# Patient Record
Sex: FEMALE | Race: WHITE | Employment: OTHER | URBAN - METROPOLITAN AREA
[De-identification: names, ages, dates, MRNs, and addresses within clinical notes are randomized per-mention and may not be internally consistent; named-entity substitution may affect disease eponyms.]

---

## 2017-01-16 DIAGNOSIS — Z12.31 ENCOUNTER FOR SCREENING MAMMOGRAM FOR BREAST CANCER: Primary | ICD-10-CM

## 2017-01-30 ENCOUNTER — TELEPHONE (OUTPATIENT)
Dept: INTERNAL MEDICINE | Age: 72
End: 2017-01-30

## 2017-01-30 DIAGNOSIS — N18.30 CHRONIC KIDNEY DISEASE, STAGE III (MODERATE) (HCC): ICD-10-CM

## 2017-01-30 DIAGNOSIS — N17.9 ACUTE KIDNEY FAILURE, UNSPECIFIED (HCC): Primary | ICD-10-CM

## 2017-02-10 ENCOUNTER — HOSPITAL ENCOUNTER (OUTPATIENT)
Dept: LAB | Age: 72
Discharge: HOME | End: 2017-02-10
Payer: MEDICARE

## 2017-02-10 LAB
ALBUMIN SERPL-MCNC: 4.5 G/DL (ref 3.9–4.9)
ANION GAP SERPL CALCULATED.3IONS-SCNC: 13 MEQ/L (ref 7–13)
BACTERIA: NORMAL /HPF
BILIRUBIN URINE: NEGATIVE
BLOOD, URINE: NEGATIVE
BUN BLDV-MCNC: 34 MG/DL (ref 8–23)
CALCIUM SERPL-MCNC: 9.6 MG/DL (ref 8.6–10.2)
CHLORIDE BLD-SCNC: 101 MEQ/L (ref 98–107)
CLARITY: CLEAR
CO2: 25 MEQ/L (ref 22–29)
COLOR: YELLOW
CREAT SERPL-MCNC: 1.04 MG/DL (ref 0.5–0.9)
CREATININE URINE: 90.3 MG/DL
EPITHELIAL CELLS, UA: NORMAL /HPF
GFR AFRICAN AMERICAN: >60
GFR NON-AFRICAN AMERICAN: 52.3
GLUCOSE BLD-MCNC: 122 MG/DL (ref 74–109)
GLUCOSE URINE: NEGATIVE MG/DL
KETONES, URINE: NEGATIVE MG/DL
LEUKOCYTE ESTERASE, URINE: NEGATIVE
NITRITE, URINE: NEGATIVE
PH UA: 6 (ref 5–9)
PHOSPHORUS: 4 MG/DL (ref 2.5–4.5)
POTASSIUM SERPL-SCNC: 3.9 MEQ/L (ref 3.5–5.1)
PROTEIN PROTEIN: 65 MG/DL
PROTEIN UA: 30 MG/DL
PROTEIN/CREAT RATIO: 0.7 ML/ML (ref 0–0.2)
RBC UA: NORMAL /HPF (ref 0–2)
SODIUM BLD-SCNC: 139 MEQ/L (ref 132–144)
SPECIFIC GRAVITY UA: 1.02 (ref 1–1.03)
UROBILINOGEN, URINE: 0.2 E.U./DL
WBC UA: NORMAL /HPF (ref 0–5)

## 2017-02-10 PROCEDURE — 36415 COLL VENOUS BLD VENIPUNCTURE: CPT

## 2017-02-10 PROCEDURE — 80069 RENAL FUNCTION PANEL: CPT

## 2017-02-10 PROCEDURE — 81001 URINALYSIS AUTO W/SCOPE: CPT

## 2017-02-10 PROCEDURE — 84156 ASSAY OF PROTEIN URINE: CPT

## 2017-04-04 DIAGNOSIS — Z12.31 ENCOUNTER FOR SCREENING MAMMOGRAM FOR BREAST CANCER: Primary | ICD-10-CM

## 2017-04-19 ENCOUNTER — TELEPHONE (OUTPATIENT)
Dept: FAMILY MEDICINE CLINIC | Age: 72
End: 2017-04-19

## 2017-05-05 ENCOUNTER — TELEPHONE (OUTPATIENT)
Dept: FAMILY MEDICINE CLINIC | Age: 72
End: 2017-05-05

## 2017-05-05 DIAGNOSIS — M54.30 SCIATIC NERVE PAIN, UNSPECIFIED LATERALITY: Primary | ICD-10-CM

## 2017-05-13 ENCOUNTER — HOSPITAL ENCOUNTER (INPATIENT)
Age: 72
LOS: 2 days | Discharge: HOME | DRG: 247 | End: 2017-05-15
Attending: EMERGENCY MEDICINE | Admitting: INTERNAL MEDICINE
Payer: MEDICARE

## 2017-05-13 ENCOUNTER — HOSPITAL ENCOUNTER (EMERGENCY)
Age: 72
Discharge: ANOTHER HOSPITAL | End: 2017-05-13
Attending: EMERGENCY MEDICINE
Payer: MEDICARE

## 2017-05-13 VITALS
HEART RATE: 63 BPM | TEMPERATURE: 97.8 F | SYSTOLIC BLOOD PRESSURE: 176 MMHG | RESPIRATION RATE: 18 BRPM | OXYGEN SATURATION: 62 % | BODY MASS INDEX: 40.35 KG/M2 | WEIGHT: 250 LBS | DIASTOLIC BLOOD PRESSURE: 83 MMHG

## 2017-05-13 DIAGNOSIS — I21.3 ACUTE ST ELEVATION MYOCARDIAL INFARCTION (STEMI), UNSPECIFIED ARTERY (HCC): Primary | ICD-10-CM

## 2017-05-13 PROBLEM — E66.01 MORBID OBESITY DUE TO EXCESS CALORIES (HCC): Status: ACTIVE | Noted: 2017-05-13

## 2017-05-13 PROBLEM — I21.21 ST ELEVATION MYOCARDIAL INFARCTION INVOLVING LEFT CIRCUMFLEX CORONARY ARTERY (HCC): Status: ACTIVE | Noted: 2017-05-13

## 2017-05-13 LAB
ANION GAP SERPL CALCULATED.3IONS-SCNC: 20 MEQ/L (ref 7–13)
BASOPHILS ABSOLUTE: 0 K/UL (ref 0–0.2)
BASOPHILS RELATIVE PERCENT: 0.5 %
BUN BLDV-MCNC: 31 MG/DL (ref 8–23)
CALCIUM SERPL-MCNC: 9.6 MG/DL (ref 8.6–10.2)
CHLORIDE BLD-SCNC: 102 MEQ/L (ref 98–107)
CO2: 21 MEQ/L (ref 22–29)
CREAT SERPL-MCNC: 1.02 MG/DL (ref 0.5–0.9)
EOSINOPHILS ABSOLUTE: 0.3 K/UL (ref 0–0.7)
EOSINOPHILS RELATIVE PERCENT: 3.2 %
GFR AFRICAN AMERICAN: >60
GFR NON-AFRICAN AMERICAN: 53.4
GLUCOSE BLD-MCNC: 133 MG/DL (ref 74–109)
HCT VFR BLD CALC: 39.5 % (ref 37–47)
HEMOGLOBIN: 13.2 G/DL (ref 12–16)
LYMPHOCYTES ABSOLUTE: 2 K/UL (ref 1–4.8)
LYMPHOCYTES RELATIVE PERCENT: 25.6 %
MAGNESIUM: 2.2 MG/DL (ref 1.7–2.3)
MCH RBC QN AUTO: 29.5 PG (ref 27–31.3)
MCHC RBC AUTO-ENTMCNC: 33.3 % (ref 33–37)
MCV RBC AUTO: 88.5 FL (ref 82–100)
MONOCYTES ABSOLUTE: 0.5 K/UL (ref 0.2–0.8)
MONOCYTES RELATIVE PERCENT: 6.7 %
NEUTROPHILS ABSOLUTE: 5.1 K/UL (ref 1.4–6.5)
NEUTROPHILS RELATIVE PERCENT: 64 %
PDW BLD-RTO: 13.4 % (ref 11.5–14.5)
PLATELET # BLD: 229 K/UL (ref 130–400)
POTASSIUM SERPL-SCNC: 4.5 MEQ/L (ref 3.5–5.1)
RBC # BLD: 4.46 M/UL (ref 4.2–5.4)
SODIUM BLD-SCNC: 143 MEQ/L (ref 132–144)
TROPONIN: <0.01 NG/ML (ref 0–0.01)
WBC # BLD: 7.9 K/UL (ref 4.8–10.8)

## 2017-05-13 PROCEDURE — B2111ZZ FLUOROSCOPY OF MULTIPLE CORONARY ARTERIES USING LOW OSMOLAR CONTRAST: ICD-10-PCS | Performed by: INTERNAL MEDICINE

## 2017-05-13 PROCEDURE — 36415 COLL VENOUS BLD VENIPUNCTURE: CPT

## 2017-05-13 PROCEDURE — 99223 1ST HOSP IP/OBS HIGH 75: CPT | Performed by: INTERNAL MEDICINE

## 2017-05-13 PROCEDURE — 83735 ASSAY OF MAGNESIUM: CPT

## 2017-05-13 PROCEDURE — 2580000003 HC RX 258: Performed by: INTERNAL MEDICINE

## 2017-05-13 PROCEDURE — 027034Z DILATION OF CORONARY ARTERY, ONE ARTERY WITH DRUG-ELUTING INTRALUMINAL DEVICE, PERCUTANEOUS APPROACH: ICD-10-PCS | Performed by: INTERNAL MEDICINE

## 2017-05-13 PROCEDURE — 92929 HC PRQ CARD STENT W/ANGIO ADDL: CPT | Performed by: INTERNAL MEDICINE

## 2017-05-13 PROCEDURE — 4A023N7 MEASUREMENT OF CARDIAC SAMPLING AND PRESSURE, LEFT HEART, PERCUTANEOUS APPROACH: ICD-10-PCS | Performed by: INTERNAL MEDICINE

## 2017-05-13 PROCEDURE — 2580000003 HC RX 258: Performed by: EMERGENCY MEDICINE

## 2017-05-13 PROCEDURE — C1874 STENT, COATED/COV W/DEL SYS: HCPCS

## 2017-05-13 PROCEDURE — C1726 CATH, BAL DIL, NON-VASCULAR: HCPCS

## 2017-05-13 PROCEDURE — 6370000000 HC RX 637 (ALT 250 FOR IP): Performed by: EMERGENCY MEDICINE

## 2017-05-13 PROCEDURE — 6360000002 HC RX W HCPCS: Performed by: INTERNAL MEDICINE

## 2017-05-13 PROCEDURE — 6370000000 HC RX 637 (ALT 250 FOR IP)

## 2017-05-13 PROCEDURE — 6370000000 HC RX 637 (ALT 250 FOR IP): Performed by: INTERNAL MEDICINE

## 2017-05-13 PROCEDURE — 2500000003 HC RX 250 WO HCPCS: Performed by: EMERGENCY MEDICINE

## 2017-05-13 PROCEDURE — 96375 TX/PRO/DX INJ NEW DRUG ADDON: CPT

## 2017-05-13 PROCEDURE — 92941 PRQ TRLML REVSC TOT OCCL AMI: CPT | Performed by: INTERNAL MEDICINE

## 2017-05-13 PROCEDURE — 80048 BASIC METABOLIC PNL TOTAL CA: CPT

## 2017-05-13 PROCEDURE — B2151ZZ FLUOROSCOPY OF LEFT HEART USING LOW OSMOLAR CONTRAST: ICD-10-PCS | Performed by: INTERNAL MEDICINE

## 2017-05-13 PROCEDURE — 93458 L HRT ARTERY/VENTRICLE ANGIO: CPT | Performed by: INTERNAL MEDICINE

## 2017-05-13 PROCEDURE — C1769 GUIDE WIRE: HCPCS

## 2017-05-13 PROCEDURE — 2720000010 HC SURG SUPPLY STERILE

## 2017-05-13 PROCEDURE — C1894 INTRO/SHEATH, NON-LASER: HCPCS

## 2017-05-13 PROCEDURE — 84484 ASSAY OF TROPONIN QUANT: CPT

## 2017-05-13 PROCEDURE — 85025 COMPLETE CBC W/AUTO DIFF WBC: CPT

## 2017-05-13 PROCEDURE — 99285 EMERGENCY DEPT VISIT HI MDM: CPT

## 2017-05-13 PROCEDURE — 93005 ELECTROCARDIOGRAM TRACING: CPT

## 2017-05-13 PROCEDURE — 6360000002 HC RX W HCPCS

## 2017-05-13 PROCEDURE — C1887 CATHETER, GUIDING: HCPCS

## 2017-05-13 PROCEDURE — 6360000002 HC RX W HCPCS: Performed by: EMERGENCY MEDICINE

## 2017-05-13 PROCEDURE — 2000000000 HC ICU R&B

## 2017-05-13 PROCEDURE — 96365 THER/PROPH/DIAG IV INF INIT: CPT

## 2017-05-13 RX ORDER — MORPHINE SULFATE 4 MG/ML
INJECTION, SOLUTION INTRAMUSCULAR; INTRAVENOUS
Status: COMPLETED
Start: 2017-05-13 | End: 2017-05-13

## 2017-05-13 RX ORDER — METOPROLOL TARTRATE 5 MG/5ML
5 INJECTION INTRAVENOUS ONCE
Status: COMPLETED | OUTPATIENT
Start: 2017-05-13 | End: 2017-05-13

## 2017-05-13 RX ORDER — PANTOPRAZOLE SODIUM 40 MG/1
40 TABLET, DELAYED RELEASE ORAL DAILY
Status: DISCONTINUED | OUTPATIENT
Start: 2017-05-13 | End: 2017-05-15 | Stop reason: HOSPADM

## 2017-05-13 RX ORDER — ALBUTEROL SULFATE 90 UG/1
2 AEROSOL, METERED RESPIRATORY (INHALATION) EVERY 6 HOURS PRN
Status: DISCONTINUED | OUTPATIENT
Start: 2017-05-13 | End: 2017-05-14

## 2017-05-13 RX ORDER — MORPHINE SULFATE 2 MG/ML
2 INJECTION, SOLUTION INTRAMUSCULAR; INTRAVENOUS ONCE
Status: DISCONTINUED | OUTPATIENT
Start: 2017-05-13 | End: 2017-05-13

## 2017-05-13 RX ORDER — CLOPIDOGREL 300 MG/1
300 TABLET, FILM COATED ORAL ONCE
Status: COMPLETED | OUTPATIENT
Start: 2017-05-13 | End: 2017-05-13

## 2017-05-13 RX ORDER — SODIUM CHLORIDE 0.9 % (FLUSH) 0.9 %
10 SYRINGE (ML) INJECTION EVERY 12 HOURS SCHEDULED
Status: DISCONTINUED | OUTPATIENT
Start: 2017-05-13 | End: 2017-05-15 | Stop reason: HOSPADM

## 2017-05-13 RX ORDER — ACETAMINOPHEN 325 MG/1
650 TABLET ORAL EVERY 4 HOURS PRN
Status: DISCONTINUED | OUTPATIENT
Start: 2017-05-13 | End: 2017-05-15 | Stop reason: HOSPADM

## 2017-05-13 RX ORDER — ASPIRIN 81 MG/1
324 TABLET, CHEWABLE ORAL ONCE
Status: COMPLETED | OUTPATIENT
Start: 2017-05-13 | End: 2017-05-13

## 2017-05-13 RX ORDER — ATORVASTATIN CALCIUM 80 MG/1
80 TABLET, FILM COATED ORAL NIGHTLY
Status: DISCONTINUED | OUTPATIENT
Start: 2017-05-13 | End: 2017-05-15 | Stop reason: HOSPADM

## 2017-05-13 RX ORDER — CLOPIDOGREL BISULFATE 75 MG/1
75 TABLET ORAL DAILY
Status: DISCONTINUED | OUTPATIENT
Start: 2017-05-14 | End: 2017-05-15 | Stop reason: HOSPADM

## 2017-05-13 RX ORDER — HYDRALAZINE HYDROCHLORIDE 20 MG/ML
10 INJECTION INTRAMUSCULAR; INTRAVENOUS EVERY 10 MIN PRN
Status: COMPLETED | OUTPATIENT
Start: 2017-05-13 | End: 2017-05-13

## 2017-05-13 RX ORDER — NITROGLYCERIN 0.4 MG/1
TABLET SUBLINGUAL
Status: COMPLETED
Start: 2017-05-13 | End: 2017-05-13

## 2017-05-13 RX ORDER — ONDANSETRON 2 MG/ML
4 INJECTION INTRAMUSCULAR; INTRAVENOUS EVERY 6 HOURS PRN
Status: DISCONTINUED | OUTPATIENT
Start: 2017-05-13 | End: 2017-05-15 | Stop reason: HOSPADM

## 2017-05-13 RX ORDER — SODIUM CHLORIDE 9 MG/ML
INJECTION, SOLUTION INTRAVENOUS CONTINUOUS
Status: DISPENSED | OUTPATIENT
Start: 2017-05-13 | End: 2017-05-14

## 2017-05-13 RX ORDER — LOSARTAN POTASSIUM AND HYDROCHLOROTHIAZIDE 12.5; 5 MG/1; MG/1
2 TABLET ORAL DAILY
Status: DISCONTINUED | OUTPATIENT
Start: 2017-05-13 | End: 2017-05-15 | Stop reason: HOSPADM

## 2017-05-13 RX ORDER — ASPIRIN 81 MG/1
324 TABLET, CHEWABLE ORAL DAILY
Status: DISCONTINUED | OUTPATIENT
Start: 2017-05-14 | End: 2017-05-15 | Stop reason: HOSPADM

## 2017-05-13 RX ORDER — MORPHINE SULFATE 4 MG/ML
4 INJECTION, SOLUTION INTRAMUSCULAR; INTRAVENOUS ONCE
Status: DISCONTINUED | OUTPATIENT
Start: 2017-05-13 | End: 2017-05-13 | Stop reason: HOSPADM

## 2017-05-13 RX ORDER — SODIUM CHLORIDE 0.9 % (FLUSH) 0.9 %
10 SYRINGE (ML) INJECTION PRN
Status: DISCONTINUED | OUTPATIENT
Start: 2017-05-13 | End: 2017-05-15 | Stop reason: HOSPADM

## 2017-05-13 RX ORDER — 0.9 % SODIUM CHLORIDE 0.9 %
1000 INTRAVENOUS SOLUTION INTRAVENOUS ONCE
Status: COMPLETED | OUTPATIENT
Start: 2017-05-13 | End: 2017-05-13

## 2017-05-13 RX ORDER — HYDROMORPHONE HCL 110MG/55ML
2 PATIENT CONTROLLED ANALGESIA SYRINGE INTRAVENOUS ONCE
Status: DISCONTINUED | OUTPATIENT
Start: 2017-05-13 | End: 2017-05-13 | Stop reason: HOSPADM

## 2017-05-13 RX ORDER — NITROGLYCERIN 20 MG/100ML
5 INJECTION INTRAVENOUS CONTINUOUS
Status: DISCONTINUED | OUTPATIENT
Start: 2017-05-13 | End: 2017-05-13 | Stop reason: HOSPADM

## 2017-05-13 RX ORDER — TRAMADOL HYDROCHLORIDE 50 MG/1
25 TABLET ORAL EVERY 6 HOURS PRN
Status: DISCONTINUED | OUTPATIENT
Start: 2017-05-13 | End: 2017-05-15 | Stop reason: HOSPADM

## 2017-05-13 RX ORDER — HEPARIN SODIUM 5000 [USP'U]/ML
5000 INJECTION, SOLUTION INTRAVENOUS; SUBCUTANEOUS ONCE
Status: COMPLETED | OUTPATIENT
Start: 2017-05-13 | End: 2017-05-13

## 2017-05-13 RX ADMIN — NITROGLYCERIN 10 MCG/MIN: 20 INJECTION INTRAVENOUS at 14:34

## 2017-05-13 RX ADMIN — LOSARTAN POTASSIUM AND HYDROCHLOROTHIAZIDE 2 TABLET: 12.5; 5 TABLET ORAL at 20:47

## 2017-05-13 RX ADMIN — SODIUM CHLORIDE: 9 INJECTION, SOLUTION INTRAVENOUS at 20:48

## 2017-05-13 RX ADMIN — HYDRALAZINE HYDROCHLORIDE 10 MG: 20 INJECTION INTRAMUSCULAR; INTRAVENOUS at 21:36

## 2017-05-13 RX ADMIN — METOPROLOL TARTRATE 5 MG: 5 INJECTION, SOLUTION INTRAVENOUS at 14:35

## 2017-05-13 RX ADMIN — CLOPIDOGREL BISULFATE 300 MG: 300 TABLET, FILM COATED ORAL at 14:33

## 2017-05-13 RX ADMIN — HYDRALAZINE HYDROCHLORIDE 10 MG: 20 INJECTION INTRAMUSCULAR; INTRAVENOUS at 23:37

## 2017-05-13 RX ADMIN — Medication 10 ML: at 20:48

## 2017-05-13 RX ADMIN — SODIUM CHLORIDE 1000 ML: 9 INJECTION, SOLUTION INTRAVENOUS at 14:31

## 2017-05-13 RX ADMIN — MORPHINE SULFATE: 4 INJECTION, SOLUTION INTRAMUSCULAR; INTRAVENOUS at 14:27

## 2017-05-13 RX ADMIN — NITROGLYCERIN: 0.4 TABLET SUBLINGUAL at 14:20

## 2017-05-13 RX ADMIN — PANTOPRAZOLE SODIUM 40 MG: 40 TABLET, DELAYED RELEASE ORAL at 20:46

## 2017-05-13 RX ADMIN — METOPROLOL TARTRATE 25 MG: 25 TABLET, FILM COATED ORAL at 20:47

## 2017-05-13 RX ADMIN — ATORVASTATIN CALCIUM 80 MG: 80 TABLET, FILM COATED ORAL at 20:52

## 2017-05-13 RX ADMIN — ASPIRIN 81 MG 324 MG: 81 TABLET ORAL at 14:29

## 2017-05-13 RX ADMIN — HEPARIN SODIUM 5000 UNITS: 5000 INJECTION, SOLUTION INTRAVENOUS; SUBCUTANEOUS at 14:29

## 2017-05-13 ASSESSMENT — ENCOUNTER SYMPTOMS
COLOR CHANGE: 0
ABDOMINAL DISTENTION: 0
HEMOPTYSIS: 0
COUGH: 0
SHORTNESS OF BREATH: 1
ABDOMINAL PAIN: 0
ABDOMINAL PAIN: 0
SORE THROAT: 0
RHINORRHEA: 0
PHOTOPHOBIA: 0
BACK PAIN: 0
NAUSEA: 0
APNEA: 0
NAUSEA: 0
GASTROINTESTINAL NEGATIVE: 1
EYES NEGATIVE: 1
EYE PAIN: 0
SINUS PRESSURE: 0
DIARRHEA: 0
WHEEZING: 0
WHEEZING: 0
VOMITING: 0
VOMITING: 0
SHORTNESS OF BREATH: 1
CONSTIPATION: 0
ORTHOPNEA: 0

## 2017-05-13 ASSESSMENT — PAIN SCALES - GENERAL
PAINLEVEL_OUTOF10: 5
PAINLEVEL_OUTOF10: 8
PAINLEVEL_OUTOF10: 0
PAINLEVEL_OUTOF10: 8
PAINLEVEL_OUTOF10: 10

## 2017-05-14 LAB
ANION GAP SERPL CALCULATED.3IONS-SCNC: 13 MEQ/L (ref 7–13)
BUN BLDV-MCNC: 27 MG/DL (ref 8–23)
CALCIUM SERPL-MCNC: 9.1 MG/DL (ref 8.6–10.2)
CHLORIDE BLD-SCNC: 103 MEQ/L (ref 98–107)
CHOLESTEROL, TOTAL: 261 MG/DL (ref 0–199)
CO2: 22 MEQ/L (ref 22–29)
CREAT SERPL-MCNC: 0.8 MG/DL (ref 0.5–0.9)
GFR AFRICAN AMERICAN: >60
GFR NON-AFRICAN AMERICAN: >60
GLUCOSE BLD-MCNC: 124 MG/DL (ref 74–109)
HCT VFR BLD CALC: 37.2 % (ref 37–47)
HDLC SERPL-MCNC: 48 MG/DL (ref 40–59)
HEMOGLOBIN: 11.8 G/DL (ref 12–16)
LDL CHOLESTEROL CALCULATED: 167 MG/DL (ref 0–129)
MCH RBC QN AUTO: 28.9 PG (ref 27–31.3)
MCHC RBC AUTO-ENTMCNC: 31.8 % (ref 33–37)
MCV RBC AUTO: 90.8 FL (ref 82–100)
PDW BLD-RTO: 13.5 % (ref 11.5–14.5)
PLATELET # BLD: 160 K/UL (ref 130–400)
POTASSIUM SERPL-SCNC: 4.4 MEQ/L (ref 3.5–5.1)
RBC # BLD: 4.09 M/UL (ref 4.2–5.4)
SODIUM BLD-SCNC: 138 MEQ/L (ref 132–144)
TRIGL SERPL-MCNC: 231 MG/DL (ref 0–200)
WBC # BLD: 7.5 K/UL (ref 4.8–10.8)

## 2017-05-14 PROCEDURE — 2580000003 HC RX 258: Performed by: INTERNAL MEDICINE

## 2017-05-14 PROCEDURE — 36415 COLL VENOUS BLD VENIPUNCTURE: CPT

## 2017-05-14 PROCEDURE — 6370000000 HC RX 637 (ALT 250 FOR IP): Performed by: INTERNAL MEDICINE

## 2017-05-14 PROCEDURE — 99232 SBSQ HOSP IP/OBS MODERATE 35: CPT | Performed by: INTERNAL MEDICINE

## 2017-05-14 PROCEDURE — 2060000000 HC ICU INTERMEDIATE R&B

## 2017-05-14 PROCEDURE — 93005 ELECTROCARDIOGRAM TRACING: CPT

## 2017-05-14 PROCEDURE — 80048 BASIC METABOLIC PNL TOTAL CA: CPT

## 2017-05-14 PROCEDURE — 85027 COMPLETE CBC AUTOMATED: CPT

## 2017-05-14 PROCEDURE — 80061 LIPID PANEL: CPT

## 2017-05-14 RX ORDER — ALBUTEROL SULFATE 90 UG/1
2 AEROSOL, METERED RESPIRATORY (INHALATION) EVERY 4 HOURS PRN
Status: DISCONTINUED | OUTPATIENT
Start: 2017-05-14 | End: 2017-05-15 | Stop reason: HOSPADM

## 2017-05-14 RX ADMIN — Medication 10 ML: at 08:11

## 2017-05-14 RX ADMIN — METOPROLOL TARTRATE 25 MG: 25 TABLET, FILM COATED ORAL at 21:20

## 2017-05-14 RX ADMIN — ACETAMINOPHEN 650 MG: 325 TABLET ORAL at 18:54

## 2017-05-14 RX ADMIN — Medication 10 ML: at 21:20

## 2017-05-14 RX ADMIN — ATORVASTATIN CALCIUM 80 MG: 80 TABLET, FILM COATED ORAL at 21:20

## 2017-05-14 RX ADMIN — METOPROLOL TARTRATE 25 MG: 25 TABLET, FILM COATED ORAL at 08:10

## 2017-05-14 RX ADMIN — ASPIRIN 81 MG CHEWABLE TABLET 324 MG: 81 TABLET CHEWABLE at 08:09

## 2017-05-14 RX ADMIN — PANTOPRAZOLE SODIUM 40 MG: 40 TABLET, DELAYED RELEASE ORAL at 08:10

## 2017-05-14 RX ADMIN — TRAMADOL HYDROCHLORIDE 25 MG: 50 TABLET, FILM COATED ORAL at 00:07

## 2017-05-14 RX ADMIN — LOSARTAN POTASSIUM AND HYDROCHLOROTHIAZIDE 2 TABLET: 12.5; 5 TABLET ORAL at 08:10

## 2017-05-14 RX ADMIN — CLOPIDOGREL BISULFATE 75 MG: 75 TABLET, FILM COATED ORAL at 08:10

## 2017-05-14 RX ADMIN — TRAMADOL HYDROCHLORIDE 25 MG: 50 TABLET, FILM COATED ORAL at 18:54

## 2017-05-14 RX ADMIN — SODIUM CHLORIDE: 9 INJECTION, SOLUTION INTRAVENOUS at 06:40

## 2017-05-14 RX ADMIN — ACETAMINOPHEN 650 MG: 325 TABLET ORAL at 00:06

## 2017-05-14 ASSESSMENT — PAIN DESCRIPTION - LOCATION
LOCATION: BACK

## 2017-05-14 ASSESSMENT — PAIN DESCRIPTION - DIRECTION: RADIATING_TOWARDS: RIGHT LEG

## 2017-05-14 ASSESSMENT — PAIN SCALES - GENERAL
PAINLEVEL_OUTOF10: 0
PAINLEVEL_OUTOF10: 1
PAINLEVEL_OUTOF10: 0
PAINLEVEL_OUTOF10: 1
PAINLEVEL_OUTOF10: 8
PAINLEVEL_OUTOF10: 0
PAINLEVEL_OUTOF10: 8
PAINLEVEL_OUTOF10: 0

## 2017-05-15 VITALS
HEART RATE: 92 BPM | WEIGHT: 250 LBS | OXYGEN SATURATION: 100 % | BODY MASS INDEX: 40.18 KG/M2 | HEIGHT: 66 IN | TEMPERATURE: 99.3 F | RESPIRATION RATE: 18 BRPM | SYSTOLIC BLOOD PRESSURE: 177 MMHG | DIASTOLIC BLOOD PRESSURE: 71 MMHG

## 2017-05-15 LAB
LV EF: 65 %
LVEF MODALITY: NORMAL

## 2017-05-15 PROCEDURE — 99238 HOSP IP/OBS DSCHRG MGMT 30/<: CPT | Performed by: NURSE PRACTITIONER

## 2017-05-15 PROCEDURE — 93306 TTE W/DOPPLER COMPLETE: CPT

## 2017-05-15 PROCEDURE — 93005 ELECTROCARDIOGRAM TRACING: CPT

## 2017-05-15 PROCEDURE — 6370000000 HC RX 637 (ALT 250 FOR IP): Performed by: INTERNAL MEDICINE

## 2017-05-15 PROCEDURE — 2580000003 HC RX 258: Performed by: INTERNAL MEDICINE

## 2017-05-15 RX ORDER — CLOPIDOGREL BISULFATE 75 MG/1
75 TABLET ORAL DAILY
Qty: 30 TABLET | Refills: 11 | Status: SHIPPED | OUTPATIENT
Start: 2017-05-15 | End: 2018-05-01 | Stop reason: SDUPTHER

## 2017-05-15 RX ORDER — ASPIRIN 81 MG/1
81 TABLET ORAL DAILY
Qty: 30 TABLET | Refills: 3 | Status: ON HOLD | OUTPATIENT
Start: 2017-05-15 | End: 2020-12-24 | Stop reason: HOSPADM

## 2017-05-15 RX ORDER — NITROGLYCERIN 0.4 MG/1
0.4 TABLET SUBLINGUAL EVERY 5 MIN PRN
Qty: 25 TABLET | Refills: 3 | Status: SHIPPED | OUTPATIENT
Start: 2017-05-15

## 2017-05-15 RX ORDER — ATORVASTATIN CALCIUM 80 MG/1
40 TABLET, FILM COATED ORAL NIGHTLY
Qty: 30 TABLET | Refills: 3 | Status: SHIPPED | OUTPATIENT
Start: 2017-05-15 | End: 2018-01-09 | Stop reason: SDUPTHER

## 2017-05-15 RX ADMIN — TRAMADOL HYDROCHLORIDE 25 MG: 50 TABLET, FILM COATED ORAL at 05:38

## 2017-05-15 RX ADMIN — ASPIRIN 81 MG CHEWABLE TABLET 324 MG: 81 TABLET CHEWABLE at 08:14

## 2017-05-15 RX ADMIN — METOPROLOL TARTRATE 25 MG: 25 TABLET, FILM COATED ORAL at 08:14

## 2017-05-15 RX ADMIN — ACETAMINOPHEN 650 MG: 325 TABLET ORAL at 05:38

## 2017-05-15 RX ADMIN — Medication 10 ML: at 08:14

## 2017-05-15 RX ADMIN — CLOPIDOGREL BISULFATE 75 MG: 75 TABLET, FILM COATED ORAL at 08:14

## 2017-05-15 RX ADMIN — LOSARTAN POTASSIUM AND HYDROCHLOROTHIAZIDE 2 TABLET: 12.5; 5 TABLET ORAL at 08:14

## 2017-05-15 RX ADMIN — PANTOPRAZOLE SODIUM 40 MG: 40 TABLET, DELAYED RELEASE ORAL at 08:14

## 2017-05-15 ASSESSMENT — PAIN SCALES - GENERAL
PAINLEVEL_OUTOF10: 8
PAINLEVEL_OUTOF10: 0
PAINLEVEL_OUTOF10: 0

## 2017-05-16 LAB
EKG ATRIAL RATE: 64 BPM
EKG ATRIAL RATE: 89 BPM
EKG P AXIS: 60 DEGREES
EKG P AXIS: 66 DEGREES
EKG P-R INTERVAL: 162 MS
EKG P-R INTERVAL: 172 MS
EKG Q-T INTERVAL: 414 MS
EKG Q-T INTERVAL: 474 MS
EKG QRS DURATION: 160 MS
EKG QRS DURATION: 170 MS
EKG QTC CALCULATION (BAZETT): 489 MS
EKG QTC CALCULATION (BAZETT): 503 MS
EKG R AXIS: 31 DEGREES
EKG R AXIS: 76 DEGREES
EKG T AXIS: -1 DEGREES
EKG T AXIS: -30 DEGREES
EKG VENTRICULAR RATE: 64 BPM
EKG VENTRICULAR RATE: 89 BPM

## 2017-05-17 LAB — LV EF: 43 %

## 2017-06-06 ENCOUNTER — OFFICE VISIT (OUTPATIENT)
Dept: CARDIOLOGY | Age: 72
End: 2017-06-06

## 2017-06-06 VITALS
RESPIRATION RATE: 14 BRPM | BODY MASS INDEX: 43.42 KG/M2 | HEIGHT: 66 IN | HEART RATE: 58 BPM | DIASTOLIC BLOOD PRESSURE: 72 MMHG | OXYGEN SATURATION: 98 % | WEIGHT: 270.2 LBS | SYSTOLIC BLOOD PRESSURE: 158 MMHG

## 2017-06-06 DIAGNOSIS — I10 ESSENTIAL HYPERTENSION: ICD-10-CM

## 2017-06-06 DIAGNOSIS — I21.21 ST ELEVATION MYOCARDIAL INFARCTION INVOLVING LEFT CIRCUMFLEX CORONARY ARTERY (HCC): Primary | ICD-10-CM

## 2017-06-06 DIAGNOSIS — E78.2 MIXED HYPERLIPIDEMIA: ICD-10-CM

## 2017-06-06 DIAGNOSIS — E66.01 MORBID OBESITY DUE TO EXCESS CALORIES (HCC): ICD-10-CM

## 2017-06-06 PROCEDURE — 99213 OFFICE O/P EST LOW 20 MIN: CPT | Performed by: INTERNAL MEDICINE

## 2017-06-06 RX ORDER — DILTIAZEM HYDROCHLORIDE 240 MG/1
240 CAPSULE, COATED, EXTENDED RELEASE ORAL DAILY
Refills: 3 | COMMUNITY
Start: 2017-04-03 | End: 2018-01-02 | Stop reason: SDUPTHER

## 2017-06-08 LAB
EKG ATRIAL RATE: 50 BPM
EKG ATRIAL RATE: 55 BPM
EKG ATRIAL RATE: 59 BPM
EKG P AXIS: 16 DEGREES
EKG P AXIS: 19 DEGREES
EKG P AXIS: 63 DEGREES
EKG P-R INTERVAL: 150 MS
EKG P-R INTERVAL: 158 MS
EKG P-R INTERVAL: 174 MS
EKG Q-T INTERVAL: 482 MS
EKG Q-T INTERVAL: 482 MS
EKG Q-T INTERVAL: 488 MS
EKG QRS DURATION: 158 MS
EKG QRS DURATION: 158 MS
EKG QRS DURATION: 164 MS
EKG QTC CALCULATION (BAZETT): 439 MS
EKG QTC CALCULATION (BAZETT): 466 MS
EKG QTC CALCULATION (BAZETT): 477 MS
EKG R AXIS: 67 DEGREES
EKG R AXIS: 69 DEGREES
EKG R AXIS: 72 DEGREES
EKG T AXIS: 53 DEGREES
EKG T AXIS: 71 DEGREES
EKG T AXIS: 71 DEGREES
EKG VENTRICULAR RATE: 50 BPM
EKG VENTRICULAR RATE: 55 BPM
EKG VENTRICULAR RATE: 59 BPM

## 2017-06-12 ENCOUNTER — HOSPITAL ENCOUNTER (OUTPATIENT)
Dept: CARDIAC CATH/INVASIVE PROCEDURES | Age: 72
Discharge: HOME | End: 2017-06-12
Attending: INTERNAL MEDICINE | Admitting: INTERNAL MEDICINE
Payer: MEDICARE

## 2017-06-12 VITALS
HEART RATE: 53 BPM | WEIGHT: 270 LBS | BODY MASS INDEX: 43.39 KG/M2 | DIASTOLIC BLOOD PRESSURE: 76 MMHG | TEMPERATURE: 97 F | SYSTOLIC BLOOD PRESSURE: 185 MMHG | HEIGHT: 66 IN | RESPIRATION RATE: 20 BRPM

## 2017-06-12 LAB
ANION GAP SERPL CALCULATED.3IONS-SCNC: 15 MEQ/L (ref 7–13)
BUN BLDV-MCNC: 31 MG/DL (ref 8–23)
CALCIUM SERPL-MCNC: 9.3 MG/DL (ref 8.6–10.2)
CHLORIDE BLD-SCNC: 103 MEQ/L (ref 98–107)
CO2: 21 MEQ/L (ref 22–29)
CREAT SERPL-MCNC: 0.97 MG/DL (ref 0.5–0.9)
GFR AFRICAN AMERICAN: >60
GFR NON-AFRICAN AMERICAN: 56.6
GLUCOSE BLD-MCNC: 115 MG/DL (ref 74–109)
HCT VFR BLD CALC: 37.3 % (ref 37–47)
HEMOGLOBIN: 12 G/DL (ref 12–16)
MCH RBC QN AUTO: 28.6 PG (ref 27–31.3)
MCHC RBC AUTO-ENTMCNC: 32.1 % (ref 33–37)
MCV RBC AUTO: 89.1 FL (ref 82–100)
PDW BLD-RTO: 14.2 % (ref 11.5–14.5)
PLATELET # BLD: 168 K/UL (ref 130–400)
POTASSIUM SERPL-SCNC: 4.2 MEQ/L (ref 3.5–5.1)
RBC # BLD: 4.19 M/UL (ref 4.2–5.4)
SODIUM BLD-SCNC: 139 MEQ/L (ref 132–144)
WBC # BLD: 5.5 K/UL (ref 4.8–10.8)

## 2017-06-12 PROCEDURE — 2500000003 HC RX 250 WO HCPCS

## 2017-06-12 PROCEDURE — 6360000002 HC RX W HCPCS

## 2017-06-12 PROCEDURE — 80048 BASIC METABOLIC PNL TOTAL CA: CPT

## 2017-06-12 PROCEDURE — 85027 COMPLETE CBC AUTOMATED: CPT

## 2017-06-12 PROCEDURE — 2720000001 HC MISC SURG SUPPLY STERILE $51-500

## 2017-06-12 PROCEDURE — C1874 STENT, COATED/COV W/DEL SYS: HCPCS

## 2017-06-12 PROCEDURE — 2580000003 HC RX 258

## 2017-06-12 PROCEDURE — C1769 GUIDE WIRE: HCPCS

## 2017-06-12 PROCEDURE — C1894 INTRO/SHEATH, NON-LASER: HCPCS

## 2017-06-12 PROCEDURE — 92928 PRQ TCAT PLMT NTRAC ST 1 LES: CPT | Performed by: INTERNAL MEDICINE

## 2017-06-12 PROCEDURE — 2720000010 HC SURG SUPPLY STERILE

## 2017-06-12 PROCEDURE — C1726 CATH, BAL DIL, NON-VASCULAR: HCPCS

## 2017-06-12 PROCEDURE — C1725 CATH, TRANSLUMIN NON-LASER: HCPCS

## 2017-06-12 RX ORDER — ONDANSETRON 2 MG/ML
4 INJECTION INTRAMUSCULAR; INTRAVENOUS EVERY 6 HOURS PRN
Status: CANCELLED | OUTPATIENT
Start: 2017-06-12

## 2017-06-12 RX ORDER — SODIUM CHLORIDE 9 MG/ML
INJECTION, SOLUTION INTRAVENOUS CONTINUOUS
Status: DISCONTINUED | OUTPATIENT
Start: 2017-06-12 | End: 2017-06-12 | Stop reason: HOSPADM

## 2017-06-12 RX ORDER — SODIUM CHLORIDE 9 MG/ML
INJECTION, SOLUTION INTRAVENOUS CONTINUOUS
Status: CANCELLED | OUTPATIENT
Start: 2017-06-12 | End: 2017-06-12

## 2017-06-12 RX ORDER — SODIUM CHLORIDE 0.9 % (FLUSH) 0.9 %
10 SYRINGE (ML) INJECTION EVERY 12 HOURS SCHEDULED
Status: DISCONTINUED | OUTPATIENT
Start: 2017-06-12 | End: 2017-06-12 | Stop reason: HOSPADM

## 2017-06-14 RX ORDER — TRAMADOL HYDROCHLORIDE 50 MG/1
TABLET ORAL
Refills: 1 | OUTPATIENT
Start: 2017-06-14

## 2017-07-11 ENCOUNTER — OFFICE VISIT (OUTPATIENT)
Dept: CARDIOLOGY | Age: 72
End: 2017-07-11

## 2017-07-11 VITALS
BODY MASS INDEX: 42.75 KG/M2 | DIASTOLIC BLOOD PRESSURE: 80 MMHG | HEART RATE: 63 BPM | HEIGHT: 66 IN | WEIGHT: 266 LBS | SYSTOLIC BLOOD PRESSURE: 120 MMHG | OXYGEN SATURATION: 96 %

## 2017-07-11 DIAGNOSIS — E66.01 MORBID OBESITY DUE TO EXCESS CALORIES (HCC): Primary | ICD-10-CM

## 2017-07-11 DIAGNOSIS — I10 ESSENTIAL HYPERTENSION: ICD-10-CM

## 2017-07-11 DIAGNOSIS — E78.2 MIXED HYPERLIPIDEMIA: ICD-10-CM

## 2017-07-11 DIAGNOSIS — I25.2 HISTORY OF ST ELEVATION MYOCARDIAL INFARCTION (STEMI): ICD-10-CM

## 2017-07-11 PROBLEM — I21.21 ST ELEVATION MYOCARDIAL INFARCTION INVOLVING LEFT CIRCUMFLEX CORONARY ARTERY (HCC): Status: RESOLVED | Noted: 2017-05-13 | Resolved: 2017-07-11

## 2017-07-11 PROCEDURE — 99213 OFFICE O/P EST LOW 20 MIN: CPT | Performed by: INTERNAL MEDICINE

## 2017-08-18 ENCOUNTER — HOSPITAL ENCOUNTER (OUTPATIENT)
Dept: LAB | Age: 72
Discharge: HOME | End: 2017-08-18
Payer: MEDICARE

## 2017-08-18 LAB
ALBUMIN SERPL-MCNC: 4.7 G/DL (ref 3.9–4.9)
ANION GAP SERPL CALCULATED.3IONS-SCNC: 17 MEQ/L (ref 7–13)
BUN BLDV-MCNC: 36 MG/DL (ref 8–23)
CALCIUM SERPL-MCNC: 9.8 MG/DL (ref 8.6–10.2)
CHLORIDE BLD-SCNC: 106 MEQ/L (ref 98–107)
CO2: 23 MEQ/L (ref 22–29)
CREAT SERPL-MCNC: 0.99 MG/DL (ref 0.5–0.9)
GFR AFRICAN AMERICAN: >60
GFR NON-AFRICAN AMERICAN: 55.3
GLUCOSE BLD-MCNC: 112 MG/DL (ref 74–109)
PARATHYROID HORMONE INTACT: 58.5 PG/ML (ref 15–65)
PHOSPHORUS: 4.3 MG/DL (ref 2.5–4.5)
POTASSIUM SERPL-SCNC: 4.8 MEQ/L (ref 3.5–5.1)
SODIUM BLD-SCNC: 146 MEQ/L (ref 132–144)
VITAMIN D 25-HYDROXY: 36.8 NG/ML (ref 30–100)

## 2017-08-18 PROCEDURE — 36415 COLL VENOUS BLD VENIPUNCTURE: CPT

## 2017-08-18 PROCEDURE — 80069 RENAL FUNCTION PANEL: CPT

## 2017-08-18 PROCEDURE — 82306 VITAMIN D 25 HYDROXY: CPT

## 2017-08-18 PROCEDURE — 83970 ASSAY OF PARATHORMONE: CPT

## 2017-09-25 DIAGNOSIS — I10 ESSENTIAL HYPERTENSION: ICD-10-CM

## 2017-09-25 RX ORDER — LOSARTAN POTASSIUM AND HYDROCHLOROTHIAZIDE 25; 100 MG/1; MG/1
TABLET ORAL
Qty: 30 TABLET | Refills: 0 | Status: SHIPPED | OUTPATIENT
Start: 2017-09-25 | End: 2017-09-27 | Stop reason: SDUPTHER

## 2017-09-27 ENCOUNTER — OFFICE VISIT (OUTPATIENT)
Dept: FAMILY MEDICINE CLINIC | Age: 72
End: 2017-09-27

## 2017-09-27 VITALS
HEART RATE: 68 BPM | SYSTOLIC BLOOD PRESSURE: 128 MMHG | BODY MASS INDEX: 43.07 KG/M2 | DIASTOLIC BLOOD PRESSURE: 72 MMHG | HEIGHT: 66 IN | RESPIRATION RATE: 14 BRPM | WEIGHT: 268 LBS

## 2017-09-27 DIAGNOSIS — M25.562 CHRONIC PAIN OF BOTH KNEES: Primary | ICD-10-CM

## 2017-09-27 DIAGNOSIS — M25.561 CHRONIC PAIN OF BOTH KNEES: Primary | ICD-10-CM

## 2017-09-27 DIAGNOSIS — Z00.00 ROUTINE GENERAL MEDICAL EXAMINATION AT A HEALTH CARE FACILITY: Primary | ICD-10-CM

## 2017-09-27 DIAGNOSIS — I10 ESSENTIAL HYPERTENSION: ICD-10-CM

## 2017-09-27 DIAGNOSIS — G89.29 CHRONIC PAIN OF BOTH KNEES: Primary | ICD-10-CM

## 2017-09-27 PROCEDURE — G0438 PPPS, INITIAL VISIT: HCPCS | Performed by: PHYSICIAN ASSISTANT

## 2017-09-27 RX ORDER — LOSARTAN POTASSIUM AND HYDROCHLOROTHIAZIDE 25; 100 MG/1; MG/1
TABLET ORAL
Qty: 90 TABLET | Refills: 1 | Status: SHIPPED | OUTPATIENT
Start: 2017-09-27 | End: 2018-04-30 | Stop reason: SDUPTHER

## 2017-09-27 ASSESSMENT — ANXIETY QUESTIONNAIRES: GAD7 TOTAL SCORE: 0

## 2017-09-27 ASSESSMENT — PATIENT HEALTH QUESTIONNAIRE - PHQ9: SUM OF ALL RESPONSES TO PHQ QUESTIONS 1-9: 0

## 2017-09-27 ASSESSMENT — LIFESTYLE VARIABLES: HOW OFTEN DO YOU HAVE A DRINK CONTAINING ALCOHOL: 0

## 2017-10-20 ENCOUNTER — HOSPITAL ENCOUNTER (OUTPATIENT)
Dept: GENERAL RADIOLOGY | Age: 72
Discharge: HOME | End: 2017-10-20
Payer: MEDICARE

## 2017-10-20 DIAGNOSIS — M25.562 LEFT KNEE PAIN, UNSPECIFIED CHRONICITY: ICD-10-CM

## 2017-10-20 DIAGNOSIS — M25.561 RIGHT KNEE PAIN, UNSPECIFIED CHRONICITY: ICD-10-CM

## 2017-10-20 PROCEDURE — 73564 X-RAY EXAM KNEE 4 OR MORE: CPT

## 2018-01-02 DIAGNOSIS — I21.21 ST ELEVATION MYOCARDIAL INFARCTION INVOLVING LEFT CIRCUMFLEX CORONARY ARTERY (HCC): ICD-10-CM

## 2018-01-02 RX ORDER — DILTIAZEM HYDROCHLORIDE 240 MG/1
240 CAPSULE, COATED, EXTENDED RELEASE ORAL DAILY
Qty: 90 CAPSULE | Refills: 3 | Status: SHIPPED | OUTPATIENT
Start: 2018-01-02 | End: 2018-10-30 | Stop reason: SDUPTHER

## 2018-01-02 NOTE — TELEPHONE ENCOUNTER
Medication: cardizem    Last office visit: 9/27/2017    Last labs: 5/14/2017    Last filled: 12/2/2017    Pt would like 90 day supply

## 2018-01-09 ENCOUNTER — OFFICE VISIT (OUTPATIENT)
Dept: CARDIOLOGY | Age: 73
End: 2018-01-09

## 2018-01-09 VITALS
WEIGHT: 283 LBS | OXYGEN SATURATION: 97 % | SYSTOLIC BLOOD PRESSURE: 138 MMHG | HEART RATE: 71 BPM | HEIGHT: 66 IN | BODY MASS INDEX: 45.48 KG/M2 | DIASTOLIC BLOOD PRESSURE: 80 MMHG

## 2018-01-09 DIAGNOSIS — E66.01 MORBID OBESITY DUE TO EXCESS CALORIES (HCC): Primary | ICD-10-CM

## 2018-01-09 DIAGNOSIS — I10 ESSENTIAL HYPERTENSION: ICD-10-CM

## 2018-01-09 DIAGNOSIS — E78.2 MIXED HYPERLIPIDEMIA: ICD-10-CM

## 2018-01-09 DIAGNOSIS — E66.01 MORBID OBESITY WITH BMI OF 45.0-49.9, ADULT (HCC): ICD-10-CM

## 2018-01-09 DIAGNOSIS — I25.2 HISTORY OF ST ELEVATION MYOCARDIAL INFARCTION (STEMI): ICD-10-CM

## 2018-01-09 PROCEDURE — 99213 OFFICE O/P EST LOW 20 MIN: CPT | Performed by: INTERNAL MEDICINE

## 2018-01-09 RX ORDER — ATORVASTATIN CALCIUM 80 MG/1
40 TABLET, FILM COATED ORAL NIGHTLY
Qty: 30 TABLET | Refills: 5 | Status: SHIPPED | OUTPATIENT
Start: 2018-01-09 | End: 2019-01-15 | Stop reason: SDUPTHER

## 2018-01-09 NOTE — PROGRESS NOTES
effusion. 7-11-17: s/p PCI of RCA at [facility]. Pt denies chest pain, dyspnea, dyspnea on exertion, change in exercise capacity, fatigue,  nausea, vomiting, diarrhea, constipation, motor weakness, insomnia, weight loss, syncope, dizziness, lightheadedness, palpitations, PND, orthopnea, or claudication. No nitro use. BP and hr are good. CAD is stable. No LE discoloration or ulcers. No LE edema. No CHF type symptoms. Lipid profile is normal.     1-9-18: Pt denies chest pain, dyspnea, dyspnea on exertion, change in exercise capacity, fatigue,  nausea, vomiting, diarrhea, constipation, motor weakness, insomnia, weight loss, syncope, dizziness, lightheadedness, palpitations, PND, orthopnea, or claudication. No nitro use. BP and hr are good. CAD is stable. No LE discoloration or ulcers. No LE edema. No CHF type symptoms. Lipid profile is normal.  No bleeding issues on DAPT. Compliant with meds.      Patient Active Problem List   Diagnosis    Hypothyroid    Essential hypertension    Hyperlipidemia    Morbid obesity due to excess calories ([address]    Morbid obesity due to excess calories ([address]    History of ST elevation myocardial infarction (STEMI)       Past Surgical History:   Procedure Laterality Date    COLONOSCOPY  01/14/2015    DR Royal Miramontes - DIVERTICULOSIS    CORONARY ANGIOPLASTY WITH STENT PLACEMENT  05/17/2017    x2 stents    FINGER SURGERY  12/9/14    middle finger right hand due to infection   [facility]  [id number]   [place]  2008    shoulder dislocated - popped  back in Right shoulder    UPPER GASTROINTESTINAL ENDOSCOPY  01/14/2015    DR LANE - ULCER IN THE ANTRUM    UPPER GASTROINTESTINAL ENDOSCOPY  05/06/2015    DR Royal Miramontes - GASTRITIS, PREVIOUS ULCER HEALED       Social History     Social History    Marital status:      Spouse name: N/A    Number of children: N/A    Years of education: N/A     Social History Main Topics    Smoking status: Never Smoker    exertion  Gastrointestinal ROS: no abdominal pain, change in bowel habits, or black or bloody stools  Genito-Urinary ROS: no dysuria, trouble voiding, or hematuria  Musculoskeletal ROS: negative  Neurological ROS: no TIA or stroke symptoms  Dermatological ROS: negative    VITALS:  Blood pressure 138/80, pulse 71, height 5' 6\" (1.676 m), weight 283 lb (128.4 kg), last menstrual period 09/17/1996, SpO2 97 %, not currently breastfeeding. Body mass index is 45.68 kg/m². Physical Examination:  General appearance - alert, well appearing, and in no distress  Mental status - alert, oriented to person, place, and time  Neck - Neck is supple, no JVD or carotid bruits. No thyromegaly or adenopathy. Chest - clear to auscultation, no wheezes, rales or rhonchi, symmetric air entry  Heart - normal rate, regular rhythm, normal S1, S2, no murmurs, rubs, clicks or gallops  Abdomen - soft, nontender, nondistended, no masses or organomegaly  Neurological - alert, oriented, normal speech, no focal findings or movement disorder noted  Extremities - peripheral pulses normal, no pedal edema, no clubbing or cyanosis  Skin - normal coloration and turgor, no rashes, no suspicious skin lesions noted      No orders of the defined types were placed in this encounter. ASSESSMENT:    1. Morbid obesity due to excess calories ([address]     2. Essential hypertension     3. Mixed hyperlipidemia     4. History of ST elevation myocardial infarction (STEMI)           PLAN:     Patient will need to continue to follow up with you for their general medical care     As always, aggressive risk factor modification is strongly recommended. We should adhere to the [address] guidelines for HTN management and the NCEP ATP III guidelines for LDL-C management. Cardiac diet is always recommended with low fat, cholesterol, calories and sodium. Continue medications at current doses.      DAPT for 12 months    The proper use and anticipated side effects of proximal to mid RCA. S/p PCI to CX with 2 KEYSHA. Planned staging of RCA due to STEMI procedure. s/p ECHO. Conclusions   Summary   Normal mitral valve structure and function.   Normal aortic valve structure and function.   Normal tricuspid valve structure and function.   There is mild ( 1 +) tricuspid regurgitation with estimated RVSP of 38 mm   Hg.   Normal pulmonic valve structure and function.   Mildly dilated left atrium.   Left ventricular ejection fraction is visually estimated at 65%.   Impaired relaxation compatible with diastolic dysfunction. ( reversed E/A   ratio)   Mild concentric left ventricular hypertrophy.   Normal right atrium.   Normal right ventricle structure and function.   No evidence of pericardial effusion.   No evidence of pleural effusion. 7-11-17: s/p PCI of RCA at [facility]. Pt denies chest pain, dyspnea, dyspnea on exertion, change in exercise capacity, fatigue,  nausea, vomiting, diarrhea, constipation, motor weakness, insomnia, weight loss, syncope, dizziness, lightheadedness, palpitations, PND, orthopnea, or claudication. No nitro use. BP and hr are good. CAD is stable. No LE discoloration or ulcers. No LE edema. No CHF type symptoms. Lipid profile is abnormal. Diet and exercise could be better. No smoking.          Patient Active Problem List   Diagnosis    Hypothyroid    Essential hypertension    Hyperlipidemia    Morbid obesity due to excess calories ([address]    Morbid obesity due to excess calories ([address]    History of ST elevation myocardial infarction (STEMI)       Past Surgical History:   Procedure Laterality Date    COLONOSCOPY  01/14/2015    DR Jesenia Silvestre - DIVERTICULOSIS    CORONARY ANGIOPLASTY WITH STENT PLACEMENT  05/17/2017    x2 stents    FINGER SURGERY  12/9/14    middle finger right hand due to infection   [facility]  [id number]   [place]  2008    shoulder dislocated - popped  back in Right shoulder    UPPER GASTROINTESTINAL ENDOSCOPY modification is strongly recommended. We should adhere to the [address] guidelines for HTN management and the NCEP ATP III guidelines for LDL-C management. Cardiac diet is always recommended with low fat, cholesterol, calories and sodium. Continue medications at current doses. DAPT for 12 months    Check EKG next office visit. Increase lipitor to 80mg nightly. Consider zetia if not at goal still. The proper use and anticipated side effects of nitroglycerine has been carefully explained. If a single episode of chest pain is not relieved by one tablet, the patient will try another within 5 minutes; and if this doesn't relieve the pain, the patient is instructed to call 911 for transportation to an emergency department. Patient was advised and encouraged to check blood pressure at home or at a pharmacy, maintain a logbook, and also call us back if blood pressure are above the target ranges or if it is low. Patient clearly understands and agrees to the instructions. We will need to continue to monitor muscle and liver enzymes, BUN, CR, and electrolytes. Thank you for allowing me to participate in the care of your patient, please don't hesitate to contact me if you have any further questions.

## 2018-04-30 DIAGNOSIS — I10 ESSENTIAL HYPERTENSION: ICD-10-CM

## 2018-04-30 RX ORDER — LOSARTAN POTASSIUM AND HYDROCHLOROTHIAZIDE 25; 100 MG/1; MG/1
TABLET ORAL
Qty: 90 TABLET | Refills: 1 | Status: SHIPPED | OUTPATIENT
Start: 2018-04-30 | End: 2018-10-24 | Stop reason: SDUPTHER

## 2018-05-02 RX ORDER — CLOPIDOGREL BISULFATE 75 MG/1
75 TABLET ORAL DAILY
Qty: 30 TABLET | Refills: 5 | Status: SHIPPED | OUTPATIENT
Start: 2018-05-02 | End: 2018-07-10

## 2018-07-10 ENCOUNTER — OFFICE VISIT (OUTPATIENT)
Dept: CARDIOLOGY CLINIC | Age: 73
End: 2018-07-10
Payer: MEDICARE

## 2018-07-10 VITALS
HEART RATE: 63 BPM | DIASTOLIC BLOOD PRESSURE: 80 MMHG | HEIGHT: 66 IN | WEIGHT: 283.4 LBS | BODY MASS INDEX: 45.55 KG/M2 | SYSTOLIC BLOOD PRESSURE: 120 MMHG

## 2018-07-10 DIAGNOSIS — E66.01 MORBID OBESITY DUE TO EXCESS CALORIES (HCC): ICD-10-CM

## 2018-07-10 DIAGNOSIS — I10 ESSENTIAL HYPERTENSION: Primary | ICD-10-CM

## 2018-07-10 DIAGNOSIS — I25.2 HISTORY OF ST ELEVATION MYOCARDIAL INFARCTION (STEMI): ICD-10-CM

## 2018-07-10 DIAGNOSIS — E78.2 MIXED HYPERLIPIDEMIA: ICD-10-CM

## 2018-07-10 DIAGNOSIS — I25.10 CORONARY ARTERY DISEASE INVOLVING NATIVE CORONARY ARTERY OF NATIVE HEART WITHOUT ANGINA PECTORIS: ICD-10-CM

## 2018-07-10 PROCEDURE — 99214 OFFICE O/P EST MOD 30 MIN: CPT | Performed by: INTERNAL MEDICINE

## 2018-07-10 PROCEDURE — 93000 ELECTROCARDIOGRAM COMPLETE: CPT | Performed by: INTERNAL MEDICINE

## 2018-07-10 NOTE — PROGRESS NOTES
evidence of pleural effusion. 7-11-17: s/p PCI of RCA at [facility]. Pt denies chest pain, dyspnea, dyspnea on exertion, change in exercise capacity, fatigue,  nausea, vomiting, diarrhea, constipation, motor weakness, insomnia, weight loss, syncope, dizziness, lightheadedness, palpitations, PND, orthopnea, or claudication. No nitro use. BP and hr are good. CAD is stable. No LE discoloration or ulcers. No LE edema. No CHF type symptoms. Lipid profile is normal.     1-9-18: Pt denies chest pain, dyspnea, dyspnea on exertion, change in exercise capacity, fatigue,  nausea, vomiting, diarrhea, constipation, motor weakness, insomnia, weight loss, syncope, dizziness, lightheadedness, palpitations, PND, orthopnea, or claudication. No nitro use. BP and hr are good. CAD is stable. No LE discoloration or ulcers. No LE edema. No CHF type symptoms. Lipid profile is normal.  No bleeding issues on DAPT. Compliant with meds. 7-10-18: doing well overall. On DAPT and no bleeding issues. Pt denies chest pain, dyspnea, dyspnea on exertion, change in exercise capacity, fatigue,  nausea, vomiting, diarrhea, constipation, motor weakness, insomnia, weight loss, syncope, dizziness, lightheadedness, palpitations, PND, orthopnea, or claudication. No nitro use. BP and hr are good. CAD is stable. No LE discoloration or ulcers. No LE edema. No CHF type symptoms. Lipid profile is normal. No recent hospitalization. No change in meds. ekg WITH NSR, RBBB. SHE NEEDS TO HAVE BACK WORK DONE. Did not tolerate full dose lipitor 80mg daily, caused her joint pain.        Patient Active Problem List   Diagnosis    Hypothyroid    Essential hypertension    Hyperlipidemia    Morbid obesity due to excess calories ([address]    Morbid obesity due to excess calories ([address]    History of ST elevation myocardial infarction (STEMI)    Coronary artery disease involving native coronary artery of native heart without angina pectoris       Past Surgical History: 3. Mixed hyperlipidemia     4. History of ST elevation myocardial infarction (STEMI)     5. Coronary artery disease involving native coronary artery of native heart without angina pectoris           PLAN:     Patient will need to continue to follow up with you for their general medical care     As always, aggressive risk factor modification is strongly recommended. We should adhere to the [address] guidelines for HTN management and the NCEP ATP III guidelines for LDL-C management. Cardiac diet is always recommended with low fat, cholesterol, calories and sodium. Continue medications at current doses. DAPT     The proper use and anticipated side effects of nitroglycerine has been carefully explained. If a single episode of chest pain is not relieved by one tablet, the patient will try another within 5 minutes; and if this doesn't relieve the pain, the patient is instructed to call 911 for transportation to an emergency department. Patient was advised and encouraged to check blood pressure at home or at a pharmacy, maintain a logbook, and also call us back if blood pressure are above the target ranges or if it is low. Patient clearly understands and agrees to the instructions. We will need to continue to monitor muscle and liver enzymes, BUN, CR, and electrolytes. Thank you for allowing me to participate in the care of your patient, please don't hesitate to contact me if you have any further questions. Chief Complaint   Patient presents with    Hypertension    Hyperlipidemia    6 Month Follow-Up       5-13-17: Hospital Course:   Kathlee Runner is a 80 y.o. female admitted to [facility] on 5/13/2017 for chest pain. After an EKG was done at [facility] was found to be having an acute MI patient was flown here by helicopter taken directly to cath lab to drug eluted stents were put in place ALP INE 3.5 mm x 23 mm and a 2.5 mm x 8 mm .  Tolerated procedure well denied smoking.          Patient Active Problem List   Diagnosis    Hypothyroid    Essential hypertension    Hyperlipidemia    Morbid obesity due to excess calories ([address]    Morbid obesity due to excess calories ([address]    History of ST elevation myocardial infarction (STEMI)    Coronary artery disease involving native coronary artery of native heart without angina pectoris       Past Surgical History:   Procedure Laterality Date    COLONOSCOPY  01/14/2015    DR LANE - DIVERTICULOSIS    CORONARY ANGIOPLASTY WITH STENT PLACEMENT  05/17/2017    x2 stents    FINGER SURGERY  12/9/14    middle finger right hand due to infection   [address] [facility]  [id number]   [employer] SHOULDER SURGERY  2008    shoulder dislocated - popped  back in Right shoulder    UPPER GASTROINTESTINAL ENDOSCOPY  01/14/2015    DR LANE - ULCER IN THE ANTRUM    UPPER GASTROINTESTINAL ENDOSCOPY  05/06/2015    DR LANE - GASTRITIS, PREVIOUS ULCER HEALED       Social History     Social History    Marital status:      Spouse name: N/A    Number of children: N/A    Years of education: N/A     Social History Main Topics    Smoking status: Never Smoker    Smokeless tobacco: Never Used    Alcohol use Yes      Comment: glass of wine once a year    Drug use: No    Sexual activity: Yes     Partners: Male     Other Topics Concern    Not on file     Social History Narrative    No narrative on file       Family History   Problem Relation Age of Onset    Heart Disease Mother 61    Cancer Father 79        kidney       Current Outpatient Prescriptions   Medication Sig Dispense Refill    metoprolol tartrate (LOPRESSOR) 25 MG tablet Take 1 tablet by mouth 2 times daily 180 tablet 3    losartan-hydrochlorothiazide (HYZAAR) 100-25 MG per tablet Take 1 tablet by mouth daily 90 tablet 1    atorvastatin (LIPITOR) 80 MG tablet Take 0.5 tablets by mouth nightly (Patient taking differently: Take 80 mg by mouth nightly ) 30 tablet 5    diltiazem no focal findings or movement disorder noted  Extremities - peripheral pulses normal, no pedal edema, no clubbing or cyanosis  Skin - normal coloration and turgor, no rashes, no suspicious skin lesions noted      Orders Placed This Encounter   Procedures    EKG 12 Lead       ASSESSMENT:     Diagnosis Orders   1. Essential hypertension  EKG 12 Lead   2. Morbid obesity due to excess calories ([address]     3. Mixed hyperlipidemia     4. History of ST elevation myocardial infarction (STEMI)     5. Coronary artery disease involving native coronary artery of native heart without angina pectoris           PLAN:     Patient will need to continue to follow up with you for their general medical care     As always, aggressive risk factor modification is strongly recommended. We should adhere to the [address] guidelines for HTN management and the NCEP ATP III guidelines for LDL-C management. Cardiac diet is always recommended with low fat, cholesterol, calories and sodium. Continue medications at current doses. Ok to stop Plavix. Cont with Asa 81MG DAILY. Check EKG     Consider Zetia if not at LDL targer. Or repatha. The proper use and anticipated side effects of nitroglycerine has been carefully explained. If a single episode of chest pain is not relieved by one tablet, the patient will try another within 5 minutes; and if this doesn't relieve the pain, the patient is instructed to call 911 for transportation to an emergency department. Patient was advised and encouraged to check blood pressure at home or at a pharmacy, maintain a logbook, and also call us back if blood pressure are above the target ranges or if it is low. Patient clearly understands and agrees to the instructions. We will need to continue to monitor muscle and liver enzymes, BUN, CR, and electrolytes.     Thank you for allowing me to participate in the care of your patient, please don't hesitate to contact me if you have any further

## 2018-08-28 ENCOUNTER — HOSPITAL ENCOUNTER (OUTPATIENT)
Dept: LAB | Age: 73
Discharge: HOME | End: 2018-08-28
Payer: MEDICARE

## 2018-08-28 LAB
ALBUMIN SERPL-MCNC: 4.3 G/DL (ref 3.9–4.9)
ANION GAP SERPL CALCULATED.3IONS-SCNC: 15 MEQ/L (ref 7–13)
BUN BLDV-MCNC: 28 MG/DL (ref 8–23)
CALCIUM SERPL-MCNC: 9.4 MG/DL (ref 8.6–10.2)
CHLORIDE BLD-SCNC: 101 MEQ/L (ref 98–107)
CO2: 25 MEQ/L (ref 22–29)
CREAT SERPL-MCNC: 1.04 MG/DL (ref 0.5–0.9)
GFR AFRICAN AMERICAN: >60
GFR NON-AFRICAN AMERICAN: 52.1
GLUCOSE BLD-MCNC: 113 MG/DL (ref 74–109)
PARATHYROID HORMONE INTACT: 81.6 PG/ML (ref 15–65)
PHOSPHORUS: 3.6 MG/DL (ref 2.5–4.5)
POTASSIUM SERPL-SCNC: 4.8 MEQ/L (ref 3.5–5.1)
SODIUM BLD-SCNC: 141 MEQ/L (ref 132–144)
VITAMIN D 25-HYDROXY: 31.1 NG/ML (ref 30–100)

## 2018-08-28 PROCEDURE — 36415 COLL VENOUS BLD VENIPUNCTURE: CPT

## 2018-08-28 PROCEDURE — 83970 ASSAY OF PARATHORMONE: CPT

## 2018-08-28 PROCEDURE — 80069 RENAL FUNCTION PANEL: CPT

## 2018-08-28 PROCEDURE — 82306 VITAMIN D 25 HYDROXY: CPT

## 2018-10-24 DIAGNOSIS — I10 ESSENTIAL HYPERTENSION: ICD-10-CM

## 2018-10-24 RX ORDER — LOSARTAN POTASSIUM AND HYDROCHLOROTHIAZIDE 25; 100 MG/1; MG/1
TABLET ORAL
Qty: 90 TABLET | Refills: 1 | Status: SHIPPED | OUTPATIENT
Start: 2018-10-24 | End: 2018-10-30 | Stop reason: SDUPTHER

## 2018-10-24 NOTE — TELEPHONE ENCOUNTER
Rx requested:  Requested Prescriptions     Pending Prescriptions Disp Refills    losartan-hydrochlorothiazide (HYZAAR) 100-25 MG per tablet 90 tablet 1     Sig: Take 1 tablet by mouth daily       Last Office Visit:   9/27/17- made an appointment with you for next week.    Last Labs:  8/28/18    Last filled:  4/30/18    Next Visit Date:  Future Appointments  Date Time Provider Alvarez Solo   10/30/2018 9:00 AM Laurita Grant MD [address] [place] [address]   1/15/2019 10:15 AM Jordna Gomes DO Fredyroslyn Saeed [facility] [id number]

## 2018-10-30 ENCOUNTER — HOSPITAL ENCOUNTER (OUTPATIENT)
Age: 73
Setting detail: SPECIMEN
Discharge: HOME | End: 2018-10-30
Payer: MEDICARE

## 2018-10-30 ENCOUNTER — OFFICE VISIT (OUTPATIENT)
Dept: FAMILY MEDICINE CLINIC | Age: 73
End: 2018-10-30
Payer: MEDICARE

## 2018-10-30 VITALS
OXYGEN SATURATION: 97 % | BODY MASS INDEX: 53.26 KG/M2 | WEIGHT: 289.4 LBS | SYSTOLIC BLOOD PRESSURE: 126 MMHG | TEMPERATURE: 97.6 F | HEART RATE: 71 BPM | DIASTOLIC BLOOD PRESSURE: 82 MMHG | HEIGHT: 62 IN

## 2018-10-30 DIAGNOSIS — Z23 ENCOUNTER FOR VACCINATION: ICD-10-CM

## 2018-10-30 DIAGNOSIS — M54.41 CHRONIC BILATERAL LOW BACK PAIN WITH BILATERAL SCIATICA: Primary | ICD-10-CM

## 2018-10-30 DIAGNOSIS — E03.9 HYPOTHYROIDISM, UNSPECIFIED TYPE: ICD-10-CM

## 2018-10-30 DIAGNOSIS — Z00.00 ROUTINE GENERAL MEDICAL EXAMINATION AT A HEALTH CARE FACILITY: ICD-10-CM

## 2018-10-30 DIAGNOSIS — I21.21 ST ELEVATION MYOCARDIAL INFARCTION INVOLVING LEFT CIRCUMFLEX CORONARY ARTERY (HCC): ICD-10-CM

## 2018-10-30 DIAGNOSIS — I10 ESSENTIAL HYPERTENSION: ICD-10-CM

## 2018-10-30 DIAGNOSIS — G89.29 CHRONIC BILATERAL LOW BACK PAIN WITH BILATERAL SCIATICA: Primary | ICD-10-CM

## 2018-10-30 DIAGNOSIS — M54.42 CHRONIC BILATERAL LOW BACK PAIN WITH BILATERAL SCIATICA: Primary | ICD-10-CM

## 2018-10-30 DIAGNOSIS — M17.0 PRIMARY OSTEOARTHRITIS OF BOTH KNEES: ICD-10-CM

## 2018-10-30 PROBLEM — E66.01 MORBID OBESITY DUE TO EXCESS CALORIES (HCC): Status: RESOLVED | Noted: 2017-06-06 | Resolved: 2018-10-30

## 2018-10-30 LAB
ALBUMIN SERPL-MCNC: 4.2 G/DL (ref 3.9–4.9)
ALP BLD-CCNC: 108 U/L (ref 40–130)
ALT SERPL-CCNC: 15 U/L (ref 0–33)
ANION GAP SERPL CALCULATED.3IONS-SCNC: 14 MEQ/L (ref 7–13)
AST SERPL-CCNC: 16 U/L (ref 0–35)
BILIRUB SERPL-MCNC: 0.4 MG/DL (ref 0–1.2)
BUN BLDV-MCNC: 24 MG/DL (ref 8–23)
CALCIUM SERPL-MCNC: 9.7 MG/DL (ref 8.6–10.2)
CHLORIDE BLD-SCNC: 103 MEQ/L (ref 98–107)
CHOLESTEROL, FASTING: 165 MG/DL (ref 0–199)
CO2: 25 MEQ/L (ref 22–29)
CREAT SERPL-MCNC: 1.21 MG/DL (ref 0.5–0.9)
GFR AFRICAN AMERICAN: 52.9
GFR NON-AFRICAN AMERICAN: 43.7
GLOBULIN: 2.6 G/DL (ref 2.3–3.5)
GLUCOSE FASTING: 113 MG/DL (ref 74–109)
HCT VFR BLD CALC: 37.6 % (ref 37–47)
HDLC SERPL-MCNC: 54 MG/DL (ref 40–59)
HEMOGLOBIN: 12.2 G/DL (ref 12–16)
LDL CHOLESTEROL CALCULATED: 80 MG/DL (ref 0–129)
MCH RBC QN AUTO: 29.4 PG (ref 27–31.3)
MCHC RBC AUTO-ENTMCNC: 32.6 % (ref 33–37)
MCV RBC AUTO: 90.2 FL (ref 82–100)
PDW BLD-RTO: 14.5 % (ref 11.5–14.5)
PLATELET # BLD: 184 K/UL (ref 130–400)
POTASSIUM SERPL-SCNC: 4.7 MEQ/L (ref 3.5–5.1)
RBC # BLD: 4.17 M/UL (ref 4.2–5.4)
SODIUM BLD-SCNC: 142 MEQ/L (ref 132–144)
TOTAL PROTEIN: 6.8 G/DL (ref 6.4–8.1)
TRIGLYCERIDE, FASTING: 153 MG/DL (ref 0–200)
TSH SERPL DL<=0.05 MIU/L-ACNC: 6.12 UIU/ML (ref 0.27–4.2)
WBC # BLD: 4.2 K/UL (ref 4.8–10.8)

## 2018-10-30 PROCEDURE — 80053 COMPREHEN METABOLIC PANEL: CPT

## 2018-10-30 PROCEDURE — 90670 PCV13 VACCINE IM: CPT | Performed by: FAMILY MEDICINE

## 2018-10-30 PROCEDURE — G0439 PPPS, SUBSEQ VISIT: HCPCS | Performed by: FAMILY MEDICINE

## 2018-10-30 PROCEDURE — 80061 LIPID PANEL: CPT

## 2018-10-30 PROCEDURE — G0009 ADMIN PNEUMOCOCCAL VACCINE: HCPCS | Performed by: FAMILY MEDICINE

## 2018-10-30 PROCEDURE — 85027 COMPLETE CBC AUTOMATED: CPT

## 2018-10-30 PROCEDURE — 84443 ASSAY THYROID STIM HORMONE: CPT

## 2018-10-30 RX ORDER — DILTIAZEM HYDROCHLORIDE 240 MG/1
240 CAPSULE, COATED, EXTENDED RELEASE ORAL DAILY
Qty: 90 CAPSULE | Refills: 3 | Status: SHIPPED | OUTPATIENT
Start: 2018-10-30 | End: 2019-12-26

## 2018-10-30 RX ORDER — LOSARTAN POTASSIUM AND HYDROCHLOROTHIAZIDE 25; 100 MG/1; MG/1
TABLET ORAL
Qty: 90 TABLET | Refills: 3 | Status: SHIPPED | OUTPATIENT
Start: 2018-10-30 | End: 2020-01-20

## 2018-10-30 ASSESSMENT — PATIENT HEALTH QUESTIONNAIRE - PHQ9
SUM OF ALL RESPONSES TO PHQ QUESTIONS 1-9: 0
SUM OF ALL RESPONSES TO PHQ QUESTIONS 1-9: 0

## 2018-10-30 ASSESSMENT — LIFESTYLE VARIABLES: HOW OFTEN DO YOU HAVE A DRINK CONTAINING ALCOHOL: 0

## 2018-10-30 ASSESSMENT — ANXIETY QUESTIONNAIRES: GAD7 TOTAL SCORE: 0

## 2018-10-30 NOTE — PROGRESS NOTES
Medicare Annual Wellness Visit  Name: Allen De La Torre Date: 10/30/2018   MRN: [id number] Sex: Female   Age: 68 y.o. Ethnicity: Non-/Non    : 1945 Race: Mary Green is here for Medicare AWV    Screenings for behavioral, psychosocial and functional/safety risks, and cognitive dysfunction are all negative except as indicated below. These results, as well as other patient data from the [address] form, are documented in Flowsheets linked to this Encounter. Allergies   Allergen Reactions    Lisinopril Other (See Comments)     cough    Tramadol Nausea Only     Prior to Visit Medications    Medication Sig Taking? Authorizing Provider   losartan-hydrochlorothiazide (HYZAAR) 100-25 MG per tablet Take 1 tablet by mouth daily Yes Berkley Burrows MD   metoprolol tartrate (LOPRESSOR) 25 MG tablet Take 1 tablet by mouth 2 times daily Yes Jordan Boland DO   atorvastatin (LIPITOR) 80 MG tablet Take 0.5 tablets by mouth nightly  Patient taking differently: Take 80 mg by mouth nightly  Yes Jordan Boland, DO   diltiazem (CARDIZEM CD) 240 MG extended release capsule Take 1 capsule by mouth daily Yes STACIA Lovell   nitroGLYCERIN (NITROSTAT) 0.4 MG SL tablet Place 1 tablet under the tongue every 5 minutes as needed for Chest pain Yes BEN Blake CNP   aspirin EC 81 MG EC tablet Take 1 tablet by mouth daily Yes BEN Blake CNP   acetaminophen (TYLENOL) 325 MG tablet Take 650 mg by mouth as needed for Pain Yes Historical Provider, MD   traMADol (ULTRAM) 50 MG tablet take 1 (ONE) tablet 3 (THREE) times a day as needed  Historical Provider, MD   diclofenac sodium (VOLTAREN) 1 % GEL Apply 2 g topically 2 times daily  STACIA Vásquez   CINNAMON PO Take  by mouth.  2000 mg. daily  Historical Provider, MD     Past Medical History:   Diagnosis Date    Antral ulcer 2015    DR Polo Vega    Asthma     \"cured by beestings\"    Coronary artery disease involving native coronary artery of native heart without angina pectoris 7/10/2018    Diverticulosis of colon (without mention of hemorrhage) 01/14/2015    DR Ramiro Carlson    Essential hypertension 12/10/2013    History of normal resting EKG 1996    normal    History of ST elevation myocardial infarction (STEMI) 7/11/2017    History of transfusion of whole blood 1996    Excessive bleeding before hysterectomy    Hyperlipidemia     Hypothyroidism     Morbid obesity due to excess calories ([address] 5/13/2017    Morbid obesity due to excess calories ([address] 6/6/2017    Osteoarthritis     Pneumonia     Shoulder dislocation     ST elevation myocardial infarction involving left circumflex coronary artery ([address] 5/13/2017    Unspecified gastritis and gastroduodenitis without mention of hemorrhage 05/06/2015    DR Ramiro Carlson     Past Surgical History:   Procedure Laterality Date    COLONOSCOPY  01/14/2015    DR LANE - DIVERTICULOSIS    CORONARY ANGIOPLASTY WITH STENT PLACEMENT  05/17/2017    x2 stents    FINGER SURGERY  12/9/14    middle finger right hand due to infection    HYSTERECTOMY  1996    LASIK  [id number]   [place]  2008    shoulder dislocated - popped  back in Right shoulder    UPPER GASTROINTESTINAL ENDOSCOPY  01/14/2015    DR LANE - ULCER IN THE ANTRUM    UPPER GASTROINTESTINAL ENDOSCOPY  05/06/2015    DR Ramiro Carlson - GASTRITIS, PREVIOUS ULCER HEALED     Family History   Problem Relation Age of Onset    Heart Disease Mother 61    Cancer Father 79        kidney       CareTeam (Including outside providers/suppliers regularly involved in providing care):   Patient Care Team:  STACIA Esparza as PCP - General (Physician Assistant)  STACIA Esparza as PCP - S Attributed Provider  Marie Burns MD as Consulting Physician (Orthopedic Surgery)  Gamal Crain MD (Gastroenterology)    Wt Readings from Last 3 Encounters:   10/30/18 289 lb 6.4 oz (131.3 kg)   07/10/18 283 lb 6.4

## 2018-10-31 ENCOUNTER — TELEPHONE (OUTPATIENT)
Dept: FAMILY MEDICINE CLINIC | Age: 73
End: 2018-10-31

## 2018-10-31 DIAGNOSIS — E03.9 HYPOTHYROIDISM, UNSPECIFIED TYPE: Primary | ICD-10-CM

## 2018-10-31 RX ORDER — LEVOTHYROXINE SODIUM 0.07 MG/1
75 TABLET ORAL DAILY
Qty: 30 TABLET | Refills: 3 | Status: SHIPPED | OUTPATIENT
Start: 2018-10-31 | End: 2020-12-08

## 2018-11-19 ENCOUNTER — OFFICE VISIT (OUTPATIENT)
Dept: FAMILY MEDICINE CLINIC | Age: 73
End: 2018-11-19
Payer: MEDICARE

## 2018-11-19 VITALS
HEIGHT: 62 IN | WEIGHT: 280.6 LBS | BODY MASS INDEX: 51.64 KG/M2 | RESPIRATION RATE: 16 BRPM | DIASTOLIC BLOOD PRESSURE: 80 MMHG | HEART RATE: 78 BPM | TEMPERATURE: 98.1 F | SYSTOLIC BLOOD PRESSURE: 132 MMHG | OXYGEN SATURATION: 96 %

## 2018-11-19 DIAGNOSIS — J22 LOWER RESPIRATORY INFECTION (E.G., BRONCHITIS, PNEUMONIA, PNEUMONITIS, PULMONITIS): Primary | ICD-10-CM

## 2018-11-19 PROCEDURE — 99214 OFFICE O/P EST MOD 30 MIN: CPT | Performed by: FAMILY MEDICINE

## 2018-11-19 RX ORDER — AZITHROMYCIN 250 MG/1
250 TABLET, FILM COATED ORAL SEE ADMIN INSTRUCTIONS
Qty: 6 TABLET | Refills: 0 | Status: SHIPPED | OUTPATIENT
Start: 2018-11-19 | End: 2018-11-24

## 2018-11-19 ASSESSMENT — ENCOUNTER SYMPTOMS
DIARRHEA: 0
SHORTNESS OF BREATH: 0
COUGH: 1
RHINORRHEA: 0
SORE THROAT: 0
ABDOMINAL PAIN: 0
WHEEZING: 0
CONSTIPATION: 0

## 2018-11-19 NOTE — PROGRESS NOTES
LASIK  [id number]    SHOULDER SURGERY  2008    shoulder dislocated - popped  back in Right shoulder    UPPER GASTROINTESTINAL ENDOSCOPY  01/14/2015    DR LANE - ULCER IN THE ANTRUM    UPPER GASTROINTESTINAL ENDOSCOPY  05/06/2015    DR Jhonny Dimas - GASTRITIS, PREVIOUS ULCER HEALED     Social History     Social History    Marital status:      Spouse name: N/A    Number of children: N/A    Years of education: N/A     Occupational History    Not on file.      Social History Main Topics    Smoking status: Never Smoker    Smokeless tobacco: Never Used    Alcohol use Yes      Comment: glass of wine once a year    Drug use: No    Sexual activity: Yes     Partners: Male     Other Topics Concern    Not on file     Social History Narrative    No narrative on file     Allergies   Allergen Reactions    Lisinopril Other (See Comments)     cough    Tramadol Nausea Only     Current Outpatient Prescriptions   Medication Sig Dispense Refill    azithromycin (ZITHROMAX) 250 MG tablet Take 1 tablet by mouth See Admin Instructions for 5 days 500mg on day 1 followed by 250mg on days 2 - 5 6 tablet 0    levothyroxine (SYNTHROID) 75 MCG tablet Take 1 tablet by mouth Daily 30 tablet 3    diltiazem (CARDIZEM CD) 240 MG extended release capsule Take 1 capsule by mouth daily 90 capsule 3    losartan-hydrochlorothiazide (HYZAAR) 100-25 MG per tablet Take 1 tablet by mouth daily 90 tablet 3    Handicap Placard MISC by Does not apply route x5 years 1 each 0    metoprolol tartrate (LOPRESSOR) 25 MG tablet Take 1 tablet by mouth 2 times daily 180 tablet 3    atorvastatin (LIPITOR) 80 MG tablet Take 0.5 tablets by mouth nightly (Patient taking differently: Take 80 mg by mouth nightly ) 30 tablet 5    nitroGLYCERIN (NITROSTAT) 0.4 MG SL tablet Place 1 tablet under the tongue every 5 minutes as needed for Chest pain 25 tablet 3    aspirin EC 81 MG EC tablet Take 1 tablet by mouth daily 30 tablet 3    traMADol (ULTRAM) 50 MG

## 2019-01-04 ENCOUNTER — OFFICE VISIT (OUTPATIENT)
Dept: FAMILY MEDICINE CLINIC | Age: 74
End: 2019-01-04
Payer: MEDICARE

## 2019-01-04 VITALS
DIASTOLIC BLOOD PRESSURE: 72 MMHG | WEIGHT: 285 LBS | OXYGEN SATURATION: 98 % | SYSTOLIC BLOOD PRESSURE: 138 MMHG | HEART RATE: 62 BPM | BODY MASS INDEX: 52.13 KG/M2

## 2019-01-04 DIAGNOSIS — H93.90 EAR LESION: Primary | ICD-10-CM

## 2019-01-04 DIAGNOSIS — E66.01 MORBID OBESITY WITH BMI OF 50.0-59.9, ADULT (HCC): ICD-10-CM

## 2019-01-04 PROCEDURE — 99213 OFFICE O/P EST LOW 20 MIN: CPT | Performed by: FAMILY MEDICINE

## 2019-01-04 ASSESSMENT — ENCOUNTER SYMPTOMS
SHORTNESS OF BREATH: 0
DIARRHEA: 0
CONSTIPATION: 0
WHEEZING: 0
RHINORRHEA: 0
ABDOMINAL PAIN: 0
SORE THROAT: 0
COUGH: 0

## 2019-01-15 ENCOUNTER — OFFICE VISIT (OUTPATIENT)
Dept: CARDIOLOGY CLINIC | Age: 74
End: 2019-01-15
Payer: MEDICARE

## 2019-01-15 VITALS
BODY MASS INDEX: 53.47 KG/M2 | HEART RATE: 78 BPM | HEIGHT: 62 IN | OXYGEN SATURATION: 97 % | SYSTOLIC BLOOD PRESSURE: 136 MMHG | WEIGHT: 290.6 LBS | DIASTOLIC BLOOD PRESSURE: 84 MMHG

## 2019-01-15 DIAGNOSIS — I10 ESSENTIAL HYPERTENSION: ICD-10-CM

## 2019-01-15 DIAGNOSIS — E66.01 MORBID OBESITY DUE TO EXCESS CALORIES (HCC): Primary | ICD-10-CM

## 2019-01-15 DIAGNOSIS — I25.2 HISTORY OF ST ELEVATION MYOCARDIAL INFARCTION (STEMI): ICD-10-CM

## 2019-01-15 DIAGNOSIS — I25.10 CORONARY ARTERY DISEASE INVOLVING NATIVE CORONARY ARTERY OF NATIVE HEART WITHOUT ANGINA PECTORIS: ICD-10-CM

## 2019-01-15 PROCEDURE — 99214 OFFICE O/P EST MOD 30 MIN: CPT | Performed by: INTERNAL MEDICINE

## 2019-01-15 RX ORDER — ATORVASTATIN CALCIUM 80 MG/1
40 TABLET, FILM COATED ORAL NIGHTLY
Qty: 90 TABLET | Refills: 3 | Status: SHIPPED | OUTPATIENT
Start: 2019-01-15 | End: 2020-03-10 | Stop reason: SDUPTHER

## 2019-01-22 PROBLEM — M43.10 DEGENERATIVE SPONDYLOLISTHESIS: Status: ACTIVE | Noted: 2019-01-22

## 2019-01-22 PROBLEM — M48.062 SPINAL STENOSIS OF LUMBAR REGION WITH NEUROGENIC CLAUDICATION: Status: ACTIVE | Noted: 2019-01-22

## 2019-01-31 ENCOUNTER — TELEPHONE (OUTPATIENT)
Dept: CARDIOLOGY CLINIC | Age: 74
End: 2019-01-31

## 2019-02-01 ENCOUNTER — HOSPITAL ENCOUNTER (OUTPATIENT)
Dept: PREADMISSION TESTING | Age: 74
Discharge: HOME | End: 2019-02-05
Payer: MEDICARE

## 2019-02-01 VITALS
RESPIRATION RATE: 16 BRPM | OXYGEN SATURATION: 98 % | TEMPERATURE: 97.1 F | HEART RATE: 72 BPM | HEIGHT: 63 IN | DIASTOLIC BLOOD PRESSURE: 73 MMHG | WEIGHT: 281 LBS | SYSTOLIC BLOOD PRESSURE: 196 MMHG | BODY MASS INDEX: 49.79 KG/M2

## 2019-02-01 DIAGNOSIS — D23.22 BENIGN NEOPLASM OF LEFT EAR: ICD-10-CM

## 2019-02-01 LAB
ANION GAP SERPL CALCULATED.3IONS-SCNC: 12 MEQ/L (ref 7–13)
BUN BLDV-MCNC: 34 MG/DL (ref 8–23)
CALCIUM SERPL-MCNC: 9.7 MG/DL (ref 8.6–10.2)
CHLORIDE BLD-SCNC: 105 MEQ/L (ref 98–107)
CO2: 27 MEQ/L (ref 22–29)
CREAT SERPL-MCNC: 1.18 MG/DL (ref 0.5–0.9)
EKG ATRIAL RATE: 62 BPM
EKG P AXIS: 48 DEGREES
EKG P-R INTERVAL: 178 MS
EKG Q-T INTERVAL: 482 MS
EKG QRS DURATION: 162 MS
EKG QTC CALCULATION (BAZETT): 489 MS
EKG R AXIS: 40 DEGREES
EKG T AXIS: 15 DEGREES
EKG VENTRICULAR RATE: 62 BPM
GFR AFRICAN AMERICAN: 54.4
GFR NON-AFRICAN AMERICAN: 45
GLUCOSE BLD-MCNC: 101 MG/DL (ref 74–109)
HCT VFR BLD CALC: 36.1 % (ref 37–47)
HEMOGLOBIN: 12.1 G/DL (ref 12–16)
MCH RBC QN AUTO: 30.6 PG (ref 27–31.3)
MCHC RBC AUTO-ENTMCNC: 33.7 % (ref 33–37)
MCV RBC AUTO: 90.9 FL (ref 82–100)
PDW BLD-RTO: 15.3 % (ref 11.5–14.5)
PLATELET # BLD: 168 K/UL (ref 130–400)
POTASSIUM SERPL-SCNC: 4.4 MEQ/L (ref 3.5–5.1)
RBC # BLD: 3.97 M/UL (ref 4.2–5.4)
SODIUM BLD-SCNC: 144 MEQ/L (ref 132–144)
WBC # BLD: 7.3 K/UL (ref 4.8–10.8)

## 2019-02-01 PROCEDURE — 85027 COMPLETE CBC AUTOMATED: CPT

## 2019-02-01 PROCEDURE — 80048 BASIC METABOLIC PNL TOTAL CA: CPT

## 2019-02-01 PROCEDURE — 93005 ELECTROCARDIOGRAM TRACING: CPT

## 2019-02-01 RX ORDER — SODIUM CHLORIDE 0.9 % (FLUSH) 0.9 %
10 SYRINGE (ML) INJECTION EVERY 12 HOURS SCHEDULED
Status: CANCELLED | OUTPATIENT
Start: 2019-02-07

## 2019-02-01 RX ORDER — SODIUM CHLORIDE 0.9 % (FLUSH) 0.9 %
10 SYRINGE (ML) INJECTION PRN
Status: CANCELLED | OUTPATIENT
Start: 2019-02-07

## 2019-02-01 RX ORDER — LIDOCAINE HYDROCHLORIDE 10 MG/ML
1 INJECTION, SOLUTION EPIDURAL; INFILTRATION; INTRACAUDAL; PERINEURAL
Status: CANCELLED | OUTPATIENT
Start: 2019-02-07 | End: 2019-02-07

## 2019-02-01 RX ORDER — SODIUM CHLORIDE, SODIUM LACTATE, POTASSIUM CHLORIDE, CALCIUM CHLORIDE 600; 310; 30; 20 MG/100ML; MG/100ML; MG/100ML; MG/100ML
INJECTION, SOLUTION INTRAVENOUS CONTINUOUS
Status: CANCELLED | OUTPATIENT
Start: 2019-02-07

## 2019-02-01 ASSESSMENT — ENCOUNTER SYMPTOMS
STRIDOR: 0
VOMITING: 0
SHORTNESS OF BREATH: 0
CHEST TIGHTNESS: 0
BACK PAIN: 0
TROUBLE SWALLOWING: 0
EYES NEGATIVE: 1
SORE THROAT: 0
COUGH: 0
CONSTIPATION: 0
ALLERGIC/IMMUNOLOGIC NEGATIVE: 1
ABDOMINAL PAIN: 0
DIARRHEA: 0
NAUSEA: 0
WHEEZING: 0

## 2019-02-07 ENCOUNTER — ANESTHESIA EVENT (OUTPATIENT)
Dept: OPERATING ROOM | Age: 74
End: 2019-02-07
Payer: MEDICARE

## 2019-02-07 ENCOUNTER — HOSPITAL ENCOUNTER (OUTPATIENT)
Age: 74
Setting detail: OUTPATIENT SURGERY
Discharge: HOME | End: 2019-02-07
Attending: OTOLARYNGOLOGY | Admitting: OTOLARYNGOLOGY
Payer: MEDICARE

## 2019-02-07 ENCOUNTER — ANESTHESIA (OUTPATIENT)
Dept: OPERATING ROOM | Age: 74
End: 2019-02-07
Payer: MEDICARE

## 2019-02-07 VITALS — DIASTOLIC BLOOD PRESSURE: 72 MMHG | OXYGEN SATURATION: 87 % | SYSTOLIC BLOOD PRESSURE: 158 MMHG

## 2019-02-07 VITALS
RESPIRATION RATE: 16 BRPM | TEMPERATURE: 97.2 F | SYSTOLIC BLOOD PRESSURE: 164 MMHG | OXYGEN SATURATION: 95 % | HEART RATE: 71 BPM | DIASTOLIC BLOOD PRESSURE: 78 MMHG

## 2019-02-07 DIAGNOSIS — D23.22 BENIGN NEOPLASM OF LEFT EAR: Primary | ICD-10-CM

## 2019-02-07 PROCEDURE — 7100000011 HC PHASE II RECOVERY - ADDTL 15 MIN: Performed by: OTOLARYNGOLOGY

## 2019-02-07 PROCEDURE — 88329 PATH CONSLTJ DRG SURG: CPT

## 2019-02-07 PROCEDURE — 6360000002 HC RX W HCPCS: Performed by: NURSE PRACTITIONER

## 2019-02-07 PROCEDURE — 6360000002 HC RX W HCPCS: Performed by: NURSE ANESTHETIST, CERTIFIED REGISTERED

## 2019-02-07 PROCEDURE — 3700000001 HC ADD 15 MINUTES (ANESTHESIA): Performed by: OTOLARYNGOLOGY

## 2019-02-07 PROCEDURE — 88305 TISSUE EXAM BY PATHOLOGIST: CPT

## 2019-02-07 PROCEDURE — 2709999900 HC NON-CHARGEABLE SUPPLY: Performed by: OTOLARYNGOLOGY

## 2019-02-07 PROCEDURE — 2500000003 HC RX 250 WO HCPCS: Performed by: OTOLARYNGOLOGY

## 2019-02-07 PROCEDURE — 3600000012 HC SURGERY LEVEL 2 ADDTL 15MIN: Performed by: OTOLARYNGOLOGY

## 2019-02-07 PROCEDURE — 2500000003 HC RX 250 WO HCPCS: Performed by: NURSE PRACTITIONER

## 2019-02-07 PROCEDURE — 88331 PATH CONSLTJ SURG 1 BLK 1SPC: CPT

## 2019-02-07 PROCEDURE — 7100000000 HC PACU RECOVERY - FIRST 15 MIN: Performed by: OTOLARYNGOLOGY

## 2019-02-07 PROCEDURE — 93010 ELECTROCARDIOGRAM REPORT: CPT | Performed by: INTERNAL MEDICINE

## 2019-02-07 PROCEDURE — 2580000003 HC RX 258: Performed by: NURSE PRACTITIONER

## 2019-02-07 PROCEDURE — 3600000002 HC SURGERY LEVEL 2 BASE: Performed by: OTOLARYNGOLOGY

## 2019-02-07 PROCEDURE — 3700000000 HC ANESTHESIA ATTENDED CARE: Performed by: OTOLARYNGOLOGY

## 2019-02-07 PROCEDURE — 7100000001 HC PACU RECOVERY - ADDTL 15 MIN: Performed by: OTOLARYNGOLOGY

## 2019-02-07 PROCEDURE — 7100000010 HC PHASE II RECOVERY - FIRST 15 MIN: Performed by: OTOLARYNGOLOGY

## 2019-02-07 PROCEDURE — 2580000003 HC RX 258: Performed by: OTOLARYNGOLOGY

## 2019-02-07 PROCEDURE — 2500000003 HC RX 250 WO HCPCS: Performed by: NURSE ANESTHETIST, CERTIFIED REGISTERED

## 2019-02-07 RX ORDER — SODIUM CHLORIDE 0.9 % (FLUSH) 0.9 %
10 SYRINGE (ML) INJECTION EVERY 12 HOURS SCHEDULED
Status: DISCONTINUED | OUTPATIENT
Start: 2019-02-07 | End: 2019-02-07 | Stop reason: HOSPADM

## 2019-02-07 RX ORDER — SODIUM CHLORIDE 0.9 % (FLUSH) 0.9 %
10 SYRINGE (ML) INJECTION PRN
Status: DISCONTINUED | OUTPATIENT
Start: 2019-02-07 | End: 2019-02-07 | Stop reason: HOSPADM

## 2019-02-07 RX ORDER — LIDOCAINE HYDROCHLORIDE AND EPINEPHRINE BITARTRATE 20; .01 MG/ML; MG/ML
INJECTION, SOLUTION SUBCUTANEOUS PRN
Status: DISCONTINUED | OUTPATIENT
Start: 2019-02-07 | End: 2019-02-07 | Stop reason: ALTCHOICE

## 2019-02-07 RX ORDER — HYDROCODONE BITARTRATE AND ACETAMINOPHEN 5; 325 MG/1; MG/1
1 TABLET ORAL EVERY 4 HOURS PRN
Qty: 20 TABLET | Refills: 0 | Status: SHIPPED | OUTPATIENT
Start: 2019-02-07 | End: 2019-02-14

## 2019-02-07 RX ORDER — METOCLOPRAMIDE HYDROCHLORIDE 5 MG/ML
10 INJECTION INTRAMUSCULAR; INTRAVENOUS
Status: DISCONTINUED | OUTPATIENT
Start: 2019-02-07 | End: 2019-02-07 | Stop reason: HOSPADM

## 2019-02-07 RX ORDER — FENTANYL CITRATE 50 UG/ML
INJECTION, SOLUTION INTRAMUSCULAR; INTRAVENOUS PRN
Status: DISCONTINUED | OUTPATIENT
Start: 2019-02-07 | End: 2019-02-07 | Stop reason: SDUPTHER

## 2019-02-07 RX ORDER — MEPERIDINE HYDROCHLORIDE 25 MG/ML
12.5 INJECTION INTRAMUSCULAR; INTRAVENOUS; SUBCUTANEOUS EVERY 5 MIN PRN
Status: DISCONTINUED | OUTPATIENT
Start: 2019-02-07 | End: 2019-02-07 | Stop reason: HOSPADM

## 2019-02-07 RX ORDER — LIDOCAINE HYDROCHLORIDE 10 MG/ML
1 INJECTION, SOLUTION EPIDURAL; INFILTRATION; INTRACAUDAL; PERINEURAL
Status: COMPLETED | OUTPATIENT
Start: 2019-02-07 | End: 2019-02-07

## 2019-02-07 RX ORDER — ROCURONIUM BROMIDE 10 MG/ML
INJECTION, SOLUTION INTRAVENOUS PRN
Status: DISCONTINUED | OUTPATIENT
Start: 2019-02-07 | End: 2019-02-07 | Stop reason: SDUPTHER

## 2019-02-07 RX ORDER — PROPOFOL 10 MG/ML
INJECTION, EMULSION INTRAVENOUS PRN
Status: DISCONTINUED | OUTPATIENT
Start: 2019-02-07 | End: 2019-02-07 | Stop reason: SDUPTHER

## 2019-02-07 RX ORDER — LIDOCAINE HYDROCHLORIDE 20 MG/ML
INJECTION, SOLUTION INFILTRATION; PERINEURAL PRN
Status: DISCONTINUED | OUTPATIENT
Start: 2019-02-07 | End: 2019-02-07 | Stop reason: SDUPTHER

## 2019-02-07 RX ORDER — ONDANSETRON 2 MG/ML
4 INJECTION INTRAMUSCULAR; INTRAVENOUS EVERY 6 HOURS PRN
Status: DISCONTINUED | OUTPATIENT
Start: 2019-02-07 | End: 2019-02-07 | Stop reason: HOSPADM

## 2019-02-07 RX ORDER — MIDAZOLAM HYDROCHLORIDE 1 MG/ML
INJECTION INTRAMUSCULAR; INTRAVENOUS PRN
Status: DISCONTINUED | OUTPATIENT
Start: 2019-02-07 | End: 2019-02-07 | Stop reason: SDUPTHER

## 2019-02-07 RX ORDER — ONDANSETRON 2 MG/ML
INJECTION INTRAMUSCULAR; INTRAVENOUS PRN
Status: DISCONTINUED | OUTPATIENT
Start: 2019-02-07 | End: 2019-02-07 | Stop reason: SDUPTHER

## 2019-02-07 RX ORDER — ACETAMINOPHEN 325 MG/1
650 TABLET ORAL EVERY 4 HOURS PRN
Status: DISCONTINUED | OUTPATIENT
Start: 2019-02-07 | End: 2019-02-07 | Stop reason: HOSPADM

## 2019-02-07 RX ORDER — DIPHENHYDRAMINE HYDROCHLORIDE 50 MG/ML
12.5 INJECTION INTRAMUSCULAR; INTRAVENOUS
Status: DISCONTINUED | OUTPATIENT
Start: 2019-02-07 | End: 2019-02-07 | Stop reason: HOSPADM

## 2019-02-07 RX ORDER — MAGNESIUM HYDROXIDE 1200 MG/15ML
LIQUID ORAL CONTINUOUS PRN
Status: COMPLETED | OUTPATIENT
Start: 2019-02-07 | End: 2019-02-07

## 2019-02-07 RX ORDER — FENTANYL CITRATE 50 UG/ML
50 INJECTION, SOLUTION INTRAMUSCULAR; INTRAVENOUS EVERY 10 MIN PRN
Status: DISCONTINUED | OUTPATIENT
Start: 2019-02-07 | End: 2019-02-07 | Stop reason: HOSPADM

## 2019-02-07 RX ORDER — HYDROCODONE BITARTRATE AND ACETAMINOPHEN 5; 325 MG/1; MG/1
1 TABLET ORAL PRN
Status: DISCONTINUED | OUTPATIENT
Start: 2019-02-07 | End: 2019-02-07 | Stop reason: HOSPADM

## 2019-02-07 RX ORDER — HYDROCODONE BITARTRATE AND ACETAMINOPHEN 5; 325 MG/1; MG/1
2 TABLET ORAL PRN
Status: DISCONTINUED | OUTPATIENT
Start: 2019-02-07 | End: 2019-02-07 | Stop reason: HOSPADM

## 2019-02-07 RX ORDER — ONDANSETRON 2 MG/ML
4 INJECTION INTRAMUSCULAR; INTRAVENOUS
Status: DISCONTINUED | OUTPATIENT
Start: 2019-02-07 | End: 2019-02-07 | Stop reason: HOSPADM

## 2019-02-07 RX ORDER — SODIUM CHLORIDE, SODIUM LACTATE, POTASSIUM CHLORIDE, CALCIUM CHLORIDE 600; 310; 30; 20 MG/100ML; MG/100ML; MG/100ML; MG/100ML
INJECTION, SOLUTION INTRAVENOUS CONTINUOUS
Status: DISCONTINUED | OUTPATIENT
Start: 2019-02-07 | End: 2019-02-07 | Stop reason: HOSPADM

## 2019-02-07 RX ORDER — CEPHALEXIN 250 MG/1
250 CAPSULE ORAL 3 TIMES DAILY
Qty: 30 CAPSULE | Refills: 0 | Status: SHIPPED | OUTPATIENT
Start: 2019-02-07 | End: 2019-02-17

## 2019-02-07 RX ORDER — DEXTROSE, SODIUM CHLORIDE, AND POTASSIUM CHLORIDE 5; .45; .15 G/100ML; G/100ML; G/100ML
INJECTION INTRAVENOUS CONTINUOUS
Status: DISCONTINUED | OUTPATIENT
Start: 2019-02-07 | End: 2019-02-07

## 2019-02-07 RX ADMIN — FENTANYL CITRATE 50 MCG: 50 INJECTION, SOLUTION INTRAMUSCULAR; INTRAVENOUS at 12:25

## 2019-02-07 RX ADMIN — SODIUM CHLORIDE, POTASSIUM CHLORIDE, SODIUM LACTATE AND CALCIUM CHLORIDE: 600; 310; 30; 20 INJECTION, SOLUTION INTRAVENOUS at 08:55

## 2019-02-07 RX ADMIN — MIDAZOLAM HYDROCHLORIDE 2 MG: 1 INJECTION, SOLUTION INTRAMUSCULAR; INTRAVENOUS at 10:05

## 2019-02-07 RX ADMIN — FENTANYL CITRATE 50 MCG: 50 INJECTION, SOLUTION INTRAMUSCULAR; INTRAVENOUS at 10:14

## 2019-02-07 RX ADMIN — ROCURONIUM BROMIDE 40 MG: 50 INJECTION, SOLUTION INTRAVENOUS at 10:14

## 2019-02-07 RX ADMIN — ONDANSETRON 4 MG: 2 INJECTION INTRAMUSCULAR; INTRAVENOUS at 12:05

## 2019-02-07 RX ADMIN — SODIUM CHLORIDE, POTASSIUM CHLORIDE, SODIUM LACTATE AND CALCIUM CHLORIDE: 600; 310; 30; 20 INJECTION, SOLUTION INTRAVENOUS at 11:20

## 2019-02-07 RX ADMIN — LIDOCAINE HYDROCHLORIDE 0.1 ML: 10 INJECTION, SOLUTION EPIDURAL; INFILTRATION; INTRACAUDAL; PERINEURAL at 08:54

## 2019-02-07 RX ADMIN — SUGAMMADEX 200 MG: 100 INJECTION, SOLUTION INTRAVENOUS at 12:05

## 2019-02-07 RX ADMIN — PROPOFOL 150 MG: 10 INJECTION, EMULSION INTRAVENOUS at 10:14

## 2019-02-07 RX ADMIN — CEFAZOLIN 3 G: 10 INJECTION, POWDER, FOR SOLUTION INTRAVENOUS; PARENTERAL at 10:10

## 2019-02-07 RX ADMIN — LIDOCAINE HYDROCHLORIDE 60 MG: 20 INJECTION, SOLUTION INFILTRATION; PERINEURAL at 10:14

## 2019-02-07 ASSESSMENT — PULMONARY FUNCTION TESTS
PIF_VALUE: 24
PIF_VALUE: 22
PIF_VALUE: 24
PIF_VALUE: 23
PIF_VALUE: 23
PIF_VALUE: 24
PIF_VALUE: 22
PIF_VALUE: 2
PIF_VALUE: 24
PIF_VALUE: 22
PIF_VALUE: 24
PIF_VALUE: 2
PIF_VALUE: 24
PIF_VALUE: 22
PIF_VALUE: 22
PIF_VALUE: 23
PIF_VALUE: 2
PIF_VALUE: 22
PIF_VALUE: 24
PIF_VALUE: 22
PIF_VALUE: 23
PIF_VALUE: 2
PIF_VALUE: 22
PIF_VALUE: 2
PIF_VALUE: 1
PIF_VALUE: 24
PIF_VALUE: 22
PIF_VALUE: 24
PIF_VALUE: 22
PIF_VALUE: 24
PIF_VALUE: 22
PIF_VALUE: 22
PIF_VALUE: 24
PIF_VALUE: 23
PIF_VALUE: 24
PIF_VALUE: 22
PIF_VALUE: 2
PIF_VALUE: 22
PIF_VALUE: 2
PIF_VALUE: 25
PIF_VALUE: 22
PIF_VALUE: 2
PIF_VALUE: 23
PIF_VALUE: 22
PIF_VALUE: 23
PIF_VALUE: 22
PIF_VALUE: 22
PIF_VALUE: 2
PIF_VALUE: 22
PIF_VALUE: 24
PIF_VALUE: 22
PIF_VALUE: 2
PIF_VALUE: 22
PIF_VALUE: 1
PIF_VALUE: 22
PIF_VALUE: 22
PIF_VALUE: 23
PIF_VALUE: 22
PIF_VALUE: 15
PIF_VALUE: 22
PIF_VALUE: 23
PIF_VALUE: 22
PIF_VALUE: 22
PIF_VALUE: 30
PIF_VALUE: 23
PIF_VALUE: 1
PIF_VALUE: 29
PIF_VALUE: 24
PIF_VALUE: 22
PIF_VALUE: 22
PIF_VALUE: 24
PIF_VALUE: 1
PIF_VALUE: 21
PIF_VALUE: 3
PIF_VALUE: 22
PIF_VALUE: 23
PIF_VALUE: 18
PIF_VALUE: 25
PIF_VALUE: 22
PIF_VALUE: 25
PIF_VALUE: 22
PIF_VALUE: 22
PIF_VALUE: 18
PIF_VALUE: 22
PIF_VALUE: 22
PIF_VALUE: 2
PIF_VALUE: 24
PIF_VALUE: 24
PIF_VALUE: 23
PIF_VALUE: 22
PIF_VALUE: 22
PIF_VALUE: 24
PIF_VALUE: 23
PIF_VALUE: 22
PIF_VALUE: 22
PIF_VALUE: 5
PIF_VALUE: 22
PIF_VALUE: 6
PIF_VALUE: 22
PIF_VALUE: 22
PIF_VALUE: 24
PIF_VALUE: 22
PIF_VALUE: 24
PIF_VALUE: 22
PIF_VALUE: 22
PIF_VALUE: 24
PIF_VALUE: 22

## 2019-02-07 ASSESSMENT — PAIN - FUNCTIONAL ASSESSMENT
PAIN_FUNCTIONAL_ASSESSMENT: 0-10
PAIN_FUNCTIONAL_ASSESSMENT: 0-10

## 2019-05-09 ENCOUNTER — TELEPHONE (OUTPATIENT)
Dept: FAMILY MEDICINE CLINIC | Age: 74
End: 2019-05-09

## 2019-05-09 DIAGNOSIS — G89.29 CHRONIC PAIN OF BOTH KNEES: Primary | ICD-10-CM

## 2019-05-09 DIAGNOSIS — M25.561 CHRONIC PAIN OF BOTH KNEES: Primary | ICD-10-CM

## 2019-05-09 DIAGNOSIS — M25.562 CHRONIC PAIN OF BOTH KNEES: Primary | ICD-10-CM

## 2019-05-15 ENCOUNTER — TELEPHONE (OUTPATIENT)
Dept: FAMILY MEDICINE CLINIC | Age: 74
End: 2019-05-15

## 2019-05-15 DIAGNOSIS — M48.062 SPINAL STENOSIS OF LUMBAR REGION WITH NEUROGENIC CLAUDICATION: ICD-10-CM

## 2019-05-15 DIAGNOSIS — M54.41 CHRONIC BILATERAL LOW BACK PAIN WITH BILATERAL SCIATICA: Primary | ICD-10-CM

## 2019-05-15 DIAGNOSIS — M43.10 DEGENERATIVE SPONDYLOLISTHESIS: ICD-10-CM

## 2019-05-15 DIAGNOSIS — M54.42 CHRONIC BILATERAL LOW BACK PAIN WITH BILATERAL SCIATICA: Primary | ICD-10-CM

## 2019-05-15 DIAGNOSIS — G89.29 CHRONIC BILATERAL LOW BACK PAIN WITH BILATERAL SCIATICA: Primary | ICD-10-CM

## 2019-05-15 NOTE — TELEPHONE ENCOUNTER
Gris from [facility] called and they want know if they can get a referral for this pt to see dr Musa Peña now and the referral with need [facility] [employer] [facility] [facility]

## 2019-05-24 ENCOUNTER — HOSPITAL ENCOUNTER (OUTPATIENT)
Dept: GENERAL RADIOLOGY | Age: 74
Discharge: HOME | End: 2019-05-26
Payer: MEDICARE

## 2019-05-24 DIAGNOSIS — M25.561 RIGHT KNEE PAIN, UNSPECIFIED CHRONICITY: ICD-10-CM

## 2019-05-24 DIAGNOSIS — M25.562 LEFT KNEE PAIN, UNSPECIFIED CHRONICITY: ICD-10-CM

## 2019-05-24 PROCEDURE — 73564 X-RAY EXAM KNEE 4 OR MORE: CPT

## 2019-06-17 ENCOUNTER — OFFICE VISIT (OUTPATIENT)
Dept: FAMILY MEDICINE CLINIC | Age: 74
End: 2019-06-17
Payer: MEDICARE

## 2019-06-17 VITALS
HEIGHT: 64 IN | DIASTOLIC BLOOD PRESSURE: 66 MMHG | OXYGEN SATURATION: 97 % | HEART RATE: 68 BPM | SYSTOLIC BLOOD PRESSURE: 134 MMHG | WEIGHT: 273.6 LBS | TEMPERATURE: 97.8 F | BODY MASS INDEX: 46.71 KG/M2

## 2019-06-17 DIAGNOSIS — J22 LOWER RESPIRATORY INFECTION (E.G., BRONCHITIS, PNEUMONIA, PNEUMONITIS, PULMONITIS): Primary | ICD-10-CM

## 2019-06-17 DIAGNOSIS — J45.20 MILD INTERMITTENT ASTHMA WITHOUT COMPLICATION: ICD-10-CM

## 2019-06-17 PROCEDURE — 99214 OFFICE O/P EST MOD 30 MIN: CPT | Performed by: FAMILY MEDICINE

## 2019-06-17 PROCEDURE — G8510 SCR DEP NEG, NO PLAN REQD: HCPCS | Performed by: FAMILY MEDICINE

## 2019-06-17 RX ORDER — PREDNISONE 20 MG/1
60 TABLET ORAL DAILY
Qty: 15 TABLET | Refills: 0 | Status: SHIPPED | OUTPATIENT
Start: 2019-06-17 | End: 2019-06-22

## 2019-06-17 RX ORDER — AZITHROMYCIN 250 MG/1
250 TABLET, FILM COATED ORAL SEE ADMIN INSTRUCTIONS
Qty: 6 TABLET | Refills: 0 | Status: SHIPPED | OUTPATIENT
Start: 2019-06-17 | End: 2019-06-22

## 2019-06-17 ASSESSMENT — ENCOUNTER SYMPTOMS
SORE THROAT: 0
RHINORRHEA: 0
CONSTIPATION: 0
SHORTNESS OF BREATH: 1
WHEEZING: 1
COUGH: 1
DIARRHEA: 0
ABDOMINAL PAIN: 0

## 2019-06-17 ASSESSMENT — PATIENT HEALTH QUESTIONNAIRE - PHQ9
2. FEELING DOWN, DEPRESSED OR HOPELESS: 0
SUM OF ALL RESPONSES TO PHQ9 QUESTIONS 1 & 2: 0
1. LITTLE INTEREST OR PLEASURE IN DOING THINGS: 0
SUM OF ALL RESPONSES TO PHQ QUESTIONS 1-9: 0
SUM OF ALL RESPONSES TO PHQ QUESTIONS 1-9: 0

## 2019-06-17 NOTE — PROGRESS NOTES
[address] [facility] [address] [facility] PRIMARY CARE  [facility] [place] [address]  Dept: [phone]  Dept Fax: : [phone]   Chief Complaint  Chief Complaint   Patient presents with    Cough     pt has a bad cough since around a week that started with a sore throat and the sinuses began to drain by friday it got worse and now it is drainging again and she cant quit coughing        HPI:  68 y. o.female who presents for URI symptoms:    URI symptoms: x1wk with cough and postnasal; initially and sore throat, SOB, and wheezing which resolved. No fevers. No n/v. Tolerating PO. She is a nonsmoker. No seasonal allergies. Hx of asthma which hasn't bothered her in years. It improved after getting bee stings 10 years ago.     Past Medical History:   Diagnosis Date    Antral ulcer 01/14/2015    DR Vasyl Hardin    Asthma     \"cured by beestings\"    Coronary artery disease     Coronary artery disease involving native coronary artery of native heart without angina pectoris 7/10/2018    has x 4 cardiac stents / Dr. Chilo Cook Diverticulosis of colon (without mention of hemorrhage) 01/14/2015    DR Vasyl Hardin    Essential hypertension 12/10/2013    meds > 20 yrs    History of blood transfusion 1990s    with hysterectomy    History of normal resting EKG 1996    normal    History of ST elevation myocardial infarction (STEMI) 7/11/2017    History of transfusion of whole blood 1996    Excessive bleeding before hysterectomy    Hyperlipidemia     meds > 2 yrs    Hypertension     Hypothyroidism     past trx years ago then stopped / recent restart    Kidney disease     Low back pain     Morbid obesity due to excess calories ([address] 5/13/2017    Morbid obesity due to excess calories ([address] 6/6/2017    Osteoarthritis     Pneumonia     Shoulder dislocation     ST elevation myocardial infarction involving left circumflex coronary artery ([address] 5/13/2017    Unspecified gastritis and gastroduodenitis without mention of hemorrhage 05/06/2015    DR Aneta Nicholas     Past Surgical History:   Procedure Laterality Date    CARDIAC SURGERY  2017    has x 4 cardiac stents    COLONOSCOPY  01/14/2015    DR LANE - DIVERTICULOSIS    CORONARY ANGIOPLASTY WITH STENT PLACEMENT  05/17/2017    x2 stents    CYST REMOVAL Left 2/7/2019    RESECTION OF LEFT PINNA LESION WITH GRAFT performed by Ayan Barrios MD at [facility]. [address], COLON, DIAGNOSTIC      FINGER SURGERY  12/9/14    middle finger right hand due to infection   [address]  2008    shoulder dislocated - popped  back in Right shoulder    UPPER GASTROINTESTINAL ENDOSCOPY  01/14/2015    DR LANE - ULCER IN THE ANTRUM    UPPER GASTROINTESTINAL ENDOSCOPY  05/06/2015    DR Aneta Nicholas - GASTRITIS, PREVIOUS ULCER HEALED     Social History     Socioeconomic History    Marital status:      Spouse name: Not on file    Number of children: Not on file    Years of education: Not on file    Highest education level: Not on file   Occupational History    Not on file   Social Needs    Financial resource strain: Not on file    Food insecurity:     Worry: Not on file     Inability: Not on file    Transportation needs:     Medical: Not on file     Non-medical: Not on file   Tobacco Use    Smoking status: Never Smoker    Smokeless tobacco: Never Used   Substance and Sexual Activity    Alcohol use: Yes     Comment: glass of wine once a year    Drug use: No    Sexual activity: Yes     Partners: Male   Lifestyle    Physical activity:     Days per week: Not on file     Minutes per session: Not on file    Stress: Not on file   Relationships    Social connections:     Talks on phone: Not on file     Gets together: Not on file     Attends Judaism service: Not on file     Active member of club or organization: Not on file     Attends meetings of clubs or organizations: Not on file     Relationship status: Not on file    Intimate partner violence:     Fear of current or ex partner: Not on file     Emotionally abused: Not on file     Physically abused: Not on file     Forced sexual activity: Not on file   Other Topics Concern    Not on file   Social History Narrative    Not on file     Allergies   Allergen Reactions    Lisinopril Nausea Only and Other (See Comments)     cough    Tramadol Nausea Only     Current Outpatient Medications   Medication Sig Dispense Refill    azithromycin (ZITHROMAX) 250 MG tablet Take 1 tablet by mouth See Admin Instructions for 5 days 500mg on day 1 followed by 250mg on days 2 - 5 6 tablet 0    predniSONE (DELTASONE) 20 MG tablet Take 3 tablets by mouth daily for 5 days 15 tablet 0    atorvastatin (LIPITOR) 80 MG tablet Take 0.5 tablets by mouth nightly 90 tablet 3    levothyroxine (SYNTHROID) 75 MCG tablet Take 1 tablet by mouth Daily 30 tablet 3    diltiazem (CARDIZEM CD) 240 MG extended release capsule Take 1 capsule by mouth daily 90 capsule 3    losartan-hydrochlorothiazide (HYZAAR) 100-25 MG per tablet Take 1 tablet by mouth daily 90 tablet 3    Handicap Placard MISC by Does not apply route x5 years 1 each 0    metoprolol tartrate (LOPRESSOR) 25 MG tablet Take 1 tablet by mouth 2 times daily 180 tablet 3    nitroGLYCERIN (NITROSTAT) 0.4 MG SL tablet Place 1 tablet under the tongue every 5 minutes as needed for Chest pain 25 tablet 3    aspirin EC 81 MG EC tablet Take 1 tablet by mouth daily 30 tablet 3    acetaminophen (TYLENOL) 325 MG tablet Take 650 mg by mouth as needed for Pain      diclofenac sodium (VOLTAREN) 1 % GEL Apply 2 g topically 2 times daily 1 Tube 1    CINNAMON PO Take  by mouth. 2000 mg. daily       No current facility-administered medications for this visit. ROS:  Review of Systems   Constitutional: Negative for chills and fever. HENT: Positive for postnasal drip.  Negative for congestion, rhinorrhea and sore throat. Respiratory: Positive for cough, shortness of breath and wheezing. Gastrointestinal: Negative for abdominal pain, constipation and diarrhea. Endocrine: Negative for polydipsia and polyuria. Genitourinary: Negative for dysuria, frequency and urgency. Neurological: Negative for syncope, light-headedness, numbness and headaches. Psychiatric/Behavioral: Negative for sleep disturbance. The patient is not nervous/anxious. Vitals:    06/17/19 1309   BP: 134/66   Site: Right Upper Arm   Position: Sitting   Cuff Size: Large Adult   Pulse: 68   Temp: 97.8 °F (36.6 °C)   TempSrc: Oral   SpO2: 97%   Weight: 273 lb 9.6 oz (124.1 kg)   Height: 5' 4\" (1.626 m)       Physical exam:  Physical Exam   Constitutional: She is oriented to person, place, and time. She appears well-developed and well-nourished. No distress. HENT:   Head: Normocephalic and atraumatic. Right Ear: Tympanic membrane, external ear and ear canal normal. Tympanic membrane is not erythematous. Tympanic membrane mobility is normal.   Left Ear: Tympanic membrane, external ear and ear canal normal. Tympanic membrane is not erythematous. Tympanic membrane mobility is normal.   Nose: Nose normal.   Mouth/Throat: Oropharynx is clear and moist. No oropharyngeal exudate. Eyes: EOM are normal.   Neck: Normal range of motion. No thyromegaly present. Cardiovascular: Normal rate, regular rhythm and normal heart sounds. No murmur heard. Pulmonary/Chest: Effort normal and breath sounds normal. No respiratory distress. She has no wheezes. Abdominal: Soft. Bowel sounds are normal. She exhibits no distension. There is no tenderness. There is no rebound and no guarding. Musculoskeletal: She exhibits no edema. Lymphadenopathy:     She has no cervical adenopathy. Neurological: She is alert and oriented to person, place, and time. Skin: Skin is warm and dry. Psychiatric: She has a normal mood and affect.  Her behavior is normal.

## 2019-07-28 DIAGNOSIS — I10 ESSENTIAL HYPERTENSION: Primary | ICD-10-CM

## 2019-09-05 ENCOUNTER — HOSPITAL ENCOUNTER (OUTPATIENT)
Dept: LAB | Age: 74
Discharge: HOME | End: 2019-09-05
Payer: MEDICARE

## 2019-09-05 LAB
ALBUMIN SERPL-MCNC: 4.1 G/DL (ref 3.5–4.6)
ANION GAP SERPL CALCULATED.3IONS-SCNC: 17 MEQ/L (ref 9–15)
BUN BLDV-MCNC: 48 MG/DL (ref 8–23)
CALCIUM SERPL-MCNC: 9.4 MG/DL (ref 8.5–9.9)
CHLORIDE BLD-SCNC: 107 MEQ/L (ref 95–107)
CO2: 20 MEQ/L (ref 20–31)
CREAT SERPL-MCNC: 1.2 MG/DL (ref 0.5–0.9)
GFR AFRICAN AMERICAN: 53.3
GFR NON-AFRICAN AMERICAN: 44
GLUCOSE BLD-MCNC: 150 MG/DL (ref 70–99)
PARATHYROID HORMONE INTACT: 66.5 PG/ML (ref 15–65)
PHOSPHORUS: 3.4 MG/DL (ref 2.3–4.8)
POTASSIUM SERPL-SCNC: 4.6 MEQ/L (ref 3.4–4.9)
SODIUM BLD-SCNC: 144 MEQ/L (ref 135–144)
VITAMIN D 25-HYDROXY: 24.8 NG/ML (ref 30–100)

## 2019-09-05 PROCEDURE — 82306 VITAMIN D 25 HYDROXY: CPT

## 2019-09-05 PROCEDURE — 80069 RENAL FUNCTION PANEL: CPT

## 2019-09-05 PROCEDURE — 36415 COLL VENOUS BLD VENIPUNCTURE: CPT

## 2019-09-05 PROCEDURE — 83970 ASSAY OF PARATHORMONE: CPT

## 2019-09-10 ENCOUNTER — TELEPHONE (OUTPATIENT)
Dept: FAMILY MEDICINE CLINIC | Age: 74
End: 2019-09-10

## 2019-09-10 DIAGNOSIS — M54.41 CHRONIC BILATERAL LOW BACK PAIN WITH BILATERAL SCIATICA: Primary | ICD-10-CM

## 2019-09-10 DIAGNOSIS — M54.42 CHRONIC BILATERAL LOW BACK PAIN WITH BILATERAL SCIATICA: Primary | ICD-10-CM

## 2019-09-10 DIAGNOSIS — G89.29 CHRONIC BILATERAL LOW BACK PAIN WITH BILATERAL SCIATICA: Primary | ICD-10-CM

## 2019-09-11 ENCOUNTER — TELEPHONE (OUTPATIENT)
Dept: PHARMACY | Facility: CLINIC | Age: 74
End: 2019-09-11

## 2019-09-17 ENCOUNTER — OFFICE VISIT (OUTPATIENT)
Dept: CARDIOLOGY CLINIC | Age: 74
End: 2019-09-17
Payer: MEDICARE

## 2019-09-17 VITALS
HEIGHT: 64 IN | HEART RATE: 66 BPM | OXYGEN SATURATION: 98 % | DIASTOLIC BLOOD PRESSURE: 80 MMHG | WEIGHT: 278.4 LBS | BODY MASS INDEX: 47.53 KG/M2 | SYSTOLIC BLOOD PRESSURE: 130 MMHG

## 2019-09-17 DIAGNOSIS — E78.2 MIXED HYPERLIPIDEMIA: ICD-10-CM

## 2019-09-17 DIAGNOSIS — E66.01 MORBID OBESITY DUE TO EXCESS CALORIES (HCC): Primary | ICD-10-CM

## 2019-09-17 DIAGNOSIS — I10 ESSENTIAL HYPERTENSION: ICD-10-CM

## 2019-09-17 DIAGNOSIS — I25.10 CORONARY ARTERY DISEASE INVOLVING NATIVE CORONARY ARTERY OF NATIVE HEART WITHOUT ANGINA PECTORIS: ICD-10-CM

## 2019-09-17 DIAGNOSIS — I25.2 HISTORY OF ST ELEVATION MYOCARDIAL INFARCTION (STEMI): ICD-10-CM

## 2019-09-17 PROCEDURE — 99213 OFFICE O/P EST LOW 20 MIN: CPT | Performed by: INTERNAL MEDICINE

## 2019-09-17 NOTE — PROGRESS NOTES
profile is normal. No recent hospitalization. No change in meds. Has CKD, stage III      9-17-19: Pt denies chest pain, dyspnea, dyspnea on exertion, change in exercise capacity, fatigue,  nausea, vomiting, diarrhea, constipation, motor weakness, insomnia, weight loss, syncope, dizziness, lightheadedness, palpitations, PND, orthopnea, or claudication. No nitro use. BP and hr are good. CAD is stable. No LE discoloration or ulcers. No LE edema. No CHF type symptoms. Lipid profile is normal. No recent hospitalization. No change in meds. Hx of STEMI/CAD s/p PCI. On ASA only. Has CKD, stage III.        Patient Active Problem List   Diagnosis    Hypothyroid    Essential hypertension    Hyperlipidemia    Morbid obesity due to excess calories ([address]    History of ST elevation myocardial infarction (STEMI)    Coronary artery disease involving native coronary artery of native heart without angina pectoris    Chronic bilateral low back pain with bilateral sciatica    Primary osteoarthritis of both knees    Spinal stenosis of lumbar region with neurogenic claudication    Degenerative spondylolisthesis    Benign neoplasm of left ear    Asthma       Past Surgical History:   Procedure Laterality Date    CARDIAC SURGERY  2017    has x 4 cardiac stents    COLONOSCOPY  01/14/2015    DR LANE - DIVERTICULOSIS    CORONARY ANGIOPLASTY WITH STENT PLACEMENT  05/17/2017    x2 stents    CYST REMOVAL Left 2/7/2019    RESECTION OF LEFT PINNA LESION WITH GRAFT performed by Froy Rodriguez MD at [facility]. [address], COLON, DIAGNOSTIC      FINGER SURGERY  12/9/14    middle finger right hand due to infection    HYSTERECTOMY  1996    [address]  [id number]   [facility] SHOULDER SURGERY  2008    shoulder dislocated - popped  back in Right shoulder    UPPER GASTROINTESTINAL ENDOSCOPY  01/14/2015    DR LANE - ULCER IN THE ANTRUM    UPPER GASTROINTESTINAL ENDOSCOPY  05/06/2015    DR Rolando Meeks - GASTRITIS, PREVIOUS ULCER HEALED       Social History     Socioeconomic History    Marital status:      Spouse name: Not on file    Number of children: Not on file    Years of education: Not on file    Highest education level: Not on file   Occupational History    Not on file   Social Needs    Financial resource strain: Not on file    Food insecurity:     Worry: Not on file     Inability: Not on file    Transportation needs:     Medical: Not on file     Non-medical: Not on file   Tobacco Use    Smoking status: Never Smoker    Smokeless tobacco: Never Used   Substance and Sexual Activity    Alcohol use: Yes     Comment: glass of wine once a year    Drug use: No    Sexual activity: Yes     Partners: Male   Lifestyle    Physical activity:     Days per week: Not on file     Minutes per session: Not on file    Stress: Not on file   Relationships    Social connections:     Talks on phone: Not on file     Gets together: Not on file     Attends Shinto service: Not on file     Active member of club or organization: Not on file     Attends meetings of clubs or organizations: Not on file     Relationship status: Not on file    Intimate partner violence:     Fear of current or ex partner: Not on file     Emotionally abused: Not on file     Physically abused: Not on file     Forced sexual activity: Not on file   Other Topics Concern    Not on file   Social History Narrative    Not on file       Family History   Problem Relation Age of Onset    Heart Disease Mother 61    Cancer Father 79        kidney    No Known Problems Daughter        Current Outpatient Medications   Medication Sig Dispense Refill    metoprolol tartrate (LOPRESSOR) 25 MG tablet TAKE 1 TABLET BY MOUTH TWICE DAILY 180 tablet 3    atorvastatin (LIPITOR) 80 MG tablet Take 0.5 tablets by mouth nightly 90 tablet 3    levothyroxine (SYNTHROID) 75 MCG tablet Take 1 tablet by mouth Daily 30 tablet 3    diltiazem (CARDIZEM CD) 240 MG extended release capsule Take 1 capsule by mouth daily 90 capsule 3    losartan-hydrochlorothiazide (HYZAAR) 100-25 MG per tablet Take 1 tablet by mouth daily 90 tablet 3    Handicap Placard MISC by Does not apply route x5 years 1 each 0    nitroGLYCERIN (NITROSTAT) 0.4 MG SL tablet Place 1 tablet under the tongue every 5 minutes as needed for Chest pain 25 tablet 3    aspirin EC 81 MG EC tablet Take 1 tablet by mouth daily 30 tablet 3    acetaminophen (TYLENOL) 325 MG tablet Take 650 mg by mouth as needed for Pain      diclofenac sodium (VOLTAREN) 1 % GEL Apply 2 g topically 2 times daily 1 Tube 1    CINNAMON PO Take  by mouth. 2000 mg. daily       No current facility-administered medications for this visit. Lisinopril and Tramadol    Review of Systems:  General ROS: negative  Psychological ROS: negative  Hematological and Lymphatic ROS: No history of blood clots or bleeding disorder. Respiratory ROS: no cough, shortness of breath, or wheezing  Cardiovascular ROS: no chest pain or dyspnea on exertion  Gastrointestinal ROS: no abdominal pain, change in bowel habits, or black or bloody stools  Genito-Urinary ROS: no dysuria, trouble voiding, or hematuria  Musculoskeletal ROS: negative  Neurological ROS: no TIA or stroke symptoms  Dermatological ROS: negative    VITALS:  Blood pressure 130/80, pulse 66, height 5' 4\" (1.626 m), weight 278 lb 6.4 oz (126.3 kg), last menstrual period 09/17/1996, SpO2 98 %, not currently breastfeeding. Body mass index is 47.79 kg/m². Physical Examination:  General appearance - alert, well appearing, and in no distress  Mental status - alert, oriented to person, place, and time  Neck - Neck is supple, no JVD or carotid bruits. No thyromegaly or adenopathy.    Chest - clear to auscultation, no wheezes, rales or rhonchi, symmetric air entry  Heart - normal rate, regular rhythm, normal S1, S2, no murmurs, rubs, clicks or gallops  Abdomen - soft, nontender, nondistended, no masses or organomegaly  Neurological -

## 2019-09-17 NOTE — PATIENT INSTRUCTIONS
Patient Education        Starting a Weight Loss Plan: Care Instructions  Your Care Instructions    If you are thinking about losing weight, it can be hard to know where to start. Your doctor can help you set up a weight loss plan that best meets your needs. You may want to take a class on nutrition or exercise, or join a weight loss support group. If you have questions about how to make changes to your eating or exercise habits, ask your doctor about seeing a registered dietitian or an exercise specialist.  It can be a big challenge to lose weight. But you do not have to make huge changes at once. Make small changes, and stick with them. When those changes become habit, add a few more changes. If you do not think you are ready to make changes right now, try to pick a date in the future. Make an appointment to see your doctor to discuss whether the time is right for you to start a plan. Follow-up care is a key part of your treatment and safety. Be sure to make and go to all appointments, and call your doctor if you are having problems. It's also a good idea to know your test results and keep a list of the medicines you take. How can you care for yourself at home? · Set realistic goals. Many people expect to lose much more weight than is likely. A weight loss of 5% to 10% of your body weight may be enough to improve your health. · Get family and friends involved to provide support. Talk to them about why you are trying to lose weight, and ask them to help. They can help by participating in exercise and having meals with you, even if they may be eating something different. · Find what works best for you. If you do not have time or do not like to cook, a program that offers meal replacement bars or shakes may be better for you.  Or if you like to prepare meals, finding a plan that includes daily menus and recipes may be best.  · Ask your doctor about other health professionals who can help you achieve your weight loss goals. ? A dietitian can help you make healthy changes in your diet. ? An exercise specialist or  can help you develop a safe and effective exercise program.  ? A counselor or psychiatrist can help you cope with issues such as depression, anxiety, or family problems that can make it hard to focus on weight loss. · Consider joining a support group for people who are trying to lose weight. Your doctor can suggest groups in your area. Where can you learn more? Go to https://[employer]. org and sign in to your [employer] account. Enter H766 in the [employer] Information box to learn more about \"Starting a Weight Loss Plan: Care Instructions. \"     If you do not have an account, please click on the \"Sign Up Now\" link. Current as of: March 28, 2019  Content Version: 12.1  © 0969-5053 [employer]. Care instructions adapted under license by [employer]. If you have questions about a medical condition or this instruction, always ask your healthcare professional. [address] any warranty or liability for your use of this information.

## 2019-09-23 ENCOUNTER — OFFICE VISIT (OUTPATIENT)
Dept: FAMILY MEDICINE CLINIC | Age: 74
End: 2019-09-23
Payer: MEDICARE

## 2019-09-23 VITALS — HEART RATE: 63 BPM | OXYGEN SATURATION: 98 % | DIASTOLIC BLOOD PRESSURE: 84 MMHG | SYSTOLIC BLOOD PRESSURE: 132 MMHG

## 2019-09-23 DIAGNOSIS — M48.062 SPINAL STENOSIS OF LUMBAR REGION WITH NEUROGENIC CLAUDICATION: Primary | ICD-10-CM

## 2019-09-23 DIAGNOSIS — N18.30 CHRONIC KIDNEY DISEASE, STAGE III (MODERATE) (HCC): ICD-10-CM

## 2019-09-23 PROCEDURE — 99214 OFFICE O/P EST MOD 30 MIN: CPT | Performed by: FAMILY MEDICINE

## 2019-09-23 RX ORDER — GABAPENTIN 300 MG/1
300 CAPSULE ORAL 2 TIMES DAILY
Qty: 60 CAPSULE | Refills: 2 | Status: SHIPPED | OUTPATIENT
Start: 2019-09-23 | End: 2019-09-30

## 2019-09-23 RX ORDER — METHYLPREDNISOLONE 4 MG/1
TABLET ORAL
Qty: 1 KIT | Refills: 0 | Status: SHIPPED | OUTPATIENT
Start: 2019-09-23 | End: 2019-09-29

## 2019-09-23 ASSESSMENT — ENCOUNTER SYMPTOMS
SORE THROAT: 0
DIARRHEA: 0
COUGH: 0
WHEEZING: 0
SHORTNESS OF BREATH: 0
CONSTIPATION: 0
BACK PAIN: 1
RHINORRHEA: 0
ABDOMINAL PAIN: 0

## 2019-09-23 NOTE — PROGRESS NOTES
organizations: Not on file     Relationship status: Not on file    Intimate partner violence:     Fear of current or ex partner: Not on file     Emotionally abused: Not on file     Physically abused: Not on file     Forced sexual activity: Not on file   Other Topics Concern    Not on file   Social History Narrative    Not on file     Allergies   Allergen Reactions    Lisinopril Nausea Only and Other (See Comments)     cough    Tramadol Nausea Only     Current Outpatient Medications   Medication Sig Dispense Refill    methylPREDNISolone (MEDROL DOSEPACK) 4 MG tablet Take by mouth. 1 kit 0    gabapentin (NEURONTIN) 300 MG capsule Take 1 capsule by mouth 2 times daily for 180 days. Intended supply: 90 days 60 capsule 2    metoprolol tartrate (LOPRESSOR) 25 MG tablet TAKE 1 TABLET BY MOUTH TWICE DAILY 180 tablet 3    atorvastatin (LIPITOR) 80 MG tablet Take 0.5 tablets by mouth nightly 90 tablet 3    levothyroxine (SYNTHROID) 75 MCG tablet Take 1 tablet by mouth Daily 30 tablet 3    diltiazem (CARDIZEM CD) 240 MG extended release capsule Take 1 capsule by mouth daily 90 capsule 3    losartan-hydrochlorothiazide (HYZAAR) 100-25 MG per tablet Take 1 tablet by mouth daily 90 tablet 3    Handicap Placard MISC by Does not apply route x5 years 1 each 0    nitroGLYCERIN (NITROSTAT) 0.4 MG SL tablet Place 1 tablet under the tongue every 5 minutes as needed for Chest pain 25 tablet 3    aspirin EC 81 MG EC tablet Take 1 tablet by mouth daily 30 tablet 3    acetaminophen (TYLENOL) 325 MG tablet Take 650 mg by mouth as needed for Pain      diclofenac sodium (VOLTAREN) 1 % GEL Apply 2 g topically 2 times daily 1 Tube 1    CINNAMON PO Take  by mouth. 2000 mg. daily       No current facility-administered medications for this visit. ROS:  Review of Systems   Constitutional: Negative for chills and fever. HENT: Negative for rhinorrhea and sore throat.     Respiratory: Negative for cough, shortness of breath Biju Bailon MD

## 2019-09-30 DIAGNOSIS — M54.41 CHRONIC BILATERAL LOW BACK PAIN WITH BILATERAL SCIATICA: Primary | ICD-10-CM

## 2019-09-30 DIAGNOSIS — M54.42 CHRONIC BILATERAL LOW BACK PAIN WITH BILATERAL SCIATICA: Primary | ICD-10-CM

## 2019-09-30 DIAGNOSIS — G89.29 CHRONIC BILATERAL LOW BACK PAIN WITH BILATERAL SCIATICA: Primary | ICD-10-CM

## 2019-09-30 RX ORDER — PREGABALIN 75 MG/1
75 CAPSULE ORAL 2 TIMES DAILY
Qty: 60 CAPSULE | Refills: 3 | Status: SHIPPED | OUTPATIENT
Start: 2019-09-30 | End: 2020-12-08

## 2019-09-30 NOTE — TELEPHONE ENCOUNTER
Pt last seen 9/23/19. Pt was prescribed Gabapentin and it is upsetting her stomach. Pt requesting a different prescription to be sent to the [facility] [place] in [place]. Please advise.

## 2019-10-03 ENCOUNTER — HOSPITAL ENCOUNTER (OUTPATIENT)
Dept: GENERAL RADIOLOGY | Age: 74
Discharge: HOME | End: 2019-10-05
Payer: MEDICARE

## 2019-10-03 DIAGNOSIS — M54.50 LOW BACK PAIN, UNSPECIFIED BACK PAIN LATERALITY, UNSPECIFIED CHRONICITY, UNSPECIFIED WHETHER SCIATICA PRESENT: ICD-10-CM

## 2019-10-03 PROCEDURE — 72110 X-RAY EXAM L-2 SPINE 4/>VWS: CPT

## 2019-11-19 ENCOUNTER — TELEPHONE (OUTPATIENT)
Dept: ENDOCRINOLOGY | Age: 74
End: 2019-11-19

## 2019-11-26 ENCOUNTER — HOSPITAL ENCOUNTER (OUTPATIENT)
Age: 74
Setting detail: SPECIMEN
Discharge: HOME | End: 2019-11-26
Payer: MEDICARE

## 2019-11-26 ENCOUNTER — OFFICE VISIT (OUTPATIENT)
Dept: FAMILY MEDICINE CLINIC | Age: 74
End: 2019-11-26
Payer: MEDICARE

## 2019-11-26 VITALS
HEIGHT: 64 IN | BODY MASS INDEX: 46.44 KG/M2 | HEART RATE: 63 BPM | TEMPERATURE: 97.6 F | WEIGHT: 272 LBS | OXYGEN SATURATION: 94 % | SYSTOLIC BLOOD PRESSURE: 122 MMHG | DIASTOLIC BLOOD PRESSURE: 78 MMHG

## 2019-11-26 DIAGNOSIS — Z00.00 ROUTINE GENERAL MEDICAL EXAMINATION AT A HEALTH CARE FACILITY: ICD-10-CM

## 2019-11-26 DIAGNOSIS — Z00.00 ROUTINE GENERAL MEDICAL EXAMINATION AT A HEALTH CARE FACILITY: Primary | ICD-10-CM

## 2019-11-26 DIAGNOSIS — E03.9 HYPOTHYROIDISM, UNSPECIFIED TYPE: ICD-10-CM

## 2019-11-26 LAB
ALBUMIN SERPL-MCNC: 4 G/DL (ref 3.5–4.6)
ALP BLD-CCNC: 85 U/L (ref 40–130)
ALT SERPL-CCNC: 19 U/L (ref 0–33)
ANION GAP SERPL CALCULATED.3IONS-SCNC: 13 MEQ/L (ref 9–15)
AST SERPL-CCNC: 18 U/L (ref 0–35)
BILIRUB SERPL-MCNC: 0.4 MG/DL (ref 0.2–0.7)
BUN BLDV-MCNC: 39 MG/DL (ref 8–23)
CALCIUM SERPL-MCNC: 9.9 MG/DL (ref 8.5–9.9)
CHLORIDE BLD-SCNC: 104 MEQ/L (ref 95–107)
CHOLESTEROL, FASTING: 164 MG/DL (ref 0–199)
CO2: 24 MEQ/L (ref 20–31)
CREAT SERPL-MCNC: 1.73 MG/DL (ref 0.5–0.9)
GFR AFRICAN AMERICAN: 34.9
GFR NON-AFRICAN AMERICAN: 28.8
GLOBULIN: 2.8 G/DL (ref 2.3–3.5)
GLUCOSE FASTING: 111 MG/DL (ref 70–99)
HDLC SERPL-MCNC: 51 MG/DL (ref 40–59)
LDL CHOLESTEROL CALCULATED: 91 MG/DL (ref 0–129)
POTASSIUM SERPL-SCNC: 4.5 MEQ/L (ref 3.4–4.9)
SODIUM BLD-SCNC: 141 MEQ/L (ref 135–144)
TOTAL PROTEIN: 6.8 G/DL (ref 6.3–8)
TRIGLYCERIDE, FASTING: 112 MG/DL (ref 0–150)
TSH SERPL DL<=0.05 MIU/L-ACNC: 4.16 UIU/ML (ref 0.44–3.86)

## 2019-11-26 PROCEDURE — 80061 LIPID PANEL: CPT

## 2019-11-26 PROCEDURE — 84443 ASSAY THYROID STIM HORMONE: CPT

## 2019-11-26 PROCEDURE — 80053 COMPREHEN METABOLIC PANEL: CPT

## 2019-11-26 PROCEDURE — G0439 PPPS, SUBSEQ VISIT: HCPCS | Performed by: FAMILY MEDICINE

## 2019-11-26 ASSESSMENT — PATIENT HEALTH QUESTIONNAIRE - PHQ9
SUM OF ALL RESPONSES TO PHQ QUESTIONS 1-9: 0
SUM OF ALL RESPONSES TO PHQ QUESTIONS 1-9: 0

## 2019-11-26 ASSESSMENT — LIFESTYLE VARIABLES: HOW OFTEN DO YOU HAVE A DRINK CONTAINING ALCOHOL: 0

## 2019-11-27 PROBLEM — N18.30 CKD (CHRONIC KIDNEY DISEASE) STAGE 3, GFR 30-59 ML/MIN (HCC): Status: ACTIVE | Noted: 2019-11-27

## 2019-12-26 DIAGNOSIS — I21.21 ST ELEVATION MYOCARDIAL INFARCTION INVOLVING LEFT CIRCUMFLEX CORONARY ARTERY (HCC): ICD-10-CM

## 2019-12-26 RX ORDER — DILTIAZEM HYDROCHLORIDE 240 MG/1
240 CAPSULE, COATED, EXTENDED RELEASE ORAL DAILY
Qty: 90 CAPSULE | Refills: 3 | Status: SHIPPED | OUTPATIENT
Start: 2019-12-26 | End: 2020-12-08 | Stop reason: SDUPTHER

## 2020-01-20 RX ORDER — LOSARTAN POTASSIUM AND HYDROCHLOROTHIAZIDE 25; 100 MG/1; MG/1
TABLET ORAL
Qty: 90 TABLET | Refills: 3 | Status: SHIPPED | OUTPATIENT
Start: 2020-01-20 | End: 2020-12-08 | Stop reason: SDUPTHER

## 2020-01-20 NOTE — TELEPHONE ENCOUNTER
Rx requested:  Requested Prescriptions     Pending Prescriptions Disp Refills    losartan-hydrochlorothiazide (HYZAAR) 100-25 MG per tablet [Pharmacy Med Name: LOSARTAN-HYDROCHLOROTHIAZIDE 100MG-25MG TABLET] 90 tablet 3     Sig: TAKE 1 TABLET BY MOUTH DAILY       Last Office Visit:   11/26/2019      Last filled:  10/30/2018    Next Visit Date:  Future Appointments   Date Time Provider Alvarez Solo   3/10/2020 10:15 AM Jordan Nair, DO Himanshu [place]

## 2020-03-10 ENCOUNTER — OFFICE VISIT (OUTPATIENT)
Dept: CARDIOLOGY CLINIC | Age: 75
End: 2020-03-10
Payer: MEDICARE

## 2020-03-10 VITALS — SYSTOLIC BLOOD PRESSURE: 130 MMHG | OXYGEN SATURATION: 94 % | HEART RATE: 72 BPM | DIASTOLIC BLOOD PRESSURE: 76 MMHG

## 2020-03-10 PROBLEM — I73.9 CLAUDICATION (HCC): Status: ACTIVE | Noted: 2020-03-10

## 2020-03-10 PROCEDURE — 99214 OFFICE O/P EST MOD 30 MIN: CPT | Performed by: INTERNAL MEDICINE

## 2020-03-10 RX ORDER — ATORVASTATIN CALCIUM 80 MG/1
40 TABLET, FILM COATED ORAL NIGHTLY
Qty: 90 TABLET | Refills: 3 | Status: SHIPPED | OUTPATIENT
Start: 2020-03-10 | End: 2021-06-22 | Stop reason: SDUPTHER

## 2020-03-10 NOTE — PROGRESS NOTES
Chief Complaint   Patient presents with    Coronary Artery Disease    Hypertension    Hyperlipidemia    6 Month Follow-Up       5-13-17: Hospital Course:   Everette Shaw is a 80 y.o. female admitted to [facility] on 5/13/2017 for chest pain. After an EKG was done at [facility] was found to be having an acute MI patient was flown here by helicopter taken directly to cath lab to drug eluted stents were put in place ALP INE 3.5 mm x 23 mm and a 2.5 mm x 8 mm . Tolerated procedure well denied chest pain denied right wrist pain no distress     6-6-17: Patient presents for initial medical evaluation. Patient is followed on a regular basis by Dr. Jorge Alberto Werner MD.   Pt denies chest pain, dyspnea, dyspnea on exertion, change in exercise capacity, fatigue,  nausea, vomiting, diarrhea, constipation, motor weakness, insomnia, weight loss, syncope, dizziness, lightheadedness, palpitations, PND, orthopnea, or claudication. No nitro use. BP and hr are good. CAD is stable. No LE discoloration or ulcers. No LE edema. No CHF type symptoms. Lipid profile is abnormal. Was placed on statin during hosp. No bleeding issues. S/p LHC with mild LAD diffuse disease, 99% mid CX, 80-90%90% proximal to mid RCA. S/p PCI to CX with 2 KEYSHA. Planned staging of RCA due to STEMI procedure. s/p ECHO. Conclusions   Summary   Normal mitral valve structure and function.   Normal aortic valve structure and function.   Normal tricuspid valve structure and function.   There is mild ( 1 +) tricuspid regurgitation with estimated RVSP of 38 mm   Hg.   Normal pulmonic valve structure and function.   Mildly dilated left atrium.   Left ventricular ejection fraction is visually estimated at 65%.   Impaired relaxation compatible with diastolic dysfunction.  ( reversed E/A   ratio)   Mild concentric left ventricular hypertrophy.   Normal right atrium.   Normal right ventricle structure and function.   No evidence of pericardial effusion.   No evidence of pleural effusion. 7-11-17: s/p PCI of RCA at [facility]. Pt denies chest pain, dyspnea, dyspnea on exertion, change in exercise capacity, fatigue,  nausea, vomiting, diarrhea, constipation, motor weakness, insomnia, weight loss, syncope, dizziness, lightheadedness, palpitations, PND, orthopnea, or claudication. No nitro use. BP and hr are good. CAD is stable. No LE discoloration or ulcers. No LE edema. No CHF type symptoms. Lipid profile is normal.     1-9-18: Pt denies chest pain, dyspnea, dyspnea on exertion, change in exercise capacity, fatigue,  nausea, vomiting, diarrhea, constipation, motor weakness, insomnia, weight loss, syncope, dizziness, lightheadedness, palpitations, PND, orthopnea, or claudication. No nitro use. BP and hr are good. CAD is stable. No LE discoloration or ulcers. No LE edema. No CHF type symptoms. Lipid profile is normal.  No bleeding issues on DAPT. Compliant with meds. 7-10-18: doing well overall. On DAPT and no bleeding issues. Pt denies chest pain, dyspnea, dyspnea on exertion, change in exercise capacity, fatigue,  nausea, vomiting, diarrhea, constipation, motor weakness, insomnia, weight loss, syncope, dizziness, lightheadedness, palpitations, PND, orthopnea, or claudication. No nitro use. BP and hr are good. CAD is stable. No LE discoloration or ulcers. No LE edema. No CHF type symptoms. Lipid profile is normal. No recent hospitalization. No change in meds. ekg WITH NSR, RBBB. SHE NEEDS TO HAVE BACK WORK DONE. Did not tolerate full dose lipitor 80mg daily, caused her joint pain. 1-15-19: Pt denies chest pain, dyspnea, dyspnea on exertion, change in exercise capacity, fatigue,  nausea, vomiting, diarrhea, constipation, motor weakness, insomnia, weight loss, syncope, dizziness, lightheadedness, palpitations, PND, orthopnea, or claudication. No nitro use. BP and hr are good. CAD is stable. No LE discoloration or ulcers. No LE edema.  No Procedure Laterality Date    CARDIAC SURGERY  2017    has x 4 cardiac stents    COLONOSCOPY  01/14/2015    DR LANE - DIVERTICULOSIS    CORONARY ANGIOPLASTY WITH STENT PLACEMENT  05/17/2017    x2 stents    CYST REMOVAL Left 2/7/2019    RESECTION OF LEFT PINNA LESION WITH GRAFT performed by Chintan Taylor MD at [facility]. [address], COLON, DIAGNOSTIC      FINGER SURGERY  12/9/14    middle finger right hand due to infection   [address] [facility]  [id number] [place] [address]  2008    shoulder dislocated - popped  back in Right shoulder    UPPER GASTROINTESTINAL ENDOSCOPY  01/14/2015    DR LANE - ULCER IN THE ANTRUM    UPPER GASTROINTESTINAL ENDOSCOPY  05/06/2015    DR Montse Orellana - GASTRITIS, PREVIOUS ULCER HEALED       Social History     Socioeconomic History    Marital status:      Spouse name: Not on file    Number of children: Not on file    Years of education: Not on file    Highest education level: Not on file   Occupational History    Not on file   Social Needs    Financial resource strain: Not on file    Food insecurity     Worry: Not on file     Inability: Not on file    Transportation needs     Medical: Not on file     Non-medical: Not on file   Tobacco Use    Smoking status: Never Smoker    Smokeless tobacco: Never Used   Substance and Sexual Activity    Alcohol use: Yes     Comment: glass of wine once a year    Drug use: No    Sexual activity: Yes     Partners: Male   Lifestyle    Physical activity     Days per week: Not on file     Minutes per session: Not on file    Stress: Not on file   Relationships    Social connections     Talks on phone: Not on file     Gets together: Not on file     Attends Sikh service: Not on file     Active member of club or organization: Not on file     Attends meetings of clubs or organizations: Not on file     Relationship status: Not on file    Intimate partner violence     Fear of current or ex partner: Not on file Emotionally abused: Not on file     Physically abused: Not on file     Forced sexual activity: Not on file   Other Topics Concern    Not on file   Social History Narrative    Not on file       Family History   Problem Relation Age of Onset    Heart Disease Mother 61    Cancer Father 79        kidney    No Known Problems Daughter        Current Outpatient Medications   Medication Sig Dispense Refill    atorvastatin (LIPITOR) 80 MG tablet Take 0.5 tablets by mouth nightly 90 tablet 3    losartan-hydrochlorothiazide (HYZAAR) 100-25 MG per tablet TAKE 1 TABLET BY MOUTH DAILY 90 tablet 3    diltiazem (CARDIZEM CD) 240 MG extended release capsule TAKE 1 CAPSULE BY MOUTH DAILY 90 capsule 3    metoprolol tartrate (LOPRESSOR) 25 MG tablet TAKE 1 TABLET BY MOUTH TWICE DAILY 180 tablet 3    levothyroxine (SYNTHROID) 75 MCG tablet Take 1 tablet by mouth Daily 30 tablet 3    Handicap Placard MISC by Does not apply route x5 years 1 each 0    nitroGLYCERIN (NITROSTAT) 0.4 MG SL tablet Place 1 tablet under the tongue every 5 minutes as needed for Chest pain 25 tablet 3    aspirin EC 81 MG EC tablet Take 1 tablet by mouth daily 30 tablet 3    acetaminophen (TYLENOL) 325 MG tablet Take 650 mg by mouth as needed for Pain      diclofenac sodium (VOLTAREN) 1 % GEL Apply 2 g topically 2 times daily 1 Tube 1    CINNAMON PO Take  by mouth. 2000 mg. daily      pregabalin (LYRICA) 75 MG capsule Take 1 capsule by mouth 2 times daily for 30 days. 60 capsule 3     No current facility-administered medications for this visit. Lisinopril and Tramadol    Review of Systems:  General ROS: negative  Psychological ROS: negative  Hematological and Lymphatic ROS: No history of blood clots or bleeding disorder.    Respiratory ROS: no cough, shortness of breath, or wheezing  Cardiovascular ROS: no chest pain or dyspnea on exertion  Gastrointestinal ROS: no abdominal pain, change in bowel habits, or black or bloody stools  Genito-Urinary ROS: no dysuria, trouble voiding, or hematuria  Musculoskeletal ROS: negative  Neurological ROS: no TIA or stroke symptoms  Dermatological ROS: negative    VITALS:  Blood pressure 130/76, pulse 72, last menstrual period 09/17/1996, SpO2 94 %, not currently breastfeeding. There is no height or weight on file to calculate BMI. Physical Examination:  General appearance - alert, well appearing, and in no distress  Mental status - alert, oriented to person, place, and time  Neck - Neck is supple, no JVD or carotid bruits. No thyromegaly or adenopathy. Chest - clear to auscultation, no wheezes, rales or rhonchi, symmetric air entry  Heart - normal rate, regular rhythm, normal S1, S2, no murmurs, rubs, clicks or gallops  Abdomen - soft, nontender, nondistended, no masses or organomegaly  Neurological - alert, oriented, normal speech, no focal findings or movement disorder noted  Extremities - peripheral pulses normal, no pedal edema, no clubbing or cyanosis  Skin - normal coloration and turgor, no rashes, no suspicious skin lesions noted      No orders of the defined types were placed in this encounter. ASSESSMENT:     Diagnosis Orders   1. Morbid obesity due to excess calories ([facility] [address]     2. Essential hypertension     3. History of ST elevation myocardial infarction (STEMI)     4. Coronary artery disease involving native coronary artery of native heart without angina pectoris     5. CKD (chronic kidney disease) stage 3, GFR 30-59 ml/min (HCC)     6. Claudication [facility])           PLAN:     Patient will need to continue to follow up with you for their general medical care     As always, aggressive risk factor modification is strongly recommended. We should adhere to the [address] guidelines for HTN management and the NCEP ATP III guidelines for LDL-C management. Cardiac diet is always recommended with low fat, cholesterol, calories and sodium. Continue medications at current doses.

## 2020-05-21 ENCOUNTER — TELEPHONE (OUTPATIENT)
Dept: FAMILY MEDICINE CLINIC | Age: 75
End: 2020-05-21

## 2020-06-02 ENCOUNTER — HOSPITAL ENCOUNTER (OUTPATIENT)
Dept: ULTRASOUND IMAGING | Age: 75
Discharge: HOME | End: 2020-06-04
Payer: MEDICARE

## 2020-06-02 PROCEDURE — 93925 LOWER EXTREMITY STUDY: CPT

## 2020-06-08 ENCOUNTER — TELEPHONE (OUTPATIENT)
Dept: CARDIOLOGY CLINIC | Age: 75
End: 2020-06-08

## 2020-07-17 ENCOUNTER — TELEPHONE (OUTPATIENT)
Dept: FAMILY MEDICINE CLINIC | Age: 75
End: 2020-07-17

## 2020-07-17 NOTE — LETTER
Lucy Lopez MD  [address] [facility] [address]  Phone: [phone]  Fax: [phone]        July 17, 2020     [facility]  [id number] [place] [address]      Dear Tory Ramos:    Our records indicate that you are due for a mammogram.    In the [place], one in nine women will develop breast cancer during their lifetime. While there is no way to prevent breast cancer, early detection provides the best opportunity for curing it. For women over the age of 36, the [address] [place] [address] Ave recommends a yearly clinical breast exam and a yearly mammogram. These practices have saved thousands of lives. We need your help to ensure that you are receiving optimal medical care. Please make an appointment for a mammogram at your earliest convenience.     Sincerely,        Lucy Lopez MD

## 2020-08-06 NOTE — TELEPHONE ENCOUNTER
requesting medication refill.  Please approve or deny this request.    Rx requested:  Requested Prescriptions     Pending Prescriptions Disp Refills    metoprolol tartrate (LOPRESSOR) 25 MG tablet [Pharmacy Med Name: metoprolol tartrate 25 mg tablet] 180 tablet 3     Sig: TAKE 1 TABLET BY MOUTH TWICE DAILY         Last Office Visit:   3/10/2020      Next Visit Date:  Future Appointments   Date Time Provider Alvarez Solo   9/15/2020 10:45 AM Jordan Yao, DO [facility]

## 2020-09-11 ENCOUNTER — HOSPITAL ENCOUNTER (OUTPATIENT)
Dept: LAB | Age: 75
Discharge: HOME | End: 2020-09-11
Payer: MEDICARE

## 2020-09-11 LAB
ALBUMIN SERPL-MCNC: 4 G/DL (ref 3.5–4.6)
ANION GAP SERPL CALCULATED.3IONS-SCNC: 12 MEQ/L (ref 9–15)
BUN BLDV-MCNC: 27 MG/DL (ref 8–23)
CALCIUM SERPL-MCNC: 9.8 MG/DL (ref 8.5–9.9)
CHLORIDE BLD-SCNC: 105 MEQ/L (ref 95–107)
CO2: 25 MEQ/L (ref 20–31)
CREAT SERPL-MCNC: 1.08 MG/DL (ref 0.5–0.9)
GFR AFRICAN AMERICAN: 60
GFR NON-AFRICAN AMERICAN: 49.6
GLUCOSE BLD-MCNC: 111 MG/DL (ref 70–99)
HCT VFR BLD CALC: 29.6 % (ref 37–47)
HEMOGLOBIN: 9.1 G/DL (ref 12–16)
MCH RBC QN AUTO: 26.3 PG (ref 27–31.3)
MCHC RBC AUTO-ENTMCNC: 30.9 % (ref 33–37)
MCV RBC AUTO: 85.2 FL (ref 82–100)
PARATHYROID HORMONE INTACT: 60.6 PG/ML (ref 15–65)
PDW BLD-RTO: 15.5 % (ref 11.5–14.5)
PHOSPHORUS: 4.6 MG/DL (ref 2.3–4.8)
PLATELET # BLD: 213 K/UL (ref 130–400)
POTASSIUM SERPL-SCNC: 4.9 MEQ/L (ref 3.4–4.9)
RBC # BLD: 3.47 M/UL (ref 4.2–5.4)
SODIUM BLD-SCNC: 142 MEQ/L (ref 135–144)
VITAMIN D 25-HYDROXY: 19.9 NG/ML (ref 30–100)
WBC # BLD: 5.2 K/UL (ref 4.8–10.8)

## 2020-09-11 PROCEDURE — 36415 COLL VENOUS BLD VENIPUNCTURE: CPT

## 2020-09-11 PROCEDURE — 82306 VITAMIN D 25 HYDROXY: CPT

## 2020-09-11 PROCEDURE — 85027 COMPLETE CBC AUTOMATED: CPT

## 2020-09-11 PROCEDURE — 80069 RENAL FUNCTION PANEL: CPT

## 2020-09-11 PROCEDURE — 83970 ASSAY OF PARATHORMONE: CPT

## 2020-09-15 ENCOUNTER — OFFICE VISIT (OUTPATIENT)
Dept: CARDIOLOGY CLINIC | Age: 75
End: 2020-09-15
Payer: MEDICARE

## 2020-09-15 VITALS
SYSTOLIC BLOOD PRESSURE: 130 MMHG | WEIGHT: 268 LBS | DIASTOLIC BLOOD PRESSURE: 80 MMHG | HEART RATE: 73 BPM | BODY MASS INDEX: 46 KG/M2 | TEMPERATURE: 97.4 F

## 2020-09-15 PROCEDURE — 93000 ELECTROCARDIOGRAM COMPLETE: CPT | Performed by: INTERNAL MEDICINE

## 2020-09-15 PROCEDURE — 99214 OFFICE O/P EST MOD 30 MIN: CPT | Performed by: INTERNAL MEDICINE

## 2020-09-15 NOTE — PROGRESS NOTES
Chief Complaint   Patient presents with    Coronary Artery Disease    Hypertension    Hyperlipidemia       5-13-17: Hospital Course:   Ángel Shin is a 80 y.o. female admitted to [facility] on 5/13/2017 for chest pain. After an EKG was done at [facility] was found to be having an acute MI patient was flown here by helicopter taken directly to cath lab to drug eluted stents were put in place ALP INE 3.5 mm x 23 mm and a 2.5 mm x 8 mm . Tolerated procedure well denied chest pain denied right wrist pain no distress     6-6-17: Patient presents for initial medical evaluation. Patient is followed on a regular basis by Dr. Claude Hoyt MD.   Pt denies chest pain, dyspnea, dyspnea on exertion, change in exercise capacity, fatigue,  nausea, vomiting, diarrhea, constipation, motor weakness, insomnia, weight loss, syncope, dizziness, lightheadedness, palpitations, PND, orthopnea, or claudication. No nitro use. BP and hr are good. CAD is stable. No LE discoloration or ulcers. No LE edema. No CHF type symptoms. Lipid profile is abnormal. Was placed on statin during hosp. No bleeding issues. S/p LHC with mild LAD diffuse disease, 99% mid CX, 80-90%90% proximal to mid RCA. S/p PCI to CX with 2 KEYSHA. Planned staging of RCA due to STEMI procedure. s/p ECHO. Conclusions   Summary   Normal mitral valve structure and function.   Normal aortic valve structure and function.   Normal tricuspid valve structure and function.   There is mild ( 1 +) tricuspid regurgitation with estimated RVSP of 38 mm   Hg.   Normal pulmonic valve structure and function.   Mildly dilated left atrium.   Left ventricular ejection fraction is visually estimated at 65%.   Impaired relaxation compatible with diastolic dysfunction.  ( reversed E/A   ratio)   Mild concentric left ventricular hypertrophy.   Normal right atrium.   Normal right ventricle structure and function.   No evidence of pericardial effusion.   No evidence of pleural effusion. 7-11-17: s/p PCI of RCA at [facility]. Pt denies chest pain, dyspnea, dyspnea on exertion, change in exercise capacity, fatigue,  nausea, vomiting, diarrhea, constipation, motor weakness, insomnia, weight loss, syncope, dizziness, lightheadedness, palpitations, PND, orthopnea, or claudication. No nitro use. BP and hr are good. CAD is stable. No LE discoloration or ulcers. No LE edema. No CHF type symptoms. Lipid profile is normal.     1-9-18: Pt denies chest pain, dyspnea, dyspnea on exertion, change in exercise capacity, fatigue,  nausea, vomiting, diarrhea, constipation, motor weakness, insomnia, weight loss, syncope, dizziness, lightheadedness, palpitations, PND, orthopnea, or claudication. No nitro use. BP and hr are good. CAD is stable. No LE discoloration or ulcers. No LE edema. No CHF type symptoms. Lipid profile is normal.  No bleeding issues on DAPT. Compliant with meds. 7-10-18: doing well overall. On DAPT and no bleeding issues. Pt denies chest pain, dyspnea, dyspnea on exertion, change in exercise capacity, fatigue,  nausea, vomiting, diarrhea, constipation, motor weakness, insomnia, weight loss, syncope, dizziness, lightheadedness, palpitations, PND, orthopnea, or claudication. No nitro use. BP and hr are good. CAD is stable. No LE discoloration or ulcers. No LE edema. No CHF type symptoms. Lipid profile is normal. No recent hospitalization. No change in meds. ekg WITH NSR, RBBB. SHE NEEDS TO HAVE BACK WORK DONE. Did not tolerate full dose lipitor 80mg daily, caused her joint pain. 1-15-19: Pt denies chest pain, dyspnea, dyspnea on exertion, change in exercise capacity, fatigue,  nausea, vomiting, diarrhea, constipation, motor weakness, insomnia, weight loss, syncope, dizziness, lightheadedness, palpitations, PND, orthopnea, or claudication. No nitro use. BP and hr are good. CAD is stable. No LE discoloration or ulcers. No LE edema. No CHF type symptoms.  Lipid to excess calories ([facility] [address]    History of ST elevation myocardial infarction (STEMI)    Coronary artery disease involving native coronary artery of native heart without angina pectoris    Chronic bilateral low back pain with bilateral sciatica    Primary osteoarthritis of both knees    Spinal stenosis of lumbar region with neurogenic claudication    Degenerative spondylolisthesis    Benign neoplasm of left ear    Asthma    CKD (chronic kidney disease) stage 3, GFR 30-59 ml/min ([facility])    Claudication [facility])       Past Surgical History:   Procedure Laterality Date    CARDIAC SURGERY  2017    has x 4 cardiac stents    COLONOSCOPY  01/14/2015    DR LANE - DIVERTICULOSIS    CORONARY ANGIOPLASTY WITH STENT PLACEMENT  05/17/2017    x2 stents    CYST REMOVAL Left 2/7/2019    RESECTION OF LEFT PINNA LESION WITH GRAFT performed by Jamaica Bragg MD at [facility]. [address], COLON, DIAGNOSTIC      FINGER SURGERY  12/9/14    middle finger right hand due to infection    HYSTERECTOMY  1996    LASIK  [id number]   [facility] SHOULDER SURGERY  2008    shoulder dislocated - popped  back in Right shoulder    UPPER GASTROINTESTINAL ENDOSCOPY  01/14/2015    DR LNAE - ULCER IN THE ANTRUM    UPPER GASTROINTESTINAL ENDOSCOPY  05/06/2015    DR Judith Segovia - GASTRITIS, PREVIOUS ULCER HEALED       Social History     Socioeconomic History    Marital status:      Spouse name: Not on file    Number of children: Not on file    Years of education: Not on file    Highest education level: Not on file   Occupational History    Not on file   Social Needs    Financial resource strain: Not on file    Food insecurity     Worry: Not on file     Inability: Not on file    Transportation needs     Medical: Not on file     Non-medical: Not on file   Tobacco Use    Smoking status: Never Smoker    Smokeless tobacco: Never Used   Substance and Sexual Activity    Alcohol use: Yes     Comment: glass of wine once a year    Drug use: No    Sexual activity: Yes     Partners: Male   Lifestyle    Physical activity     Days per week: Not on file     Minutes per session: Not on file    Stress: Not on file   Relationships    Social connections     Talks on phone: Not on file     Gets together: Not on file     Attends Catholic service: Not on file     Active member of club or organization: Not on file     Attends meetings of clubs or organizations: Not on file     Relationship status: Not on file    Intimate partner violence     Fear of current or ex partner: Not on file     Emotionally abused: Not on file     Physically abused: Not on file     Forced sexual activity: Not on file   Other Topics Concern    Not on file   Social History Narrative    Not on file       Family History   Problem Relation Age of Onset    Heart Disease Mother 61    Cancer Father 79        kidney    No Known Problems Daughter        Current Outpatient Medications   Medication Sig Dispense Refill    metoprolol tartrate (LOPRESSOR) 25 MG tablet TAKE 1 TABLET BY MOUTH TWICE DAILY 180 tablet 3    atorvastatin (LIPITOR) 80 MG tablet Take 0.5 tablets by mouth nightly 90 tablet 3    losartan-hydrochlorothiazide (HYZAAR) 100-25 MG per tablet TAKE 1 TABLET BY MOUTH DAILY 90 tablet 3    diltiazem (CARDIZEM CD) 240 MG extended release capsule TAKE 1 CAPSULE BY MOUTH DAILY 90 capsule 3    levothyroxine (SYNTHROID) 75 MCG tablet Take 1 tablet by mouth Daily 30 tablet 3    Handicap Placard MISC by Does not apply route x5 years 1 each 0    nitroGLYCERIN (NITROSTAT) 0.4 MG SL tablet Place 1 tablet under the tongue every 5 minutes as needed for Chest pain 25 tablet 3    aspirin EC 81 MG EC tablet Take 1 tablet by mouth daily 30 tablet 3    acetaminophen (TYLENOL) 325 MG tablet Take 650 mg by mouth as needed for Pain      diclofenac sodium (VOLTAREN) 1 % GEL Apply 2 g topically 2 times daily 1 Tube 1    CINNAMON PO Take  by mouth.  2000 mg. daily      pregabalin (LYRICA) 75 MG capsule Take 1 capsule by mouth 2 times daily for 30 days. 60 capsule 3     No current facility-administered medications for this visit. Lisinopril and Tramadol    Review of Systems:  General ROS: negative  Psychological ROS: negative  Hematological and Lymphatic ROS: No history of blood clots or bleeding disorder. Respiratory ROS: no cough, shortness of breath, or wheezing  Cardiovascular ROS: no chest pain or dyspnea on exertion  Gastrointestinal ROS: no abdominal pain, change in bowel habits, or black or bloody stools  Genito-Urinary ROS: no dysuria, trouble voiding, or hematuria  Musculoskeletal ROS: negative  Neurological ROS: no TIA or stroke symptoms  Dermatological ROS: negative    VITALS:  Blood pressure 130/80, pulse 73, temperature 97.4 °F (36.3 °C), temperature source Infrared, weight 268 lb (121.6 kg), last menstrual period 09/17/1996, not currently breastfeeding. Body mass index is 46 kg/m². Physical Examination:  General appearance - alert, well appearing, and in no distress  Mental status - alert, oriented to person, place, and time  Neck - Neck is supple, no JVD or carotid bruits. No thyromegaly or adenopathy. Chest - clear to auscultation, no wheezes, rales or rhonchi, symmetric air entry  Heart - normal rate, regular rhythm, normal S1, S2, no murmurs, rubs, clicks or gallops  Abdomen - soft, nontender, nondistended, no masses or organomegaly  Neurological - alert, oriented, normal speech, no focal findings or movement disorder noted  Extremities - peripheral pulses normal, no pedal edema, no clubbing or cyanosis  Skin - normal coloration and turgor, no rashes, no suspicious skin lesions noted      Orders Placed This Encounter   Procedures    EKG 12 Lead       ASSESSMENT:     Diagnosis Orders   1. Essential hypertension  EKG 12 Lead   2.  Coronary artery disease involving native coronary artery of native heart without angina pectoris  EKG 12 Lead         PLAN:     Patient will need to continue to follow up with you for their general medical care     As always, aggressive risk factor modification is strongly recommended. We should adhere to the [address] guidelines for HTN management and the NCEP ATP III guidelines for LDL-C management. Cardiac diet is always recommended with low fat, cholesterol, calories and sodium. Continue medications at current doses. Avoid nephrotoxic agents    Follow up with PCP for labs    Check EKG    The proper use and anticipated side effects of nitroglycerine has been carefully explained. If a single episode of chest pain is not relieved by one tablet, the patient will try another within 5 minutes; and if this doesn't relieve the pain, the patient is instructed to call 911 for transportation to an emergency department. Patient was advised and encouraged to check blood pressure at home or at a pharmacy, maintain a logbook, and also call us back if blood pressure are above the target ranges or if it is low. Patient clearly understands and agrees to the instructions. We will need to continue to monitor muscle and liver enzymes, BUN, CR, and electrolytes. Thank you for allowing me to participate in the care of your patient, please don't hesitate to contact me if you have any further questions.

## 2020-11-10 ENCOUNTER — TELEPHONE (OUTPATIENT)
Dept: OTHER | Facility: CLINIC | Age: 75
End: 2020-11-10

## 2020-12-08 ENCOUNTER — HOSPITAL ENCOUNTER (OUTPATIENT)
Age: 75
Setting detail: SPECIMEN
Discharge: HOME | End: 2020-12-08
Payer: MEDICARE

## 2020-12-08 ENCOUNTER — OFFICE VISIT (OUTPATIENT)
Dept: FAMILY MEDICINE CLINIC | Age: 75
End: 2020-12-08
Payer: MEDICARE

## 2020-12-08 VITALS
DIASTOLIC BLOOD PRESSURE: 72 MMHG | TEMPERATURE: 97 F | HEIGHT: 64 IN | SYSTOLIC BLOOD PRESSURE: 114 MMHG | HEART RATE: 68 BPM | OXYGEN SATURATION: 98 % | BODY MASS INDEX: 45.07 KG/M2 | WEIGHT: 264 LBS

## 2020-12-08 LAB
ALBUMIN SERPL-MCNC: 4 G/DL (ref 3.5–4.6)
ALP BLD-CCNC: 111 U/L (ref 40–130)
ALT SERPL-CCNC: 11 U/L (ref 0–33)
ANION GAP SERPL CALCULATED.3IONS-SCNC: 11 MEQ/L (ref 9–15)
AST SERPL-CCNC: 12 U/L (ref 0–35)
BILIRUB SERPL-MCNC: 0.3 MG/DL (ref 0.2–0.7)
BUN BLDV-MCNC: 34 MG/DL (ref 8–23)
CALCIUM SERPL-MCNC: 9.2 MG/DL (ref 8.5–9.9)
CHLORIDE BLD-SCNC: 104 MEQ/L (ref 95–107)
CHOLESTEROL, FASTING: 160 MG/DL (ref 0–199)
CO2: 24 MEQ/L (ref 20–31)
CREAT SERPL-MCNC: 1.24 MG/DL (ref 0.5–0.9)
GFR AFRICAN AMERICAN: 51.1
GFR NON-AFRICAN AMERICAN: 42.2
GLOBULIN: 2.7 G/DL (ref 2.3–3.5)
GLUCOSE FASTING: 104 MG/DL (ref 70–99)
HDLC SERPL-MCNC: 57 MG/DL (ref 40–59)
LDL CHOLESTEROL CALCULATED: 79 MG/DL (ref 0–129)
POTASSIUM SERPL-SCNC: 4.3 MEQ/L (ref 3.4–4.9)
SODIUM BLD-SCNC: 139 MEQ/L (ref 135–144)
TOTAL PROTEIN: 6.7 G/DL (ref 6.3–8)
TRIGLYCERIDE, FASTING: 118 MG/DL (ref 0–150)
TSH SERPL DL<=0.05 MIU/L-ACNC: 2.94 UIU/ML (ref 0.44–3.86)

## 2020-12-08 PROCEDURE — 36415 COLL VENOUS BLD VENIPUNCTURE: CPT | Performed by: FAMILY MEDICINE

## 2020-12-08 PROCEDURE — 80053 COMPREHEN METABOLIC PANEL: CPT

## 2020-12-08 PROCEDURE — G0439 PPPS, SUBSEQ VISIT: HCPCS | Performed by: FAMILY MEDICINE

## 2020-12-08 PROCEDURE — 99214 OFFICE O/P EST MOD 30 MIN: CPT | Performed by: FAMILY MEDICINE

## 2020-12-08 PROCEDURE — 80061 LIPID PANEL: CPT

## 2020-12-08 PROCEDURE — 84443 ASSAY THYROID STIM HORMONE: CPT

## 2020-12-08 RX ORDER — OXYBUTYNIN CHLORIDE 10 MG/1
10 TABLET, EXTENDED RELEASE ORAL DAILY
Qty: 30 TABLET | Refills: 3 | Status: SHIPPED | OUTPATIENT
Start: 2020-12-08 | End: 2021-04-09 | Stop reason: SDUPTHER

## 2020-12-08 RX ORDER — DILTIAZEM HYDROCHLORIDE 240 MG/1
240 CAPSULE, COATED, EXTENDED RELEASE ORAL DAILY
Qty: 90 CAPSULE | Refills: 3 | Status: SHIPPED | OUTPATIENT
Start: 2020-12-08 | End: 2021-12-28

## 2020-12-08 RX ORDER — LOSARTAN POTASSIUM AND HYDROCHLOROTHIAZIDE 25; 100 MG/1; MG/1
1 TABLET ORAL DAILY
Qty: 90 TABLET | Refills: 3 | Status: ON HOLD | OUTPATIENT
Start: 2020-12-08 | End: 2020-12-24 | Stop reason: HOSPADM

## 2020-12-08 ASSESSMENT — LIFESTYLE VARIABLES
AUDIT-C TOTAL SCORE: INCOMPLETE
HOW OFTEN DO YOU HAVE A DRINK CONTAINING ALCOHOL: NEVER
AUDIT TOTAL SCORE: INCOMPLETE
HOW OFTEN DO YOU HAVE A DRINK CONTAINING ALCOHOL: 0

## 2020-12-08 ASSESSMENT — ENCOUNTER SYMPTOMS
WHEEZING: 0
CONSTIPATION: 0
RHINORRHEA: 0
DIARRHEA: 0
SHORTNESS OF BREATH: 0
COUGH: 0
ABDOMINAL PAIN: 0
SORE THROAT: 0

## 2020-12-08 ASSESSMENT — PATIENT HEALTH QUESTIONNAIRE - PHQ9
SUM OF ALL RESPONSES TO PHQ QUESTIONS 1-9: 0
SUM OF ALL RESPONSES TO PHQ QUESTIONS 1-9: 0
2. FEELING DOWN, DEPRESSED OR HOPELESS: 0
SUM OF ALL RESPONSES TO PHQ QUESTIONS 1-9: 0
SUM OF ALL RESPONSES TO PHQ9 QUESTIONS 1 & 2: 0
1. LITTLE INTEREST OR PLEASURE IN DOING THINGS: 0

## 2020-12-08 NOTE — PROGRESS NOTES
[address] PRIMARY CARE  [address] [id number]  Dept: [phone]  Dept Fax: [phone]: [phone]   Chief Complaint  Chief Complaint   Patient presents with    Medicare AWV       HPI:  77 y. o.female who presents for urinary urgency:    Urinary urgency: having sudden urge and barely makes it to the toilet. Worsening over the past year. Wants to try a med for this. CAD: no CP or SOB    Hypothyroidism: asymptomatic. Compliant w med.     Past Medical History:   Diagnosis Date    Antral ulcer 01/14/2015    DR Bronwyn Sandhoff    Asthma     \"cured by beestings\"    Coronary artery disease     Coronary artery disease involving native coronary artery of native heart without angina pectoris 7/10/2018    has x 4 cardiac stents / Dr. Crisostomo November Diverticulosis of colon (without mention of hemorrhage) 01/14/2015    DR Bronwyn Sandhoff    Essential hypertension 12/10/2013    meds > 20 yrs    History of blood transfusion 1990s    with hysterectomy    History of normal resting EKG 1996    normal    History of ST elevation myocardial infarction (STEMI) 7/11/2017    History of transfusion of whole blood 1996    Excessive bleeding before hysterectomy    Hyperlipidemia     meds > 2 yrs    Hypertension     Hypothyroidism     past trx years ago then stopped / recent restart    Kidney disease     Low back pain     Morbid obesity due to excess calories ([address] 5/13/2017    Morbid obesity due to excess calories ([address] 6/6/2017    Osteoarthritis     Pneumonia     Shoulder dislocation     ST elevation myocardial infarction involving left circumflex coronary artery ([address] 5/13/2017    Unspecified gastritis and gastroduodenitis without mention of hemorrhage 05/06/2015    DR Bronwyn Sandhoff     Past Surgical History:   Procedure Laterality Date    CARDIAC SURGERY  2017    has x 4 cardiac stents    COLONOSCOPY  01/14/2015    DR Bronwyn Sandhoff - DIVERTICULOSIS    CORONARY ANGIOPLASTY WITH STENT PLACEMENT  05/17/2017    x2 stents    CYST REMOVAL Left 2/7/2019    RESECTION OF LEFT PINNA LESION WITH GRAFT performed by Carlota Zavala MD at [facility]. [address], COLON, DIAGNOSTIC      FINGER SURGERY  12/9/14    middle finger right hand due to infection   [address] [facility]  [id number]   [facility]  2008    shoulder dislocated - popped  back in Right shoulder    UPPER GASTROINTESTINAL ENDOSCOPY  01/14/2015    DR LANE - ULCER IN THE ANTRUM    UPPER GASTROINTESTINAL ENDOSCOPY  05/06/2015    DR Horne Lung - GASTRITIS, PREVIOUS ULCER HEALED     Social History     Socioeconomic History    Marital status:      Spouse name: Not on file    Number of children: Not on file    Years of education: Not on file    Highest education level: Not on file   Occupational History    Not on file   Social Needs    Financial resource strain: Not on file    Food insecurity     Worry: Not on file     Inability: Not on file    Transportation needs     Medical: Not on file     Non-medical: Not on file   Tobacco Use    Smoking status: Never Smoker    Smokeless tobacco: Never Used   Substance and Sexual Activity    Alcohol use: Yes     Comment: glass of wine once a year    Drug use: No    Sexual activity: Yes     Partners: Male   Lifestyle    Physical activity     Days per week: Not on file     Minutes per session: Not on file    Stress: Not on file   Relationships    Social connections     Talks on phone: Not on file     Gets together: Not on file     Attends Mandaen service: Not on file     Active member of club or organization: Not on file     Attends meetings of clubs or organizations: Not on file     Relationship status: Not on file    Intimate partner violence     Fear of current or ex partner: Not on file     Emotionally abused: Not on file     Physically abused: Not on file     Forced sexual activity: Not on file   Other Topics Concern Visit in 1 year.     Hugh Wilson MD

## 2020-12-08 NOTE — PATIENT INSTRUCTIONS
Personalized Preventive Plan for Jaylene Alfaro - 12/8/2020  Medicare offers a range of preventive health benefits. Some of the tests and screenings are paid in full while other may be subject to a deductible, co-insurance, and/or copay. Some of these benefits include a comprehensive review of your medical history including lifestyle, illnesses that may run in your family, and various assessments and screenings as appropriate. After reviewing your medical record and screening and assessments performed today your provider may have ordered immunizations, labs, imaging, and/or referrals for you. A list of these orders (if applicable) as well as your Preventive Care list are included within your After Visit Summary for your review. Other Preventive Recommendations:    · A preventive eye exam performed by an eye specialist is recommended every 1-2 years to screen for glaucoma; cataracts, macular degeneration, and other eye disorders. · A preventive dental visit is recommended every 6 months. · Try to get at least 150 minutes of exercise per week or 10,000 steps per day on a pedometer . · Order or download the FREE \"Exercise & Physical Activity: Your Everyday Guide\" from [employer]. Call 7-777.781.4601 or search [employer] online. · You need 9241-8627 mg of calcium and 0141-0706 IU of vitamin D per day. It is possible to meet your calcium requirement with diet alone, but a vitamin D supplement is usually necessary to meet this goal.  · When exposed to the sun, use a sunscreen that protects against both UVA and UVB radiation with an SPF of 30 or greater. Reapply every 2 to 3 hours or after sweating, drying off with a towel, or swimming. · Always wear a seat belt when traveling in a car. Always wear a helmet when riding a bicycle or motorcycle.

## 2020-12-08 NOTE — PROGRESS NOTES
Medicare Annual Wellness Visit  Name: Kali No Date: 2020   MRN: [id number] Sex: Female   Age: 77 y.o. Ethnicity: Non-/Non    : 1945 Race: Mary Jo Trevino is here for Medicare AWV    Screenings for behavioral, psychosocial and functional/safety risks, and cognitive dysfunction are all negative except as indicated below. These results, as well as other patient data from the [address] form, are documented in Flowsheets linked to this Encounter. Allergies   Allergen Reactions    Lisinopril Nausea Only and Other (See Comments)     cough    Tramadol Nausea Only       Prior to Visit Medications    Medication Sig Taking? Authorizing Provider   metoprolol tartrate (LOPRESSOR) 25 MG tablet TAKE 1 TABLET BY MOUTH TWICE DAILY Yes Jordan CUNNINGHAM Holiday, DO   atorvastatin (LIPITOR) 80 MG tablet Take 0.5 tablets by mouth nightly Yes Jordan CUNNINGHAM Holiday, DO   losartan-hydrochlorothiazide (HYZAAR) 100-25 MG per tablet TAKE 1 TABLET BY MOUTH DAILY Yes Tamika Garcia MD   diltiazem (CARDIZEM CD) 240 MG extended release capsule TAKE 1 CAPSULE BY MOUTH DAILY Yes Tamika Garcia MD   levothyroxine (SYNTHROID) 75 MCG tablet Take 1 tablet by mouth Daily Yes Tamika Garcia MD   Handicap Placard MISC by Does not apply route x5 years Yes Tamika Garcia MD   nitroGLYCERIN (NITROSTAT) 0.4 MG SL tablet Place 1 tablet under the tongue every 5 minutes as needed for Chest pain Yes Alex Spies, APRN - CNP   aspirin EC 81 MG EC tablet Take 1 tablet by mouth daily Yes Alex Spies, APRN - CNP   acetaminophen (TYLENOL) 325 MG tablet Take 650 mg by mouth as needed for Pain Yes Historical Provider, MD   diclofenac sodium (VOLTAREN) 1 % GEL Apply 2 g topically 2 times daily Yes STACIA Vásquez   CINNAMON PO Take  by mouth. 2000 mg. daily Yes Historical Provider, MD   pregabalin (LYRICA) 75 MG capsule Take 1 capsule by mouth 2 times daily for 30 days.   Tamika Garcia MD Past Medical History:   Diagnosis Date    Antral ulcer 01/14/2015    DR Marty Garcia    Asthma     \"cured by beestings\"    Coronary artery disease     Coronary artery disease involving native coronary artery of native heart without angina pectoris 7/10/2018    has x 4 cardiac stents / Dr. Jose Graves Diverticulosis of colon (without mention of hemorrhage) 01/14/2015    DR Marty Garcia    Essential hypertension 12/10/2013    meds > 20 yrs    History of blood transfusion 1990s    with hysterectomy    History of normal resting EKG 1996    normal    History of ST elevation myocardial infarction (STEMI) 7/11/2017    History of transfusion of whole blood 1996    Excessive bleeding before hysterectomy    Hyperlipidemia     meds > 2 yrs    Hypertension     Hypothyroidism     past trx years ago then stopped / recent restart    Kidney disease     Low back pain     Morbid obesity due to excess calories ([address] 5/13/2017    Morbid obesity due to excess calories ([address] 6/6/2017    Osteoarthritis     Pneumonia     Shoulder dislocation     ST elevation myocardial infarction involving left circumflex coronary artery ([address] 5/13/2017    Unspecified gastritis and gastroduodenitis without mention of hemorrhage 05/06/2015    DR Marty Garcia       Past Surgical History:   Procedure Laterality Date    CARDIAC SURGERY  2017    has x 4 cardiac stents    COLONOSCOPY  01/14/2015    DR LANE - DIVERTICULOSIS    CORONARY ANGIOPLASTY WITH STENT PLACEMENT  05/17/2017    x2 stents    CYST REMOVAL Left 2/7/2019    RESECTION OF LEFT PINNA LESION WITH GRAFT performed by Vi Shannon MD at [facility]. [address], COLON, DIAGNOSTIC      FINGER SURGERY  12/9/14    middle finger right hand due to infection    HYSTERECTOMY  1996  [address]  [id number]   [place]  2008    shoulder dislocated - popped  back in Right shoulder    UPPER GASTROINTESTINAL ENDOSCOPY  01/14/2015    DR LANE - ULCER IN THE ANTRUM    UPPER GASTROINTESTINAL ENDOSCOPY  05/06/2015    DR Vanita Koehler - GASTRITIS, PREVIOUS ULCER HEALED       Family History   Problem Relation Age of Onset    Heart Disease Mother 61    Cancer Father 79        kidney    No Known Problems Daughter        CareTeam (Including outside providers/suppliers regularly involved in providing care):   Patient Care Team:  Joleen Faith MD as PCP - General (Family Medicine)  Joleen Faith MD as PCP - [facility] Empaneled Provider  David Gill MD as Consulting Physician (Orthopedic Surgery)  Sally Grajeda MD (Gastroenterology)    Wt Readings from Last 3 Encounters:   12/08/20 264 lb (119.7 kg)   09/15/20 268 lb (121.6 kg)   11/26/19 272 lb (123.4 kg)     Vitals:    12/08/20 1324   BP: 114/72   Site: Right Upper Arm   Position: Sitting   Cuff Size: Large Adult   Pulse: 68   Temp: 97 °F (36.1 °C)   TempSrc: Infrared   SpO2: 98%   Weight: 264 lb (119.7 kg)   Height: 5' 3.75\" (1.619 m)     Body mass index is 45.67 kg/m². Based upon direct observation of the patient, evaluation of cognition reveals recent and remote memory intact. Patient's complete Health Risk Assessment and screening values have been reviewed and are found in Flowsheets. The following problems were reviewed today and where indicated follow up appointments were made and/or referrals ordered. Positive Risk Factor Screenings with Interventions:     General Health and ACP:  General  In general, how would you say your health is?: Fair  In the past 7 days, have you experienced any of the following?  New or Increased Pain, New or Increased Fatigue, Loneliness, Social Isolation, Stress or Anger?: None of These  Do you get the social and emotional support that you need?: Yes  Do you have a Living Will?: (!) No  Advance Directives     Power of  Living Will ACP-Advance Directive ACP-Power of     Not on File Not on File Filed [address] Risk Interventions:  · No Living Will: Patient declines ACP discussion/assistance    Health Habits/Nutrition:  Health Habits/Nutrition  Do you exercise for at least 20 minutes 2-3 times per week?: (!) No  Have you lost any weight without trying in the past 3 months?: No  Do you eat fewer than 2 meals per day?: No  Have you seen a dentist within the past year?: (!) No  Body mass index: (!) 45.67  Health Habits/Nutrition Interventions:  · Inadequate physical activity:  patient is not ready to increase his/her physical activity level at this time    Hearing/Vision:  No exam data present  Hearing/Vision  Do you or your family notice any trouble with your hearing?: No  Do you have difficulty driving, watching TV, or doing any of your daily activities because of your eyesight?: No  Have you had an eye exam within the past year?: (!) No  Hearing/Vision Interventions:  · Vision concerns:  patient encouraged to make appointment with his/her eye specialist    Safety:  Safety  Do you have working smoke detectors?: Yes  Have all throw rugs been removed or fastened?: (!) No  Do you have non-slip mats or surfaces in all bathtubs/showers?: Yes  Do all of your stairways have a railing or banister?: Yes  Are your doorways, halls and stairs free of clutter?: Yes  Do you always fasten your seatbelt when you are in a car?: Yes  Safety Interventions:  · Home safety tips provided    Personalized Preventive Plan   Current Health Maintenance Status  Immunization History   Administered Date(s) Administered    Pneumococcal Conjugate 13-valent ([id number]) 10/30/2018    Pneumococcal Polysaccharide ([id number]) 10/02/2012    Tdap (Boostrix, Adacel) 12/05/2014        Health Maintenance   Topic Date Due    Shingles Vaccine (1 of 2) 07/12/1996    Breast cancer screen  04/12/2018    Annual Wellness Visit (AWV)  05/29/2019    Flu vaccine (1) 09/01/2020    TSH testing  11/26/2020    Lipid screen  11/26/2020    Potassium monitoring  09/11/2021    Creatinine monitoring  09/11/2021    DTaP/Tdap/Td vaccine (2 - Td) 12/05/2024    Colon cancer screen colonoscopy  01/14/2025    Pneumococcal 65+ years Vaccine  Completed    Hepatitis C screen  Completed    DEXA (modify frequency per FRAX score)  Addressed    Hepatitis A vaccine  Aged Out    Hepatitis B vaccine  Aged Out    Hib vaccine  Aged Out    Meningococcal (ACWY) vaccine  Aged Out     Recommendations for AMSC Due: see orders and patient instructions/AVS.  . Recommended screening schedule for the next 5-10 years is provided to the patient in written form: see Patient Instructions/AVS.    Cheryle Christians was seen today for medicare aw.     Diagnoses and all orders for this visit:    ST elevation myocardial infarction involving left circumflex coronary artery ([facility] [address]

## 2020-12-21 ENCOUNTER — TELEPHONE (OUTPATIENT)
Dept: FAMILY MEDICINE CLINIC | Age: 75
End: 2020-12-21

## 2020-12-21 NOTE — TELEPHONE ENCOUNTER
Patient called stating that her sinuses are still draining really bad and is asking if you can call in a prescription for her. She stated that she is taking sinus medicine and it's drying out her nose but it is still draining down her throat, making it hard to breathe sometime. Patient's last visit was on 12/8/20.

## 2020-12-22 ENCOUNTER — NURSE TRIAGE (OUTPATIENT)
Dept: OTHER | Facility: CLINIC | Age: 75
End: 2020-12-22

## 2020-12-22 ENCOUNTER — APPOINTMENT (OUTPATIENT)
Dept: GENERAL RADIOLOGY | Age: 75
DRG: 377 | End: 2020-12-22
Payer: MEDICARE

## 2020-12-22 ENCOUNTER — HOSPITAL ENCOUNTER (INPATIENT)
Age: 75
LOS: 2 days | Discharge: HOME | DRG: 377 | End: 2020-12-24
Attending: EMERGENCY MEDICINE | Admitting: INTERNAL MEDICINE
Payer: MEDICARE

## 2020-12-22 DIAGNOSIS — D50.8 OTHER IRON DEFICIENCY ANEMIA: ICD-10-CM

## 2020-12-22 DIAGNOSIS — R07.9 CHEST PAIN, UNSPECIFIED TYPE: Primary | ICD-10-CM

## 2020-12-22 DIAGNOSIS — N18.2 STAGE 2 CHRONIC KIDNEY DISEASE: ICD-10-CM

## 2020-12-22 LAB
ABO/RH: NORMAL
ACANTHOCYTES: ABNORMAL
ALBUMIN SERPL-MCNC: 4.4 G/DL (ref 3.5–4.6)
ALP BLD-CCNC: 123 U/L (ref 40–130)
ALT SERPL-CCNC: 13 U/L (ref 0–33)
ANION GAP SERPL CALCULATED.3IONS-SCNC: 11 MEQ/L (ref 9–15)
ANISOCYTOSIS: ABNORMAL
ANISOCYTOSIS: ABNORMAL
ANTIBODY SCREEN: NORMAL
APTT: 33.4 SEC (ref 24.4–36.8)
AST SERPL-CCNC: 12 U/L (ref 0–35)
BASOPHILS ABSOLUTE: 0 K/UL (ref 0–0.2)
BASOPHILS ABSOLUTE: 0 K/UL (ref 0–0.2)
BASOPHILS RELATIVE PERCENT: 0.6 %
BASOPHILS RELATIVE PERCENT: 0.6 %
BILIRUB SERPL-MCNC: 0.3 MG/DL (ref 0.2–0.7)
BUN BLDV-MCNC: 38 MG/DL (ref 8–23)
CALCIUM SERPL-MCNC: 9.7 MG/DL (ref 8.5–9.9)
CHLORIDE BLD-SCNC: 106 MEQ/L (ref 95–107)
CO2: 21 MEQ/L (ref 20–31)
CREAT SERPL-MCNC: 1.73 MG/DL (ref 0.5–0.9)
EOSINOPHILS ABSOLUTE: 0.1 K/UL (ref 0–0.7)
EOSINOPHILS ABSOLUTE: 0.2 K/UL (ref 0–0.7)
EOSINOPHILS RELATIVE PERCENT: 2 %
EOSINOPHILS RELATIVE PERCENT: 2.8 %
GFR AFRICAN AMERICAN: 34.8
GFR NON-AFRICAN AMERICAN: 28.8
GLOBULIN: 2.4 G/DL (ref 2.3–3.5)
GLUCOSE BLD-MCNC: 173 MG/DL (ref 70–99)
HCT VFR BLD CALC: 22.5 % (ref 37–47)
HCT VFR BLD CALC: 23.3 % (ref 37–47)
HEMOGLOBIN: 6.7 G/DL (ref 12–16)
HEMOGLOBIN: 7 G/DL (ref 12–16)
HYPOCHROMIA: ABNORMAL
HYPOCHROMIA: ABNORMAL
INR BLD: 1
LYMPHOCYTES ABSOLUTE: 0.6 K/UL (ref 1–4.8)
LYMPHOCYTES ABSOLUTE: 1 K/UL (ref 1–4.8)
LYMPHOCYTES RELATIVE PERCENT: 12.8 %
LYMPHOCYTES RELATIVE PERCENT: 8.7 %
MAGNESIUM: 2.1 MG/DL (ref 1.7–2.4)
MCH RBC QN AUTO: 23.3 PG (ref 27–31.3)
MCH RBC QN AUTO: 23.8 PG (ref 27–31.3)
MCHC RBC AUTO-ENTMCNC: 29.6 % (ref 33–37)
MCHC RBC AUTO-ENTMCNC: 29.9 % (ref 33–37)
MCV RBC AUTO: 78.5 FL (ref 82–100)
MCV RBC AUTO: 79.5 FL (ref 82–100)
MICROCYTES: ABNORMAL
MONOCYTES ABSOLUTE: 0.4 K/UL (ref 0.2–0.8)
MONOCYTES ABSOLUTE: 0.5 K/UL (ref 0.2–0.8)
MONOCYTES RELATIVE PERCENT: 6.4 %
MONOCYTES RELATIVE PERCENT: 6.4 %
NEUTROPHILS ABSOLUTE: 5.4 K/UL (ref 1.4–6.5)
NEUTROPHILS ABSOLUTE: 5.9 K/UL (ref 1.4–6.5)
NEUTROPHILS RELATIVE PERCENT: 77.4 %
NEUTROPHILS RELATIVE PERCENT: 82.3 %
OVALOCYTES: ABNORMAL
PDW BLD-RTO: 15.4 % (ref 11.5–14.5)
PDW BLD-RTO: 15.7 % (ref 11.5–14.5)
PLATELET # BLD: 207 K/UL (ref 130–400)
PLATELET # BLD: 224 K/UL (ref 130–400)
PLATELET SLIDE REVIEW: NORMAL
PLATELET SLIDE REVIEW: NORMAL
POIKILOCYTES: ABNORMAL
POTASSIUM SERPL-SCNC: 4.5 MEQ/L (ref 3.4–4.9)
PROTHROMBIN TIME: 12.9 SEC (ref 12.3–14.9)
RBC # BLD: 2.87 M/UL (ref 4.2–5.4)
RBC # BLD: 2.93 M/UL (ref 4.2–5.4)
SARS-COV-2, NAAT: NOT DETECTED
SODIUM BLD-SCNC: 138 MEQ/L (ref 135–144)
TARGET CELLS: ABNORMAL
TOTAL PROTEIN: 6.8 G/DL (ref 6.3–8)
TROPONIN: 0.03 NG/ML (ref 0–0.01)
TROPONIN: <0.01 NG/ML (ref 0–0.01)
WBC # BLD: 6.6 K/UL (ref 4.8–10.8)
WBC # BLD: 7.6 K/UL (ref 4.8–10.8)

## 2020-12-22 PROCEDURE — 36415 COLL VENOUS BLD VENIPUNCTURE: CPT

## 2020-12-22 PROCEDURE — 86923 COMPATIBILITY TEST ELECTRIC: CPT

## 2020-12-22 PROCEDURE — 99284 EMERGENCY DEPT VISIT MOD MDM: CPT

## 2020-12-22 PROCEDURE — C9113 INJ PANTOPRAZOLE SODIUM, VIA: HCPCS | Performed by: EMERGENCY MEDICINE

## 2020-12-22 PROCEDURE — 83735 ASSAY OF MAGNESIUM: CPT

## 2020-12-22 PROCEDURE — 36430 TRANSFUSION BLD/BLD COMPNT: CPT

## 2020-12-22 PROCEDURE — 2580000003 HC RX 258: Performed by: INTERNAL MEDICINE

## 2020-12-22 PROCEDURE — 80053 COMPREHEN METABOLIC PANEL: CPT

## 2020-12-22 PROCEDURE — U0002 COVID-19 LAB TEST NON-CDC: HCPCS

## 2020-12-22 PROCEDURE — 85610 PROTHROMBIN TIME: CPT

## 2020-12-22 PROCEDURE — 85730 THROMBOPLASTIN TIME PARTIAL: CPT

## 2020-12-22 PROCEDURE — 86901 BLOOD TYPING SEROLOGIC RH(D): CPT

## 2020-12-22 PROCEDURE — 86850 RBC ANTIBODY SCREEN: CPT

## 2020-12-22 PROCEDURE — 84484 ASSAY OF TROPONIN QUANT: CPT

## 2020-12-22 PROCEDURE — 86900 BLOOD TYPING SEROLOGIC ABO: CPT

## 2020-12-22 PROCEDURE — 6370000000 HC RX 637 (ALT 250 FOR IP): Performed by: INTERNAL MEDICINE

## 2020-12-22 PROCEDURE — 2060000000 HC ICU INTERMEDIATE R&B

## 2020-12-22 PROCEDURE — 99222 1ST HOSP IP/OBS MODERATE 55: CPT | Performed by: SPECIALIST

## 2020-12-22 PROCEDURE — 85025 COMPLETE CBC W/AUTO DIFF WBC: CPT

## 2020-12-22 PROCEDURE — 6360000002 HC RX W HCPCS: Performed by: EMERGENCY MEDICINE

## 2020-12-22 PROCEDURE — 71045 X-RAY EXAM CHEST 1 VIEW: CPT

## 2020-12-22 PROCEDURE — P9016 RBC LEUKOCYTES REDUCED: HCPCS

## 2020-12-22 PROCEDURE — 93005 ELECTROCARDIOGRAM TRACING: CPT | Performed by: EMERGENCY MEDICINE

## 2020-12-22 PROCEDURE — 2580000003 HC RX 258: Performed by: EMERGENCY MEDICINE

## 2020-12-22 RX ORDER — SODIUM CHLORIDE 9 MG/ML
10 INJECTION INTRAVENOUS DAILY
Status: DISCONTINUED | OUTPATIENT
Start: 2020-12-22 | End: 2020-12-24 | Stop reason: HOSPADM

## 2020-12-22 RX ORDER — ONDANSETRON 2 MG/ML
4 INJECTION INTRAMUSCULAR; INTRAVENOUS EVERY 6 HOURS PRN
Status: DISCONTINUED | OUTPATIENT
Start: 2020-12-22 | End: 2020-12-24 | Stop reason: HOSPADM

## 2020-12-22 RX ORDER — MORPHINE SULFATE 4 MG/ML
4 INJECTION, SOLUTION INTRAMUSCULAR; INTRAVENOUS
Status: DISCONTINUED | OUTPATIENT
Start: 2020-12-22 | End: 2020-12-24 | Stop reason: HOSPADM

## 2020-12-22 RX ORDER — 0.9 % SODIUM CHLORIDE 0.9 %
20 INTRAVENOUS SOLUTION INTRAVENOUS ONCE
Status: COMPLETED | OUTPATIENT
Start: 2020-12-22 | End: 2020-12-23

## 2020-12-22 RX ORDER — SODIUM CHLORIDE 0.9 % (FLUSH) 0.9 %
10 SYRINGE (ML) INJECTION PRN
Status: DISCONTINUED | OUTPATIENT
Start: 2020-12-22 | End: 2020-12-24 | Stop reason: HOSPADM

## 2020-12-22 RX ORDER — DILTIAZEM HYDROCHLORIDE 240 MG/1
240 CAPSULE, COATED, EXTENDED RELEASE ORAL DAILY
Status: DISCONTINUED | OUTPATIENT
Start: 2020-12-22 | End: 2020-12-24 | Stop reason: HOSPADM

## 2020-12-22 RX ORDER — 0.9 % SODIUM CHLORIDE 0.9 %
1000 INTRAVENOUS SOLUTION INTRAVENOUS ONCE
Status: COMPLETED | OUTPATIENT
Start: 2020-12-22 | End: 2020-12-22

## 2020-12-22 RX ORDER — ACETAMINOPHEN 325 MG/1
650 TABLET ORAL EVERY 4 HOURS PRN
Status: DISCONTINUED | OUTPATIENT
Start: 2020-12-22 | End: 2020-12-24 | Stop reason: HOSPADM

## 2020-12-22 RX ORDER — PANTOPRAZOLE SODIUM 40 MG/10ML
40 INJECTION, POWDER, LYOPHILIZED, FOR SOLUTION INTRAVENOUS ONCE
Status: COMPLETED | OUTPATIENT
Start: 2020-12-22 | End: 2020-12-22

## 2020-12-22 RX ORDER — PANTOPRAZOLE SODIUM 40 MG/10ML
40 INJECTION, POWDER, LYOPHILIZED, FOR SOLUTION INTRAVENOUS DAILY
Status: DISCONTINUED | OUTPATIENT
Start: 2020-12-22 | End: 2020-12-24 | Stop reason: HOSPADM

## 2020-12-22 RX ORDER — SODIUM CHLORIDE 9 MG/ML
10 INJECTION INTRAVENOUS DAILY
Status: DISCONTINUED | OUTPATIENT
Start: 2020-12-22 | End: 2020-12-22 | Stop reason: SDUPTHER

## 2020-12-22 RX ORDER — MORPHINE SULFATE 4 MG/ML
4 INJECTION, SOLUTION INTRAMUSCULAR; INTRAVENOUS EVERY 4 HOURS PRN
Status: DISCONTINUED | OUTPATIENT
Start: 2020-12-22 | End: 2020-12-24 | Stop reason: HOSPADM

## 2020-12-22 RX ORDER — OXYBUTYNIN CHLORIDE 5 MG/1
10 TABLET, EXTENDED RELEASE ORAL DAILY
Status: DISCONTINUED | OUTPATIENT
Start: 2020-12-22 | End: 2020-12-24 | Stop reason: HOSPADM

## 2020-12-22 RX ORDER — LOSARTAN POTASSIUM AND HYDROCHLOROTHIAZIDE 12.5; 5 MG/1; MG/1
2 TABLET ORAL DAILY
Status: DISCONTINUED | OUTPATIENT
Start: 2020-12-22 | End: 2020-12-24 | Stop reason: HOSPADM

## 2020-12-22 RX ORDER — ATORVASTATIN CALCIUM 40 MG/1
40 TABLET, FILM COATED ORAL NIGHTLY
Status: DISCONTINUED | OUTPATIENT
Start: 2020-12-22 | End: 2020-12-24 | Stop reason: HOSPADM

## 2020-12-22 RX ORDER — ASPIRIN 81 MG/1
81 TABLET ORAL DAILY
Status: DISCONTINUED | OUTPATIENT
Start: 2020-12-22 | End: 2020-12-24 | Stop reason: HOSPADM

## 2020-12-22 RX ORDER — SODIUM CHLORIDE 0.9 % (FLUSH) 0.9 %
10 SYRINGE (ML) INJECTION EVERY 12 HOURS SCHEDULED
Status: DISCONTINUED | OUTPATIENT
Start: 2020-12-22 | End: 2020-12-24 | Stop reason: HOSPADM

## 2020-12-22 RX ORDER — PANTOPRAZOLE SODIUM 40 MG/10ML
40 INJECTION, POWDER, LYOPHILIZED, FOR SOLUTION INTRAVENOUS DAILY
Status: DISCONTINUED | OUTPATIENT
Start: 2020-12-23 | End: 2020-12-22 | Stop reason: SDUPTHER

## 2020-12-22 RX ORDER — ONDANSETRON 2 MG/ML
4 INJECTION INTRAMUSCULAR; INTRAVENOUS ONCE
Status: DISCONTINUED | OUTPATIENT
Start: 2020-12-22 | End: 2020-12-24 | Stop reason: HOSPADM

## 2020-12-22 RX ORDER — NITROGLYCERIN 0.4 MG/1
0.4 TABLET SUBLINGUAL EVERY 5 MIN PRN
Status: DISCONTINUED | OUTPATIENT
Start: 2020-12-22 | End: 2020-12-24 | Stop reason: HOSPADM

## 2020-12-22 RX ORDER — ACETAMINOPHEN 325 MG/1
650 TABLET ORAL PRN
Status: DISCONTINUED | OUTPATIENT
Start: 2020-12-22 | End: 2020-12-22 | Stop reason: SDUPTHER

## 2020-12-22 RX ADMIN — SODIUM CHLORIDE 20 ML: 9 INJECTION, SOLUTION INTRAVENOUS at 23:00

## 2020-12-22 RX ADMIN — PANTOPRAZOLE SODIUM 40 MG: 40 INJECTION, POWDER, FOR SOLUTION INTRAVENOUS at 15:42

## 2020-12-22 RX ADMIN — Medication 10 ML: at 21:00

## 2020-12-22 RX ADMIN — ATORVASTATIN CALCIUM 40 MG: 40 TABLET, FILM COATED ORAL at 20:40

## 2020-12-22 RX ADMIN — METOPROLOL TARTRATE 25 MG: 25 TABLET, FILM COATED ORAL at 20:40

## 2020-12-22 RX ADMIN — SODIUM CHLORIDE 1000 ML: 9 INJECTION, SOLUTION INTRAVENOUS at 14:01

## 2020-12-22 ASSESSMENT — ENCOUNTER SYMPTOMS
NAUSEA: 0
BACK PAIN: 0
COUGH: 0
SORE THROAT: 0
VOMITING: 0
DIARRHEA: 0
SHORTNESS OF BREATH: 0
ABDOMINAL PAIN: 0

## 2020-12-22 NOTE — ED TRIAGE NOTES
[facility] with co chest pain, Pt was given 1 Nitro and 324 ASA po. Pt has cardiac ha is aox4 pink and warm, denies chest pain at this time.

## 2020-12-22 NOTE — FLOWSHEET NOTE
Dr. Keyon Peraltas in to see patient and wants one unit of blood given if ok  With cardiology and wants to perform an EGD once cleared by cardiology. Electronically signed by Elin Minor on 12/22/2020 at 6:00 PM     1851-Lab called and HGB dropped to 6.7. Message sent to Dr. Jonah Mejia to notify him and clarify if patient may get blood. Electronically signed by Elin Minor on 12/22/2020 at 6:52 PM

## 2020-12-22 NOTE — TELEPHONE ENCOUNTER
C/o nasal congestion, post nasal drip and shortness of breath. Reason for Disposition   Patient wants to be seen    Answer Assessment - Initial Assessment Questions  1. RESPIRATORY STATUS: \"Describe your breathing? \" (e.g., wheezing, shortness of breath, unable to speak, severe coughing)       Shortness of breath    2. ONSET: \"When did this breathing problem begin? \"       Couple weeks    3. PATTERN \"Does the difficult breathing come and go, or has it been constant since it started? \"       Intermittent    4. SEVERITY: \"How bad is your breathing? \" (e.g., mild, moderate, severe)     - MILD: No SOB at rest, mild SOB with walking, speaks normally in sentences, can lay down, no retractions, pulse < 100.     - MODERATE: SOB at rest, SOB with minimal exertion and prefers to sit, cannot lie down flat, speaks in phrases, mild retractions, audible wheezing, pulse 100-120.     - SEVERE: Very SOB at rest, speaks in single words, struggling to breathe, sitting hunched forward, retractions, pulse > 120       Mild    5. RECURRENT SYMPTOM: \"Have you had difficulty breathing before? \" If so, ask: \"When was the last time? \" and \"What happened that time? \"       Yes    6. CARDIAC HISTORY: \"Do you have any history of heart disease? \" (e.g., heart attack, angina, bypass surgery, angioplasty)       stents    7. LUNG HISTORY: \"Do you have any history of lung disease? \"  (e.g., pulmonary embolus, asthma, emphysema)      Asthma    8. CAUSE: \"What do you think is causing the breathing problem? \"       Not sure    9. OTHER SYMPTOMS: \"Do you have any other symptoms? (e.g., dizziness, runny nose, cough, chest pain, fever)      See above    10. PREGNANCY: \"Is there any chance you are pregnant? \" \"When was your last menstrual period? \"        Na    11. TRAVEL: \"Have you traveled out of the country in the last month? \" (e.g., travel history, exposures)        Denies    Protocols used: BREATHING DIFFICULTY-ADULT-[place]    Patient called pre-service center ([facility]) to schedule appointment, with red flag complaint, transferred to RN access for triage. See above questions and answers. Caller talking full sentences without any distress on phone. Discussed disposition and patient agreeable. Discussed potential consequences for not following disposition recommendation. Aware to call back with any concerns or persistent, worsening, or new symptoms develop. Warm transfer to [facility] scheduling for appointment. Attention Provider: Thank you for allowing me to participate in the care of your patient. The  patient was connected to triage in response to information provided to the [facility]. Please do not respond through this encounter as the response is not directed to a shared pool.

## 2020-12-22 NOTE — ED PROVIDER NOTES
Excessive bleeding before hysterectomy    Hyperlipidemia     meds > 2 yrs    Hypertension     Hypothyroidism     past trx years ago then stopped / recent restart    Kidney disease     Low back pain     Morbid obesity due to excess calories ([address] 5/13/2017    Morbid obesity due to excess calories ([address] 6/6/2017    Osteoarthritis     Pneumonia     Shoulder dislocation     ST elevation myocardial infarction involving left circumflex coronary artery ([address] 5/13/2017    Unspecified gastritis and gastroduodenitis without mention of hemorrhage 05/06/2015    DR L[address]         SURGICAL HISTORY       Past Surgical History:   Procedure Laterality Date    CARDIAC SURGERY  2017    has x 4 cardiac stents    COLONOSCOPY  01/14/2015    DR Hossein Falcon - DIVERTICULOSIS    CORONARY ANGIOPLASTY WITH STENT PLACEMENT  05/17/2017    x2 stents    CYST REMOVAL Left 2/7/2019    RESECTION OF LEFT PINNA LESION WITH GRAFT [facility] [address], COLON, DIAGNOSTIC      FINGER SURGERY  12/9/14    middle finger right hand due to infection   [facility]  [id number] [place] [address]  2008    shoulder dislocated - popped  back in Right shoulder    UPPER GASTROINTESTINAL ENDOSCOPY  01/14/2015    DR LANE - ULCER IN THE ANTRUM    UPPER GASTROINTESTINAL ENDOSCOPY  05/06/2015    DR LANE - GASTRITIS, PREVIOUS ULCER HEALED         CURRENTMEDICATIONS       Previous Medications    ACETAMINOPHEN (TYLENOL) 325 MG TABLET    Take 650 mg by mouth as needed for Pain    ASPIRIN EC 81 MG EC TABLET    Take 1 tablet by mouth daily    ATORVASTATIN (LIPITOR) 80 MG TABLET    Take 0.5 tablets by mouth nightly    CINNAMON PO    Take  by mouth.  2000 mg. daily    DICLOFENAC SODIUM (VOLTAREN) 1 % GEL    Apply 2 g topically 2 times daily    DILTIAZEM (CARDIZEM CD) 240 MG EXTENDED RELEASE CAPSULE    Take 1 capsule by mouth daily    HANDICAP PLACARD MISC    by Does not apply route x5 years LOSARTAN-HYDROCHLOROTHIAZIDE (HYZAAR) 100-25 MG PER TABLET    Take 1 tablet by mouth daily    METOPROLOL TARTRATE (LOPRESSOR) 25 MG TABLET    TAKE 1 TABLET BY MOUTH TWICE DAILY    NITROGLYCERIN (NITROSTAT) 0.4 MG SL TABLET    Place 1 tablet under the tongue every 5 minutes as needed for Chest pain    OXYBUTYNIN (DITROPAN XL) 10 MG EXTENDED RELEASE TABLET    Take 1 tablet by mouth daily       ALLERGIES     Lisinopril and Tramadol    FAMILY HISTORY       Family History   Problem Relation Age of Onset    Heart Disease Mother 61    Cancer Father 79        kidney    No Known Problems Daughter           SOCIAL HISTORY       Social History     Socioeconomic History    Marital status:      Spouse name: None    Number of children: None    Years of education: None    Highest education level: None   Occupational History    None   Social Needs    Financial resource strain: None    Food insecurity     Worry: None     Inability: None    Transportation needs     Medical: None     Non-medical: None   Tobacco Use    Smoking status: Never Smoker    Smokeless tobacco: Never Used   Substance and Sexual Activity    Alcohol use: Yes     Comment: glass of wine once a year    Drug use: No    Sexual activity: Yes     Partners: Male   Lifestyle    Physical activity     Days per week: None     Minutes per session: None    Stress: None   Relationships    Social connections     Talks on phone: None     Gets together: None     Attends Christianity service: None     Active member of club or organization: None     Attends meetings of clubs or organizations: None     Relationship status: None    Intimate partner violence     Fear of current or ex partner: None     Emotionally abused: None     Physically abused: None     Forced sexual activity: None   Other Topics Concern    None   Social History Narrative    None         PHYSICAL EXAM       ED Triage Vitals [12/22/20 1340]   BP Temp Temp Source Pulse Resp SpO2 Height Weight   (!) 154/93 98.6 °F (37 °C) Oral 94 18 99 % 5' 3\" (1.6 m) 250 lb (113.4 kg)       Physical Exam  Vitals signs and nursing note reviewed. Constitutional:       Appearance: She is well-developed. HENT:      Head: Normocephalic. Right Ear: External ear normal.      Left Ear: External ear normal.   Eyes:      Conjunctiva/sclera: Conjunctivae normal.      Pupils: Pupils are equal, round, and reactive to light. Neck:      Musculoskeletal: Normal range of motion and neck supple. Cardiovascular:      Rate and Rhythm: Normal rate and regular rhythm. Heart sounds: Normal heart sounds. Pulmonary:      Effort: Pulmonary effort is normal.      Breath sounds: Normal breath sounds. Abdominal:      General: Bowel sounds are normal. There is no distension. Palpations: Abdomen is soft. Tenderness: There is no abdominal tenderness. Musculoskeletal: Normal range of motion. Skin:     General: Skin is warm and dry. Neurological:      Mental Status: She is alert and oriented to person, place, and time. Psychiatric:         Mood and Affect: Mood normal.           MDM  78 yo female presents to the ED with chest pain. Pt is afebrile, hemodynamically stable. Pt was given sublingual nitro and PO asa with complete resolution of pain per EMS. Pt is currently chest pain free. pt given 1 L NS in the ED. EKG shows NSR with HR 88, RBBB, normal axis, normal intervals, no ST changes. Labs remarkable for BUN 38, Cr 1.73, Hb 7. CXR negative. Pt reassessed and feels better. Pt's Hb 12/8 was 9.1. Pt with 2 point drop in Hb and will possibly require a blood transfusion. Pt denies hematemesis, blood in stool. Pt does state that she has a h/o PUD and maybe it's acting up. Pt given IV protonix in the ED. Pt educated about chest pain and GI bleed. Case discussed with Dr. Ana Maria Gutierrez who recommended admission. Case then discussed with Dr. Jarrod Vogel and pt admitted to [facility] in serious condition.   Pt

## 2020-12-22 NOTE — ACP (ADVANCE CARE PLANNING)
Advance Care Planning     Advance Care Planning Activator (Inpatient)  Conversation Note      Date of ACP Conversation: 12/22/2020    Conversation Conducted with: Patient with Decision Making Capacity    ACP Activator: [address] makes decisions on behalf of the incapacitated patient: Decision Maker is asked to consider and make decisions based on patient values, known preferences, or best interests. Health Care Decision Maker:     Current Designated Health Care Decision Maker:   Primary Decision Maker: Darell Daviseric - [phone]    Secondary Decision Maker: Colton Booth - Child - [phone]    Supplemental (Other) Decision Maker: Marzena Mora - Child - [phone]  (If there is a valid [address] named in the \"Healthcare Decision Makers\" box in the ACP activity, but it is not visible above, be sure to open that field and then select the health care decision maker relationship (ie \"primary\") in the blank space to the right of the name.) Validate  this information as still accurate & up-to-date; edit [address] field as needed.)    Note: Assess and validate information in current ACP documents, as indicated. If no Decision Maker listed above or available through scanned documents, then:    If no Authorized Decision Maker has previously been identified, then patient chooses [address]:  \"Who would you like to name as your primary health care decision-maker? \"               Name: Sunny Stein        Relationship:           Phone number: [phone]  Lucita Torres this person be reached easily? \" he may not hear his phone, if not call his son  \"Who would you like to name as your back-up decision maker? \"   Name: Colton Booth        Relationship: son          Phone number: [phone]  Lucita Torres this person be reached easily? \" Yes    Note: If the relationship of these Decision-Makers to the patient does NOT follow your state's Next of Kin hierarchy, recommend that patient complete ACP document that meets state-specific requirements to allow them to act on the patient's behalf when appropriate. Care Preferences    Ventilation: \"If you were in your present state of health and suddenly became very ill and were unable to breathe on your own, what would your preference be about the use of a ventilator (breathing machine) if it were available to you? \"      Would the patient desire the use of ventilator (breathing machine)?: yes    \"If your health worsens and it becomes clear that your chance of recovery is unlikely, what would your preference be about the use of a ventilator (breathing machine) if it were available to you? \"     Would the patient desire the use of ventilator (breathing machine)?: Yes      Resuscitation  \"CPR works best to restart the heart when there is a sudden event, like a heart attack, in someone who is otherwise healthy. Unfortunately, CPR does not typically restart the heart for people who have serious health conditions or who are very sick. \"    \"In the event your heart stopped as a result of an underlying serious health condition, would you want attempts to be made to restart your heart (answer \"yes\" for attempt to resuscitate) or would you prefer a natural death (answer \"no\" for do not attempt to resuscitate)? \" yes     Pt states she is not sure about whether she would want cor/vent if her condition worsened. I encouraged her to consider filling out advance directives but she declined any information at this time.       NOTE: If the patient has a valid advance directive AND now provides care preference(s) that are inconsistent with that prior directive, advise the patient to consider either: creating a new advance directive that complies with state-specific requirements; or, if that is not possible, orally revoking that prior directive in accordance with state-specific requirements, which must be documented in the EHR. [x] Yes   [] No   Educated Patient / Juliet Craw regarding differences between Advance Directives and portable DNR orders.     Length of ACP Conversation in minutes:  10  Conversation Outcomes:  [x] ACP discussion completed  [] Existing advance directive reviewed with patient; no changes to patient's previously recorded wishes  [] New Advance Directive completed  [] Portable Do Not Rescitate prepared for Provider review and signature  [] POLST/POST/MOLST/MOST prepared for Provider review and signature      Follow-up plan:    [] Schedule follow-up conversation to continue planning  [] Referred individual to Provider for additional questions/concerns   [] Advised patient/agent/surrogate to review completed ACP document and update if needed with changes in condition, patient preferences or care setting    [x] This note routed to one or more involved healthcare providers

## 2020-12-22 NOTE — ED NOTES
Bed: 20  Expected date: 12/22/20  Expected time: 1:21 PM  Means of arrival: [facility]  Comments:  77 F - CP 7/10. ST depression in many leads. 186/75,100,98%. ASA and NTG given. Hx MI with stents.       Gary Monique RN  12/22/20 7339

## 2020-12-22 NOTE — CONSULTS
Consults    Patient Name: Adolph Adam Date: 2020  1:40 PM  MR #: [id number]  : 1945    Attending Physician: Aster Farr DO  Reason for consult: Anemia    History of Presenting Illness:      Prakash Randle is a 77 y.o. female on hospital day 0 with a history of shortness of breath and chest discomfort.,  Patient was evaluated in the emergency room and was found to be anemic. GI consult was requested because of anemia.,  Patient has a history of bleeding ulcer in the past.,  Patient has   history of GERD symptoms and has been taking over-the-counter antacids. .,  No history of any dysphagia or weight loss. ,  No significant change in bowel habits, no history of any hematemesis melena or rectal bleed, patient does not take any   NSAIDs, does take low-dose aspirin, does not smoke does not drink any alcohol history Obtained From:  patient      History:      Past Medical History:   Diagnosis Date    Antral ulcer 2015    DR Ryan Mchugh    Asthma     \"cured by beestings\"    Coronary artery disease     Coronary artery disease involving native coronary artery of native heart without angina pectoris 7/10/2018    has x 4 cardiac stents / Dr. Roxanna Nam Diverticulosis of colon (without mention of hemorrhage) 2015    DR Ryan Mchugh    Essential hypertension 12/10/2013    meds > 20 yrs    History of blood transfusion     with hysterectomy    History of normal resting EKG     normal    History of ST elevation myocardial infarction (STEMI) 2017    History of transfusion of whole blood     Excessive bleeding before hysterectomy    Hyperlipidemia     meds > 2 yrs    Hypertension     Hypothyroidism     past trx years ago then stopped / recent restart    Kidney disease     Low back pain     Morbid obesity due to excess calories ([address] 2017    Morbid obesity due to excess calories ([address] 2017    Osteoarthritis     Pneumonia     Shoulder dislocation     on file     Minutes per session: Not on file    Stress: Not on file   Relationships    Social connections     Talks on phone: Not on file     Gets together: Not on file     Attends Muslim service: Not on file     Active member of club or organization: Not on file     Attends meetings of clubs or organizations: Not on file     Relationship status: Not on file    Intimate partner violence     Fear of current or ex partner: Not on file     Emotionally abused: Not on file     Physically abused: Not on file     Forced sexual activity: Not on file   Other Topics Concern    Not on file   Social History Narrative    Not on file      [] Unable to obtain due to ventilated and/ or neurologic status      Home Medications:      Medications Prior to Admission: dilTIAZem (CARDIZEM CD) 240 MG extended release capsule, Take 1 capsule by mouth daily  losartan-hydroCHLOROthiazide (HYZAAR) 100-25 MG per tablet, Take 1 tablet by mouth daily  oxybutynin (DITROPAN XL) 10 MG extended release tablet, Take 1 tablet by mouth daily  metoprolol tartrate (LOPRESSOR) 25 MG tablet, TAKE 1 TABLET BY MOUTH TWICE DAILY  atorvastatin (LIPITOR) 80 MG tablet, Take 0.5 tablets by mouth nightly  nitroGLYCERIN (NITROSTAT) 0.4 MG SL tablet, Place 1 tablet under the tongue every 5 minutes as needed for Chest pain  aspirin EC 81 MG EC tablet, Take 1 tablet by mouth daily  acetaminophen (TYLENOL) 325 MG tablet, Take 650 mg by mouth as needed for Pain  diclofenac sodium (VOLTAREN) 1 % GEL, Apply 2 g topically 2 times daily    Current Hospital Medications:     Scheduled Meds:   ondansetron  4 mg Intravenous Once    sodium chloride (PF)  10 mL Intravenous Daily     Continuous Infusions:  PRN Meds:.morphine  . Allergies:      Allergies   Allergen Reactions    Lisinopril Nausea Only and Other (See Comments)     cough    Tramadol Nausea Only        Review of Systems:       [x] CV, Resp, Neuro, , and all other systems reviewed and negative other than listed in HPI.      [] Unable to obtain due to ventilated and/ or neurologic status      Objective Findings:     Vitals:   Vitals:    12/22/20 1340 12/22/20 1358 12/22/20 1541 12/22/20 1656   BP: (!) 154/93 (!) 149/59 131/62 138/77   Pulse: 94 88 89 90   Resp: 18 18 18 18   Temp: 98.6 °F (37 °C)  98.6 °F (37 °C)    TempSrc: Oral  Oral    SpO2: 99% 99% 100% 99%   Weight: 250 lb (113.4 kg) 250 lb (113.4 kg)     Height: 5' 3\" (1.6 m) 5' 3\" (1.6 m)          Physical Examination:  General: In mild discomfort secondary to dyspnea. HEENT: Normocephalic, scleral icterus. Mild pallor  Neck: No jugular venous distention. Heart: Regular, no murmur, no rub/gallop. No right ventricular heave. Lungs: Clear to ascultation, no rales/wheezing/rhonchi. Good chest wall excursion. Abdomen: Obese soft nontender no palpable mass. Bowel sounds are present  Extremities: No clubbing/cyanosis, no edema. Skin: Warm, dry, normal turgor, no rash, no bruise, no petichiae. Neuro: No myoclonus or tremor. Psych: Normal affect    Results/ Medications reviewed 12/22/2020, 5:37 PM     Laboratory, Microbiology, Pathology, Radiology, Cardiology, Medications and Transcriptions reviewed  Scheduled Meds:   ondansetron  4 mg Intravenous Once    sodium chloride (PF)  10 mL Intravenous Daily     Continuous Infusions:    Recent Labs     12/22/20  1345   WBC 7.6   HGB 7.0*   HCT 23.3*   MCV 79.5*        Recent Labs     12/22/20  1345      K 4.5      CO2 21   BUN 38*   CREATININE 1.73*     Recent Labs     12/22/20  1345   AST 12   ALT 13   BILITOT 0.3   ALKPHOS 123     No results for input(s): LIPASE, AMYLASE in the last 72 hours. Recent Labs     12/22/20  1345   PROT 6.8   INR 1.0     Xr Chest Portable    Result Date: 12/22/2020  COMPARISON: No prior studies available for comparison. HISTORY: Chest pain TECHNIQUE: AP view FINDINGS: The cardiac silhouette is mildly enlarged. There is coarsening of the interstitium.  Haziness

## 2020-12-23 LAB
BASOPHILS ABSOLUTE: 0 K/UL (ref 0–0.2)
BASOPHILS ABSOLUTE: 0.1 K/UL (ref 0–0.2)
BASOPHILS RELATIVE PERCENT: 0 %
BASOPHILS RELATIVE PERCENT: 0.2 %
BASOPHILS RELATIVE PERCENT: 0.5 %
BASOPHILS RELATIVE PERCENT: 0.6 %
BLOOD BANK DISPENSE STATUS: NORMAL
BLOOD BANK DISPENSE STATUS: NORMAL
BLOOD BANK PRODUCT CODE: NORMAL
BLOOD BANK PRODUCT CODE: NORMAL
BPU ID: NORMAL
BPU ID: NORMAL
DESCRIPTION BLOOD BANK: NORMAL
DESCRIPTION BLOOD BANK: NORMAL
EOSINOPHILS ABSOLUTE: 0.1 K/UL (ref 0–0.7)
EOSINOPHILS ABSOLUTE: 0.2 K/UL (ref 0–0.7)
EOSINOPHILS ABSOLUTE: 0.2 K/UL (ref 0–0.7)
EOSINOPHILS ABSOLUTE: 0.3 K/UL (ref 0–0.7)
EOSINOPHILS RELATIVE PERCENT: 1.7 %
EOSINOPHILS RELATIVE PERCENT: 2.6 %
EOSINOPHILS RELATIVE PERCENT: 2.9 %
EOSINOPHILS RELATIVE PERCENT: 2.9 %
HCT VFR BLD CALC: 21.7 % (ref 37–47)
HCT VFR BLD CALC: 24.1 % (ref 37–47)
HCT VFR BLD CALC: 25.7 % (ref 37–47)
HCT VFR BLD CALC: 26.9 % (ref 37–47)
HEMOGLOBIN: 6.6 G/DL (ref 12–16)
HEMOGLOBIN: 7.3 G/DL (ref 12–16)
HEMOGLOBIN: 7.9 G/DL (ref 12–16)
HEMOGLOBIN: 8.6 G/DL (ref 12–16)
LV EF: 65 %
LVEF MODALITY: NORMAL
LYMPHOCYTES ABSOLUTE: 0.6 K/UL (ref 1–4.8)
LYMPHOCYTES ABSOLUTE: 0.9 K/UL (ref 1–4.8)
LYMPHOCYTES ABSOLUTE: 1.2 K/UL (ref 1–4.8)
LYMPHOCYTES ABSOLUTE: 1.3 K/UL (ref 1–4.8)
LYMPHOCYTES RELATIVE PERCENT: 12.6 %
LYMPHOCYTES RELATIVE PERCENT: 14.1 %
LYMPHOCYTES RELATIVE PERCENT: 14.3 %
LYMPHOCYTES RELATIVE PERCENT: 8.4 %
MCH RBC QN AUTO: 24 PG (ref 27–31.3)
MCH RBC QN AUTO: 24 PG (ref 27–31.3)
MCH RBC QN AUTO: 25.1 PG (ref 27–31.3)
MCH RBC QN AUTO: 25.9 PG (ref 27–31.3)
MCHC RBC AUTO-ENTMCNC: 30.2 % (ref 33–37)
MCHC RBC AUTO-ENTMCNC: 30.3 % (ref 33–37)
MCHC RBC AUTO-ENTMCNC: 30.9 % (ref 33–37)
MCHC RBC AUTO-ENTMCNC: 31.8 % (ref 33–37)
MCV RBC AUTO: 79.1 FL (ref 82–100)
MCV RBC AUTO: 79.2 FL (ref 82–100)
MCV RBC AUTO: 81.1 FL (ref 82–100)
MCV RBC AUTO: 81.3 FL (ref 82–100)
MONOCYTES ABSOLUTE: 0.6 K/UL (ref 0.2–0.8)
MONOCYTES ABSOLUTE: 0.7 K/UL (ref 0.2–0.8)
MONOCYTES ABSOLUTE: 0.8 K/UL (ref 0.2–0.8)
MONOCYTES ABSOLUTE: 1.8 K/UL (ref 0.2–0.8)
MONOCYTES RELATIVE PERCENT: 26.1 %
MONOCYTES RELATIVE PERCENT: 7.6 %
MONOCYTES RELATIVE PERCENT: 8.1 %
MONOCYTES RELATIVE PERCENT: 8.6 %
NEUTROPHILS ABSOLUTE: 4.4 K/UL (ref 1.4–6.5)
NEUTROPHILS ABSOLUTE: 5.6 K/UL (ref 1.4–6.5)
NEUTROPHILS ABSOLUTE: 6.2 K/UL (ref 1.4–6.5)
NEUTROPHILS ABSOLUTE: 7.1 K/UL (ref 1.4–6.5)
NEUTROPHILS RELATIVE PERCENT: 63.8 %
NEUTROPHILS RELATIVE PERCENT: 74 %
NEUTROPHILS RELATIVE PERCENT: 74.3 %
NEUTROPHILS RELATIVE PERCENT: 76.7 %
PDW BLD-RTO: 16 % (ref 11.5–14.5)
PDW BLD-RTO: 16.4 % (ref 11.5–14.5)
PDW BLD-RTO: 16.7 % (ref 11.5–14.5)
PDW BLD-RTO: 17.2 % (ref 11.5–14.5)
PLATELET # BLD: 216 K/UL (ref 130–400)
PLATELET # BLD: 216 K/UL (ref 130–400)
PLATELET # BLD: 235 K/UL (ref 130–400)
PLATELET # BLD: 236 K/UL (ref 130–400)
RBC # BLD: 2.74 M/UL (ref 4.2–5.4)
RBC # BLD: 3.04 M/UL (ref 4.2–5.4)
RBC # BLD: 3.17 M/UL (ref 4.2–5.4)
RBC # BLD: 3.3 M/UL (ref 4.2–5.4)
TROPONIN: 0.05 NG/ML (ref 0–0.01)
TROPONIN: 0.08 NG/ML (ref 0–0.01)
WBC # BLD: 6.9 K/UL (ref 4.8–10.8)
WBC # BLD: 7.3 K/UL (ref 4.8–10.8)
WBC # BLD: 8.4 K/UL (ref 4.8–10.8)
WBC # BLD: 9.5 K/UL (ref 4.8–10.8)

## 2020-12-23 PROCEDURE — 84484 ASSAY OF TROPONIN QUANT: CPT

## 2020-12-23 PROCEDURE — 2580000003 HC RX 258: Performed by: INTERNAL MEDICINE

## 2020-12-23 PROCEDURE — 85025 COMPLETE CBC W/AUTO DIFF WBC: CPT

## 2020-12-23 PROCEDURE — 6360000002 HC RX W HCPCS: Performed by: SPECIALIST

## 2020-12-23 PROCEDURE — 2580000003 HC RX 258: Performed by: SPECIALIST

## 2020-12-23 PROCEDURE — 36415 COLL VENOUS BLD VENIPUNCTURE: CPT

## 2020-12-23 PROCEDURE — 93005 ELECTROCARDIOGRAM TRACING: CPT | Performed by: PHYSICIAN ASSISTANT

## 2020-12-23 PROCEDURE — 93306 TTE W/DOPPLER COMPLETE: CPT

## 2020-12-23 PROCEDURE — APPNB45 APP NON BILLABLE 31-45 MINUTES: Performed by: PHYSICIAN ASSISTANT

## 2020-12-23 PROCEDURE — 99223 1ST HOSP IP/OBS HIGH 75: CPT | Performed by: INTERNAL MEDICINE

## 2020-12-23 PROCEDURE — 99231 SBSQ HOSP IP/OBS SF/LOW 25: CPT | Performed by: SPECIALIST

## 2020-12-23 PROCEDURE — 2060000000 HC ICU INTERMEDIATE R&B

## 2020-12-23 PROCEDURE — 2700000000 HC OXYGEN THERAPY PER DAY

## 2020-12-23 PROCEDURE — 2580000003 HC RX 258: Performed by: PHYSICIAN ASSISTANT

## 2020-12-23 PROCEDURE — 36430 TRANSFUSION BLD/BLD COMPNT: CPT

## 2020-12-23 PROCEDURE — 6370000000 HC RX 637 (ALT 250 FOR IP): Performed by: INTERNAL MEDICINE

## 2020-12-23 PROCEDURE — C9113 INJ PANTOPRAZOLE SODIUM, VIA: HCPCS | Performed by: SPECIALIST

## 2020-12-23 RX ORDER — SODIUM CHLORIDE 0.9 % (FLUSH) 0.9 %
10 SYRINGE (ML) INJECTION EVERY 12 HOURS SCHEDULED
Status: DISCONTINUED | OUTPATIENT
Start: 2020-12-23 | End: 2020-12-24 | Stop reason: HOSPADM

## 2020-12-23 RX ORDER — SODIUM CHLORIDE 0.9 % (FLUSH) 0.9 %
10 SYRINGE (ML) INJECTION PRN
Status: DISCONTINUED | OUTPATIENT
Start: 2020-12-23 | End: 2020-12-24 | Stop reason: HOSPADM

## 2020-12-23 RX ORDER — SODIUM CHLORIDE 9 MG/ML
INJECTION, SOLUTION INTRAVENOUS
Status: DISPENSED
Start: 2020-12-23 | End: 2020-12-24

## 2020-12-23 RX ORDER — SODIUM CHLORIDE 9 MG/ML
20 INJECTION INTRAVENOUS ONCE
Status: COMPLETED | OUTPATIENT
Start: 2020-12-23 | End: 2020-12-23

## 2020-12-23 RX ADMIN — METOPROLOL TARTRATE 25 MG: 25 TABLET, FILM COATED ORAL at 08:58

## 2020-12-23 RX ADMIN — SODIUM CHLORIDE 10 ML: 9 INJECTION INTRAMUSCULAR; INTRAVENOUS; SUBCUTANEOUS at 08:59

## 2020-12-23 RX ADMIN — PANTOPRAZOLE SODIUM 40 MG: 40 INJECTION, POWDER, FOR SOLUTION INTRAVENOUS at 08:59

## 2020-12-23 RX ADMIN — SODIUM CHLORIDE 20 ML: 9 INJECTION INTRAMUSCULAR; INTRAVENOUS; SUBCUTANEOUS at 12:30

## 2020-12-23 RX ADMIN — ATORVASTATIN CALCIUM 40 MG: 40 TABLET, FILM COATED ORAL at 21:57

## 2020-12-23 RX ADMIN — Medication 10 ML: at 21:57

## 2020-12-23 RX ADMIN — OXYBUTYNIN CHLORIDE 10 MG: 5 TABLET, EXTENDED RELEASE ORAL at 08:58

## 2020-12-23 RX ADMIN — Medication 10 ML: at 08:59

## 2020-12-23 RX ADMIN — METOPROLOL TARTRATE 25 MG: 25 TABLET, FILM COATED ORAL at 21:57

## 2020-12-23 RX ADMIN — DILTIAZEM HYDROCHLORIDE 240 MG: 240 CAPSULE, COATED, EXTENDED RELEASE ORAL at 08:58

## 2020-12-23 RX ADMIN — LOSARTAN POTASSIUM AND HYDROCHLOROTHIAZIDE 2 TABLET: 50; 12.5 TABLET, FILM COATED ORAL at 08:58

## 2020-12-23 ASSESSMENT — ENCOUNTER SYMPTOMS
CONSTIPATION: 0
COLOR CHANGE: 0
EYES NEGATIVE: 1
ABDOMINAL DISTENTION: 0
RESPIRATORY NEGATIVE: 1
GASTROINTESTINAL NEGATIVE: 1
ANAL BLEEDING: 0
BLOOD IN STOOL: 0
DIARRHEA: 0
ABDOMINAL PAIN: 1
NAUSEA: 0
ABDOMINAL PAIN: 0
RECTAL PAIN: 0
SHORTNESS OF BREATH: 1
CHEST TIGHTNESS: 1
VOMITING: 0

## 2020-12-23 NOTE — FLOWSHEET NOTE
09 am assessment completed. Awake and resting. physicain messaged to update hgb. No complaints of pain or discomfort. No reports of black stools. SOB with exertion.  o2 3l NC.

## 2020-12-23 NOTE — PROGRESS NOTES
Progress Note  Date:2020       Room:W176/W176-01  Patient Josafat Diamond     YOB: 1945     Age:75 y.o. Subjective    Subjective:  Symptoms:  No diarrhea. Diet:  No nausea or vomiting. feels okay no abdominal pain or any evidence of overt GI bleed  Review of Systems   Constitutional: Negative. HENT: Negative. Eyes: Negative. Respiratory: Negative. Cardiovascular: Negative. Gastrointestinal: Negative. Negative for abdominal distention, abdominal pain, anal bleeding, blood in stool, constipation, diarrhea, nausea, rectal pain and vomiting. Endocrine: Negative. Genitourinary: Negative. Musculoskeletal: Negative. Skin: Negative. Allergic/Immunologic: Negative for food allergies. Neurological: Negative. Hematological: Negative. Psychiatric/Behavioral: Negative. Objective         Vitals Last 24 Hours:  TEMPERATURE:  Temp  Av °F (36.7 °C)  Min: 97.8 °F (36.6 °C)  Max: 98.4 °F (36.9 °C)  RESPIRATIONS RANGE: Resp  Av.3  Min: 16  Max: 20  PULSE OXIMETRY RANGE: SpO2  Av.6 %  Min: 98 %  Max: 100 %  PULSE RANGE: Pulse  Av  Min: 61  Max: 94  BLOOD PRESSURE RANGE: Systolic (44MEA), REV:487 , Min:116 , MEKHI:615   ; Diastolic (48XAD), DHT:44, Min:47, Max:102    I/O (24Hr):     Intake/Output Summary (Last 24 hours) at 2020 1716  Last data filed at 2020 1626  Gross per 24 hour   Intake 403.33 ml   Output --   Net 403.33 ml     Objective  Labs/Imaging/Diagnostics    Labs:  CBC:  Recent Labs     20  0320 20  0718 20  1625   WBC 9.5 6.9 8.4   RBC 3.04* 2.74* 3.30*   HGB 7.3* 6.6* 8.6*   HCT 24.1* 21.7* 26.9*   MCV 79.1* 79.2* 81.3*   RDW 16.4* 16.0* 16.7*    216 235     CHEMISTRIES:  Recent Labs     20  1345      K 4.5      CO2 21   BUN 38*   CREATININE 1.73*   GLUCOSE 173*   MG 2.1     PT/INR:  Recent Labs     20  1345   PROTIME 12.9   INR 1.0     APTT:  Recent Labs 03:35 PM  ----------------------------------------------------------------  Findings Left Ventricle Normal left ventricular systolic function, no regional wall motion abnormalities, estimated ejection fraction of 65%. Normal left ventricular size and function. Mild concentric left ventricular hypertrophy. Impaired relaxation compatible with diastolic dysfunction. ( reversed E/A ratio) Right Ventricle Normal right ventricle structure and function. Normal right ventricle systolic pressure. Left Atrium The left atrium is Mildly dilated. Right Atrium Normal right atrium. Mitral Valve Diffusely thickened and pliable mitral valve leaflets with normal excursion. Moderate (2+) mitral regurgitation is present. No evidence of mitral valve stenosis. Tricuspid Valve Normal tricuspid valve structure and function. Aortic Valve Sclerotic, trileaflet aortic valve with diffusely thickened leaflets with normal cusp separation and excursion. No evidence of aortic valve regurgitation . No evidence of aortic valve stenosis. Pulmonic Valve Normal pulmonic valve structure and function. Pericardial Effusion No evidence of pericardial effusion. Pleural Effusion No evidence of pleural effusion. Aorta \ Miscellaneous Miscellaneous normal findings were found. M-Mode Measurements (cm)  LVIDd: 6.04 cm                         LVIDs: 4.47 cm  IVSd: 1.27 cm                          IVSs: 1.66 cm  LVPWd: 1.14 cm                         AO Root Dimension: 2.38 cm  Rt. Vent.  Dimension: 2.78 cm           LA: 4.84 cm                                         LVOT: 2.09 cm Doppler Measurements:  AV Velocity:0.01 m/s                    MV Peak E-Wave: 1.37 m/s  AV Peak Gradient: 24.56 mmHg            MV Peak A-Wave: 1.43 m/s  AV Mean Gradient: 13.98 mmHg  AV Area (Continuity):1.34 cm^2  TR Velocity:2.47 m/s                    Estimated RAP:5 mmHg  TR Gradient:24.31 mmHg                  RVSP:29.31 mmHg Valves  Mitral Valve  Peak E-Wave: 1.37 m/s Mild bibasilar infiltrates versus atelectasis or scarring. Assessment//Plan           Hospital Problems           Last Modified POA    Chest pain 12/22/2020 Yes        Assessment & Plan  25-year-old female with history of and requiring transfusion. Cardiology has cleared the patient for endoscopic evaluation so we will schedule EGD and colonoscopy a.m.   Electronically signed by Rishabh Lizarraga MD on 12/23/20 at 5:16 PM EST

## 2020-12-23 NOTE — H&P
History and Physical  Patient: Analisa Cortes  Unit/Bed:W176/W176-01  YOB: 1945  MRN: [id number]  Acct: [de-identified]   Admitting Diagnosis: Chest pain [R07.9]  Admit Date:  12/22/2020  Hospital Day: 1      Chief Complaint:   Chest pain    History of Present Illness: This is a very pleasant 76-year-old  female with past medical history significant for hypertension, dyslipidemia, history of coronary artery disease status post PCI of the LAD and RCA in 2017, history of STEMI, history of bleeding gastric ulcer approximately 5 years ago, chronic kidney disease and obesity who presented to the emergency room yesterday with complaints of chest pain. Over the past few months, patient has noticed she has been increasingly fatigued and tired and will experience shortness of breath with exertion. Also beginning about a few months ago, patient noticed intermittent mid epigastric pain which would occur with eating. Then yesterday, patient began experiencing severe midsternal chest pressure and heaviness which she states felt similar to the chest pain she experienced with her previous MI. This midsternal chest pain persisted so she decided to present to the ER for further evaluation. She denied any nausea, vomiting, dizziness, lightheadedness, diaphoresis, palpitations, paroxysmal nocturnal dyspnea, orthopnea, syncope, fever or chills, medic easier, melena or hematemesis. On presentation to the emergency room, blood pressure 154/93, heart rate 94, respiratory rate 18, pulse ox 99%, temperature 98.6 °F.  Sodium 138, potassium 4.5, chloride 106, total CO2 21, BUN elevated at 38, creatinine elevated 1.73, GFR low at 28.8, glucose 173. Initial troponin negative at less than 0.010. WBC 7.6, hemoglobin low at 7.0, hematocrit low at 23.3, platelets 575. Previous CBC from 9/11/2020 with hemoglobin of 9.1 at that time compared to hemoglobin of 12.1 in February 2019. Rapid Covid test negative. Chest x-ray revealed mild bibasilar infiltrates versus atelectasis or scarring. Patient was treated with sublingual nitroglycerin and aspirin in route to ER and states that this helped to alleviate her discomfort. He was admitted for further evaluation. At time of evaluation today, patient is seen on 1 W. resting comfortably and in no acute distress. Her serial troponins have been elevated x2 with initial troponin negative, second troponin elevated at 0.027, third troponin elevated 0.077. Hemoglobin yesterday evening dropped to 6.7. And patient was transfused with 1 unit of PRBCs. She was evaluated by gastroenterology yesterday evening and started on IV Protonix with plan for EGD when cleared by cardiology. Hemoglobin this morning is low at 6.6. She denies any current chest pain complaints. She is hemodynamically stable. On telemetry she sinus rhythm with heart rate 60s to 70s. Telemetry alarms show a 7 beat run of nonsustained ventricular tachycardia at 8:36 AM.  Patient has had no coronary evaluation since her PCI of LAD and RCA in 2017.       Allergies   Allergen Reactions    Lisinopril Nausea Only and Other (See Comments)     cough    Tramadol Nausea Only       Current Facility-Administered Medications   Medication Dose Route Frequency Provider Last Rate Last Admin    morphine injection 4 mg  4 mg Intravenous Q15 Min PRN Christophe Gandara MD        ondansetron [facility]) injection 4 mg  4 mg Intravenous Once Christophe Gandara MD        sodium chloride (PF) 0.9 % injection 10 mL  10 mL Intravenous Daily Jordan J Holiday, DO        aspirin EC tablet 81 mg  81 mg Oral Daily Jordan J Holiday, DO   Stopped at 12/22/20 1902    atorvastatin (LIPITOR) tablet 40 mg  40 mg Oral Nightly Jordan J Holiday, DO   40 mg at 12/22/20 2040    diclofenac sodium (VOLTAREN) 1 % gel 2 g  2 g Topical BID Jordan J Holiday, DO   Stopped at 12/22/20 1902    dilTIAZem (CARDIZEM CD) extended release capsule 240 mg  240 mg Oral Daily Jordan J Holiday, DO        losartan-hydroCHLOROthiazide (HYZAAR) 50-12.5 MG per tablet 2 tablet  2 tablet Oral Daily [facility] Holiday, DO        metoprolol tartrate (LOPRESSOR) tablet 25 mg  25 mg Oral BID [facility] Holiday, DO   25 mg at 12/22/20 2040    nitroGLYCERIN (NITROSTAT) SL tablet 0.4 mg  0.4 mg Sublingual Q5 Min PRN [facility] Holiday, DO        oxybutynin (DITROPAN-XL) extended release tablet 10 mg  10 mg Oral Daily [facility] Holiday, DO        morphine injection 4 mg  4 mg Intravenous Q4H PRN [facility] Holiday, DO        ondansetron [facility]) injection 4 mg  4 mg Intravenous Q6H PRN [facility] Holiday, DO        sodium chloride flush 0.9 % injection 10 mL  10 mL Intravenous 2 times per day [facility] Holiday, DO   10 mL at 12/22/20 2100    sodium chloride flush 0.9 % injection 10 mL  10 mL Intravenous PRN [facility] Holiday, DO        acetaminophen (TYLENOL) tablet 650 mg  650 mg Oral Q4H PRN [facility] Holiday, DO        famotidine (PEPCID) injection 20 mg  20 mg Intravenous BID PRN [facility] Holiday, DO        pantoprazole (PROTONIX) injection 40 mg  40 mg Intravenous Daily Omar Hughes MD   Stopped at 12/22/20 1753    And    sodium chloride (PF) 0.9 % injection 10 mL  10 mL Intravenous Daily Omar Hughes MD   Stopped at 12/22/20 1753       PMHx:  Past Medical History:   Diagnosis Date    Antral ulcer 01/14/2015    DR Daisha Parsons    Asthma     \"cured by beestings\"    Coronary artery disease     Coronary artery disease involving native coronary artery of native heart without angina pectoris 7/10/2018    has x 4 cardiac stents / Dr. Stephanie Grubbs Diverticulosis of colon (without mention of hemorrhage) 01/14/2015    DR Daisha Parsons    Essential hypertension 12/10/2013    meds > 20 yrs    History of blood transfusion 1990s    with hysterectomy    History of normal resting EKG 1996    normal    History of ST elevation myocardial infarction (STEMI) 7/11/2017    History of transfusion of whole blood 1996    Excessive bleeding before hysterectomy  Hyperlipidemia     meds > 2 yrs    Hypertension     Hypothyroidism     past trx years ago then stopped / recent restart    Kidney disease     Low back pain     Morbid obesity due to excess calories ([address] 5/13/2017    Morbid obesity due to excess calories ([address] 6/6/2017    Osteoarthritis     Pneumonia     Shoulder dislocation     ST elevation myocardial infarction involving left circumflex coronary artery ([address] 5/13/2017    Unspecified gastritis and gastroduodenitis without mention of hemorrhage 05/06/2015    DR Seth Coy       PSHx:  Past Surgical History:   Procedure Laterality Date    CARDIAC SURGERY  2017    has x 4 cardiac stents    COLONOSCOPY  01/14/2015    DR Seth Coy - DIVERTICULOSIS    CORONARY ANGIOPLASTY WITH STENT PLACEMENT  05/17/2017    x2 stents    CYST REMOVAL Left 2/7/2019    RESECTION OF LEFT PINNA LESION WITH GRAFT performed by Becka Abdalla MD at [facility]. [address], COLON, DIAGNOSTIC      FINGER SURGERY  12/9/14    middle finger right hand due to infection   [id number] [place] [address]  2008    shoulder dislocated - popped  back in Right shoulder    UPPER GASTROINTESTINAL ENDOSCOPY  01/14/2015    DR LANE - ULCER IN THE ANTRUM    UPPER GASTROINTESTINAL ENDOSCOPY  05/06/2015    DR Seth Coy - GASTRITIS, PREVIOUS ULCER HEALED       Social Hx:  Social History     Socioeconomic History    Marital status:      Spouse name: None    Number of children: None    Years of education: None    Highest education level: None   Occupational History    None   Social Needs    Financial resource strain: None    Food insecurity     Worry: None     Inability: None    Transportation needs     Medical: None     Non-medical: None   Tobacco Use    Smoking status: Never Smoker    Smokeless tobacco: Never Used   Substance and Sexual Activity    Alcohol use: Yes     Comment: glass of wine once a year    Drug use: No    Sexual activity: Yes Partners: Male   Lifestyle    Physical activity     Days per week: None     Minutes per session: None    Stress: None   Relationships    Social connections     Talks on phone: None     Gets together: None     Attends Sikh service: None     Active member of club or organization: None     Attends meetings of clubs or organizations: None     Relationship status: None    Intimate partner violence     Fear of current or ex partner: None     Emotionally abused: None     Physically abused: None     Forced sexual activity: None   Other Topics Concern    None   Social History Narrative    None       Family Hx:  Family History   Problem Relation Age of Onset    Heart Disease Mother 61    Cancer Father 79        kidney    No Known Problems Daughter        Review ofSystems:   Review of Systems   Constitutional: Positive for fatigue. Negative for activity change and fever. HENT: Negative for congestion. Respiratory: Positive for chest tightness and shortness of breath (with exertion). Cardiovascular: Positive for chest pain. Negative for palpitations and leg swelling. Gastrointestinal: Positive for abdominal pain (epigastric pain after eating). Negative for nausea and vomiting. Genitourinary: Negative for difficulty urinating. Musculoskeletal: Negative for arthralgias. Skin: Negative for color change, pallor and rash. Neurological: Negative for dizziness, syncope and light-headedness. Psychiatric/Behavioral: Negative for agitation and behavioral problems. Physical Examination:    BP (!) 152/48   Pulse 71   Temp 98 °F (36.7 °C) (Oral)   Resp 18   Ht 5' 3\" (1.6 m)   Wt 251 lb 14.4 oz (114.3 kg)   LMP 09/17/1996   SpO2 99%   BMI 44.62 kg/m²    Physical Exam  Constitutional:       General: She is not in acute distress. Appearance: Normal appearance. She is obese. HENT:      Head: Normocephalic and atraumatic.    Neck:      Musculoskeletal: Normal range of motion and neck supple. Cardiovascular:      Rate and Rhythm: Normal rate and regular rhythm. Heart sounds: Murmur present. Pulmonary:      Effort: Pulmonary effort is normal. No respiratory distress. Breath sounds: No wheezing, rhonchi or rales. Abdominal:      Palpations: Abdomen is soft. Tenderness: There is no abdominal tenderness. Musculoskeletal: Normal range of motion. Right lower leg: No edema. Left lower leg: No edema. Skin:     General: Skin is warm and dry. Neurological:      General: No focal deficit present. Mental Status: She is alert and oriented to person, place, and time. Cranial Nerves: No cranial nerve deficit.    Psychiatric:         Mood and Affect: Mood normal.         Behavior: Behavior normal.          LABS:  CBC:   Lab Results   Component Value Date    WBC 6.9 12/23/2020    RBC 2.74 12/23/2020    HGB 6.6 12/23/2020    HCT 21.7 12/23/2020    MCV 79.2 12/23/2020    MCH 24.0 12/23/2020    MCHC 30.2 12/23/2020    RDW 16.0 12/23/2020     12/23/2020    MPV 8.6 06/29/2015     CBC with Differential:   Lab Results   Component Value Date    WBC 6.9 12/23/2020    RBC 2.74 12/23/2020    HGB 6.6 12/23/2020    HCT 21.7 12/23/2020     12/23/2020    MCV 79.2 12/23/2020    MCH 24.0 12/23/2020    MCHC 30.2 12/23/2020    RDW 16.0 12/23/2020    LYMPHOPCT 8.4 12/23/2020    MONOPCT 26.1 12/23/2020    BASOPCT 0.0 12/23/2020    MONOSABS 1.8 12/23/2020    LYMPHSABS 0.6 12/23/2020    EOSABS 0.1 12/23/2020    BASOSABS 0.0 12/23/2020     CMP:    Lab Results   Component Value Date     12/22/2020    K 4.5 12/22/2020     12/22/2020    CO2 21 12/22/2020    BUN 38 12/22/2020    CREATININE 1.73 12/22/2020    GFRAA 34.8 12/22/2020    LABGLOM 28.8 12/22/2020    GLUCOSE 173 12/22/2020    PROT 6.8 12/22/2020    LABALBU 4.4 12/22/2020    CALCIUM 9.7 12/22/2020    BILITOT 0.3 12/22/2020    ALKPHOS 123 12/22/2020    AST 12 12/22/2020    ALT 13 12/22/2020     BMP:    Lab Results   Component Value Date     12/22/2020    K 4.5 12/22/2020     12/22/2020    CO2 21 12/22/2020    BUN 38 12/22/2020    LABALBU 4.4 12/22/2020    CREATININE 1.73 12/22/2020    CALCIUM 9.7 12/22/2020    GFRAA 34.8 12/22/2020    LABGLOM 28.8 12/22/2020    GLUCOSE 173 12/22/2020     Magnesium:    Lab Results   Component Value Date    MG 2.1 12/22/2020     Troponin:    Lab Results   Component Value Date    TROPONINI 0.077 12/23/2020     No results for input(s): PROBNP in the last 72 hours. Recent Labs     12/22/20  1345   INR 1.0       RADIOLOGY:  Xr Chest Portable    Result Date: 12/22/2020  COMPARISON: No prior studies available for comparison. HISTORY: Chest pain TECHNIQUE: AP view FINDINGS: The cardiac silhouette is mildly enlarged. There is coarsening of the interstitium. Haziness is seen in both bases. Calcifications are seen in the thoracic aorta. The left hemidiaphragm is slightly elevated. Mild bibasilar infiltrates versus atelectasis or scarring. Echocardiogram 5/15/17:  Conclusions   Summary   Normal mitral valve structure and function. Normal aortic valve structure and function. Normal tricuspid valve structure and function. There is mild ( 1 +) tricuspid regurgitation with estimated RVSP of 38 mm   Hg. Normal pulmonic valve structure and function. Mildly dilated left atrium. Left ventricular ejection fraction is visually estimated at 65%. Impaired relaxation compatible with diastolic dysfunction. ( reversed E/A   ratio)   Mild concentric left ventricular hypertrophy. Normal right atrium. Normal right ventricle structure and function. No evidence of pericardial effusion. No evidence of pleural effusion.    Signature   ----------------------------------------------------------------   Electronically signed by Dilma Noriega(Interpreting physician)   on 05/15/2017 06:08 PM      EKG 12/22/20: SR 88, RBBB, ST depression with T wave inversion in leads V1-V3 and lead III, QTc 525ms    Telemetry 12/23/20: SR 60s-70s, 7 beat NSVT at 8:36am      Assessment:    Active Hospital Problems    Diagnosis Date Noted    Chest pain [R07.9] 12/22/2020     1. NSTEMI/chest pain  2. Acute worsening anemia/GI bleed  3. JP on CKD  4. NSVT  5. Hx CAD s/p PCI of LAD and RCA in 2017  6. HTN  7. Dyslipidemia  8. Hx gastric ulcer  9. Abnormal EKG/RBBB  10. Cardiac murmur    Plan:  1. Admit to telemetry  2. Maximize medical therapy--hold aspirin for now secondary to anemia/GI bleed, Lopressor 25 mg p.o. twice daily, Cardizem  mg p.o. daily, HOLD losartan/hydrochlorthiazide 100/25 mg p.o. daily secondary to JP, Lipitor 40 mg tablet p.o. daily at bedtime, Protonix 40 mg IV daily  3. Cardiac diet recommended  4. Check echocardiogram  5. Repeat EKG this morning to monitor QTc interval as was prolonged at 525 ms on 12/22/2020. Avoid any QTC prolonging agents  6. Monitor on telemetry for any tachycardia or bradycardia arrhythmias  7. Maintain potassium greater than 4, magnesium greater than 2  8. GI/DVT prophylaxis  9. GI consult/recommendations regarding acute worsening anemia/GI bleed  10. Coronary evaluation per Dr. Hattie Powers conservative medical therapy for now in light of acute anemia/GI bleeding. Will need to consider coronary evaluation in future when feasible  11. Further recommendations to follow        Electronically signed by STACIA Jimenez on 12/23/2020 at 8:54 AM    Attending Supervising Physician's Attestation Statement    Postdated note. Patient seen on rounds on 12/23/2020 with physician assistant    The patient is a 77 y.o. female. I have performed a history and physical examination of the patient. I discussed the case with the physician assistant. I reviewed the patient's Past Medical History, Past Surgical History, Medications, and Allergies.          Physical Exam:  Vitals:    12/23/20 1411 12/23/20 1607 12/23/20 1918 12/23/20 2157   BP: (!) 143/73 (!) 143/79 (!) 155/69 (!) 144/50   Pulse: 61 73 67 70   Resp:  16 18    Temp:  98 °F (36.7 °C) 98.3 °F (36.8 °C)    TempSrc:  Oral Oral    SpO2: 100% 98% 100%    Weight:       Height:           Review of Systems - Respiratory ROS: positive for - shortness of breath  Cardiovascular ROS: positive for - chest pain  Gastrointestinal ROS: no abdominal pain, change in bowel habits, or black or bloody stools    Pulmonary/Chest: clear to auscultation bilaterally- no wheezes, rales or rhonchi, normal air movement, no respiratory distress  Cardiovascular: normal rate, normal S1 and S2, no gallops, intact distal pulses and no carotid bruits  Abdomen: soft, non-tender, non-distended, normal bowel sounds, no masses or organomegaly    Active Hospital Problems    Diagnosis Date Noted    Chest pain [R07.9] 12/22/2020     Priority: High        I reviewed and agree with the findings and plan documented in her note . Impression    11. NSTEMI/chest pain  12. Acute worsening anemia/GI bleed  13. JP on CKD  14. NSVT  15. Hx CAD s/p PCI of LAD and RCA in 2017  16. HTN  17. Dyslipidemia  18. Hx gastric ulcer  19. Abnormal EKG/RBBB  20. Cardiac murmur  21.  Prolonged QT          /Plan     Hold antiplatelets  Okay for GI to proceed with endoscopy to identify source of bleeding  Maximize cardiac medications  Avoid any nephrotoxic agents  1 unit of packed red blood cells  IV fluids  Monitor H&H closely  Check EKG to monitor QTc interval  Coronary evaluation in future when feasible

## 2020-12-24 VITALS
RESPIRATION RATE: 18 BRPM | BODY MASS INDEX: 44.63 KG/M2 | SYSTOLIC BLOOD PRESSURE: 148 MMHG | DIASTOLIC BLOOD PRESSURE: 53 MMHG | HEIGHT: 63 IN | HEART RATE: 82 BPM | WEIGHT: 251.9 LBS | TEMPERATURE: 97 F | OXYGEN SATURATION: 95 %

## 2020-12-24 LAB
BASOPHILS ABSOLUTE: 0 K/UL (ref 0–0.2)
BASOPHILS ABSOLUTE: 0 K/UL (ref 0–0.2)
BASOPHILS RELATIVE PERCENT: 0.3 %
BASOPHILS RELATIVE PERCENT: 0.4 %
EKG ATRIAL RATE: 71 BPM
EKG ATRIAL RATE: 75 BPM
EKG ATRIAL RATE: 88 BPM
EKG P AXIS: 43 DEGREES
EKG P AXIS: 52 DEGREES
EKG P AXIS: 56 DEGREES
EKG P-R INTERVAL: 164 MS
EKG P-R INTERVAL: 174 MS
EKG P-R INTERVAL: 186 MS
EKG Q-T INTERVAL: 434 MS
EKG Q-T INTERVAL: 440 MS
EKG Q-T INTERVAL: 448 MS
EKG QRS DURATION: 156 MS
EKG QRS DURATION: 166 MS
EKG QRS DURATION: 168 MS
EKG QTC CALCULATION (BAZETT): 478 MS
EKG QTC CALCULATION (BAZETT): 500 MS
EKG QTC CALCULATION (BAZETT): 525 MS
EKG R AXIS: 24 DEGREES
EKG R AXIS: 39 DEGREES
EKG R AXIS: 50 DEGREES
EKG T AXIS: 19 DEGREES
EKG T AXIS: 20 DEGREES
EKG T AXIS: 6 DEGREES
EKG VENTRICULAR RATE: 71 BPM
EKG VENTRICULAR RATE: 75 BPM
EKG VENTRICULAR RATE: 88 BPM
EOSINOPHILS ABSOLUTE: 0.2 K/UL (ref 0–0.7)
EOSINOPHILS ABSOLUTE: 0.3 K/UL (ref 0–0.7)
EOSINOPHILS RELATIVE PERCENT: 3 %
EOSINOPHILS RELATIVE PERCENT: 3.4 %
HCT VFR BLD CALC: 25.1 % (ref 37–47)
HCT VFR BLD CALC: 28.5 % (ref 37–47)
HEMOGLOBIN: 7.8 G/DL (ref 12–16)
HEMOGLOBIN: 8.7 G/DL (ref 12–16)
LYMPHOCYTES ABSOLUTE: 0.9 K/UL (ref 1–4.8)
LYMPHOCYTES ABSOLUTE: 1.2 K/UL (ref 1–4.8)
LYMPHOCYTES RELATIVE PERCENT: 14.4 %
LYMPHOCYTES RELATIVE PERCENT: 14.4 %
MCH RBC QN AUTO: 24.7 PG (ref 27–31.3)
MCH RBC QN AUTO: 24.8 PG (ref 27–31.3)
MCHC RBC AUTO-ENTMCNC: 30.6 % (ref 33–37)
MCHC RBC AUTO-ENTMCNC: 30.9 % (ref 33–37)
MCV RBC AUTO: 80.3 FL (ref 82–100)
MCV RBC AUTO: 80.6 FL (ref 82–100)
MONOCYTES ABSOLUTE: 0.6 K/UL (ref 0.2–0.8)
MONOCYTES ABSOLUTE: 0.6 K/UL (ref 0.2–0.8)
MONOCYTES RELATIVE PERCENT: 7.6 %
MONOCYTES RELATIVE PERCENT: 9.2 %
NEUTROPHILS ABSOLUTE: 4.8 K/UL (ref 1.4–6.5)
NEUTROPHILS ABSOLUTE: 6.4 K/UL (ref 1.4–6.5)
NEUTROPHILS RELATIVE PERCENT: 72.7 %
NEUTROPHILS RELATIVE PERCENT: 74.6 %
PDW BLD-RTO: 16.7 % (ref 11.5–14.5)
PDW BLD-RTO: 17.3 % (ref 11.5–14.5)
PLATELET # BLD: 220 K/UL (ref 130–400)
PLATELET # BLD: 256 K/UL (ref 130–400)
RBC # BLD: 3.12 M/UL (ref 4.2–5.4)
RBC # BLD: 3.54 M/UL (ref 4.2–5.4)
WBC # BLD: 6.6 K/UL (ref 4.8–10.8)
WBC # BLD: 8.5 K/UL (ref 4.8–10.8)

## 2020-12-24 PROCEDURE — 6360000002 HC RX W HCPCS: Performed by: SPECIALIST

## 2020-12-24 PROCEDURE — 99238 HOSP IP/OBS DSCHRG MGMT 30/<: CPT | Performed by: INTERNAL MEDICINE

## 2020-12-24 PROCEDURE — 2580000003 HC RX 258: Performed by: SPECIALIST

## 2020-12-24 PROCEDURE — C9113 INJ PANTOPRAZOLE SODIUM, VIA: HCPCS | Performed by: SPECIALIST

## 2020-12-24 PROCEDURE — 6370000000 HC RX 637 (ALT 250 FOR IP): Performed by: INTERNAL MEDICINE

## 2020-12-24 PROCEDURE — 36415 COLL VENOUS BLD VENIPUNCTURE: CPT

## 2020-12-24 PROCEDURE — 2700000000 HC OXYGEN THERAPY PER DAY

## 2020-12-24 PROCEDURE — 85025 COMPLETE CBC W/AUTO DIFF WBC: CPT

## 2020-12-24 RX ORDER — PANTOPRAZOLE SODIUM 40 MG/1
40 TABLET, DELAYED RELEASE ORAL
Qty: 30 TABLET | Refills: 5 | Status: SHIPPED | OUTPATIENT
Start: 2020-12-24 | End: 2021-02-04

## 2020-12-24 RX ADMIN — Medication 10 ML: at 08:58

## 2020-12-24 RX ADMIN — OXYBUTYNIN CHLORIDE 10 MG: 5 TABLET, EXTENDED RELEASE ORAL at 08:58

## 2020-12-24 RX ADMIN — PANTOPRAZOLE SODIUM 40 MG: 40 INJECTION, POWDER, FOR SOLUTION INTRAVENOUS at 08:58

## 2020-12-24 RX ADMIN — METOPROLOL TARTRATE 25 MG: 25 TABLET, FILM COATED ORAL at 08:58

## 2020-12-24 RX ADMIN — DILTIAZEM HYDROCHLORIDE 240 MG: 240 CAPSULE, COATED, EXTENDED RELEASE ORAL at 08:58

## 2020-12-24 NOTE — PROGRESS NOTES
Chief Complaint   Patient presents with    Chest Pain       SUBJECTIVE: Resting comfortably. No events overnight. Hemoglobin is 8.7. Has mild elevated cardiac enzymes. Denies any active chest pain at this time.   Status post echo ejection fraction 55%, no wall motion abnormalities, mild LVH and grade 1 diastolic dysfunction, moderate mitral regurgitation    Past Medical History:   Diagnosis Date    Antral ulcer 01/14/2015    DR Cecelia Walsh    Asthma     \"cured by beestings\"    Coronary artery disease     Coronary artery disease involving native coronary artery of native heart without angina pectoris 7/10/2018    has x 4 cardiac stents / Dr. Mauricio Leung Diverticulosis of colon (without mention of hemorrhage) 01/14/2015    DR Cecelia Walsh    Essential hypertension 12/10/2013    meds > 20 yrs    History of blood transfusion 1990s    with hysterectomy    History of normal resting EKG 1996    normal    History of ST elevation myocardial infarction (STEMI) 7/11/2017    History of transfusion of whole blood 1996    Excessive bleeding before hysterectomy    Hyperlipidemia     meds > 2 yrs    Hypertension     Hypothyroidism     past trx years ago then stopped / recent restart    Kidney disease     Low back pain     Morbid obesity due to excess calories ([address] 5/13/2017    Morbid obesity due to excess calories ([address] 6/6/2017    Osteoarthritis     Pneumonia     Shoulder dislocation     ST elevation myocardial infarction involving left circumflex coronary artery ([address] 5/13/2017    Unspecified gastritis and gastroduodenitis without mention of hemorrhage 05/06/2015    DR LANE     Patient Active Problem List   Diagnosis    Hypothyroid    Essential hypertension    Hyperlipidemia    Morbid obesity due to excess calories ([address]    History of ST elevation myocardial infarction (STEMI)    Coronary artery disease involving native coronary artery of native heart without angina pectoris    Chronic bilateral low back pain with bilateral sciatica    Primary osteoarthritis of both knees    Spinal stenosis of lumbar region with neurogenic claudication    Degenerative spondylolisthesis    Benign neoplasm of left ear    Asthma    CKD (chronic kidney disease) stage 3, GFR 30-59 ml/min    Claudication (HCC)    Chest pain       Current Facility-Administered Medications   Medication Dose Route Frequency Provider Last Rate Last Admin    sodium chloride flush 0.9 % injection 10 mL  10 mL Intravenous 2 times per day Susan Kuhn MD        sodium chloride flush 0.9 % injection 10 mL  10 mL Intravenous PRN Susan Kuhn MD        sodium chloride flush 0.9 % injection 10 mL  10 mL Intravenous 2 times per day Susan Kuhn MD   10 mL at 12/23/20 2157    sodium chloride flush 0.9 % injection 10 mL  10 mL Intravenous PRN Susan Kuhn MD        polyethylene glycol (GoLYTELY) solution 2,000 mL  2,000 mL Oral See Admin Instructions Susan Kuhn MD        morphine injection 4 mg  4 mg Intravenous Q15 Min PRN Kecia Hernandez MD        ondansetron [facility]) injection 4 mg  4 mg Intravenous Once Kecia Hernandez MD        sodium chloride (PF) 0.9 % injection 10 mL  10 mL Intravenous Daily [facility] Holiday, DO   10 mL at 12/23/20 0859    [Held by provider] aspirin EC tablet 81 mg  81 mg Oral Daily [facility] Holiday, DO   Stopped at 12/22/20 1902    atorvastatin (LIPITOR) tablet 40 mg  40 mg Oral Nightly [facility] Holiday, DO   40 mg at 12/23/20 2157    diclofenac sodium (VOLTAREN) 1 % gel 2 g  2 g Topical BID [facility] Holiday, DO   Stopped at 12/22/20 1902    dilTIAZem (CARDIZEM CD) extended release capsule 240 mg  240 mg Oral Daily [facility] Holiday, DO   240 mg at 12/23/20 0858    [Held by provider] losartan-hydroCHLOROthiazide (HYZAAR) 50-12.5 MG per tablet 2 tablet  2 tablet Oral Daily [facility] Holiday, DO   2 tablet at 12/23/20 0858    metoprolol tartrate (LOPRESSOR) tablet 25 mg  25 mg Oral BID Jordan J Holiday, DO   25 mg at 12/23/20 2157    She has no wheezes. She has no rales. She exhibits no tenderness. Abdominal: Soft. Bowel sounds are normal. She exhibits no distension and no mass. There is no abdominal tenderness. There is no rebound and no guarding. Musculoskeletal:         General: No edema. Neurological: She is alert and oriented to person, place, and time. No cranial nerve deficit. Skin: Skin is warm and dry. No rash noted. She is not diaphoretic. No erythema. No pallor. Psychiatric: She has a normal mood and affect.  Her behavior is normal. Judgment and thought content normal.         LABS:  Recent Results (from the past 24 hour(s))   CBC Auto Differential    Collection Time: 12/23/20  7:18 AM   Result Value Ref Range    WBC 6.9 4.8 - 10.8 K/uL    RBC 2.74 (L) 4.20 - 5.40 M/uL    Hemoglobin 6.6 (LL) 12.0 - 16.0 g/dL    Hematocrit 21.7 (L) 37.0 - 47.0 %    MCV 79.2 (L) 82.0 - 100.0 fL    MCH 24.0 (L) 27.0 - 31.3 pg    MCHC 30.2 (L) 33.0 - 37.0 %    RDW 16.0 (H) 11.5 - 14.5 %    Platelets 394 186 - 778 K/uL    Neutrophils % 63.8 %    Lymphocytes % 8.4 %    Monocytes % 26.1 %    Eosinophils % 1.7 %    Basophils % 0.0 %    Neutrophils Absolute 4.4 1.4 - 6.5 K/uL    Lymphocytes Absolute 0.6 (L) 1.0 - 4.8 K/uL    Monocytes Absolute 1.8 (H) 0.2 - 0.8 K/uL    Eosinophils Absolute 0.1 0.0 - 0.7 K/uL    Basophils Absolute 0.0 0.0 - 0.2 K/uL   TROPONIN    Collection Time: 12/23/20 10:06 AM   Result Value Ref Range    Troponin 0.046 (HH) 0.000 - 0.010 ng/mL   EKG 12 Lead    Collection Time: 12/23/20 10:32 AM   Result Value Ref Range    Ventricular Rate 75 BPM    Atrial Rate 75 BPM    P-R Interval 174 ms    QRS Duration 156 ms    Q-T Interval 448 ms    QTc Calculation (Bazett) 500 ms    P Axis 43 degrees    R Axis 24 degrees    T Axis 20 degrees   CBC Auto Differential    Collection Time: 12/23/20  4:25 PM   Result Value Ref Range    WBC 8.4 4.8 - 10.8 K/uL    RBC 3.30 (L) 4.20 - 5.40 M/uL    Hemoglobin 8.6 (L) 12.0 - 16.0 g/dL    Hematocrit 26.9 (L) 37.0 - 47.0 %    MCV 81.3 (L) 82.0 - 100.0 fL    MCH 25.9 (L) 27.0 - 31.3 pg    MCHC 31.8 (L) 33.0 - 37.0 %    RDW 16.7 (H) 11.5 - 14.5 %    Platelets 492 131 - 649 K/uL    Neutrophils % 74.0 %    Lymphocytes % 14.3 %    Monocytes % 8.6 %    Eosinophils % 2.6 %    Basophils % 0.5 %    Neutrophils Absolute 6.2 1.4 - 6.5 K/uL    Lymphocytes Absolute 1.2 1.0 - 4.8 K/uL    Monocytes Absolute 0.7 0.2 - 0.8 K/uL    Eosinophils Absolute 0.2 0.0 - 0.7 K/uL    Basophils Absolute 0.0 0.0 - 0.2 K/uL   CBC Auto Differential    Collection Time: 12/23/20  7:48 PM   Result Value Ref Range    WBC 7.3 4.8 - 10.8 K/uL    RBC 3.17 (L) 4.20 - 5.40 M/uL    Hemoglobin 7.9 (L) 12.0 - 16.0 g/dL    Hematocrit 25.7 (L) 37.0 - 47.0 %    MCV 81.1 (L) 82.0 - 100.0 fL    MCH 25.1 (L) 27.0 - 31.3 pg    MCHC 30.9 (L) 33.0 - 37.0 %    RDW 17.2 (H) 11.5 - 14.5 %    Platelets 870 888 - 810 K/uL    Neutrophils % 76.7 %    Lymphocytes % 12.6 %    Monocytes % 7.6 %    Eosinophils % 2.9 %    Basophils % 0.2 %    Neutrophils Absolute 5.6 1.4 - 6.5 K/uL    Lymphocytes Absolute 0.9 (L) 1.0 - 4.8 K/uL    Monocytes Absolute 0.6 0.2 - 0.8 K/uL    Eosinophils Absolute 0.2 0.0 - 0.7 K/uL    Basophils Absolute 0.0 0.0 - 0.2 K/uL   CBC Auto Differential    Collection Time: 12/24/20  2:37 AM   Result Value Ref Range    WBC 8.5 4.8 - 10.8 K/uL    RBC 3.54 (L) 4.20 - 5.40 M/uL    Hemoglobin 8.7 (L) 12.0 - 16.0 g/dL    Hematocrit 28.5 (L) 37.0 - 47.0 %    MCV 80.6 (L) 82.0 - 100.0 fL    MCH 24.7 (L) 27.0 - 31.3 pg    MCHC 30.6 (L) 33.0 - 37.0 %    RDW 17.3 (H) 11.5 - 14.5 %    Platelets 113 926 - 828 K/uL    Neutrophils % 74.6 %    Lymphocytes % 14.4 %    Monocytes % 7.6 %    Eosinophils % 3.0 %    Basophils % 0.4 %    Neutrophils Absolute 6.4 1.4 - 6.5 K/uL    Lymphocytes Absolute 1.2 1.0 - 4.8 K/uL    Monocytes Absolute 0.6 0.2 - 0.8 K/uL    Eosinophils Absolute 0.3 0.0 - 0.7 K/uL    Basophils Absolute 0.0 0.0 - 0.2 K/uL     Troponin:    Lab Results   Component Value Date    TROPONINI 0.046 12/23/2020               Assessment:         Active Hospital Problems     Diagnosis Date Noted    Chest pain [R07.9] 12/22/2020      1. NSTEMI/chest pain  2. Acute worsening anemia/GI bleed  3. JP on CKD  4. NSVT  5. Hx CAD s/p PCI of LAD and RCA in 2017  6. HTN  7. Dyslipidemia  8. Hx gastric ulcer  9. Abnormal EKG/RBBB  10. Cardiac murmur  11. Moderate MR     Plan:  1. Maximize medical therapy--hold aspirin for now secondary to anemia/GI bleed, Lopressor 25 mg p.o. twice daily, Cardizem  mg p.o. daily, HOLD losartan/hydrochlorthiazide 100/25 mg p.o. daily secondary to JP, Lipitor 40 mg tablet p.o. daily at bedtime, Protonix 40 mg IV daily  2. Cardiac diet recommended  3. Check EKG to monitor QTc interval  4. Monitor on telemetry for any tachycardia or bradycardia arrhythmias  5. Maintain potassium greater than 4, magnesium greater than 2  6. GI/DVT prophylaxis  7. GI consult/recommendations regarding acute worsening anemia/GI bleed  8. conservative medical therapy for now in light of acute anemia/GI bleeding. Will need to consider coronary evaluation in future when feasible  9. Stable for endoscopy from cardiology standpoint.       Electronically signed by Colton Walsh DO on 12/24/2020 at 5:11 AM

## 2020-12-24 NOTE — PROGRESS NOTES
2157: Shift assessment complete, see flowsheet. A/Ox4. Pt is almost half way through her bowel prep. She denies needs and pain at this time. She is up in her chair, watching TV, denies needs.

## 2020-12-24 NOTE — DISCHARGE SUMMARY
Discharge Summary    Date: 1/10/2021  Patient Name: Thalia Libman YOB: 1945 Age: 77 y.o. Admit Date: 12/22/2020  Discharge Date: 12/24/2020  Discharge Condition: Good    Admission Diagnosis  Chest pain (R07.9)     Discharge Diagnosis  Active Problems: Chest painResolved Problems: * No resolved hospital problems. [facility] Stay  Narrative of Hospital Course:  Patient presented with GI bleed. Aspirin was discontinued. She was seen by GI and was given 1 unit packed red blood cell. She elected to have outpatient colonoscopy/endoscopy. Hydrochlorothiazide lisinopril was discontinued as well. Patient to follow-up with GI as outpatient to complete her evaluation    Consultants:  IP CONSULT TO GI    Surgeries/procedures Performed:       Treatments:    IV Hydration        Discharge Plan/Disposition:  Home    Hospital/Incidental Findings Requiring Follow Up:    Patient Instructions:    Diet: Cardiac Diet    Activity:Activity as Tolerated  For number of days (if applicable): Other Instructions:    Provider Follow-Up:   No follow-ups on file. Significant Diagnostic Studies:    Recent Labs:  Admission on 12/22/2020, Discharged on 12/24/2020No results displayed because visit has over 200 results. ------------    Radiology last 7 days:  No results found.      [unfilled]    Discharge Medications    Discharge Medication List as of 12/24/2020 11:36 AMSTART taking these medicationspantoprazole (PROTONIX) 40 MG tabletTake 1 tablet by mouth every morning (before breakfast), Disp-30 tablet, R-5Normal    Discharge Medication List as of 12/24/2020 11:36 AM    Discharge Medication List as of 12/24/2020 11:36 AMCONTINUE these medications which have NOT CHANGEDdilTIAZem (CARDIZEM CD) 240 MG extended release capsuleTake 1 capsule by mouth daily, Disp-90 capsule,R-3Normaloxybutynin (DITROPAN XL) 10 MG extended release tabletTake 1 tablet by mouth daily, Disp-30 tablet,R-3Normalmetoprolol tartrate (LOPRESSOR) 25 MG tabletTAKE 1 TABLET BY MOUTH TWICE DAILY, Disp-180 tablet,R-3Normalatorvastatin (LIPITOR) 80 MG tabletTake 0.5 tablets by mouth nightly, Disp-90 tablet,R-3NormalnitroGLYCERIN (NITROSTAT) 0.4 MG SL tabletPlace 1 tablet under the tongue every 5 minutes as needed for Chest pain, Disp-25 tablet, R-3Printacetaminophen (TYLENOL) 325 MG tabletTake 650 mg by mouth every 6 hours as needed for Pain Historical Med    Discharge Medication List as of 12/24/2020 11:36 AMSTOP taking these medicationslosartan-hydroCHLOROthiazide (HYZAAR) 100-25 MG per tabletComments:Reason for Stopping:Handicap Placard MISCComments:Reason for Stopping:aspirin EC 81 MG EC tabletComments:Reason for Stopping:diclofenac sodium (VOLTAREN) 1 % GELComments:Reason for Stopping:CINNAMON POComments:Reason for Stopping:    Time Spent on Discharge:2E] minutes were spent in patient examination, evaluation, counseling as well as medication reconciliation, prescriptions for required medications, discharge plan, and follow up.     Electronically signed by Nikia Barragan DO on 1/10/21 at 5:49 PM EST

## 2020-12-24 NOTE — CARE COORDINATION
This Care Transition Nurse provided AMI booklet and zones sheet and reviewed. Discussed the use of the zones sheet and stressed importance of phoning cardiologist promptly for symptoms in the yellow zone and ems for symptoms in the red zone. Reviewed symptoms, risk factors, BP, medications, heart healthy diet, activity. Discussed managing cholesterol, blood pressure, weight, increase physical activity but get clearance from cardiologist before starting a new exercise routine, benefits of cardiac rehab, managing stress. Reviewed how to read food labels for fat, cholesterol, sodium, sugar. Advised to limit sodium intake to 2000 mg per day, eat fresh fruits, vegetables, whole grains, lean proteins ( chicken, turkey, fish, beans), lowfat or non-fat dairy products, healthy fats in small amounts. Avoid trans fats, and saturated fat, limit eating beef. Limit eating out and when doing so choose grilled, baked, or broiled entrees. Stressed importance of PCP follow up within one week of discharge and follow up with cardiologist as directed. Patient states she has had a MI in the past and is familiar with symptoms. She avoids adding salt to her food and limits processed foods. She takes her BP and cholesterol medications as ordered. She has a BP cuff but does not check her BP regularly. She states it is controlled with medication. She does not exercise but does \"stuff around the house. \"  Advised to add some exercise such as ambulating around the house.

## 2020-12-24 NOTE — FLOWSHEET NOTE
Assumed care of patient. Assessment unchanged. Patient on bedside commode. Patient voices no needs at this time.

## 2020-12-25 PROCEDURE — 93010 ELECTROCARDIOGRAM REPORT: CPT | Performed by: INTERNAL MEDICINE

## 2021-01-02 ENCOUNTER — ANESTHESIA EVENT (OUTPATIENT)
Dept: OPERATING ROOM | Age: 76
End: 2021-01-02
Payer: MEDICARE

## 2021-01-02 ENCOUNTER — HOSPITAL ENCOUNTER (OUTPATIENT)
Age: 76
Setting detail: OUTPATIENT SURGERY
Discharge: HOME | End: 2021-01-02
Attending: SPECIALIST | Admitting: SPECIALIST
Payer: MEDICARE

## 2021-01-02 ENCOUNTER — ANESTHESIA (OUTPATIENT)
Dept: OPERATING ROOM | Age: 76
End: 2021-01-02
Payer: MEDICARE

## 2021-01-02 VITALS
HEART RATE: 62 BPM | BODY MASS INDEX: 44.3 KG/M2 | RESPIRATION RATE: 14 BRPM | DIASTOLIC BLOOD PRESSURE: 54 MMHG | SYSTOLIC BLOOD PRESSURE: 134 MMHG | TEMPERATURE: 98.4 F | OXYGEN SATURATION: 98 % | WEIGHT: 250 LBS | HEIGHT: 63 IN

## 2021-01-02 VITALS
RESPIRATION RATE: 14 BRPM | SYSTOLIC BLOOD PRESSURE: 108 MMHG | OXYGEN SATURATION: 100 % | DIASTOLIC BLOOD PRESSURE: 57 MMHG

## 2021-01-02 PROCEDURE — 3609027000 HC COLONOSCOPY: Performed by: SPECIALIST

## 2021-01-02 PROCEDURE — 3609017100 HC EGD: Performed by: SPECIALIST

## 2021-01-02 PROCEDURE — 6360000002 HC RX W HCPCS: Performed by: NURSE ANESTHETIST, CERTIFIED REGISTERED

## 2021-01-02 PROCEDURE — 7100000011 HC PHASE II RECOVERY - ADDTL 15 MIN: Performed by: SPECIALIST

## 2021-01-02 PROCEDURE — 88305 TISSUE EXAM BY PATHOLOGIST: CPT

## 2021-01-02 PROCEDURE — 3700000000 HC ANESTHESIA ATTENDED CARE: Performed by: SPECIALIST

## 2021-01-02 PROCEDURE — 43239 EGD BIOPSY SINGLE/MULTIPLE: CPT | Performed by: SPECIALIST

## 2021-01-02 PROCEDURE — 2500000003 HC RX 250 WO HCPCS: Performed by: NURSE ANESTHETIST, CERTIFIED REGISTERED

## 2021-01-02 PROCEDURE — 2580000003 HC RX 258: Performed by: SPECIALIST

## 2021-01-02 PROCEDURE — 88342 IMHCHEM/IMCYTCHM 1ST ANTB: CPT

## 2021-01-02 PROCEDURE — 7100000010 HC PHASE II RECOVERY - FIRST 15 MIN: Performed by: SPECIALIST

## 2021-01-02 PROCEDURE — 2709999900 HC NON-CHARGEABLE SUPPLY: Performed by: SPECIALIST

## 2021-01-02 PROCEDURE — 45385 COLONOSCOPY W/LESION REMOVAL: CPT | Performed by: SPECIALIST

## 2021-01-02 PROCEDURE — 3700000001 HC ADD 15 MINUTES (ANESTHESIA): Performed by: SPECIALIST

## 2021-01-02 RX ORDER — MAGNESIUM HYDROXIDE 1200 MG/15ML
LIQUID ORAL PRN
Status: DISCONTINUED | OUTPATIENT
Start: 2021-01-02 | End: 2021-01-02 | Stop reason: ALTCHOICE

## 2021-01-02 RX ORDER — ASPIRIN 81 MG/1
81 TABLET ORAL DAILY
COMMUNITY
End: 2021-02-04

## 2021-01-02 RX ORDER — PROPOFOL 10 MG/ML
INJECTION, EMULSION INTRAVENOUS PRN
Status: DISCONTINUED | OUTPATIENT
Start: 2021-01-02 | End: 2021-01-02 | Stop reason: SDUPTHER

## 2021-01-02 RX ORDER — ONDANSETRON 2 MG/ML
4 INJECTION INTRAMUSCULAR; INTRAVENOUS
Status: DISCONTINUED | OUTPATIENT
Start: 2021-01-02 | End: 2021-01-02 | Stop reason: HOSPADM

## 2021-01-02 RX ORDER — SODIUM CHLORIDE, SODIUM LACTATE, POTASSIUM CHLORIDE, CALCIUM CHLORIDE 600; 310; 30; 20 MG/100ML; MG/100ML; MG/100ML; MG/100ML
INJECTION, SOLUTION INTRAVENOUS CONTINUOUS
Status: DISCONTINUED | OUTPATIENT
Start: 2021-01-02 | End: 2021-01-02 | Stop reason: HOSPADM

## 2021-01-02 RX ORDER — LOSARTAN POTASSIUM AND HYDROCHLOROTHIAZIDE 25; 100 MG/1; MG/1
1 TABLET ORAL DAILY
Status: ON HOLD | COMMUNITY
End: 2021-02-04

## 2021-01-02 RX ORDER — LIDOCAINE HYDROCHLORIDE 20 MG/ML
INJECTION, SOLUTION INFILTRATION; PERINEURAL PRN
Status: DISCONTINUED | OUTPATIENT
Start: 2021-01-02 | End: 2021-01-02 | Stop reason: SDUPTHER

## 2021-01-02 RX ADMIN — PROPOFOL 30 MG: 10 INJECTION, EMULSION INTRAVENOUS at 09:35

## 2021-01-02 RX ADMIN — PROPOFOL 30 MG: 10 INJECTION, EMULSION INTRAVENOUS at 09:34

## 2021-01-02 RX ADMIN — PROPOFOL 30 MG: 10 INJECTION, EMULSION INTRAVENOUS at 09:32

## 2021-01-02 RX ADMIN — PROPOFOL 30 MG: 10 INJECTION, EMULSION INTRAVENOUS at 09:15

## 2021-01-02 RX ADMIN — SODIUM CHLORIDE, POTASSIUM CHLORIDE, SODIUM LACTATE AND CALCIUM CHLORIDE: 600; 310; 30; 20 INJECTION, SOLUTION INTRAVENOUS at 08:17

## 2021-01-02 RX ADMIN — PROPOFOL 30 MG: 10 INJECTION, EMULSION INTRAVENOUS at 09:23

## 2021-01-02 RX ADMIN — PROPOFOL 30 MG: 10 INJECTION, EMULSION INTRAVENOUS at 09:29

## 2021-01-02 RX ADMIN — PROPOFOL 30 MG: 10 INJECTION, EMULSION INTRAVENOUS at 09:26

## 2021-01-02 RX ADMIN — PROPOFOL 30 MG: 10 INJECTION, EMULSION INTRAVENOUS at 09:37

## 2021-01-02 RX ADMIN — PROPOFOL 50 MG: 10 INJECTION, EMULSION INTRAVENOUS at 09:11

## 2021-01-02 RX ADMIN — PROPOFOL 30 MG: 10 INJECTION, EMULSION INTRAVENOUS at 09:12

## 2021-01-02 RX ADMIN — PROPOFOL 30 MG: 10 INJECTION, EMULSION INTRAVENOUS at 09:20

## 2021-01-02 RX ADMIN — PROPOFOL 30 MG: 10 INJECTION, EMULSION INTRAVENOUS at 09:21

## 2021-01-02 RX ADMIN — PROPOFOL 30 MG: 10 INJECTION, EMULSION INTRAVENOUS at 09:39

## 2021-01-02 RX ADMIN — PROPOFOL 50 MG: 10 INJECTION, EMULSION INTRAVENOUS at 09:31

## 2021-01-02 RX ADMIN — SODIUM CHLORIDE, POTASSIUM CHLORIDE, SODIUM LACTATE AND CALCIUM CHLORIDE: 600; 310; 30; 20 INJECTION, SOLUTION INTRAVENOUS at 09:44

## 2021-01-02 RX ADMIN — PROPOFOL 50 MG: 10 INJECTION, EMULSION INTRAVENOUS at 09:09

## 2021-01-02 RX ADMIN — LIDOCAINE HYDROCHLORIDE 40 MG: 20 INJECTION, SOLUTION INFILTRATION; PERINEURAL at 09:09

## 2021-01-02 RX ADMIN — PROPOFOL 30 MG: 10 INJECTION, EMULSION INTRAVENOUS at 09:18

## 2021-01-02 ASSESSMENT — PULMONARY FUNCTION TESTS
PIF_VALUE: 1

## 2021-01-02 ASSESSMENT — PAIN - FUNCTIONAL ASSESSMENT: PAIN_FUNCTIONAL_ASSESSMENT: 0-10

## 2021-01-02 NOTE — ANESTHESIA PRE PROCEDURE
Department of Anesthesiology  Preprocedure Note       Name:  Ngoc Villarreal   Age:  77 y.o.  :  1945                                          MRN:  [id number]         Date:  2021      Surgeon: Marleen Patel):  Marcin Fuentes MD    Procedure: Procedure(s):  EGD DIAGNOSTIC ONLY  COLONOSCOPY DIAGNOSTIC    Medications prior to admission:   Prior to Admission medications    Medication Sig Start Date End Date Taking? Authorizing Provider   pantoprazole (PROTONIX) 40 MG tablet Take 1 tablet by mouth every morning (before breakfast) 20   [facility]   dilTIAZem (CARDIZEM CD) 240 MG extended release capsule Take 1 capsule by mouth daily 20   Holly Guzmán MD   oxybutynin (DITROPAN XL) 10 MG extended release tablet Take 1 tablet by mouth daily 20   Holly Guzmán MD   metoprolol tartrate (LOPRESSOR) 25 MG tablet TAKE 1 TABLET BY MOUTH TWICE DAILY 20   [facility] Holiday, DO   atorvastatin (LIPITOR) 80 MG tablet Take 0.5 tablets by mouth nightly 3/10/20   [facility]   nitroGLYCERIN (NITROSTAT) 0.4 MG SL tablet Place 1 tablet under the tongue every 5 minutes as needed for Chest pain 5/15/17   Courtney Loop, APRN - CNP   acetaminophen (TYLENOL) 325 MG tablet Take 650 mg by mouth every 6 hours as needed for Pain     Historical Provider, MD       Current medications:    Current Facility-Administered Medications   Medication Dose Route Frequency Provider Last Rate Last Admin    lactated ringers infusion   Intravenous Continuous Marcin Fuentes MD        ondansetron [facility]) injection 4 mg  4 mg Intravenous Once PRN Marcin Fuentes MD           Allergies:     Allergies   Allergen Reactions    Lisinopril Nausea Only and Other (See Comments)     cough    Tramadol Nausea Only       Problem List:    Patient Active Problem List   Diagnosis Code    Hypothyroid E03.9    Essential hypertension I10    Hyperlipidemia E78.5    Morbid obesity due to excess calories ([facility] [address] E66.01  History of ST elevation myocardial infarction (STEMI) I25.2    Coronary artery disease involving native coronary artery of native heart without angina pectoris I25.10    Chronic bilateral low back pain with bilateral sciatica M54.42, M54.41, G89.29    Primary osteoarthritis of both knees M17.0    Spinal stenosis of lumbar region with neurogenic claudication M48.062    Degenerative spondylolisthesis M43.10    Benign neoplasm of left ear D23.22    Asthma J45.909    CKD (chronic kidney disease) stage 3, GFR 30-59 ml/min N18.30    Claudication (HCC) I73.9    Chest pain R07.9       Past Medical History:        Diagnosis Date    Antral ulcer 01/14/2015    DR Dipti Grant    Asthma     \"cured by beestings\"    Coronary artery disease     Coronary artery disease involving native coronary artery of native heart without angina pectoris 7/10/2018    has x 4 cardiac stents / Dr. Chandana Ortez Diverticulosis of colon (without mention of hemorrhage) 01/14/2015    DR Dipti Grant    Essential hypertension 12/10/2013    meds > 20 yrs    History of blood transfusion 1990s    with hysterectomy    History of normal resting EKG 1996    normal    History of ST elevation myocardial infarction (STEMI) 7/11/2017    History of transfusion of whole blood 1996    Excessive bleeding before hysterectomy    Hyperlipidemia     meds > 2 yrs    Hypertension     Hypothyroidism     past trx years ago then stopped / recent restart    Kidney disease     Low back pain     Morbid obesity due to excess calories ([address] 5/13/2017    Morbid obesity due to excess calories ([address] 6/6/2017    Osteoarthritis     Pneumonia     Shoulder dislocation     ST elevation myocardial infarction involving left circumflex coronary artery ([address] 5/13/2017    Unspecified gastritis and gastroduodenitis without mention of hemorrhage 05/06/2015    DR Dipti Grant       Past Surgical History:        Procedure Laterality Date   [facility] CARDIAC SURGERY  2017 CALCIUM 9.7 12/22/2020    BILITOT 0.3 12/22/2020    ALKPHOS 123 12/22/2020    AST 12 12/22/2020    ALT 13 12/22/2020       POC Tests: No results for input(s): POCGLU, POCNA, POCK, POCCL, POCBUN, POCHEMO, POCHCT in the last 72 hours. Coags:   Lab Results   Component Value Date    PROTIME 12.9 12/22/2020    INR 1.0 12/22/2020    APTT 33.4 12/22/2020       HCG (If Applicable): No results found for: PREGTESTUR, PREGSERUM, HCG, HCGQUANT     ABGs: No results found for: PHART, PO2ART, JFW6TSS, HDT1BGM, BEART, H5TSQHTR     Type & Screen (If Applicable):  No results found for: LABABO, LABRH    Drug/Infectious Status (If Applicable):  No results found for: HIV, HEPCAB    COVID-19 Screening (If Applicable):   Lab Results   Component Value Date    COVID19 Not Detected 12/22/2020         Anesthesia Evaluation  Patient summary reviewed and Nursing notes reviewed  Airway: Mallampati: II  TM distance: >3 FB   Neck ROM: full  Mouth opening: > = 3 FB Dental: normal exam         Pulmonary:normal exam    (+) pneumonia:  asthma:           Patient did not smoke on day of surgery. Cardiovascular:    (+) hypertension:, past MI:, CAD:, CHF: diastolic, hyperlipidemia      ECG reviewed      Echocardiogram reviewed               ROS comment: EKG- RBBB     Neuro/Psych:   (+) neuromuscular disease:,             GI/Hepatic/Renal:   (+) PUD, renal disease: CRI, bowel prep, morbid obesity         ROS comment: Anemia  diverticulosis. Endo/Other:    (+) hypothyroidism::., .          Pt had no PAT visit       Abdominal:   (+) obese,     Abdomen: soft. Vascular:           ROS comment: claudication. Anesthesia Plan      MAC     ASA 3       Induction: intravenous. Anesthetic plan and risks discussed with patient. Plan discussed with attending.                   BEN Crowell - CRNA   1/2/2021

## 2021-01-02 NOTE — H&P
Patient Name: Donna Rolon  : 1945  MRN: [id number]  DATE: 21      ENDOSCOPY  History and Physical    Procedure:    [x] Diagnostic Colonoscopy       [] Screening Colonoscopy  [x] EGD      [] ERCP      [] EUS       [] Other    [x] Previous office notes/History and Physical reviewed from the patients chart. Please see EMR for further details of HPI. I have examined the patient's status immediately prior to the procedure and:      Indications/HPI:    []Abdominal Pain  []Cancer- GI/Lung  []Fhx of colon CA/polyps  []History of Polyps  []Moultons   []Melena  []Abnormal Imaging  []Dysphagia    []Persistent Pneumonia  []Anemia  []Food Impaction  []History of Polyps  []GI Bleed  []Pulmonary nodule/Mass  []Change in bowel habits []Heartburn/Reflux  []Rectal Bleed (BRBPR)  []Chest Pain - Non Cardiac []Heme (+) Stoo  l[]Ulcers  []Constipation  []Hemoptysis   []Varices  []Diarrhea  []Hypoxemia  []Nausea/Vomiting  []Screening   []Crohns/Colitis  []Other: anemia  Anesthesia:   [x] MAC [] Moderate Sedation   [] General   [] None     ROS: 12 pt Review of Symptoms was negative unless mentioned above    Medications:   Prior to Admission medications    Medication Sig Start Date End Date Taking?  Authorizing Provider   aspirin 81 MG EC tablet Take 81 mg by mouth daily   Yes Historical Provider, MD   losartan-hydroCHLOROthiazide (HYZAAR) 100-25 MG per tablet Take 1 tablet by mouth daily   Yes Historical Provider, MD   pantoprazole (PROTONIX) 40 MG tablet Take 1 tablet by mouth every morning (before breakfast) 20  Yes Jordan Boland, DO   dilTIAZem (CARDIZEM CD) 240 MG extended release capsule Take 1 capsule by mouth daily 20  Yes Siena Eisenberg MD   oxybutynin (DITROPAN XL) 10 MG extended release tablet Take 1 tablet by mouth daily 20  Yes Siena Eisenberg MD   metoprolol tartrate (LOPRESSOR) 25 MG tablet TAKE 1 TABLET BY MOUTH TWICE DAILY 20  Yes Jordan Boland, DO   atorvastatin (LIPITOR) 80 MG tablet Take 0.5 tablets by mouth nightly 3/10/20  Yes Jordan J Holiday, DO   acetaminophen (TYLENOL) 325 MG tablet Take 650 mg by mouth every 6 hours as needed for Pain    Yes Historical Provider, MD   nitroGLYCERIN (NITROSTAT) 0.4 MG SL tablet Place 1 tablet under the tongue every 5 minutes as needed for Chest pain 5/15/17   Alan Strong, APRN - CNP       Allergies:    Allergies   Allergen Reactions    Lisinopril Nausea Only and Other (See Comments)     cough    Tramadol Nausea Only        History of allergic reaction to anesthesia:  No    Past Medical History:  Past Medical History:   Diagnosis Date    Antral ulcer 01/14/2015    DR Hu Rajan    Asthma     \"cured by beestings\"    Coronary artery disease     Coronary artery disease involving native coronary artery of native heart without angina pectoris 7/10/2018    has x 4 cardiac stents / Dr. Fadia Reddy Diverticulosis of colon (without mention of hemorrhage) 01/14/2015    DR Hu Rajan    Essential hypertension 12/10/2013    meds > 20 yrs    History of blood transfusion 1990s    with hysterectomy    History of normal resting EKG 1996    normal    History of ST elevation myocardial infarction (STEMI) 7/11/2017    History of transfusion of whole blood 1996    Excessive bleeding before hysterectomy    Hyperlipidemia     meds > 2 yrs    Hypertension     Hypothyroidism     past trx years ago then stopped / recent restart    Kidney disease     Low back pain     Morbid obesity due to excess calories ([address] 5/13/2017    Morbid obesity due to excess calories ([address] 6/6/2017    Osteoarthritis     Pneumonia     Shoulder dislocation     ST elevation myocardial infarction involving left circumflex coronary artery ([address] 5/13/2017    Unspecified gastritis and gastroduodenitis without mention of hemorrhage 05/06/2015    DR Hu Rajan       Past Surgical History:  Past Surgical History:   Procedure Laterality Date    CARDIAC SURGERY  2017    has x 4 cardiac stents    COLONOSCOPY  01/14/2015    DR LANE - DIVERTICULOSIS    CORONARY ANGIOPLASTY WITH STENT PLACEMENT  05/17/2017    x2 stents    CYST REMOVAL Left 2/7/2019    RESECTION OF LEFT PINNA LESION WITH GRAFT performed by Kale Alvarado MD at [facility]. [address], COLON, DIAGNOSTIC      FINGER SURGERY  12/9/14    middle finger right hand due to infection   [address]  [phone]   [place]  2008    shoulder dislocated - popped  back in Right shoulder    UPPER GASTROINTESTINAL ENDOSCOPY  01/14/2015    DR LANE - ULCER IN THE ANTRUM    UPPER GASTROINTESTINAL ENDOSCOPY  05/06/2015     [facility] - GASTRITIS, PREVIOUS ULCER HEALED       Social History:  Social History     Tobacco Use    Smoking status: Never Smoker    Smokeless tobacco: Never Used   Substance Use Topics    Alcohol use: Yes     Comment: glass of wine once a year    Drug use: No       Vital Signs:   Vitals:    01/02/21 0812   BP: (!) 138/55   Pulse: 73   Resp: 16   Temp: 98.4 °F (36.9 °C)   SpO2: 96%        Physical Exam:  Cardiac:  [x]WNL  []Comments:  Pulmonary:  [x]WNL   []Comments:   Neuro/Mental Status:  [x]WNL  []Comments:  Abdominal:  [x]WNL    []Comments:  Other:   []WNL  []Comments:    Informed Consent:  The risks and benefits of the procedure have been discussed with either the patient or if they cannot consent, their representative. Assessment:  Patient examined and appropriate for planned sedation and procedure. Plan:  Proceed with planned sedation and procedure as above.     Isacc Mendoza MD  8:58 AM

## 2021-01-02 NOTE — PROGRESS NOTES
Discharge instructions reviewed with pt by [facility] RN, pt verbalized understanding.   Pt tolerating oral fluids, IV discontinued

## 2021-01-25 ENCOUNTER — TELEPHONE (OUTPATIENT)
Dept: FAMILY MEDICINE CLINIC | Age: 76
End: 2021-01-25

## 2021-01-25 ENCOUNTER — OFFICE VISIT (OUTPATIENT)
Dept: GASTROENTEROLOGY | Age: 76
End: 2021-01-25
Payer: MEDICARE

## 2021-01-25 VITALS — BODY MASS INDEX: 44.29 KG/M2 | HEIGHT: 63 IN | OXYGEN SATURATION: 95 % | HEART RATE: 76 BPM | RESPIRATION RATE: 18 BRPM

## 2021-01-25 DIAGNOSIS — K92.2 GASTROINTESTINAL HEMORRHAGE, UNSPECIFIED GASTROINTESTINAL HEMORRHAGE TYPE: Primary | ICD-10-CM

## 2021-01-25 DIAGNOSIS — D50.9 IRON DEFICIENCY ANEMIA, UNSPECIFIED IRON DEFICIENCY ANEMIA TYPE: ICD-10-CM

## 2021-01-25 PROCEDURE — 99213 OFFICE O/P EST LOW 20 MIN: CPT | Performed by: SPECIALIST

## 2021-01-25 RX ORDER — PANTOPRAZOLE SODIUM 40 MG/1
40 TABLET, DELAYED RELEASE ORAL DAILY
Qty: 30 TABLET | Refills: 2 | Status: SHIPPED | OUTPATIENT
Start: 2021-01-25 | End: 2021-06-22

## 2021-01-25 ASSESSMENT — ENCOUNTER SYMPTOMS
VOMITING: 0
DIARRHEA: 0
EYES NEGATIVE: 1
GASTROINTESTINAL NEGATIVE: 1
SHORTNESS OF BREATH: 1
BLOOD IN STOOL: 0
ANAL BLEEDING: 0
RECTAL PAIN: 0
CONSTIPATION: 0
ABDOMINAL DISTENTION: 0
NAUSEA: 0
ABDOMINAL PAIN: 0

## 2021-01-25 NOTE — PROGRESS NOTES
Gastroenterology Clinic Follow up Visit    Don Larson  [id number]  Chief Complaint   Patient presents with    Follow Up After Procedure       HPI and A/P at last visit summarized below:  Patient is here for follow-up. EGD showed some erosions in the antrum and the duodenum and antral biopsy showed reactive gastropathy with no H. pylori. Colonoscopy showed diverticulosis and multiple benign polyps. She was on low-dose aspirin which might explain the antral gastritis. Not take any NSAIDs. According to the medication list patient is on Protonix 40 mg once a day but patient not sure she is on any PPI, CBC on December 24 shows hemoglobin of 7.8 and hematocrit 25.1, reports no history of any overt GI bleeding, of any hematuria or epistaxis, has been experiencing shortness of breath. Review of Systems   Constitutional: Negative. HENT: Negative. Eyes: Negative. Respiratory: Positive for shortness of breath. Cardiovascular: Negative. Gastrointestinal: Negative. Negative for abdominal distention, abdominal pain, anal bleeding, blood in stool, constipation, diarrhea, nausea, rectal pain and vomiting. Endocrine: Negative. Genitourinary: Negative. Musculoskeletal: Negative. Skin: Negative. Allergic/Immunologic: Negative for food allergies. Neurological: Negative. Hematological: Negative. Psychiatric/Behavioral: Negative. Past medical history, past surgical history, medication list, social and familyhistory reviewed    Pulse 76, resp. rate 18, height 5' 3\" (1.6 m), last menstrual period 09/17/1996, SpO2 95 %, not currently breastfeeding. Physical Exam  Constitutional:       Appearance: She is well-developed. HENT:      Head: Normocephalic and atraumatic. Eyes:      Conjunctiva/sclera: Conjunctivae normal.      Pupils: Pupils are equal, round, and reactive to light. Neck:      Musculoskeletal: Normal range of motion.    Cardiovascular:      Rate and Rhythm: Normal rate. Pulmonary:      Effort: Pulmonary effort is normal.   Abdominal:      General: Bowel sounds are normal.      Palpations: Abdomen is soft. Comments: Soft nontender no palpable mass   Musculoskeletal: Normal range of motion. Skin:     General: Skin is warm. Neurological:      Mental Status: She is alert. Laboratory, Pathology, Radiology reviewed in detail with relevantimportant investigations summarized below:    No results for input(s): WBC, HGB, HCT, MCV, PLT in the last 720 hours. Lab Results   Component Value Date    ALT 13 12/22/2020    AST 12 12/22/2020    ALKPHOS 123 12/22/2020    BILITOT 0.3 12/22/2020     Colonoscopy    Result Date: 1/2/2021  No dictation       Endoscopic investigations:     Assessment and Plan:  Dalila Pearl 77 y.o. female for follow up. Anemia etiology unclear. EGD showed antral erosions and colonoscopy showed benign colon polyp and diverticulosis. Rule out possible small bowel causes. Will order capsule endoscopy and also repeat CBC and put the patient on Protonix 40 mg once a day. Return after capsule study   Diagnosis Orders   1. Gastrointestinal hemorrhage, unspecified gastrointestinal hemorrhage type  Endoscopy, GI with capsule    CBC Auto Differential   2. Iron deficiency anemia, unspecified iron deficiency anemia type  Endoscopy, GI with capsule    CBC Auto Differential       Return in about 4 weeks (around 2/22/2021). Elane Lombard, MD   StaffGastroenterologist  [facility]    Please note this report has been partially produced using speech recognitionsoftware  and may cause contain errors related to that system including grammar, punctuation and spelling as well as words andphrases that may seem inappropriate. If there are questions or concerns please feel free to contact me to clarify.

## 2021-02-04 ENCOUNTER — HOSPITAL ENCOUNTER (OUTPATIENT)
Age: 76
Setting detail: OBSERVATION
LOS: 1 days | Discharge: HOME | DRG: 812 | End: 2021-02-07
Attending: INTERNAL MEDICINE | Admitting: INTERNAL MEDICINE
Payer: MEDICARE

## 2021-02-04 ENCOUNTER — TELEPHONE (OUTPATIENT)
Dept: CARDIOLOGY CLINIC | Age: 76
End: 2021-02-04

## 2021-02-04 ENCOUNTER — HOSPITAL ENCOUNTER (EMERGENCY)
Age: 76
Discharge: ANOTHER HOSPITAL | End: 2021-02-04
Attending: EMERGENCY MEDICINE
Payer: MEDICARE

## 2021-02-04 ENCOUNTER — OFFICE VISIT (OUTPATIENT)
Dept: CARDIOLOGY CLINIC | Age: 76
End: 2021-02-04
Payer: MEDICARE

## 2021-02-04 ENCOUNTER — HOSPITAL ENCOUNTER (OUTPATIENT)
Dept: LAB | Age: 76
Discharge: HOME | End: 2021-02-04
Payer: MEDICARE

## 2021-02-04 VITALS
BODY MASS INDEX: 44.73 KG/M2 | WEIGHT: 262 LBS | OXYGEN SATURATION: 95 % | HEART RATE: 69 BPM | HEIGHT: 64 IN | TEMPERATURE: 98 F | DIASTOLIC BLOOD PRESSURE: 55 MMHG | SYSTOLIC BLOOD PRESSURE: 153 MMHG | RESPIRATION RATE: 17 BRPM

## 2021-02-04 VITALS
DIASTOLIC BLOOD PRESSURE: 70 MMHG | BODY MASS INDEX: 47.3 KG/M2 | OXYGEN SATURATION: 97 % | HEART RATE: 77 BPM | WEIGHT: 267 LBS | SYSTOLIC BLOOD PRESSURE: 110 MMHG

## 2021-02-04 DIAGNOSIS — I10 ESSENTIAL HYPERTENSION: ICD-10-CM

## 2021-02-04 DIAGNOSIS — E66.01 MORBID OBESITY DUE TO EXCESS CALORIES (HCC): Primary | ICD-10-CM

## 2021-02-04 DIAGNOSIS — N18.30 STAGE 3 CHRONIC KIDNEY DISEASE, UNSPECIFIED WHETHER STAGE 3A OR 3B CKD (HCC): ICD-10-CM

## 2021-02-04 DIAGNOSIS — D64.9 SEVERE ANEMIA: Primary | ICD-10-CM

## 2021-02-04 DIAGNOSIS — I25.10 CORONARY ARTERY DISEASE INVOLVING NATIVE CORONARY ARTERY OF NATIVE HEART WITHOUT ANGINA PECTORIS: ICD-10-CM

## 2021-02-04 DIAGNOSIS — E78.2 MIXED HYPERLIPIDEMIA: ICD-10-CM

## 2021-02-04 DIAGNOSIS — R06.02 SHORTNESS OF BREATH: ICD-10-CM

## 2021-02-04 DIAGNOSIS — I25.2 HISTORY OF ST ELEVATION MYOCARDIAL INFARCTION (STEMI): ICD-10-CM

## 2021-02-04 LAB
ALBUMIN SERPL-MCNC: 4.2 G/DL (ref 3.5–4.6)
ALP BLD-CCNC: 124 U/L (ref 40–130)
ALT SERPL-CCNC: 8 U/L (ref 0–33)
ANION GAP SERPL CALCULATED.3IONS-SCNC: 15 MEQ/L (ref 9–15)
APTT: 36.3 SEC (ref 24.4–36.8)
AST SERPL-CCNC: 13 U/L (ref 0–35)
BACTERIA: ABNORMAL /HPF
BASOPHILS ABSOLUTE: 0 K/UL (ref 0–0.2)
BASOPHILS RELATIVE PERCENT: 0.1 %
BILIRUB SERPL-MCNC: 0.4 MG/DL (ref 0.2–0.7)
BILIRUBIN URINE: NEGATIVE
BLOOD, URINE: ABNORMAL
BUN BLDV-MCNC: 35 MG/DL (ref 8–23)
CALCIUM SERPL-MCNC: 10.1 MG/DL (ref 8.5–9.9)
CHLORIDE BLD-SCNC: 108 MEQ/L (ref 95–107)
CLARITY: ABNORMAL
CO2: 20 MEQ/L (ref 20–31)
COLOR: YELLOW
CREAT SERPL-MCNC: 1.3 MG/DL (ref 0.5–0.9)
EKG ATRIAL RATE: 59 BPM
EKG P AXIS: 74 DEGREES
EKG P-R INTERVAL: 156 MS
EKG Q-T INTERVAL: 484 MS
EKG QRS DURATION: 170 MS
EKG QTC CALCULATION (BAZETT): 479 MS
EKG R AXIS: 26 DEGREES
EKG T AXIS: 3 DEGREES
EKG VENTRICULAR RATE: 59 BPM
EOSINOPHILS ABSOLUTE: 0.3 K/UL (ref 0–0.7)
EOSINOPHILS RELATIVE PERCENT: 3.9 %
EPITHELIAL CELLS, UA: ABNORMAL /HPF
GFR AFRICAN AMERICAN: 48.4
GFR NON-AFRICAN AMERICAN: 40
GLOBULIN: 2.6 G/DL (ref 2.3–3.5)
GLUCOSE BLD-MCNC: 155 MG/DL (ref 70–99)
GLUCOSE URINE: NEGATIVE MG/DL
HCT VFR BLD CALC: 21.9 % (ref 37–47)
HCT VFR BLD CALC: 22.6 % (ref 37–47)
HEMOGLOBIN: 6.7 G/DL (ref 12–16)
HEMOGLOBIN: 6.9 G/DL (ref 12–16)
HYPOCHROMIA: PRESENT
INR BLD: 1.1
KETONES, URINE: NEGATIVE MG/DL
LEUKOCYTE ESTERASE, URINE: ABNORMAL
LYMPHOCYTES ABSOLUTE: 1 K/UL (ref 1–4.8)
LYMPHOCYTES RELATIVE PERCENT: 14.8 %
MCH RBC QN AUTO: 23.2 PG (ref 27–31.3)
MCH RBC QN AUTO: 23.3 PG (ref 27–31.3)
MCHC RBC AUTO-ENTMCNC: 30.5 % (ref 33–37)
MCHC RBC AUTO-ENTMCNC: 30.6 % (ref 33–37)
MCV RBC AUTO: 75.9 FL (ref 82–100)
MCV RBC AUTO: 76.2 FL (ref 82–100)
MONOCYTES ABSOLUTE: 0.5 K/UL (ref 0.2–0.8)
MONOCYTES RELATIVE PERCENT: 6.7 %
NEUTROPHILS ABSOLUTE: 5 K/UL (ref 1.4–6.5)
NEUTROPHILS RELATIVE PERCENT: 74.5 %
NITRITE, URINE: NEGATIVE
PDW BLD-RTO: 17.6 % (ref 11.5–14.5)
PDW BLD-RTO: 17.6 % (ref 11.5–14.5)
PH UA: 5.5 (ref 5–9)
PLATELET # BLD: 199 K/UL (ref 130–400)
PLATELET # BLD: 214 K/UL (ref 130–400)
POTASSIUM REFLEX MAGNESIUM: 4.1 MEQ/L (ref 3.4–4.9)
PRO-BNP: 924 PG/ML
PROTEIN UA: 30 MG/DL
PROTHROMBIN TIME: 13.8 SEC (ref 12.3–14.9)
RBC # BLD: 2.87 M/UL (ref 4.2–5.4)
RBC # BLD: 2.98 M/UL (ref 4.2–5.4)
RBC UA: ABNORMAL /HPF (ref 0–2)
SARS-COV-2, NAAT: NOT DETECTED
SODIUM BLD-SCNC: 143 MEQ/L (ref 135–144)
SPECIFIC GRAVITY UA: 1.01 (ref 1–1.03)
TOTAL PROTEIN: 6.8 G/DL (ref 6.3–8)
TROPONIN: <0.01 NG/ML (ref 0–0.01)
UROBILINOGEN, URINE: 0.2 E.U./DL
WBC # BLD: 6.4 K/UL (ref 4.8–10.8)
WBC # BLD: 6.8 K/UL (ref 4.8–10.8)
WBC UA: ABNORMAL /HPF (ref 0–5)

## 2021-02-04 PROCEDURE — 84484 ASSAY OF TROPONIN QUANT: CPT

## 2021-02-04 PROCEDURE — 85025 COMPLETE CBC W/AUTO DIFF WBC: CPT

## 2021-02-04 PROCEDURE — 93005 ELECTROCARDIOGRAM TRACING: CPT

## 2021-02-04 PROCEDURE — 86923 COMPATIBILITY TEST ELECTRIC: CPT

## 2021-02-04 PROCEDURE — 81001 URINALYSIS AUTO W/SCOPE: CPT

## 2021-02-04 PROCEDURE — 2580000003 HC RX 258: Performed by: INTERNAL MEDICINE

## 2021-02-04 PROCEDURE — 85610 PROTHROMBIN TIME: CPT

## 2021-02-04 PROCEDURE — 86900 BLOOD TYPING SEROLOGIC ABO: CPT

## 2021-02-04 PROCEDURE — 86901 BLOOD TYPING SEROLOGIC RH(D): CPT

## 2021-02-04 PROCEDURE — 85730 THROMBOPLASTIN TIME PARTIAL: CPT

## 2021-02-04 PROCEDURE — U0002 COVID-19 LAB TEST NON-CDC: HCPCS

## 2021-02-04 PROCEDURE — 82728 ASSAY OF FERRITIN: CPT

## 2021-02-04 PROCEDURE — 6370000000 HC RX 637 (ALT 250 FOR IP): Performed by: INTERNAL MEDICINE

## 2021-02-04 PROCEDURE — 36415 COLL VENOUS BLD VENIPUNCTURE: CPT

## 2021-02-04 PROCEDURE — 83880 ASSAY OF NATRIURETIC PEPTIDE: CPT

## 2021-02-04 PROCEDURE — 93010 ELECTROCARDIOGRAM REPORT: CPT | Performed by: INTERNAL MEDICINE

## 2021-02-04 PROCEDURE — P9016 RBC LEUKOCYTES REDUCED: HCPCS

## 2021-02-04 PROCEDURE — 99285 EMERGENCY DEPT VISIT HI MDM: CPT

## 2021-02-04 PROCEDURE — 80053 COMPREHEN METABOLIC PANEL: CPT

## 2021-02-04 PROCEDURE — 99214 OFFICE O/P EST MOD 30 MIN: CPT | Performed by: INTERNAL MEDICINE

## 2021-02-04 PROCEDURE — 86850 RBC ANTIBODY SCREEN: CPT

## 2021-02-04 PROCEDURE — G0378 HOSPITAL OBSERVATION PER HR: HCPCS

## 2021-02-04 PROCEDURE — 83550 IRON BINDING TEST: CPT

## 2021-02-04 PROCEDURE — 85027 COMPLETE CBC AUTOMATED: CPT

## 2021-02-04 PROCEDURE — 83540 ASSAY OF IRON: CPT

## 2021-02-04 RX ORDER — ASPIRIN 81 MG/1
81 TABLET, CHEWABLE ORAL DAILY
Status: ON HOLD | COMMUNITY
End: 2021-02-04

## 2021-02-04 RX ORDER — ATORVASTATIN CALCIUM 40 MG/1
40 TABLET, FILM COATED ORAL NIGHTLY
Status: DISCONTINUED | OUTPATIENT
Start: 2021-02-04 | End: 2021-02-07 | Stop reason: HOSPADM

## 2021-02-04 RX ORDER — ACETAMINOPHEN 650 MG/1
650 SUPPOSITORY RECTAL EVERY 6 HOURS PRN
Status: DISCONTINUED | OUTPATIENT
Start: 2021-02-04 | End: 2021-02-07 | Stop reason: HOSPADM

## 2021-02-04 RX ORDER — SODIUM CHLORIDE 9 MG/ML
INJECTION, SOLUTION INTRAVENOUS PRN
Status: DISCONTINUED | OUTPATIENT
Start: 2021-02-04 | End: 2021-02-07 | Stop reason: HOSPADM

## 2021-02-04 RX ORDER — PROMETHAZINE HYDROCHLORIDE 12.5 MG/1
12.5 TABLET ORAL EVERY 6 HOURS PRN
Status: DISCONTINUED | OUTPATIENT
Start: 2021-02-04 | End: 2021-02-07 | Stop reason: HOSPADM

## 2021-02-04 RX ORDER — SODIUM CHLORIDE 0.9 % (FLUSH) 0.9 %
10 SYRINGE (ML) INJECTION PRN
Status: DISCONTINUED | OUTPATIENT
Start: 2021-02-04 | End: 2021-02-07 | Stop reason: HOSPADM

## 2021-02-04 RX ORDER — DILTIAZEM HYDROCHLORIDE 240 MG/1
240 CAPSULE, COATED, EXTENDED RELEASE ORAL DAILY
Status: DISCONTINUED | OUTPATIENT
Start: 2021-02-05 | End: 2021-02-07 | Stop reason: HOSPADM

## 2021-02-04 RX ORDER — ONDANSETRON 2 MG/ML
4 INJECTION INTRAMUSCULAR; INTRAVENOUS EVERY 6 HOURS PRN
Status: DISCONTINUED | OUTPATIENT
Start: 2021-02-04 | End: 2021-02-07 | Stop reason: HOSPADM

## 2021-02-04 RX ORDER — ACETAMINOPHEN 325 MG/1
650 TABLET ORAL EVERY 6 HOURS PRN
Status: DISCONTINUED | OUTPATIENT
Start: 2021-02-04 | End: 2021-02-07 | Stop reason: HOSPADM

## 2021-02-04 RX ORDER — POLYETHYLENE GLYCOL 3350 17 G/17G
17 POWDER, FOR SOLUTION ORAL DAILY PRN
Status: DISCONTINUED | OUTPATIENT
Start: 2021-02-04 | End: 2021-02-07 | Stop reason: HOSPADM

## 2021-02-04 RX ORDER — OXYBUTYNIN CHLORIDE 5 MG/1
10 TABLET, EXTENDED RELEASE ORAL DAILY
Status: DISCONTINUED | OUTPATIENT
Start: 2021-02-05 | End: 2021-02-07 | Stop reason: HOSPADM

## 2021-02-04 RX ORDER — SODIUM CHLORIDE 0.9 % (FLUSH) 0.9 %
10 SYRINGE (ML) INJECTION EVERY 12 HOURS SCHEDULED
Status: DISCONTINUED | OUTPATIENT
Start: 2021-02-04 | End: 2021-02-07 | Stop reason: HOSPADM

## 2021-02-04 RX ORDER — PANTOPRAZOLE SODIUM 40 MG/1
40 TABLET, DELAYED RELEASE ORAL DAILY
Status: DISCONTINUED | OUTPATIENT
Start: 2021-02-05 | End: 2021-02-07 | Stop reason: HOSPADM

## 2021-02-04 RX ADMIN — Medication 10 ML: at 23:45

## 2021-02-04 RX ADMIN — ATORVASTATIN CALCIUM 40 MG: 40 TABLET, FILM COATED ORAL at 23:45

## 2021-02-04 RX ADMIN — METOPROLOL TARTRATE 25 MG: 25 TABLET, FILM COATED ORAL at 23:45

## 2021-02-04 NOTE — ED TRIAGE NOTES
Patient sent to ER per Dr. José Manuel Pickering for low hemoglobin that was drawn today at [address] [place] [address]. Patient denies any injury or bleeding in stool. She states her last blood transfusion was in December.

## 2021-02-04 NOTE — ED NOTES
Spoke with transfer center for hospitalist at [facility].       Tawnya Reddy  02/04/21 0356

## 2021-02-04 NOTE — ED PROVIDER NOTES
eMERGENCY dEPARTMENT eNCOUnter      CHIEF COMPLAINT    Chief Complaint   Patient presents with    Other     Sent by physician, abnormal labs        HPI    Heather Serrato is a 77 y.o. female with history of GI ulcer, asthma, coronary disease, diverticulosis who presentsto ED from home  By private car  With complaint of abnormal labs  Onset this morning  Patient is hemoglobin done by the PCP as an outpatient lab was low for which she was sent to the ED. Patient complains of exertional dyspnea and generalized weakness. Patient denies any epigastric pain or any loose stools. Patient had a scope done by Dr. Paul Jensen GI and lower GI and did not find any bleed. Patient is scheduled for capsule endoscopy on Thursday. Patient denies chest pain, cough, expectoration, abdominal pain or blood stools. Patient is not on any anticoagulation.       PAST MEDICAL HISTORY    Past Medical History:   Diagnosis Date    Antral ulcer 01/14/2015    DR Sariah Donovan    Asthma     \"cured by beestings\"    Coronary artery disease     Coronary artery disease involving native coronary artery of native heart without angina pectoris 7/10/2018    has x 4 cardiac stents / Dr. Oh Coulter Diverticulosis of colon (without mention of hemorrhage) 01/14/2015    DR Sariah Donovan    Essential hypertension 12/10/2013    meds > 20 yrs    History of blood transfusion 1990s    with hysterectomy    History of normal resting EKG 1996    normal    History of ST elevation myocardial infarction (STEMI) 7/11/2017    History of transfusion of whole blood 1996    Excessive bleeding before hysterectomy    Hyperlipidemia     meds > 2 yrs    Hypertension     Hypothyroidism     past trx years ago then stopped / recent restart    Kidney disease     Low back pain     Morbid obesity due to excess calories ([address] 5/13/2017    Morbid obesity due to excess calories ([address] 6/6/2017    Osteoarthritis     Pneumonia     Shoulder dislocation     ST elevation myocardial infarction involving left circumflex coronary artery ([facility] 5/13/2017    Unspecified gastritis and gastroduodenitis without mention of hemorrhage 05/06/2015    DR TWELVE-[facility]EP [facility]       SURGICAL HISTORY    Past Surgical History:   Procedure Laterality Date    CARDIAC SURGERY  2017    has x 4 cardiac stents    COLONOSCOPY  01/14/2015    DR TWELVE-STEP [facility] - DIVERTICULOSIS    COLONOSCOPY N/A 1/2/2021    COLONOSCOPY DIAGNOSTIC performed by Antonio Abraham MD at [place] [address]  05/17/2017    x2 stents    CYST REMOVAL Left 2/7/2019    RESECTION OF LEFT PINNA LESION WITH GRAFT performed by Rolanda Valdovinos MD at [facility]. [address], COLON, DIAGNOSTIC      FINGER SURGERY  12/9/14    middle finger right hand due to infection   [address] [place], [place] [id number] [place] [address]  2008    shoulder dislocated - popped  back in Right shoulder    UPPER GASTROINTESTINAL ENDOSCOPY  01/14/2015    DR LANE - ULCER IN THE ANTRUM    UPPER GASTROINTESTINAL ENDOSCOPY  05/06/2015    DR TWELVE-STEP [facility] - GASTRITIS, PREVIOUS ULCER HEALED    UPPER GASTROINTESTINAL ENDOSCOPY N/A 1/2/2021    EGD DIAGNOSTIC ONLY performed by Antonio Abraham MD at [address] [place] [address]    Current Outpatient Rx   Medication Sig Dispense Refill    aspirin 81 MG chewable tablet Take 81 mg by mouth daily      pantoprazole (PROTONIX) 40 MG tablet Take 1 tablet by mouth daily 30 tablet 2    dilTIAZem (CARDIZEM CD) 240 MG extended release capsule Take 1 capsule by mouth daily 90 capsule 3    oxybutynin (DITROPAN XL) 10 MG extended release tablet Take 1 tablet by mouth daily 30 tablet 3    metoprolol tartrate (LOPRESSOR) 25 MG tablet TAKE 1 TABLET BY MOUTH TWICE DAILY 180 tablet 3    atorvastatin (LIPITOR) 80 MG tablet Take 0.5 tablets by mouth nightly 90 tablet 3    losartan-hydroCHLOROthiazide (HYZAAR) 100-25 MG per tablet Take 1 tablet by mouth daily      nitroGLYCERIN (NITROSTAT) 0.4 MG SL tablet Place 1 tablet under the tongue every 5 minutes as needed for Chest pain 25 tablet 3    acetaminophen (TYLENOL) 325 MG tablet Take 650 mg by mouth every 6 hours as needed for Pain          ALLERGIES    Allergies   Allergen Reactions    Lisinopril Nausea Only and Other (See Comments)     cough    Tramadol Nausea Only       FAMILY HISTORY    Family History   Problem Relation Age of Onset    Heart Disease Mother 61    Cancer Father 79        kidney    No Known Problems Daughter        SOCIAL HISTORY    Social History     Socioeconomic History    Marital status:      Spouse name: None    Number of children: None    Years of education: None    Highest education level: None   Occupational History    None   Social Needs    Financial resource strain: None    Food insecurity     Worry: None     Inability: None    Transportation needs     Medical: None     Non-medical: None   Tobacco Use    Smoking status: Never Smoker    Smokeless tobacco: Never Used   Substance and Sexual Activity    Alcohol use: Yes     Comment: glass of wine once a year    Drug use: No    Sexual activity: Yes     Partners: Male   Lifestyle    Physical activity     Days per week: None     Minutes per session: None    Stress: None   Relationships    Social connections     Talks on phone: None     Gets together: None     Attends [employer] service: None     Active member of club or organization: None     Attends meetings of clubs or organizations: None     Relationship status: None    Intimate partner violence     Fear of current or ex partner: None     Emotionally abused: None     Physically abused: None     Forced sexual activity: None   Other Topics Concern    None   Social History Narrative    None       REVIEW OF SYSTEMS    Constitutional:  Denies fever, chills, weight loss but complains of generalized weakness   Eyes:  Denies photophobia or discharge   HENT:  Denies sore throat or ear pain   Respiratory:  Denies cough but complains of shortness of breath with exertion  Cardiovascular:  Denies chest pain, palpitations or swelling   GI:  Denies abdominal pain, nausea, vomiting, or diarrhea   Musculoskeletal:  Denies back pain   Skin:  Denies rash   Neurologic:  Denies headache, focal weakness or sensory changes   Endocrine:  Denies polyuria or polydypsia   Lymphatic:  Denies swollen glands   Psychiatric:  Denies depression, suicidal ideation or homicidal ideation   All systems negative except as marked. PHYSICAL EXAM    VITAL SIGNS: BP (!) 164/64   Pulse 76   Temp 97.7 °F (36.5 °C) (Tympanic)   Resp 16   Ht 5' 3.5\" (1.613 m)   Wt 262 lb (118.8 kg)   LMP 09/17/1996   SpO2 96%   BMI 45.68 kg/m²    Constitutional: Morbidly obese, No acute distress, Non-toxic appearance. HENT:  Normocephalic, Atraumatic, , bilateral external ears normal, Oropharynx moist, No oral exudates, Nose normal. Neck- Normal range of motion, No tenderness, Supple, No stridor. Eyes:  PERRL, EOMI, Conjunctivapallor present, No discharge. Respiratory:  Normal breath sounds, No respiratory distress, No wheezing, No chest tenderness. Cardiovascular:  Normal heart rate, Normal rhythm, No murmurs, No rubs, No gallops. GI:  Bowel sounds normal, Soft, No tenderness, No masses, No pulsatile masses. :  No CVA tenderness. Musculoskeletal:  Intact distal pulses, No edema, No tenderness, No cyanosis, No clubbing. Good range of motion in all major joints. No tenderness to palpation or major deformities noted. Back- No tenderness. Integument:  Warm, Dry, No erythema, No rash. Lymphatic:  No lymphadenopathy noted. Neurologic:  Alert & oriented x 3, Normal motor function, Normal sensory function, No focal deficits noted.    Psychiatric:  Affect normal, Judgment normal, Mood normal.     EKG    Sinus bradycardia, HR 59, Normal Axis, No ST- T wave changes, QTc 479    RADIOLOGY    No orders to display       REEVALUATION   Patient was updated the results of labs . SPoke with hospitalist Amrik Brian accepted patient admission. Labs  Labs Reviewed   CBC WITH AUTO DIFFERENTIAL - Abnormal; Notable for the following components:       Result Value    RBC 2.98 (*)     Hemoglobin 6.9 (*)     Hematocrit 22.6 (*)     MCV 75.9 (*)     MCH 23.3 (*)     MCHC 30.6 (*)     RDW 17.6 (*)     All other components within normal limits    Narrative:     Mart MALDONADO tel. [phone],  Hematology results called to and read back by viola 02/04/2021 16:24, by [address] [place] [address] W/ REFLEX TO  FOR LOW K - Abnormal; Notable for the following components:    Chloride 108 (*)     Glucose 155 (*)     BUN 35 (*)     CREATININE 1.30 (*)     GFR Non- 40.0 (*)     GFR  48.4 (*)     Calcium 10.1 (*)     All other components within normal limits   TROPONIN   BRAIN NATRIURETIC PEPTIDE   PROTIME-INR   APTT   URINALYSIS   COVID-19   COVID-19             Summation      Patient Course:     ED Medications administered this visit:  Medications - No data to display    New Prescriptions from this visit:    New Prescriptions    No medications on file       Follow-up:  No follow-up provider specified. Final Impression:   1.  Severe anemia               (Please note that portions of this note were completed with a voice recognition program.  Efforts were made to edit the dictations but occasionally words are mis-transcribed.)          Gisele Díaz MD  02/04/21 6058

## 2021-02-04 NOTE — PROGRESS NOTES
Chief Complaint   Patient presents with    Follow-Up from Hospital     CC: SOB, CAD    5-13-17: Hospital Course:   Jessica Harrington is a 80 y.o. female admitted to [facility] on 5/13/2017 for chest pain. After an EKG was done at [facility] was found to be having an acute MI patient was flown here by helicopter taken directly to cath lab to drug eluted stents were put in place ALP INE 3.5 mm x 23 mm and a 2.5 mm x 8 mm . Tolerated procedure well denied chest pain denied right wrist pain no distress     6-6-17: Patient presents for initial medical evaluation. Patient is followed on a regular basis by Dr. Kena Vickers MD.   Pt denies chest pain, dyspnea, dyspnea on exertion, change in exercise capacity, fatigue,  nausea, vomiting, diarrhea, constipation, motor weakness, insomnia, weight loss, syncope, dizziness, lightheadedness, palpitations, PND, orthopnea, or claudication. No nitro use. BP and hr are good. CAD is stable. No LE discoloration or ulcers. No LE edema. No CHF type symptoms. Lipid profile is abnormal. Was placed on statin during hosp. No bleeding issues. S/p LHC with mild LAD diffuse disease, 99% mid CX, 80-90%90% proximal to mid RCA. S/p PCI to CX with 2 KEYSHA. Planned staging of RCA due to STEMI procedure. s/p ECHO. Conclusions   Summary   Normal mitral valve structure and function.   Normal aortic valve structure and function.   Normal tricuspid valve structure and function.   There is mild ( 1 +) tricuspid regurgitation with estimated RVSP of 38 mm   Hg.   Normal pulmonic valve structure and function.   Mildly dilated left atrium.   Left ventricular ejection fraction is visually estimated at 65%.   Impaired relaxation compatible with diastolic dysfunction.  ( reversed E/A   ratio)   Mild concentric left ventricular hypertrophy.   Normal right atrium.   Normal right ventricle structure and function.   No evidence of pericardial effusion.   No evidence of pleural effusion. 7-11-17: s/p PCI of RCA at [facility]. Pt denies chest pain, dyspnea, dyspnea on exertion, change in exercise capacity, fatigue,  nausea, vomiting, diarrhea, constipation, motor weakness, insomnia, weight loss, syncope, dizziness, lightheadedness, palpitations, PND, orthopnea, or claudication. No nitro use. BP and hr are good. CAD is stable. No LE discoloration or ulcers. No LE edema. No CHF type symptoms. Lipid profile is normal.     1-9-18: Pt denies chest pain, dyspnea, dyspnea on exertion, change in exercise capacity, fatigue,  nausea, vomiting, diarrhea, constipation, motor weakness, insomnia, weight loss, syncope, dizziness, lightheadedness, palpitations, PND, orthopnea, or claudication. No nitro use. BP and hr are good. CAD is stable. No LE discoloration or ulcers. No LE edema. No CHF type symptoms. Lipid profile is normal.  No bleeding issues on DAPT. Compliant with meds. 7-10-18: doing well overall. On DAPT and no bleeding issues. Pt denies chest pain, dyspnea, dyspnea on exertion, change in exercise capacity, fatigue,  nausea, vomiting, diarrhea, constipation, motor weakness, insomnia, weight loss, syncope, dizziness, lightheadedness, palpitations, PND, orthopnea, or claudication. No nitro use. BP and hr are good. CAD is stable. No LE discoloration or ulcers. No LE edema. No CHF type symptoms. Lipid profile is normal. No recent hospitalization. No change in meds. ekg WITH NSR, RBBB. SHE NEEDS TO HAVE BACK WORK DONE. Did not tolerate full dose lipitor 80mg daily, caused her joint pain. 1-15-19: Pt denies chest pain, dyspnea, dyspnea on exertion, change in exercise capacity, fatigue,  nausea, vomiting, diarrhea, constipation, motor weakness, insomnia, weight loss, syncope, dizziness, lightheadedness, palpitations, PND, orthopnea, or claudication. No nitro use. BP and hr are good. CAD is stable. No LE discoloration or ulcers. No LE edema. No CHF type symptoms.  Lipid profile is normal. No right hand due to infection    HYSTERECTOMY  1996    LASIK  [id number]   Yvonneshrai  2008    shoulder dislocated - popped  back in Right shoulder    UPPER GASTROINTESTINAL ENDOSCOPY  01/14/2015    DR LANE - ULCER IN THE ANTRUM    UPPER GASTROINTESTINAL ENDOSCOPY  05/06/2015    DR Carmine Watts - GASTRITIS, PREVIOUS ULCER HEALED    UPPER GASTROINTESTINAL ENDOSCOPY N/A 1/2/2021    EGD DIAGNOSTIC ONLY performed by Trang Ramirez MD at [address] History     Socioeconomic History    Marital status:      Spouse name: Not on file    Number of children: Not on file    Years of education: Not on file    Highest education level: Not on file   Occupational History    Not on file   Social Needs    Financial resource strain: Not on file    Food insecurity     Worry: Not on file     Inability: Not on file    Transportation needs     Medical: Not on file     Non-medical: Not on file   Tobacco Use    Smoking status: Never Smoker    Smokeless tobacco: Never Used   Substance and Sexual Activity    Alcohol use: Yes     Comment: glass of wine once a year    Drug use: No    Sexual activity: Yes     Partners: Male   Lifestyle    Physical activity     Days per week: Not on file     Minutes per session: Not on file    Stress: Not on file   Relationships    Social connections     Talks on phone: Not on file     Gets together: Not on file     Attends Sabianist service: Not on file     Active member of club or organization: Not on file     Attends meetings of clubs or organizations: Not on file     Relationship status: Not on file    Intimate partner violence     Fear of current or ex partner: Not on file     Emotionally abused: Not on file     Physically abused: Not on file     Forced sexual activity: Not on file   Other Topics Concern    Not on file   Social History Narrative    Not on file       Family History   Problem Relation Age of Onset    Heart Disease Mother 61    Cancer Father 79 pharmacy, maintain a logbook, and also call us back if blood pressure are above the target ranges or if it is low. Patient clearly understands and agrees to the instructions. We will need to continue to monitor muscle and liver enzymes, BUN, CR, and electrolytes. Thank you for allowing me to participate in the care of your patient, please don't hesitate to contact me if you have any further questions.

## 2021-02-04 NOTE — TELEPHONE ENCOUNTER
Received call regarding abnormal CBC. Per Dr Chasity Bentley needs to go to ER.  Left message on VM to inform patient

## 2021-02-05 LAB
ABO/RH: NORMAL
ALBUMIN SERPL-MCNC: 3.6 G/DL (ref 3.5–4.6)
ALP BLD-CCNC: 115 U/L (ref 40–130)
ALT SERPL-CCNC: 9 U/L (ref 0–33)
ANION GAP SERPL CALCULATED.3IONS-SCNC: 11 MEQ/L (ref 9–15)
ANTIBODY SCREEN: NORMAL
AST SERPL-CCNC: 11 U/L (ref 0–35)
BASOPHILS ABSOLUTE: 0 K/UL (ref 0–0.2)
BASOPHILS RELATIVE PERCENT: 0.6 %
BILIRUB SERPL-MCNC: 0.6 MG/DL (ref 0.2–0.7)
BLOOD BANK DISPENSE STATUS: NORMAL
BLOOD BANK DISPENSE STATUS: NORMAL
BLOOD BANK PRODUCT CODE: NORMAL
BLOOD BANK PRODUCT CODE: NORMAL
BPU ID: NORMAL
BPU ID: NORMAL
BUN BLDV-MCNC: 29 MG/DL (ref 8–23)
CALCIUM SERPL-MCNC: 9.2 MG/DL (ref 8.5–9.9)
CHLORIDE BLD-SCNC: 113 MEQ/L (ref 95–107)
CO2: 23 MEQ/L (ref 20–31)
CREAT SERPL-MCNC: 1.05 MG/DL (ref 0.5–0.9)
DESCRIPTION BLOOD BANK: NORMAL
DESCRIPTION BLOOD BANK: NORMAL
EOSINOPHILS ABSOLUTE: 0.2 K/UL (ref 0–0.7)
EOSINOPHILS RELATIVE PERCENT: 3.8 %
FERRITIN: 13.7 NG/ML (ref 13–150)
GFR AFRICAN AMERICAN: >60
GFR NON-AFRICAN AMERICAN: 51.2
GLOBULIN: 2.6 G/DL (ref 2.3–3.5)
GLUCOSE BLD-MCNC: 109 MG/DL (ref 70–99)
HCT VFR BLD CALC: 23.6 % (ref 37–47)
HCT VFR BLD CALC: 26 % (ref 37–47)
HEMOCCULT STL QL: ABNORMAL
HEMOGLOBIN: 7.1 G/DL (ref 12–16)
HEMOGLOBIN: 8.1 G/DL (ref 12–16)
IRON SATURATION: 7 % (ref 11–46)
IRON: 26 UG/DL (ref 37–145)
LYMPHOCYTES ABSOLUTE: 0.8 K/UL (ref 1–4.8)
LYMPHOCYTES RELATIVE PERCENT: 13.6 %
MAGNESIUM: 2.1 MG/DL (ref 1.7–2.4)
MCH RBC QN AUTO: 23.3 PG (ref 27–31.3)
MCHC RBC AUTO-ENTMCNC: 30.1 % (ref 33–37)
MCV RBC AUTO: 77.4 FL (ref 82–100)
MONOCYTES ABSOLUTE: 0.5 K/UL (ref 0.2–0.8)
MONOCYTES RELATIVE PERCENT: 7.7 %
NEUTROPHILS ABSOLUTE: 4.3 K/UL (ref 1.4–6.5)
NEUTROPHILS RELATIVE PERCENT: 74.3 %
PDW BLD-RTO: 17.7 % (ref 11.5–14.5)
PLATELET # BLD: 185 K/UL (ref 130–400)
POTASSIUM SERPL-SCNC: 4.2 MEQ/L (ref 3.4–4.9)
RBC # BLD: 3.04 M/UL (ref 4.2–5.4)
SODIUM BLD-SCNC: 147 MEQ/L (ref 135–144)
TOTAL IRON BINDING CAPACITY: 365 UG/DL (ref 178–450)
TOTAL PROTEIN: 6.2 G/DL (ref 6.3–8)
TROPONIN: <0.01 NG/ML (ref 0–0.01)
TROPONIN: <0.01 NG/ML (ref 0–0.01)
WBC # BLD: 5.8 K/UL (ref 4.8–10.8)

## 2021-02-05 PROCEDURE — G0378 HOSPITAL OBSERVATION PER HR: HCPCS

## 2021-02-05 PROCEDURE — 6370000000 HC RX 637 (ALT 250 FOR IP): Performed by: INTERNAL MEDICINE

## 2021-02-05 PROCEDURE — 82274 ASSAY TEST FOR BLOOD FECAL: CPT

## 2021-02-05 PROCEDURE — 2580000003 HC RX 258: Performed by: INTERNAL MEDICINE

## 2021-02-05 PROCEDURE — 93010 ELECTROCARDIOGRAM REPORT: CPT | Performed by: INTERNAL MEDICINE

## 2021-02-05 PROCEDURE — 85014 HEMATOCRIT: CPT

## 2021-02-05 PROCEDURE — 36415 COLL VENOUS BLD VENIPUNCTURE: CPT

## 2021-02-05 PROCEDURE — 80053 COMPREHEN METABOLIC PANEL: CPT

## 2021-02-05 PROCEDURE — 93005 ELECTROCARDIOGRAM TRACING: CPT | Performed by: INTERNAL MEDICINE

## 2021-02-05 PROCEDURE — 6360000002 HC RX W HCPCS: Performed by: INTERNAL MEDICINE

## 2021-02-05 PROCEDURE — 85018 HEMOGLOBIN: CPT

## 2021-02-05 PROCEDURE — 96365 THER/PROPH/DIAG IV INF INIT: CPT

## 2021-02-05 PROCEDURE — 99215 OFFICE O/P EST HI 40 MIN: CPT | Performed by: INTERNAL MEDICINE

## 2021-02-05 PROCEDURE — 36430 TRANSFUSION BLD/BLD COMPNT: CPT

## 2021-02-05 PROCEDURE — 84484 ASSAY OF TROPONIN QUANT: CPT

## 2021-02-05 PROCEDURE — 85025 COMPLETE CBC W/AUTO DIFF WBC: CPT

## 2021-02-05 PROCEDURE — 96366 THER/PROPH/DIAG IV INF ADDON: CPT

## 2021-02-05 PROCEDURE — 83735 ASSAY OF MAGNESIUM: CPT

## 2021-02-05 RX ORDER — ISOSORBIDE MONONITRATE 30 MG/1
30 TABLET, EXTENDED RELEASE ORAL DAILY
Status: DISCONTINUED | OUTPATIENT
Start: 2021-02-05 | End: 2021-02-07 | Stop reason: HOSPADM

## 2021-02-05 RX ORDER — SODIUM CHLORIDE 9 MG/ML
INJECTION, SOLUTION INTRAVENOUS PRN
Status: DISCONTINUED | OUTPATIENT
Start: 2021-02-05 | End: 2021-02-07 | Stop reason: HOSPADM

## 2021-02-05 RX ADMIN — METOPROLOL TARTRATE 25 MG: 25 TABLET, FILM COATED ORAL at 08:14

## 2021-02-05 RX ADMIN — ACETAMINOPHEN 650 MG: 325 TABLET ORAL at 21:13

## 2021-02-05 RX ADMIN — Medication 10 ML: at 08:14

## 2021-02-05 RX ADMIN — Medication 10 ML: at 19:48

## 2021-02-05 RX ADMIN — ACETAMINOPHEN 650 MG: 325 TABLET ORAL at 14:45

## 2021-02-05 RX ADMIN — ATORVASTATIN CALCIUM 40 MG: 40 TABLET, FILM COATED ORAL at 19:48

## 2021-02-05 RX ADMIN — ACETAMINOPHEN 650 MG: 325 TABLET ORAL at 08:19

## 2021-02-05 RX ADMIN — METOPROLOL TARTRATE 25 MG: 25 TABLET, FILM COATED ORAL at 19:48

## 2021-02-05 RX ADMIN — IRON SUCROSE 200 MG: 20 INJECTION, SOLUTION INTRAVENOUS at 11:10

## 2021-02-05 RX ADMIN — ISOSORBIDE MONONITRATE 30 MG: 30 TABLET, EXTENDED RELEASE ORAL at 11:10

## 2021-02-05 RX ADMIN — PANTOPRAZOLE SODIUM 40 MG: 40 TABLET, DELAYED RELEASE ORAL at 08:14

## 2021-02-05 RX ADMIN — DILTIAZEM HYDROCHLORIDE 240 MG: 240 CAPSULE, COATED, EXTENDED RELEASE ORAL at 08:13

## 2021-02-05 RX ADMIN — OXYBUTYNIN CHLORIDE 10 MG: 5 TABLET, EXTENDED RELEASE ORAL at 08:14

## 2021-02-05 ASSESSMENT — ENCOUNTER SYMPTOMS
BLOOD IN STOOL: 0
NAUSEA: 0
GASTROINTESTINAL NEGATIVE: 1
WHEEZING: 0
EYES NEGATIVE: 1
COUGH: 0
CHEST TIGHTNESS: 1
STRIDOR: 0
SHORTNESS OF BREATH: 1

## 2021-02-05 ASSESSMENT — PAIN SCALES - GENERAL
PAINLEVEL_OUTOF10: 6
PAINLEVEL_OUTOF10: 5

## 2021-02-05 NOTE — PROGRESS NOTES
Pt assessment complete. Pt alert and oriented. Denies pain, nausea, shortness of breath. Spoke with . madhuri for patient to start on diet. Occult stool sent to lab. Pt up to bathroom independently. Vs stable. Will continue to monitor.

## 2021-02-05 NOTE — CONSULTS
Consults    Patient Name: Naomi Malone Date: 2021  9:49 PM  MR #: [id number]  : 1945    Attending Physician: Josefina Maddox DO  Reason for consult: SOB and CP    History of Presenting Illness:      Tim Shi is a 77 y.o. female on hospital day 1 with a history of . History Obtained From:  patient, electronic medical record    Pt admitted with anemia and angina. She had GIB in December. She was supposed to be off ASA but inadvertently resumed. She was taken off ASA by Dr. Darell Schmidt yesterday and Labs reveaed Hb 6.7. She has experienced exertional chest tightness and Dyspnea last week or so. ECG SR RBBB. Initial Trops negative. She is symptom free now.      She has CAD with LAD and RCA Stents  History:      EKG: SR RBBB  Past Medical History:   Diagnosis Date    Antral ulcer 2015    DR Sariah Donovan    Asthma     \"cured by beestings\"    Coronary artery disease     Coronary artery disease involving native coronary artery of native heart without angina pectoris 7/10/2018    has x 4 cardiac stents / Dr. Randee Garcia Diverticulosis of colon (without mention of hemorrhage) 2015    DR Sariah Donovan    Essential hypertension 12/10/2013    meds > 20 yrs    History of blood transfusion     with hysterectomy    History of normal resting EKG     normal    History of ST elevation myocardial infarction (STEMI) 2017    History of transfusion of whole blood     Excessive bleeding before hysterectomy    Hyperlipidemia     meds > 2 yrs    Hypertension     Hypothyroidism     past trx years ago then stopped / recent restart    Kidney disease     Low back pain     Morbid obesity due to excess calories ([address] 2017    Morbid obesity due to excess calories ([address] 2017    Osteoarthritis     Pneumonia     Shoulder dislocation     ST elevation myocardial infarction involving left circumflex coronary artery ([address] 2017    Unspecified gastritis and activity: Yes     Partners: Male   Lifestyle    Physical activity     Days per week: Not on file     Minutes per session: Not on file    Stress: Not on file   Relationships    Social connections     Talks on phone: Not on file     Gets together: Not on file     Attends Baptism service: Not on file     Active member of club or organization: Not on file     Attends meetings of clubs or organizations: Not on file     Relationship status: Not on file    Intimate partner violence     Fear of current or ex partner: Not on file     Emotionally abused: Not on file     Physically abused: Not on file     Forced sexual activity: Not on file   Other Topics Concern    Not on file   Social History Narrative    Not on file      [] Unable to obtain due to ventilated and/ or neurologic status      Home Medications:      Medications Prior to Admission: pantoprazole (PROTONIX) 40 MG tablet, Take 1 tablet by mouth daily  dilTIAZem (CARDIZEM CD) 240 MG extended release capsule, Take 1 capsule by mouth daily  oxybutynin (DITROPAN XL) 10 MG extended release tablet, Take 1 tablet by mouth daily  metoprolol tartrate (LOPRESSOR) 25 MG tablet, TAKE 1 TABLET BY MOUTH TWICE DAILY  atorvastatin (LIPITOR) 80 MG tablet, Take 0.5 tablets by mouth nightly  acetaminophen (TYLENOL) 325 MG tablet, Take 650 mg by mouth every 6 hours as needed for Pain   [DISCONTINUED] aspirin 81 MG chewable tablet, Take 81 mg by mouth daily  [DISCONTINUED] losartan-hydroCHLOROthiazide (HYZAAR) 100-25 MG per tablet, Take 1 tablet by mouth daily  nitroGLYCERIN (NITROSTAT) 0.4 MG SL tablet, Place 1 tablet under the tongue every 5 minutes as needed for Chest pain    Current Hospital Medications:     Scheduled Meds:   iron sucrose  200 mg Intravenous Once    atorvastatin  40 mg Oral Nightly    dilTIAZem  240 mg Oral Daily    metoprolol tartrate  25 mg Oral BID    oxybutynin  10 mg Oral Daily    pantoprazole  40 mg Oral Daily    sodium chloride flush  10 mL Intravenous 2 times per day     Continuous Infusions:   sodium chloride       PRN Meds:.sodium chloride flush, promethazine **OR** ondansetron, polyethylene glycol, acetaminophen **OR** acetaminophen, sodium chloride  .  sodium chloride          Allergies: Allergies   Allergen Reactions    Lisinopril Nausea Only and Other (See Comments)     cough    Tramadol Nausea Only        Review of Systems:       Review of Systems   Constitutional: Negative. Negative for diaphoresis and fatigue. HENT: Negative. Eyes: Negative. Respiratory: Positive for chest tightness and shortness of breath. Negative for cough, wheezing and stridor. Cardiovascular: Negative. Negative for chest pain, palpitations and leg swelling. Gastrointestinal: Negative. Negative for blood in stool and nausea. Genitourinary: Negative. Musculoskeletal: Negative. Skin: Negative. Neurological: Negative. Negative for dizziness, syncope, weakness and light-headedness. Hematological: Negative. Psychiatric/Behavioral: Negative. Objective Findings:     Vitals:BP (!) 157/53   Pulse 78   Temp 98.3 °F (36.8 °C) (Oral)   Resp 20   LMP 09/17/1996   SpO2 98%      Physical Examination:    Physical Exam   Constitutional: She appears healthy. No distress. HENT:   Normal cephalic and Atraumatic   Eyes: Pupils are equal, round, and reactive to light. Neck: Normal range of motion and thyroid normal. Neck supple. No JVD present. No neck adenopathy. No thyromegaly present. Cardiovascular: Normal rate, regular rhythm, normal heart sounds, intact distal pulses and normal pulses. Pulmonary/Chest: Effort normal and breath sounds normal. She has no wheezes. She has no rales. She exhibits no tenderness. Abdominal: Soft. Bowel sounds are normal. There is no abdominal tenderness. Musculoskeletal: Normal range of motion. General: No tenderness or edema.    Neurological: She is alert and oriented to person, place, and time. Skin: Skin is warm. No cyanosis. Nails show no clubbing. Results/ Medications reviewed 2/5/2021, 9:50 AM     Laboratory, Microbiology, Pathology, Radiology, Cardiology, Medications and Transcriptions reviewed  Scheduled Meds:   iron sucrose  200 mg Intravenous Once    atorvastatin  40 mg Oral Nightly    dilTIAZem  240 mg Oral Daily    metoprolol tartrate  25 mg Oral BID    oxybutynin  10 mg Oral Daily    pantoprazole  40 mg Oral Daily    sodium chloride flush  10 mL Intravenous 2 times per day     Continuous Infusions:   sodium chloride         Recent Labs     02/04/21  1338 02/04/21  1619 02/05/21  0632   WBC 6.4 6.8 5.8   HGB 6.7* 6.9* 7.1*   HCT 21.9* 22.6* 23.6*   MCV 76.2* 75.9* 77.4*    214 185     Recent Labs     02/04/21  1619 02/05/21  0632    147*   K 4.1 4.2   * 113*   CO2 20 23   BUN 35* 29*   CREATININE 1.30* 1.05*     Recent Labs     02/04/21  1619 02/05/21  0632   AST 13 11   ALT 8 9   BILITOT 0.4 0.6   ALKPHOS 124 115     No results for input(s): LIPASE, AMYLASE in the last 72 hours. Recent Labs     02/04/21  1619 02/05/21  0632   PROT 6.8 6.2*   INR 1.1  --      No results found. Active Hospital Problems    Diagnosis Date Noted    Anemia [D64.9] 02/04/2021     Priority: Low         Impression/Plan:   1. Anemia- Work up in progress. Capsule Endoscopy already scheduled  2. Angina- transfuse additional 1 U PRBC to keep HB > 8  3. CAD- Hold ASA. continue statin and BB. Will add Nitrate  4. LVEF 65  5. 2+ MR  6. SR RBBB  7. Keep on Telemetry while here     Thank you for allowing us to participate in the care of this patient. Will continue to follow. Please call if questions or concerns arise.     Electronically signed by Storm Hernandez MD on 2/5/2021 at 9:50 AM

## 2021-02-05 NOTE — PROGRESS NOTES
Physician Progress Note    2021   2:37 PM    Name:  Candace Youssef  MRN:    [id number]     IP Day: 1     Admit Date: 2021  9:49 PM  PCP: Shiva Rodriguez MD    Code Status:  Full Code      Assessment and Plan: Active Problems/ diagnosis:     Acute blood loss anemia-status post EGD and colonoscopy 2021 showing internal hemorrhoids, diverticulosis, erosive gastritis, biopsy of a polyp was tubular adenoma and negative for H. pylori  Deficiency anemia  Chest pain and dyspnea-angina, history of CAD  Hypothyroidism  HLD  HTN-stable  Asthma-stable      Plan  2021  -Agree with back to RBC transfusion as per cardiology, receiving 2 units total today.  -We will transfuse Venofer given iron deficiency anemia  -GI consultation is pending, diet restarted. No plan for procedure.  -Anticipate discharge home tomorrow if she remains stable, she will need iron supplementation  -Patient will need capsule endoscopy as was planned by GI    DVT PPx     Subjective:     Dyspnea is improving but continues to have dyspnea exertion with walking and back and forth to the bathroom.     Physical Examination:      Vitals:  BP (!) 145/59   Pulse 69   Temp 98.1 °F (36.7 °C)   Resp 16   LMP 1996   SpO2 96%   Temp (24hrs), Av °F (36.7 °C), Min:97.5 °F (36.4 °C), Max:98.4 °F (36.9 °C)      General appearance: alert, cooperative and no distress  Mental Status: oriented to person, place and time and normal affect  Lungs: clear to auscultation bilaterally, normal effort  Heart: regular rate and rhythm, no murmur  Abdomen: soft, nontender, nondistended, bowel sounds present, no masses  Extremities: no edema, redness, tenderness in the calves  Skin: no gross lesions, rashes    Data:     Labs:  Recent Labs     21  1619 21  0632   WBC 6.8 5.8   HGB 6.9* 7.1*    185     Recent Labs     21  1619 21  0632    147*   K 4.1 4.2   * 113*   CO2 20 23   BUN 35* 29*   CREATININE 1.30* 1.05*   GLUCOSE 155* 109*     Recent Labs     02/04/21  1619 02/05/21  0632   AST 13 11   ALT 8 9   BILITOT 0.4 0.6   ALKPHOS 124 115       Current Facility-Administered Medications   Medication Dose Route Frequency Provider Last Rate Last Admin    isosorbide mononitrate (IMDUR) extended release tablet 30 mg  30 mg Oral Daily Ninfa Dangelo MD   30 mg at 02/05/21 1110    0.9 % sodium chloride infusion   Intravenous PRN Ninfa Dangelo MD        atorvastatin (LIPITOR) tablet 40 mg  40 mg Oral Nightly Cristofer Shipman MD   40 mg at 02/04/21 2345    dilTIAZem (CARDIZEM CD) extended release capsule 240 mg  240 mg Oral Daily Cristofer Shipman MD   240 mg at 02/05/21 0813    metoprolol tartrate (LOPRESSOR) tablet 25 mg  25 mg Oral BID Cristofer Shipman MD   25 mg at 02/05/21 0814    oxybutynin (DITROPAN-XL) extended release tablet 10 mg  10 mg Oral Daily Cristofer Shipman MD   10 mg at 02/05/21 0814    pantoprazole (PROTONIX) tablet 40 mg  40 mg Oral Daily Cristofer Shipman MD   40 mg at 02/05/21 1089    sodium chloride flush 0.9 % injection 10 mL  10 mL Intravenous 2 times per day Cristofer Shipman MD   10 mL at 02/05/21 0814    sodium chloride flush 0.9 % injection 10 mL  10 mL Intravenous PRN Cristofer Shipman MD        promethazine (PHENERGAN) tablet 12.5 mg  12.5 mg Oral Q6H PRN Cristofer Shipman MD        Or    ondansetron [facility]) injection 4 mg  4 mg Intravenous Q6H PRN Cristofer Shipman MD        polyethylene glycol (GLYCOLAX) packet 17 g  17 g Oral Daily PRN Cristofer Shipman MD        acetaminophen (TYLENOL) tablet 650 mg  650 mg Oral Q6H PRN Cristofer Shipman MD   650 mg at 02/05/21 2358    Or    acetaminophen (TYLENOL) suppository 650 mg  650 mg Rectal Q6H PRN Cristofer Shipman MD        0.9 % sodium chloride infusion   Intravenous PRN Cristofer Shipman MD           Additional work up or/and treatment plan may be added today or then after based on clinical progression. I am managing a portion of pt care.  Some medical issues are handled by other specialists. Additional work up and treatment should be done in out pt setting by pt PCP and other out pt providers. In addition to examining and evaluating pt, I spent additional time explaining care, normaland abnormal findings, and treatment plan. All of pt questions were answered. Counseling, diet and education were provided. Case will be discussed with nursing staff when appropriate. Family will be updated if and when appropriate.        Electronically signed by Susana Hendricks DO on 2/5/2021 at 2:37 PM

## 2021-02-05 NOTE — H&P
Hospitalist Group   History and Physical      CHIEF COMPLAINT: Shortness of. breath, chest pain, abnormal labs    History of Present Illness:  77 y.o. female with a history of CKD, CAD status post stents not on antiplatelets currently, hypertension, morbid obesity, presents with abnormal labs. Patient has been having parasternal chest pain and shortness of breath with exertion. Described chest pain as squeezing pain. She visited her cardiologist today and had blood work done. She was called because of abnormal hemoglobin and asked to go to the ER. Patient denied noticing any blood per rectum. She denied abdominal pain, nausea, vomiting, diarrhea. She denied lightheadedness. Patient follows up with GI and had colonoscopy and endoscopy done for anemia in the past.  She is supposed to have capsule endoscopy on Thursday next week. REVIEW OF SYSTEMS:  no fevers, chills, has cp, sob, no n/v, ha, vision/hearing changes, wt changes, hot/cold flashes, other open skin lesions, diarrhea, constipation, dysuria/hematuria unless noted in HPI. Complete ROS performed with the patient and is otherwise negative.       PMH:  Past Medical History:   Diagnosis Date    Antral ulcer 01/14/2015    DR Makenzie Manzo    Asthma     \"cured by beestings\"    Coronary artery disease     Coronary artery disease involving native coronary artery of native heart without angina pectoris 7/10/2018    has x 4 cardiac stents / Dr. Martínez Speak Diverticulosis of colon (without mention of hemorrhage) 01/14/2015    DR Makenzie Manzo    Essential hypertension 12/10/2013    meds > 20 yrs    History of blood transfusion 1990s    with hysterectomy    History of normal resting EKG 1996    normal    History of ST elevation myocardial infarction (STEMI) 7/11/2017    History of transfusion of whole blood 1996    Excessive bleeding before hysterectomy    Hyperlipidemia     meds > 2 yrs    Hypertension     Hypothyroidism     past trx years ago then stopped / tablet TAKE 1 TABLET BY MOUTH TWICE DAILY 8/6/20  Yes Jordan CUNNINGHAM Holiday, DO   atorvastatin (LIPITOR) 80 MG tablet Take 0.5 tablets by mouth nightly 3/10/20  Yes Jordan Byersiday, DO   acetaminophen (TYLENOL) 325 MG tablet Take 650 mg by mouth every 6 hours as needed for Pain    Yes Historical Provider, MD   nitroGLYCERIN (NITROSTAT) 0.4 MG SL tablet Place 1 tablet under the tongue every 5 minutes as needed for Chest pain 5/15/17   BEN Alanis - CNP       Allergies:    Lisinopril and Tramadol    Social History:    reports that she has never smoked. She has never used smokeless tobacco. She reports current alcohol use. She reports that she does not use drugs.     Family History:       Problem Relation Age of Onset    Heart Disease Mother 61    Cancer Father 79        kidney    No Known Problems Daughter        PHYSICAL EXAM:  Vitals:  [facility] 09/17/1996   General Appearance: alert and oriented to person, place and time, well developed and well- nourished, in no acute distress  Skin: warm and dry, no rash or erythema  Head: normocephalic and atraumatic  Eyes: pupils equal, round, and reactive to light, extraocular eye movements intact, conjunctivae normal  ENT: tympanic membrane, external ear and ear canal normal bilaterally, nose without deformity, nasal mucosa and turbinates normal without polyps  Neck: supple and non-tender without mass, no thyromegaly or thyroid nodules, no cervical lymphadenopathy  Pulmonary/Chest: clear to auscultation bilaterally- no wheezes, rales or rhonchi, normal air movement, no respiratory distress  Cardiovascular: normal rate, regular rhythm, normal S1 and S2, no murmurs, rubs, clicks, or gallops, distal pulses intact, no carotid bruits  Abdomen: soft, non-tender, non-distended, normal bowel sounds, no masses or organomegaly  Extremities: no cyanosis, clubbing or edema  Musculoskeletal: normal range of motion, no joint swelling, deformity or tenderness  Neurologic: reflexes normal and symmetric, no cranial nerve deficit, gait, coordination and speech normal      LABS:  Recent Labs     02/04/21  1619      K 4.1   *   CO2 20   BUN 35*   CREATININE 1.30*   GLUCOSE 155*   CALCIUM 10.1*       Recent Labs     02/04/21  1338 02/04/21  1619   WBC 6.4 6.8   RBC 2.87* 2.98*   HGB 6.7* 6.9*   HCT 21.9* 22.6*   MCV 76.2* 75.9*   MCH 23.2* 23.3*   MCHC 30.5* 30.6*   RDW 17.6* 17.6*    214       No results for input(s): POCGLU in the last 72 hours. CBC with Differential:    Lab Results   Component Value Date    WBC 6.8 02/04/2021    RBC 2.98 02/04/2021    HGB 6.9 02/04/2021    HCT 22.6 02/04/2021     02/04/2021    MCV 75.9 02/04/2021    MCH 23.3 02/04/2021    MCHC 30.6 02/04/2021    RDW 17.6 02/04/2021    LYMPHOPCT 14.8 02/04/2021    MONOPCT 6.7 02/04/2021    BASOPCT 0.1 02/04/2021    MONOSABS 0.5 02/04/2021    LYMPHSABS 1.0 02/04/2021    EOSABS 0.3 02/04/2021    BASOSABS 0.0 02/04/2021     CMP:    Lab Results   Component Value Date     02/04/2021    K 4.1 02/04/2021     02/04/2021    CO2 20 02/04/2021    BUN 35 02/04/2021    CREATININE 1.30 02/04/2021    GFRAA 48.4 02/04/2021    LABGLOM 40.0 02/04/2021    GLUCOSE 155 02/04/2021    PROT 6.8 02/04/2021    LABALBU 4.2 02/04/2021    CALCIUM 10.1 02/04/2021    BILITOT 0.4 02/04/2021    ALKPHOS 124 02/04/2021    AST 13 02/04/2021    ALT 8 02/04/2021       Radiology: No results found. EKG: Sinus bradycardia with RBBB    ASSESSMENT/ PLAN[de-identified]      Active Problems:    Anemia  Resolved Problems:    * No resolved hospital problems. *      1. Microcytic anemia   Hemoglobin on presentation 6.9 with baseline between 9-7. Patient had work-up done including EGD and colonoscopy   Plan for capsule endoscopy on Thursday   Likely component of iron deficiency anemia given the low MCV   Will check iron studies   Check stool occult   GI consult   Will transfuse for goal above 7  2.  Exertional chest pain and shortness of breath   History of CAD status post stents last in 2017   She was on aspirin and her cardiologist just stopped it   Complaining of exertional chest pain shortness of breath which could be due to demand ischemia   We will cycle troponin and repeat EKG   Cardiology consult   Transfuse packed RBCs    Code Status: Full  DVT prophylaxis: None due to concern for bleeding         Electronically signed by Justa Johnson MD on 2/4/2021 at 10:45 PM      NOTE: This report was transcribed using voice recognition software. Every effort was made to ensure accuracy; however, inadvertent computerized transcription errors may be present.

## 2021-02-05 NOTE — ED NOTES
Called [email or web address] and [employer] to inquire about transport. Billie Harness has no availability and [employer] does not have a night shift crew.       Stacie Aranda  02/04/21 2037

## 2021-02-05 NOTE — PROGRESS NOTES
Admission and assessment completed. Patient is alert and oriented X4. Denies any SOB, N/V, dizziness or pain at this time. Patient has a hemoglobin level of 6.9; consent done and 1 unit of  PRBC's given to patient; she tolerated well. Skin is intact. Oriented to room and call light. Denies any other needs at this time and call light is within reach.   Electronically signed by Aubrey Kinney RN on 2/5/2021 at 4:16 AM

## 2021-02-05 NOTE — ED NOTES
[facility] here to transport patient to [address] [place] [address], [address] [facility]  02/04/21 2041

## 2021-02-05 NOTE — CARE COORDINATION
Sidra Jimenez Case Management Initial Discharge Assessment    Met with Patient to discuss discharge plan. PCP: dEith Pickens MD                                Date of Last Visit: LAST NOV    If no PCP, list provided? N/A    Discharge Planning    Living Arrangements: independently at home    Who do you live with?     Who helps you with your care:  self or spouse    If lives at home:     Do you have any barriers navigating in your home? no    Patient can perform ADL? YES    Current Services (outpatient and in home) :  None    Dialysis: No    Is transportation available to get to your appointments? Yes    DME Equipment:  yes - HAS CANE    Respiratory equipment: None    Respiratory provider:  no     Pharmacy:  yes - [facility] [place] [facility] with Medication Assistance Program?  No      Patient agreeable to [address]? No and Declined    Patient agreeable to SNF/Rehab? No and Declined    Other discharge needs identified? N/A    Freedom of choice list provided with basic dialogue that supports the patient's individualized plan of care/goals and shares the quality data associated with the providers. Yes    Does Patient Have a High-Risk for Readmission Diagnosis (CHF, PN, MI, COPD)? No        The plan for Transition of Care is related to the following treatment goals: GET BLOOD THEN [address]. F/U Thursday FOR CAPSULE SCOPE    Initial Discharge Plan? (Note: please see concurrent daily documentation for any updates after initial note). RETURN HOME W/ . DENIES ANYNEEDS.      The Patient and/or patient representative: Christian Hawk was provided with choice of any post-acute providers for care and equipment and agrees with discharge plan  Yes    Electronically signed by Homero Ramachandran RN on 2/5/2021 at 2:27 PM

## 2021-02-06 LAB
ALBUMIN SERPL-MCNC: 3.5 G/DL (ref 3.5–4.6)
ALP BLD-CCNC: 109 U/L (ref 40–130)
ALT SERPL-CCNC: 9 U/L (ref 0–33)
ANION GAP SERPL CALCULATED.3IONS-SCNC: 10 MEQ/L (ref 9–15)
AST SERPL-CCNC: 11 U/L (ref 0–35)
BASOPHILS ABSOLUTE: 0 K/UL (ref 0–0.2)
BASOPHILS RELATIVE PERCENT: 0.8 %
BILIRUB SERPL-MCNC: 0.5 MG/DL (ref 0.2–0.7)
BUN BLDV-MCNC: 27 MG/DL (ref 8–23)
CALCIUM SERPL-MCNC: 9.2 MG/DL (ref 8.5–9.9)
CHLORIDE BLD-SCNC: 115 MEQ/L (ref 95–107)
CO2: 22 MEQ/L (ref 20–31)
CREAT SERPL-MCNC: 1.16 MG/DL (ref 0.5–0.9)
EKG ATRIAL RATE: 67 BPM
EKG ATRIAL RATE: 68 BPM
EKG ATRIAL RATE: 77 BPM
EKG P AXIS: 63 DEGREES
EKG P AXIS: 67 DEGREES
EKG P AXIS: 73 DEGREES
EKG P-R INTERVAL: 154 MS
EKG P-R INTERVAL: 172 MS
EKG P-R INTERVAL: 180 MS
EKG Q-T INTERVAL: 442 MS
EKG Q-T INTERVAL: 448 MS
EKG Q-T INTERVAL: 448 MS
EKG QRS DURATION: 160 MS
EKG QRS DURATION: 162 MS
EKG QRS DURATION: 162 MS
EKG QTC CALCULATION (BAZETT): 469 MS
EKG QTC CALCULATION (BAZETT): 473 MS
EKG QTC CALCULATION (BAZETT): 506 MS
EKG R AXIS: 33 DEGREES
EKG R AXIS: 44 DEGREES
EKG R AXIS: 51 DEGREES
EKG T AXIS: -8 DEGREES
EKG T AXIS: 3 DEGREES
EKG T AXIS: 4 DEGREES
EKG VENTRICULAR RATE: 67 BPM
EKG VENTRICULAR RATE: 68 BPM
EKG VENTRICULAR RATE: 77 BPM
EOSINOPHILS ABSOLUTE: 0.2 K/UL (ref 0–0.7)
EOSINOPHILS RELATIVE PERCENT: 3.8 %
GFR AFRICAN AMERICAN: 55.2
GFR NON-AFRICAN AMERICAN: 45.6
GLOBULIN: 2.4 G/DL (ref 2.3–3.5)
GLUCOSE BLD-MCNC: 106 MG/DL (ref 70–99)
HCT VFR BLD CALC: 24.2 % (ref 37–47)
HEMOGLOBIN: 7.4 G/DL (ref 12–16)
LYMPHOCYTES ABSOLUTE: 0.9 K/UL (ref 1–4.8)
LYMPHOCYTES RELATIVE PERCENT: 15.8 %
MAGNESIUM: 2.1 MG/DL (ref 1.7–2.4)
MCH RBC QN AUTO: 23.9 PG (ref 27–31.3)
MCHC RBC AUTO-ENTMCNC: 30.6 % (ref 33–37)
MCV RBC AUTO: 78.1 FL (ref 82–100)
MONOCYTES ABSOLUTE: 0.5 K/UL (ref 0.2–0.8)
MONOCYTES RELATIVE PERCENT: 8.5 %
NEUTROPHILS ABSOLUTE: 4.2 K/UL (ref 1.4–6.5)
NEUTROPHILS RELATIVE PERCENT: 71.1 %
PDW BLD-RTO: 18.2 % (ref 11.5–14.5)
PLATELET # BLD: 175 K/UL (ref 130–400)
POTASSIUM SERPL-SCNC: 4.7 MEQ/L (ref 3.4–4.9)
RBC # BLD: 3.1 M/UL (ref 4.2–5.4)
SODIUM BLD-SCNC: 147 MEQ/L (ref 135–144)
TOTAL PROTEIN: 5.9 G/DL (ref 6.3–8)
WBC # BLD: 5.9 K/UL (ref 4.8–10.8)

## 2021-02-06 PROCEDURE — 85025 COMPLETE CBC W/AUTO DIFF WBC: CPT

## 2021-02-06 PROCEDURE — 1210000000 HC MED SURG R&B

## 2021-02-06 PROCEDURE — 83735 ASSAY OF MAGNESIUM: CPT

## 2021-02-06 PROCEDURE — G0378 HOSPITAL OBSERVATION PER HR: HCPCS

## 2021-02-06 PROCEDURE — 99232 SBSQ HOSP IP/OBS MODERATE 35: CPT | Performed by: INTERNAL MEDICINE

## 2021-02-06 PROCEDURE — 80053 COMPREHEN METABOLIC PANEL: CPT

## 2021-02-06 PROCEDURE — 99222 1ST HOSP IP/OBS MODERATE 55: CPT | Performed by: SPECIALIST

## 2021-02-06 PROCEDURE — 36415 COLL VENOUS BLD VENIPUNCTURE: CPT

## 2021-02-06 PROCEDURE — 93005 ELECTROCARDIOGRAM TRACING: CPT | Performed by: INTERNAL MEDICINE

## 2021-02-06 PROCEDURE — 6370000000 HC RX 637 (ALT 250 FOR IP): Performed by: INTERNAL MEDICINE

## 2021-02-06 PROCEDURE — 2580000003 HC RX 258: Performed by: INTERNAL MEDICINE

## 2021-02-06 RX ORDER — ISOSORBIDE MONONITRATE 30 MG/1
30 TABLET, EXTENDED RELEASE ORAL DAILY
Qty: 30 TABLET | Refills: 3 | Status: SHIPPED | OUTPATIENT
Start: 2021-02-07 | End: 2021-06-22

## 2021-02-06 RX ORDER — LANOLIN ALCOHOL/MO/W.PET/CERES
325 CREAM (GRAM) TOPICAL 2 TIMES DAILY
Qty: 60 TABLET | Refills: 0 | Status: SHIPPED | OUTPATIENT
Start: 2021-02-06 | End: 2021-03-15 | Stop reason: SDUPTHER

## 2021-02-06 RX ADMIN — METOPROLOL TARTRATE 25 MG: 25 TABLET, FILM COATED ORAL at 08:20

## 2021-02-06 RX ADMIN — METOPROLOL TARTRATE 25 MG: 25 TABLET, FILM COATED ORAL at 19:52

## 2021-02-06 RX ADMIN — Medication 10 ML: at 08:22

## 2021-02-06 RX ADMIN — ACETAMINOPHEN 650 MG: 325 TABLET ORAL at 19:53

## 2021-02-06 RX ADMIN — ISOSORBIDE MONONITRATE 30 MG: 30 TABLET, EXTENDED RELEASE ORAL at 08:21

## 2021-02-06 RX ADMIN — Medication 10 ML: at 19:54

## 2021-02-06 RX ADMIN — PANTOPRAZOLE SODIUM 40 MG: 40 TABLET, DELAYED RELEASE ORAL at 08:21

## 2021-02-06 RX ADMIN — ATORVASTATIN CALCIUM 40 MG: 40 TABLET, FILM COATED ORAL at 19:52

## 2021-02-06 RX ADMIN — ACETAMINOPHEN 650 MG: 325 TABLET ORAL at 04:35

## 2021-02-06 RX ADMIN — DILTIAZEM HYDROCHLORIDE 240 MG: 240 CAPSULE, COATED, EXTENDED RELEASE ORAL at 08:21

## 2021-02-06 RX ADMIN — OXYBUTYNIN CHLORIDE 10 MG: 5 TABLET, EXTENDED RELEASE ORAL at 08:21

## 2021-02-06 ASSESSMENT — PAIN SCALES - GENERAL
PAINLEVEL_OUTOF10: 7
PAINLEVEL_OUTOF10: 7

## 2021-02-06 NOTE — PROGRESS NOTES
Received a call from monitor room that patient's HR was in the 20's and that she also had a 5 second asystole. Vitals were stable and patient was asymptomatic. EKG was done and was as previous of NSR with right bundle branch block. Also red lead on tele monitor was off. Message was sent to Dr. Clinton Woods regarding this matter. Will continue to monitor and call light is within reach.   Electronically signed by Paul Horton RN on 2/6/2021 at 5:00 AM

## 2021-02-06 NOTE — PROGRESS NOTES
Physician Progress Note    2021   2:52 PM    Name:  Heather Felix  MRN:    [id number]     IP Day: 1     Admit Date: 2021  9:49 PM  PCP: Ruslan Chavez MD    Code Status:  Full Code      Assessment and Plan: Active Problems/ diagnosis:     Acute blood loss anemia-status post EGD and colonoscopy 2021 showing internal hemorrhoids, diverticulosis, erosive gastritis, biopsy of a polyp was tubular adenoma and negative for H. pylori  Deficiency anemia  Chest pain and dyspnea-angina, history of CAD  Hypothyroidism  HLD  HTN-stable  Asthma-stable      Plan  2021  -Agree with back to RBC transfusion as per cardiology, receiving 2 units total today.  -We will transfuse Venofer given iron deficiency anemia  -GI consultation is pending, diet restarted. No plan for procedure.  -Anticipate discharge home tomorrow if she remains stable, she will need iron supplementation  -Patient will need capsule endoscopy as was planned by GI    2021  -Resume PPI  -Patient received packed RBC and Venofer transfusion yesterday. -Repeat CBC in the a.m. as per GI.  -Patient was seen by cardiology, no further input. Imdur was added.  -Okay to discharge once okay by GI. Plan-plan for capsule endoscopy on Thursday as planned by GI.  -On discharge will add iron supplementation. DVT PPx     Subjective:     Dyspnea is better. No chest pain.     Physical Examination:      Vitals:  BP (!) 154/60   Pulse 75   Temp 98.2 °F (36.8 °C) (Oral)   Resp 16   LMP 1996   SpO2 94%   Temp (24hrs), Av.2 °F (36.8 °C), Min:97.9 °F (36.6 °C), Max:98.4 °F (36.9 °C)      General appearance: alert, cooperative and no distress  Mental Status: oriented to person, place and time and normal affect  Lungs: clear to auscultation bilaterally, normal effort  Heart: regular rate and rhythm, no murmur  Abdomen: soft, nontender, nondistended, bowel sounds present, no masses  Extremities: no edema, redness, tenderness in the calves  Skin: no gross lesions, rashes    Data:     Labs:  Recent Labs     02/05/21  0632 02/05/21  1705 02/06/21  0624   WBC 5.8  --  5.9   HGB 7.1* 8.1* 7.4*     --  175     Recent Labs     02/05/21  0632 02/06/21  0624   * 147*   K 4.2 4.7   * 115*   CO2 23 22   BUN 29* 27*   CREATININE 1.05* 1.16*   GLUCOSE 109* 106*     Recent Labs     02/05/21  0632 02/06/21  0624   AST 11 11   ALT 9 9   BILITOT 0.6 0.5   ALKPHOS 115 109       Current Facility-Administered Medications   Medication Dose Route Frequency Provider Last Rate Last Admin    isosorbide mononitrate (IMDUR) extended release tablet 30 mg  30 mg Oral Daily Andi Veras MD   30 mg at 02/06/21 3709    0.9 % sodium chloride infusion   Intravenous PRN Andi Veras MD        atorvastatin (LIPITOR) tablet 40 mg  40 mg Oral Nightly Greg Jaramillo MD   40 mg at 02/05/21 1948    dilTIAZem (CARDIZEM CD) extended release capsule 240 mg  240 mg Oral Daily Greg Jaramillo MD   240 mg at 02/06/21 0858    metoprolol tartrate (LOPRESSOR) tablet 25 mg  25 mg Oral BID Greg Jaramillo MD   25 mg at 02/06/21 0820    oxybutynin (DITROPAN-XL) extended release tablet 10 mg  10 mg Oral Daily Greg Jaramillo MD   10 mg at 02/06/21 4477    pantoprazole (PROTONIX) tablet 40 mg  40 mg Oral Daily Greg Jaramillo MD   40 mg at 02/06/21 3663    sodium chloride flush 0.9 % injection 10 mL  10 mL Intravenous 2 times per day Greg Jaramillo MD   10 mL at 02/06/21 0851    sodium chloride flush 0.9 % injection 10 mL  10 mL Intravenous PRN Greg Jaramillo MD        promethazine (PHENERGAN) tablet 12.5 mg  12.5 mg Oral Q6H PRN Greg Jaramillo MD        Or    ondansetron [facility]) injection 4 mg  4 mg Intravenous Q6H PRN Greg Jaramillo MD        polyethylene glycol (GLYCOLAX) packet 17 g  17 g Oral Daily PRN Greg Jaramillo MD        acetaminophen (TYLENOL) tablet 650 mg  650 mg Oral Q6H PRN Greg Jaramillo MD   650 mg at 02/06/21 0435    Or    acetaminophen (TYLENOL) suppository 650 mg  650 mg Rectal Q6H PRN Asher Helms MD        0.9 % sodium chloride infusion   Intravenous PRN Asher Helms MD           Additional work up or/and treatment plan may be added today or then after based on clinical progression. I am managing a portion of pt care. Some medical issues are handled by other specialists. Additional work up and treatment should be done in out pt setting by pt PCP and other out pt providers. In addition to examining and evaluating pt, I spent additional time explaining care, normaland abnormal findings, and treatment plan. All of pt questions were answered. Counseling, diet and education were provided. Case will be discussed with nursing staff when appropriate. Family will be updated if and when appropriate.        Electronically signed by Fermin Cardozo DO on 2/6/2021 at 2:52

## 2021-02-06 NOTE — PROGRESS NOTES
Chief complaint: Anemia    SUBJECTIVE:  Pt denies chest pain. Shortness of breath is improved. Feels less tired. Hemoglobin is 7.4.       Past Medical History:   Diagnosis Date    Antral ulcer 01/14/2015    DR Abram Bronson    Asthma     \"cured by beestings\"    Coronary artery disease     Coronary artery disease involving native coronary artery of native heart without angina pectoris 7/10/2018    has x 4 cardiac stents / Dr. Breaux Presume Diverticulosis of colon (without mention of hemorrhage) 01/14/2015    DR Valverde Exon    Essential hypertension 12/10/2013    meds > 20 yrs    History of blood transfusion 1990s    with hysterectomy    History of normal resting EKG 1996    normal    History of ST elevation myocardial infarction (STEMI) 7/11/2017    History of transfusion of whole blood 1996    Excessive bleeding before hysterectomy    Hyperlipidemia     meds > 2 yrs    Hypertension     Hypothyroidism     past trx years ago then stopped / recent restart    Kidney disease     Low back pain     Morbid obesity due to excess calories ([address] 5/13/2017    Morbid obesity due to excess calories ([address] 6/6/2017    Osteoarthritis     Pneumonia     Shoulder dislocation     ST elevation myocardial infarction involving left circumflex coronary artery ([address] 5/13/2017    Unspecified gastritis and gastroduodenitis without mention of hemorrhage 05/06/2015    DR LANE     Patient Active Problem List   Diagnosis    Hypothyroid    Essential hypertension    Hyperlipidemia    Morbid obesity due to excess calories ([address]    History of ST elevation myocardial infarction (STEMI)    Coronary artery disease involving native coronary artery of native heart without angina pectoris    Chronic bilateral low back pain with bilateral sciatica    Primary osteoarthritis of both knees    Spinal stenosis of lumbar region with neurogenic claudication    Degenerative spondylolisthesis    Benign neoplasm of left ear    Asthma    CKD (chronic kidney disease) stage 3, GFR 30-59 ml/min    Claudication (HCC)    Chest pain    Adenomatous polyp of descending colon    Adenomatous polyp of ascending colon    Adenomatous polyp of colon    Chronic gastritis without bleeding    Anemia       Current Facility-Administered Medications   Medication Dose Route Frequency Provider Last Rate Last Admin    isosorbide mononitrate (IMDUR) extended release tablet 30 mg  30 mg Oral Daily Jag Valera MD   30 mg at 02/06/21 7127    0.9 % sodium chloride infusion   Intravenous PRN Jag Valera MD        atorvastatin (LIPITOR) tablet 40 mg  40 mg Oral Nightly Minoo Wharton MD   40 mg at 02/05/21 1948    dilTIAZem (CARDIZEM CD) extended release capsule 240 mg  240 mg Oral Daily Minoo Wharton MD   240 mg at 02/06/21 0077    metoprolol tartrate (LOPRESSOR) tablet 25 mg  25 mg Oral BID Minoo Wharton MD   25 mg at 02/06/21 0820    oxybutynin (DITROPAN-XL) extended release tablet 10 mg  10 mg Oral Daily Minoo Wharton MD   10 mg at 02/06/21 2370    pantoprazole (PROTONIX) tablet 40 mg  40 mg Oral Daily Minoo Wharton MD   40 mg at 02/06/21 4304    sodium chloride flush 0.9 % injection 10 mL  10 mL Intravenous 2 times per day Minoo Wharton MD   10 mL at 02/06/21 1279    sodium chloride flush 0.9 % injection 10 mL  10 mL Intravenous PRN Minoo Wharton MD        promethazine (PHENERGAN) tablet 12.5 mg  12.5 mg Oral Q6H PRN Minoo Wharton MD        Or    ondansetron [facility]) injection 4 mg  4 mg Intravenous Q6H PRN Minoo Wharton MD        polyethylene glycol (GLYCOLAX) packet 17 g  17 g Oral Daily PRN Minoo Wharton MD        acetaminophen (TYLENOL) tablet 650 mg  650 mg Oral Q6H PRN Minoo Wharton MD   650 mg at 02/06/21 0435    Or    acetaminophen (TYLENOL) suppository 650 mg  650 mg Rectal Q6H PRN Minoo Wharton MD        0.9 % sodium chloride infusion   Intravenous PRN Minoo Wharton MD           ALLERGIES: Lisinopril and Tramadol      OBJECTIVE: VITALS:  Blood pressure (!) 154/60, pulse 75, temperature 98.2 °F (36.8 °C), temperature source Oral, resp. rate 16, last menstrual period 09/17/1996, SpO2 94 %, not currently breastfeeding. There is no height or weight on file to calculate BMI. Physical Exam   Constitutional: She is oriented to person, place, and time. She appears well-developed and well-nourished. HENT:   Head: Normocephalic and atraumatic. Eyes: Pupils are equal, round, and reactive to light. Neck: Normal range of motion. Neck supple. No JVD present. No tracheal deviation present. No thyromegaly present. Cardiovascular: Normal rate, regular rhythm, normal heart sounds and intact distal pulses. PMI is not displaced. Exam reveals no gallop, no S3, no distant heart sounds and no friction rub. No murmur heard. Pulmonary/Chest: No respiratory distress. She has no wheezes. She has no rales. She exhibits no tenderness. Abdominal: Soft. Bowel sounds are normal. She exhibits no distension and no mass. There is no abdominal tenderness. There is no rebound and no guarding. Musculoskeletal:         General: No edema. Neurological: She is alert and oriented to person, place, and time. No cranial nerve deficit. Skin: Skin is warm and dry. No rash noted. She is not diaphoretic. No erythema. No pallor. Psychiatric: She has a normal mood and affect.  Her behavior is normal. Judgment and thought content normal.         LABS:  Recent Results (from the past 24 hour(s))   Hemoglobin and Hematocrit, Blood    Collection Time: 02/05/21  5:05 PM   Result Value Ref Range    Hemoglobin 8.1 (L) 12.0 - 16.0 g/dL    Hematocrit 26.0 (L) 37.0 - 47.0 %   EKG 12 Lead    Collection Time: 02/06/21  3:59 AM   Result Value Ref Range    Ventricular Rate 67 BPM    Atrial Rate 67 BPM    P-R Interval 180 ms    QRS Duration 160 ms    Q-T Interval 448 ms    QTc Calculation (Bazett) 473 ms    P Axis 63 degrees    R Axis 51 degrees    T Axis 3 degrees   CBC Auto Differential    Collection Time: 02/06/21  6:24 AM   Result Value Ref Range    WBC 5.9 4.8 - 10.8 K/uL    RBC 3.10 (L) 4.20 - 5.40 M/uL    Hemoglobin 7.4 (L) 12.0 - 16.0 g/dL    Hematocrit 24.2 (L) 37.0 - 47.0 %    MCV 78.1 (L) 82.0 - 100.0 fL    MCH 23.9 (L) 27.0 - 31.3 pg    MCHC 30.6 (L) 33.0 - 37.0 %    RDW 18.2 (H) 11.5 - 14.5 %    Platelets 034 917 - 830 K/uL    Neutrophils % 71.1 %    Lymphocytes % 15.8 %    Monocytes % 8.5 %    Eosinophils % 3.8 %    Basophils % 0.8 %    Neutrophils Absolute 4.2 1.4 - 6.5 K/uL    Lymphocytes Absolute 0.9 (L) 1.0 - 4.8 K/uL    Monocytes Absolute 0.5 0.2 - 0.8 K/uL    Eosinophils Absolute 0.2 0.0 - 0.7 K/uL    Basophils Absolute 0.0 0.0 - 0.2 K/uL   Comprehensive Metabolic Panel    Collection Time: 02/06/21  6:24 AM   Result Value Ref Range    Sodium 147 (H) 135 - 144 mEq/L    Potassium 4.7 3.4 - 4.9 mEq/L    Chloride 115 (H) 95 - 107 mEq/L    CO2 22 20 - 31 mEq/L    Anion Gap 10 9 - 15 mEq/L    Glucose 106 (H) 70 - 99 mg/dL    BUN 27 (H) 8 - 23 mg/dL    CREATININE 1.16 (H) 0.50 - 0.90 mg/dL    GFR Non-African American 45.6 (L) >60    GFR  55.2 (L) >60    Calcium 9.2 8.5 - 9.9 mg/dL    Total Protein 5.9 (L) 6.3 - 8.0 g/dL    Albumin 3.5 3.5 - 4.6 g/dL    Total Bilirubin 0.5 0.2 - 0.7 mg/dL    Alkaline Phosphatase 109 40 - 130 U/L    ALT 9 0 - 33 U/L    AST 11 0 - 35 U/L    Globulin 2.4 2.3 - 3.5 g/dL   Magnesium    Collection Time: 02/06/21  6:24 AM   Result Value Ref Range    Magnesium 2.1 1.7 - 2.4 mg/dL     Troponin:    Lab Results   Component Value Date    TROPONINI <0.010 02/05/2021             ASSESSMENT:    Active Hospital Problems    Diagnosis Date Noted    Anemia [D64.9] 02/04/2021     Priority: Low     GI bleed  History of coronary disease status post PCI in 2017  2+ mitral rotation  Normal LV function  Essential hypertension  History of ST elevation MI  Chronic kidney disease      PLAN:   1.  As always, aggressive risk factor modification is strongly recommended. We should adhere to the JNC VIII guidelines for HTN management and the NCEP ATP III guidelines for LDL-C management. 2. GI recommendations  3. Hold antiplatelets  4. Monitor on telemetry  5.  Keep potassium greater than 4, magnesium greater than 2, hemoglobin greater than 8      Electronically signed by Remy Perez DO on 2/6/2021 at 2:32 PM

## 2021-02-06 NOTE — PROGRESS NOTES
Assessment completed. Patient is alert and oriented. Is complaining of right leg pain; tylenol was given per order. Denies any SOB, N/V, or dizziness. Tolerated her 2nd unit of blood well. Vital signs are stable. Denies any other needs at this time and call light is within reach.   Electronically signed by Marie Lopes RN on 2/5/2021 at 10:42 PM

## 2021-02-06 NOTE — CONSULTS
Consults    Patient Name: Za Steve Date: 2021  9:49 PM  MR #: [id number]  : 1945    Attending Physician: Fermin Cardozo DO  Reason for consult: Anemia and GI bleeding    History of Presenting Illness:      Francesco Segal is a 77 y.o. female on hospital day 1 with a history of generalized weakness and fatigue, was found to be severely anemic. She received transfusion. Patient was investigated recently for anemia. CT showed mild antral gastritis and duodenitis and biopsies showed reactive gastropathy, negative for H. pylori gastritis. Colonoscopy showed benign colon polyps and diverticulosis. ,  Patient is scheduled for capsule endoscopy next week, she was feeling weak and tired and hence came to the emergency room and was admitted, ports no abdominal pain nausea vomiting no history of any hematemesis or melena or rectal bleeding, stool was checked for occult blood and it was positive.  History Obtained From:  patient      History:      Past Medical History:   Diagnosis Date    Antral ulcer 2015    DR Carmine Watts    Asthma     \"cured by beestings\"    Coronary artery disease     Coronary artery disease involving native coronary artery of native heart without angina pectoris 7/10/2018    has x 4 cardiac stents / Dr. Kellie Daily Diverticulosis of colon (without mention of hemorrhage) 2015    DR Carmine Watts    Essential hypertension 12/10/2013    meds > 20 yrs    History of blood transfusion     with hysterectomy    History of normal resting EKG     normal    History of ST elevation myocardial infarction (STEMI) 2017    History of transfusion of whole blood     Excessive bleeding before hysterectomy    Hyperlipidemia     meds > 2 yrs    Hypertension     Hypothyroidism     past trx years ago then stopped / recent restart    Kidney disease     Low back pain     Morbid obesity due to excess calories ([address] 2017    Morbid obesity due to excess Not on file   Tobacco Use    Smoking status: Never Smoker    Smokeless tobacco: Never Used   Substance and Sexual Activity    Alcohol use: Yes     Comment: glass of wine once a year    Drug use: No    Sexual activity: Yes     Partners: Male   Lifestyle    Physical activity     Days per week: Not on file     Minutes per session: Not on file    Stress: Not on file   Relationships    Social connections     Talks on phone: Not on file     Gets together: Not on file     Attends [employer] service: Not on file     Active member of club or organization: Not on file     Attends meetings of clubs or organizations: Not on file     Relationship status: Not on file    Intimate partner violence     Fear of current or ex partner: Not on file     Emotionally abused: Not on file     Physically abused: Not on file     Forced sexual activity: Not on file   Other Topics Concern    Not on file   Social History Narrative    Not on file      [] Unable to obtain due to ventilated and/ or neurologic status      Home Medications:      Medications Prior to Admission: pantoprazole (PROTONIX) 40 MG tablet, Take 1 tablet by mouth daily  dilTIAZem (CARDIZEM CD) 240 MG extended release capsule, Take 1 capsule by mouth daily  oxybutynin (DITROPAN XL) 10 MG extended release tablet, Take 1 tablet by mouth daily  metoprolol tartrate (LOPRESSOR) 25 MG tablet, TAKE 1 TABLET BY MOUTH TWICE DAILY  atorvastatin (LIPITOR) 80 MG tablet, Take 0.5 tablets by mouth nightly  acetaminophen (TYLENOL) 325 MG tablet, Take 650 mg by mouth every 6 hours as needed for Pain   [DISCONTINUED] aspirin 81 MG chewable tablet, Take 81 mg by mouth daily  [DISCONTINUED] losartan-hydroCHLOROthiazide (HYZAAR) 100-25 MG per tablet, Take 1 tablet by mouth daily  nitroGLYCERIN (NITROSTAT) 0.4 MG SL tablet, Place 1 tablet under the tongue every 5 minutes as needed for Chest pain    Current Hospital Medications:     Scheduled Meds:   isosorbide mononitrate  30 mg Oral Daily    atorvastatin  40 mg Oral Nightly    dilTIAZem  240 mg Oral Daily    metoprolol tartrate  25 mg Oral BID    oxybutynin  10 mg Oral Daily    pantoprazole  40 mg Oral Daily    sodium chloride flush  10 mL Intravenous 2 times per day     Continuous Infusions:   sodium chloride      sodium chloride       PRN Meds:.sodium chloride, sodium chloride flush, promethazine **OR** ondansetron, polyethylene glycol, acetaminophen **OR** acetaminophen, sodium chloride  .  sodium chloride      sodium chloride          Allergies: Allergies   Allergen Reactions    Lisinopril Nausea Only and Other (See Comments)     cough    Tramadol Nausea Only        Review of Systems:       [x] CV, Resp, Neuro, , and all other systems reviewed and negative other than listed in HPI.      [] Unable to obtain due to ventilated and/ or neurologic status      Objective Findings:     Vitals:   Vitals:    02/05/21 1637 02/1945 02/06/21 0354 02/06/21 0758   BP: (!) 156/61 (!) 137/54 (!) 156/58 (!) 154/60   Pulse: 80 75 73 75   Resp: 17 16 16 16   Temp: 97.9 °F (36.6 °C) 98.4 °F (36.9 °C) 98.2 °F (36.8 °C) 98.2 °F (36.8 °C)   TempSrc: Oral Oral Oral Oral   SpO2: 95% 94% 95% 94%        Physical Examination:  General: In no acute distress  HEENT: Normocephalic,  scleral icterus. None  Neck: No jugular venous distention. Heart: Regular, no murmur, no rub/gallop. No right ventricular heave. Lungs: Clear to ascultation, no rales/wheezing/rhonchi. Good chest wall excursion. Abdomen: Obese soft nontender no palpable mass  Extremities: No clubbing/cyanosis, no edema. Skin: Warm, dry, normal turgor, no rash, no bruise, no petichiae. Neuro: No myoclonus or tremor.    Psych: Normal affect    Results/ Medications reviewed 2/6/2021, 2:46 PM     Laboratory, Microbiology, Pathology, Radiology, Cardiology, Medications and Transcriptions reviewed  Scheduled Meds:   isosorbide mononitrate  30 mg Oral Daily    atorvastatin  40 mg Oral Nightly    dilTIAZem  240 mg Oral Daily    metoprolol tartrate  25 mg Oral BID    oxybutynin  10 mg Oral Daily    pantoprazole  40 mg Oral Daily    sodium chloride flush  10 mL Intravenous 2 times per day     Continuous Infusions:   sodium chloride      sodium chloride         Recent Labs     02/04/21  1619 02/05/21  0632 02/05/21  1705 02/06/21  0624   WBC 6.8 5.8  --  5.9   HGB 6.9* 7.1* 8.1* 7.4*   HCT 22.6* 23.6* 26.0* 24.2*   MCV 75.9* 77.4*  --  78.1*    185  --  175     Recent Labs     02/04/21  1619 02/05/21  0632 02/06/21  0624    147* 147*   K 4.1 4.2 4.7   * 113* 115*   CO2 20 23 22   BUN 35* 29* 27*   CREATININE 1.30* 1.05* 1.16*     Recent Labs     02/04/21  1619 02/05/21  0632 02/06/21  0624   AST 13 11 11   ALT 8 9 9   BILITOT 0.4 0.6 0.5   ALKPHOS 124 115 109     No results for input(s): LIPASE, AMYLASE in the last 72 hours. Recent Labs     02/04/21  1619 02/05/21  0632 02/06/21  0624   PROT 6.8 6.2* 5.9*   INR 1.1  --   --      No results found. Impression:   GI bleeding with anemia, recent EGD and colonoscopy was unremarkable, patient is scheduled for outpatient capsule endoscopy sometime next week. Plan:   Repeat CBC a.m. and if stable patient could be discharged home and go for outpatient capsule endoscopy, if patient stays through the weekend and early next week will try to get this done as inpatient . Comments: Thank you for allowing us to participate in the care of this patient. Will continue to follow. Please call if questions or concerns arise.     Electronically signed by Destiny Carpio MD on 2/6/2021 at 2:46 PM

## 2021-02-07 VITALS
RESPIRATION RATE: 18 BRPM | DIASTOLIC BLOOD PRESSURE: 58 MMHG | TEMPERATURE: 98.3 F | OXYGEN SATURATION: 98 % | HEART RATE: 75 BPM | SYSTOLIC BLOOD PRESSURE: 157 MMHG

## 2021-02-07 LAB
ALBUMIN SERPL-MCNC: 3.4 G/DL (ref 3.5–4.6)
ALP BLD-CCNC: 117 U/L (ref 40–130)
ALT SERPL-CCNC: 6 U/L (ref 0–33)
ANION GAP SERPL CALCULATED.3IONS-SCNC: 11 MEQ/L (ref 9–15)
AST SERPL-CCNC: 12 U/L (ref 0–35)
BASOPHILS ABSOLUTE: 0.1 K/UL (ref 0–0.2)
BASOPHILS RELATIVE PERCENT: 1.1 %
BILIRUB SERPL-MCNC: 0.5 MG/DL (ref 0.2–0.7)
BUN BLDV-MCNC: 29 MG/DL (ref 8–23)
CALCIUM SERPL-MCNC: 9.3 MG/DL (ref 8.5–9.9)
CHLORIDE BLD-SCNC: 109 MEQ/L (ref 95–107)
CO2: 21 MEQ/L (ref 20–31)
CREAT SERPL-MCNC: 1.24 MG/DL (ref 0.5–0.9)
EOSINOPHILS ABSOLUTE: 0.3 K/UL (ref 0–0.7)
EOSINOPHILS RELATIVE PERCENT: 4.6 %
GFR AFRICAN AMERICAN: 51.1
GFR NON-AFRICAN AMERICAN: 42.2
GLOBULIN: 2.9 G/DL (ref 2.3–3.5)
GLUCOSE BLD-MCNC: 115 MG/DL (ref 70–99)
HCT VFR BLD CALC: 25.3 % (ref 37–47)
HEMOGLOBIN: 7.8 G/DL (ref 12–16)
LYMPHOCYTES ABSOLUTE: 1.1 K/UL (ref 1–4.8)
LYMPHOCYTES RELATIVE PERCENT: 15.7 %
MAGNESIUM: 1.9 MG/DL (ref 1.7–2.4)
MCH RBC QN AUTO: 24.5 PG (ref 27–31.3)
MCHC RBC AUTO-ENTMCNC: 30.7 % (ref 33–37)
MCV RBC AUTO: 79.8 FL (ref 82–100)
MONOCYTES ABSOLUTE: 0.6 K/UL (ref 0.2–0.8)
MONOCYTES RELATIVE PERCENT: 8.5 %
NEUTROPHILS ABSOLUTE: 4.8 K/UL (ref 1.4–6.5)
NEUTROPHILS RELATIVE PERCENT: 70.1 %
PDW BLD-RTO: 18.3 % (ref 11.5–14.5)
PLATELET # BLD: 176 K/UL (ref 130–400)
POTASSIUM SERPL-SCNC: 4.3 MEQ/L (ref 3.4–4.9)
RBC # BLD: 3.17 M/UL (ref 4.2–5.4)
SODIUM BLD-SCNC: 141 MEQ/L (ref 135–144)
TOTAL PROTEIN: 6.3 G/DL (ref 6.3–8)
WBC # BLD: 6.8 K/UL (ref 4.8–10.8)

## 2021-02-07 PROCEDURE — 85025 COMPLETE CBC W/AUTO DIFF WBC: CPT

## 2021-02-07 PROCEDURE — 80053 COMPREHEN METABOLIC PANEL: CPT

## 2021-02-07 PROCEDURE — G0378 HOSPITAL OBSERVATION PER HR: HCPCS

## 2021-02-07 PROCEDURE — 99232 SBSQ HOSP IP/OBS MODERATE 35: CPT | Performed by: INTERNAL MEDICINE

## 2021-02-07 PROCEDURE — 6370000000 HC RX 637 (ALT 250 FOR IP): Performed by: INTERNAL MEDICINE

## 2021-02-07 PROCEDURE — 83735 ASSAY OF MAGNESIUM: CPT

## 2021-02-07 PROCEDURE — 93005 ELECTROCARDIOGRAM TRACING: CPT | Performed by: INTERNAL MEDICINE

## 2021-02-07 PROCEDURE — 2580000003 HC RX 258: Performed by: INTERNAL MEDICINE

## 2021-02-07 PROCEDURE — 36415 COLL VENOUS BLD VENIPUNCTURE: CPT

## 2021-02-07 RX ADMIN — METOPROLOL TARTRATE 25 MG: 25 TABLET, FILM COATED ORAL at 09:29

## 2021-02-07 RX ADMIN — OXYBUTYNIN CHLORIDE 10 MG: 5 TABLET, EXTENDED RELEASE ORAL at 09:29

## 2021-02-07 RX ADMIN — DILTIAZEM HYDROCHLORIDE 240 MG: 240 CAPSULE, COATED, EXTENDED RELEASE ORAL at 09:29

## 2021-02-07 RX ADMIN — Medication 10 ML: at 11:18

## 2021-02-07 RX ADMIN — ACETAMINOPHEN 650 MG: 325 TABLET ORAL at 09:28

## 2021-02-07 RX ADMIN — PANTOPRAZOLE SODIUM 40 MG: 40 TABLET, DELAYED RELEASE ORAL at 09:29

## 2021-02-07 RX ADMIN — ISOSORBIDE MONONITRATE 30 MG: 30 TABLET, EXTENDED RELEASE ORAL at 09:29

## 2021-02-07 ASSESSMENT — PAIN SCALES - GENERAL
PAINLEVEL_OUTOF10: 7
PAINLEVEL_OUTOF10: 0
PAINLEVEL_OUTOF10: 0
PAINLEVEL_OUTOF10: 7

## 2021-02-07 NOTE — DISCHARGE SUMMARY
Hospital Medicine Discharge Summary    Douglas Espitia  :  1945  MRN:  [id number]    Admit date:  2021  Discharge date:  2021    Admitting Physician: Daniella Garcias MD  Primary Care Physician:  Verner Sexton, MD      Discharge Diagnoses:    Acute blood loss anemia-status post EGD and colonoscopy 2021 showing internal hemorrhoids, diverticulosis, erosive gastritis, biopsy of a polyp was tubular adenoma and negative for H. pylori  Deficiency anemia  Chest pain and dyspnea-angina, history of CAD  Hypothyroidism  HLD  HTN-stable  Asthma-stable      Hospital Course:       2021  -Agree with back to RBC transfusion as per cardiology, receiving 2 units total today.  -We will transfuse Venofer given iron deficiency anemia  -GI consultation is pending, diet restarted. No plan for procedure.  -Anticipate discharge home tomorrow if she remains stable, she will need iron supplementation  -Patient will need capsule endoscopy as was planned by GI     2021  -Resume PPI  -Patient received packed RBC and Venofer transfusion yesterday. -Repeat CBC in the a.m. as per GI.  -Patient was seen by cardiology, no further input. Imdur was added.  -Okay to discharge once okay by GI. Plan-plan for capsule endoscopy on Thursday as planned by GI.  -On discharge will add iron supplementation.         - Hb stable, ok to dc as per GI.   - will follow up with GI next week for capsule endoscopy. She understands signs and risks and GI bleeding. Exam on discharge:   BP (!) 157/58   Pulse 75   Temp 98.3 °F (36.8 °C) (Oral)   Resp 18   LMP 1996   SpO2 98%   General appearance: No apparent distress, appears stated age and cooperative. HEENT: Pupils equal, round, and reactive to light. Conjunctivae/corneas clear. Neck: Supple, with full range of motion. No jugular venous distention. Trachea midline. Respiratory:  Normal respiratory effort.  Clear to auscultation, bilaterally without Rales/Wheezes/Rhonchi. Cardiovascular: Regular rate and rhythm with normal S1/S2 without murmurs, rubs or gallops. Abdomen: Soft, non-tender, non-distended with normal bowel sounds. Musculoskeletal: No clubbing, cyanosis or edema bilaterally. Full range of motion without deformity. Skin: Skin color, texture, turgor normal.  No rashes or lesions. Neuro: Non Focal. Symetrical motor and tone. Nl Comprehension, Alert,awake and oriented. NL CN. Symetrical tone and reflexes. Psychiatric: Alert and oriented, thought content appropriate, normal insight  Capillary Refill: Brisk,< 3 seconds   Peripheral Pulses: +2 palpable, equal bilaterally     Patient was seen by the following consultants while admitted to [facility]:   Consults:  [address] TO GI    Significant Diagnostic Studies:    Refer to chart     Please refer to chart if no studies are shown here    No results found.     Discharge Medications:       Matheus Meléndez   Home Medication Instructions SRW:[id number]    Printed on:02/07/21 0606   Medication Information                      acetaminophen (TYLENOL) 325 MG tablet  Take 650 mg by mouth every 6 hours as needed for Pain              atorvastatin (LIPITOR) 80 MG tablet  Take 0.5 tablets by mouth nightly             dilTIAZem (CARDIZEM CD) 240 MG extended release capsule  Take 1 capsule by mouth daily             ferrous sulfate (FE TABS 325) 325 (65 Fe) MG EC tablet  Take 1 tablet by mouth 2 times daily             isosorbide mononitrate (IMDUR) 30 MG extended release tablet  Take 1 tablet by mouth daily             metoprolol tartrate (LOPRESSOR) 25 MG tablet  TAKE 1 TABLET BY MOUTH TWICE DAILY             nitroGLYCERIN (NITROSTAT) 0.4 MG SL tablet  Place 1 tablet under the tongue every 5 minutes as needed for Chest pain             oxybutynin (DITROPAN XL) 10 MG extended release tablet  Take 1 tablet by mouth daily             pantoprazole (PROTONIX) 40 MG tablet  Take 1 tablet by mouth daily                 Disposition:   If discharged to Home, Any [facility] needs that were indicated and/or required as been addressed and set up by Social Work. Condition at discharge: good     Activity: activity as tolerated    Total time taken for discharging this patient: 40 minutes. Greater than 70% of time was spent focused exclusively on this patient. Time was taken to review chart, discuss plans with consultants, reconciling medications, discussing plan answering questions with patient.      Mary Craig  2/7/2021, 4:18 PM  ----------------------------------------------------------------------------------------------------------------------    Candace Youssef

## 2021-02-07 NOTE — PROGRESS NOTES
Chief complaint: Anemia    SUBJECTIVE:  Pt denies chest pain. Shortness of breath is improved. Feels less tired. Hemoglobin is 7.4.    2/7/2021: She would like to go home. She denies any further melena. She states her shortness of breath and fatigue has improved. Hemoglobin is stable at 7.8.       Past Medical History:   Diagnosis Date    Antral ulcer 01/14/2015    DR Olga Sheets    Asthma     \"cured by beestings\"    Coronary artery disease     Coronary artery disease involving native coronary artery of native heart without angina pectoris 7/10/2018    has x 4 cardiac stents / Dr. Magui Kennedy Diverticulosis of colon (without mention of hemorrhage) 01/14/2015    DR Olga Sheets    Essential hypertension 12/10/2013    meds > 20 yrs    History of blood transfusion 1990s    with hysterectomy    History of normal resting EKG 1996    normal    History of ST elevation myocardial infarction (STEMI) 7/11/2017    History of transfusion of whole blood 1996    Excessive bleeding before hysterectomy    Hyperlipidemia     meds > 2 yrs    Hypertension     Hypothyroidism     past trx years ago then stopped / recent restart    Kidney disease     Low back pain     Morbid obesity due to excess calories ([address] 5/13/2017    Morbid obesity due to excess calories ([address] 6/6/2017    Osteoarthritis     Pneumonia     Shoulder dislocation     ST elevation myocardial infarction involving left circumflex coronary artery ([address] 5/13/2017    Unspecified gastritis and gastroduodenitis without mention of hemorrhage 05/06/2015    DR LANE     Patient Active Problem List   Diagnosis    Hypothyroid    Essential hypertension    Hyperlipidemia    Morbid obesity due to excess calories ([address]    History of ST elevation myocardial infarction (STEMI)    Coronary artery disease involving native coronary artery of native heart without angina pectoris    Chronic bilateral low back pain with bilateral sciatica    Primary osteoarthritis of both knees    Spinal stenosis of lumbar region with neurogenic claudication    Degenerative spondylolisthesis    Benign neoplasm of left ear    Asthma    CKD (chronic kidney disease) stage 3, GFR 30-59 ml/min    Claudication (HCC)    Chest pain    Adenomatous polyp of descending colon    Adenomatous polyp of ascending colon    Adenomatous polyp of colon    Chronic gastritis without bleeding    Anemia    Gastrointestinal hemorrhage       Current Facility-Administered Medications   Medication Dose Route Frequency Provider Last Rate Last Admin    isosorbide mononitrate (IMDUR) extended release tablet 30 mg  30 mg Oral Daily Jag Valera MD   30 mg at 02/07/21 0929    0.9 % sodium chloride infusion   Intravenous PRN Jag Valera MD        atorvastatin (LIPITOR) tablet 40 mg  40 mg Oral Nightly Minoo Wharton MD   40 mg at 02/06/21 1952    dilTIAZem (CARDIZEM CD) extended release capsule 240 mg  240 mg Oral Daily Minoo Wharton MD   240 mg at 02/07/21 0929    metoprolol tartrate (LOPRESSOR) tablet 25 mg  25 mg Oral BID Minoo Wharton MD   25 mg at 02/07/21 0929    oxybutynin (DITROPAN-XL) extended release tablet 10 mg  10 mg Oral Daily Minoo Wharton MD   10 mg at 02/07/21 0929    pantoprazole (PROTONIX) tablet 40 mg  40 mg Oral Daily Minoo Wharton MD   40 mg at 02/07/21 0929    sodium chloride flush 0.9 % injection 10 mL  10 mL Intravenous 2 times per day Minoo Wharton MD   10 mL at 02/07/21 1118    sodium chloride flush 0.9 % injection 10 mL  10 mL Intravenous PRN Minoo Wharton MD        promethazine (PHENERGAN) tablet 12.5 mg  12.5 mg Oral Q6H PRN Minoo Wharton MD        Or    ondansetron (ZOFRAN) injection 4 mg  4 mg Intravenous Q6H PRN Minoo Wharton MD        polyethylene glycol (GLYCOLAX) packet 17 g  17 g Oral Daily PRN Minoo Wharton MD        acetaminophen (TYLENOL) tablet 650 mg  650 mg Oral Q6H PRN Minoo Wharton MD   650 mg at 02/07/21 4375    Or    acetaminophen (TYLENOL) suppository 650 mg  650 mg Rectal Q6H PRN Patrick Henson MD        0.9 % sodium chloride infusion   Intravenous PRN Patrick Henson MD           ALLERGIES: Lisinopril and Tramadol      OBJECTIVE:     VITALS:  Blood pressure (!) 157/58, pulse 75, temperature 98.3 °F (36.8 °C), temperature source Oral, resp. rate 18, last menstrual period 09/17/1996, SpO2 98 %, not currently breastfeeding. There is no height or weight on file to calculate BMI. Physical Exam   Constitutional: She is oriented to person, place, and time. She appears well-developed and well-nourished. HENT:   Head: Normocephalic and atraumatic. Eyes: Pupils are equal, round, and reactive to light. Neck: Normal range of motion. Neck supple. No JVD present. No tracheal deviation present. No thyromegaly present. Cardiovascular: Normal rate, regular rhythm, normal heart sounds and intact distal pulses. PMI is not displaced. Exam reveals no gallop, no S3, no distant heart sounds and no friction rub. No murmur heard. Pulmonary/Chest: No respiratory distress. She has no wheezes. She has no rales. She exhibits no tenderness. Abdominal: Soft. Bowel sounds are normal. She exhibits no distension and no mass. There is no abdominal tenderness. There is no rebound and no guarding. Musculoskeletal:         General: No edema. Neurological: She is alert and oriented to person, place, and time. No cranial nerve deficit. Skin: Skin is warm and dry. No rash noted. She is not diaphoretic. No erythema. No pallor. Psychiatric: She has a normal mood and affect.  Her behavior is normal. Judgment and thought content normal.         LABS:  Recent Results (from the past 24 hour(s))   CBC Auto Differential    Collection Time: 02/07/21  7:12 AM   Result Value Ref Range    WBC 6.8 4.8 - 10.8 K/uL    RBC 3.17 (L) 4.20 - 5.40 M/uL    Hemoglobin 7.8 (L) 12.0 - 16.0 g/dL    Hematocrit 25.3 (L) 37.0 - 47.0 %    MCV 79.8 (L) 82.0 - 100.0 fL    MCH 24.5 (L) 27.0 - 31.3 pg    MCHC 30.7 (L) 33.0 - 37.0 %    RDW 18.3 (H) 11.5 - 14.5 %    Platelets 105 868 - 765 K/uL    Neutrophils % 70.1 %    Lymphocytes % 15.7 %    Monocytes % 8.5 %    Eosinophils % 4.6 %    Basophils % 1.1 %    Neutrophils Absolute 4.8 1.4 - 6.5 K/uL    Lymphocytes Absolute 1.1 1.0 - 4.8 K/uL    Monocytes Absolute 0.6 0.2 - 0.8 K/uL    Eosinophils Absolute 0.3 0.0 - 0.7 K/uL    Basophils Absolute 0.1 0.0 - 0.2 K/uL   Comprehensive Metabolic Panel    Collection Time: 02/07/21  7:12 AM   Result Value Ref Range    Sodium 141 135 - 144 mEq/L    Potassium 4.3 3.4 - 4.9 mEq/L    Chloride 109 (H) 95 - 107 mEq/L    CO2 21 20 - 31 mEq/L    Anion Gap 11 9 - 15 mEq/L    Glucose 115 (H) 70 - 99 mg/dL    BUN 29 (H) 8 - 23 mg/dL    CREATININE 1.24 (H) 0.50 - 0.90 mg/dL    GFR Non-African American 42.2 (L) >60    GFR  51.1 (L) >60    Calcium 9.3 8.5 - 9.9 mg/dL    Total Protein 6.3 6.3 - 8.0 g/dL    Albumin 3.4 (L) 3.5 - 4.6 g/dL    Total Bilirubin 0.5 0.2 - 0.7 mg/dL    Alkaline Phosphatase 117 40 - 130 U/L    ALT 6 0 - 33 U/L    AST 12 0 - 35 U/L    Globulin 2.9 2.3 - 3.5 g/dL   Magnesium    Collection Time: 02/07/21  7:12 AM   Result Value Ref Range    Magnesium 1.9 1.7 - 2.4 mg/dL     Troponin:    Lab Results   Component Value Date    TROPONINI <0.010 02/05/2021             ASSESSMENT:    Active Hospital Problems    Diagnosis Date Noted    Gastrointestinal hemorrhage [K92.2]      Priority: Low    Anemia [D64.9] 02/04/2021     Priority: Low     GI bleed  History of coronary disease status post PCI in 2017  2+ mitral regurgitation  Normal LV function  Essential hypertension  History of ST elevation MI  Chronic kidney disease      PLAN:   1. As always, aggressive risk factor modification is strongly recommended. We should adhere to the JNC VIII guidelines for HTN management and the NCEP ATP III guidelines for LDL-C management. 2. GI recommendations-possible outpatient capsule endoscopy  3.  Hold antiplatelets  4. Monitor on telemetry  5. Keep potassium greater than 4, magnesium greater than 2, hemoglobin greater than 8  6. Stable for discharge from cardiology standpoint.       Electronically signed by Mell Scales DO on 2/7/2021 at 12:19 PM

## 2021-02-07 NOTE — PROGRESS NOTES
Discharge instructions provided to patient and spouse. Verbalized understanding of follow up appointments, diet, activity,  medications and reasons to return to ED/call physician. All questions answered. Copy of discharge instructions provided. Assisted into wheelchair and taken to personal car. Denies further needs. IV removed without complication. Pt tolerated well. Catheter intact, dressing applied. No drainage noted.

## 2021-02-07 NOTE — PROGRESS NOTES
2100: Assessment complete. Alert and oriented, calm and cooperative. Afebrile. No complaints of nausea, SOB, or dizziness. Patient is experiencing R knee pain, rated 7/10, receiving Tylenol per MAR. She is resting comfortably, denying further needs at this time. Safety maintained. Call light within reach.      Electronically signed by Jose Myers RN on 2/6/2021 at 9:14 PM

## 2021-02-08 ENCOUNTER — CARE COORDINATION (OUTPATIENT)
Dept: CASE MANAGEMENT | Age: 76
End: 2021-02-08

## 2021-02-08 PROBLEM — D23.22 BENIGN NEOPLASM OF LEFT EAR: Status: RESOLVED | Noted: 2019-02-01 | Resolved: 2021-02-08

## 2021-02-08 PROCEDURE — 93010 ELECTROCARDIOGRAM REPORT: CPT | Performed by: INTERNAL MEDICINE

## 2021-02-08 NOTE — CARE COORDINATION
[facility] Transitions Initial Follow Up Call    Call within 2 business days of discharge: Yes    Patient: Luisa Wheat Patient : 1945   MRN: [id number]  Reason for Admission: -2021 [address] Acute blood loss anemia-status post EGD and colonoscopy 2021 showing internal hemorrhoids, diverticulosis, erosive gastritis, biopsy of a polyp was tubular adenoma and negative for H. Pylori, Deficiency anemia, Transfused. Discharge Date: 21 RARS: Readmission Risk Score: [address] [place] Discharge [address]       Complaint Diagnosis Description Type Department Provider    21   Admission (Discharged) AMANDA Luna DO    21 Other Severe anemia ED (TRANSFER) [facility]  ED Curtis Morales MD           Care Transitions request call back to P: [phone] for initial outreach.     Care Transitions 24 Hour Call    Care Transitions Interventions         Follow Up  Future Appointments   Date Time Provider Alvarez Solo   2/10/2021 10:00 AM Phoenix Wright MD [address] [place] [address]   2021  7:00 AM SCHEDULE, [facility] OR AMANDA Alford HOD   3/1/2021  3:30 PM Yandy Rubio [address] [place] [address]   2021 12:30 PM DO RIANA Lang CARDIO [facility]   2021  9:00 AM Phoenix Wright  [facility], [address] [place] [address]

## 2021-02-09 ENCOUNTER — CARE COORDINATION (OUTPATIENT)
Dept: CASE MANAGEMENT | Age: 76
End: 2021-02-09

## 2021-02-09 DIAGNOSIS — D12.2 ADENOMATOUS POLYP OF ASCENDING COLON: ICD-10-CM

## 2021-02-09 NOTE — PROGRESS NOTES
Physician Progress Note      PATIENT:               Dago Pierre  CSN #:                  [id number]  :                       1945  ADMIT DATE:       2020 1:40 PM  Sherry Dennis DATE:        2020 12:52 PM  RESPONDING  PROVIDER #:        MARIE GREENE DO          QUERY TEXT:    Patient admitted with chest pain with documentation of GIB and NSTEMI. If   possible, please document in the progress notes and discharge summary if you   are evaluating and/or treating any of the following: The medical record reflects the following:  Risk Factors: MI, CAD  Clinical Indicators: Midsternal chest pressure and heaviness, relief from SL   NTG, trops .010, .027, .077, .046; Tele showed 7 beat run of NSVT, EKG: NSR   with T wave inversion in anterolateral leads; H&H: 6.6/21.7 - 8.6/26.9;   Cardiology notes: NSTEMI/chest pain  Treatment: NTG stat, PRBC, GI consult, Tele Serial trops, serial EKG, ECHO,   Lopressor and Cardizem po, Holding anticoagulation due to GIB, labs and   monitoring    If you have any questions, please feel free to contact me at [phone]. Thank you,  Aristides Eng RN BSN CDS  Options provided:  -- NSTEMI  -- Type 2 MI  -- Demand Ischemia with MI  -- Demand Ischemia only, no MI  -- Unstable Angina  -- Other - I will add my own diagnosis  -- Disagree - Not applicable / Not valid  -- Disagree - Clinically unable to determine / Unknown  -- Refer to Clinical Documentation Reviewer    PROVIDER RESPONSE TEXT:    This patient has demand ischemia with an MI. Query created by: Jani Gitelman on 2021 9:58 AM      QUERY TEXT:    Pt admitted with chest pain. Pt noted to have NSTEMI, anemia and GIB with GI   consult ordered and treated with PPI and PRBC with pt electing to have   outpatient EGD/colonoscopy. If possible, please document in progress notes and   discharge summary the cause of the chest pain.     The medical record reflects the following:  Risk Factors: MI, CAD Clinical Indicators: Midsternal chest pressure and heaviness, relief from SL   NTG, trops .010, .027, .077, .046; Tele showed 7 beat run of NSVT, EKG: NSR   with T wave inversion in anterolateral leads; H&H: 6.6/21.7 - 8.6/26.9;   Cardiology notes: NSTEMI/chest pain  Treatment: NTG stat, PRBC, GI consult, Tele Serial trops, serial EKG, ECHO,   Lopressor and Cardizem po, Holding anticoagulation due to GIB, labs and   monitoring      If you have any questions, please feel free to contact me at [phone].   Thank you,  Mar Irwin RN BSN CDS  Options provided:  -- chest pain due to (please specify), .  -- Other - I will add my own diagnosis  -- Disagree - Not applicable / Not valid  -- Disagree - Clinically unable to determine / Unknown  -- Refer to Clinical Documentation Reviewer    PROVIDER RESPONSE TEXT:    This patient has chest pain due to anemia    Query created by: Anders Aase on 2/9/2021 10:03 AM      Electronically signed by:  Kaitlynn Aleman DO 2/9/2021 10:07 AM

## 2021-02-10 ENCOUNTER — OFFICE VISIT (OUTPATIENT)
Dept: FAMILY MEDICINE CLINIC | Age: 76
End: 2021-02-10
Payer: MEDICARE

## 2021-02-10 VITALS
DIASTOLIC BLOOD PRESSURE: 84 MMHG | WEIGHT: 264 LBS | SYSTOLIC BLOOD PRESSURE: 136 MMHG | HEART RATE: 64 BPM | BODY MASS INDEX: 45.07 KG/M2 | TEMPERATURE: 97.2 F | HEIGHT: 64 IN | OXYGEN SATURATION: 97 %

## 2021-02-10 DIAGNOSIS — I73.9 CLAUDICATION (HCC): ICD-10-CM

## 2021-02-10 DIAGNOSIS — R09.81 NASAL CONGESTION: ICD-10-CM

## 2021-02-10 DIAGNOSIS — D50.0 IRON DEFICIENCY ANEMIA DUE TO CHRONIC BLOOD LOSS: ICD-10-CM

## 2021-02-10 DIAGNOSIS — Z09 HOSPITAL DISCHARGE FOLLOW-UP: Primary | ICD-10-CM

## 2021-02-10 PROCEDURE — 1111F DSCHRG MED/CURRENT MED MERGE: CPT | Performed by: FAMILY MEDICINE

## 2021-02-10 PROCEDURE — 99214 OFFICE O/P EST MOD 30 MIN: CPT | Performed by: FAMILY MEDICINE

## 2021-02-10 RX ORDER — GUAIFENESIN 600 MG/1
600 TABLET, EXTENDED RELEASE ORAL 2 TIMES DAILY
Qty: 30 TABLET | Refills: 0 | Status: SHIPPED | OUTPATIENT
Start: 2021-02-10 | End: 2021-02-25

## 2021-02-10 RX ORDER — PSEUDOEPHEDRINE HCL 120 MG/1
120 TABLET, FILM COATED, EXTENDED RELEASE ORAL EVERY 12 HOURS
Qty: 30 TABLET | Refills: 0 | Status: SHIPPED | OUTPATIENT
Start: 2021-02-10 | End: 2021-02-17

## 2021-02-10 ASSESSMENT — PATIENT HEALTH QUESTIONNAIRE - PHQ9
SUM OF ALL RESPONSES TO PHQ QUESTIONS 1-9: 0
1. LITTLE INTEREST OR PLEASURE IN DOING THINGS: 0
2. FEELING DOWN, DEPRESSED OR HOPELESS: 0

## 2021-02-10 ASSESSMENT — ENCOUNTER SYMPTOMS
SHORTNESS OF BREATH: 0
CONSTIPATION: 0
COUGH: 0
WHEEZING: 0
DIARRHEA: 0
ABDOMINAL PAIN: 0
RHINORRHEA: 0
SORE THROAT: 0

## 2021-02-10 NOTE — PROGRESS NOTES
[address] PRIMARY CARE  [address] [id number]  Dept: [phone]  Dept Fax: : [phone]     Chief Complaint  Chief Complaint   Patient presents with    Follow-Up from Hospital     severe anemia        HPI:  77 y. o.female who presents for the following:      Hosp f/u: sent by cardiology to the ED for Hb 6.7 on 2/4/21 with symptoms; admitted 2/4 - 2/7; transfused and given iron; planning outpatient capsule endoscopy since GI source not found but had pos stool occult; She is feeling better today. Discharge Diagnoses:    Acute blood loss anemia-status post EGD and colonoscopy 1/2/2021 showing internal hemorrhoids, diverticulosis, erosive gastritis, biopsy of a polyp was tubular adenoma and negative for H. pylori  Deficiency anemia  Chest pain and dyspnea-angina, history of CAD  Hypothyroidism  HLD  HTN-stable  Asthma-stable        Hospital Course:         2/5/2021  -Agree with back to RBC transfusion as per cardiology, receiving 2 units total today.  -We will transfuse Venofer given iron deficiency anemia  -GI consultation is pending, diet restarted.  No plan for procedure.  -Anticipate discharge home tomorrow if she remains stable, she will need iron supplementation  -Patient will need capsule endoscopy as was planned by GI     2/6/2021  -Resume PPI  -Patient received packed RBC and Venofer transfusion yesterday. -Repeat CBC in the a.m. as per GI.  -Patient was seen by cardiology, no further input. Crow Walters was added.  -Okay to discharge once okay by GI.  Plan-plan for capsule endoscopy on Thursday as planned by GI.  -On discharge will add iron supplementation.     2/7  - Hb stable, ok to dc as per GI.   - will follow up with GI next week for capsule endoscopy. She understands signs and risks and GI bleeding. Review of Systems   Constitutional: Negative for chills and fever.    HENT: Negative for congestion, rhinorrhea and sore throat. Respiratory: Negative for cough, shortness of breath and wheezing. Gastrointestinal: Negative for abdominal pain, constipation and diarrhea. Endocrine: Negative for polydipsia and polyuria. Genitourinary: Negative for dysuria, frequency and urgency. Neurological: Negative for syncope, light-headedness, numbness and headaches. Psychiatric/Behavioral: Negative for sleep disturbance. The patient is not nervous/anxious.         Past Medical History:   Diagnosis Date    Antral ulcer 01/14/2015    DR Sariah Donovan    Asthma     \"cured by beestings\"    Coronary artery disease     Coronary artery disease involving native coronary artery of native heart without angina pectoris 7/10/2018    has x 4 cardiac stents / Dr. Julianne Koch Diverticulosis of colon (without mention of hemorrhage) 01/14/2015    DR Sariah Donovan    Essential hypertension 12/10/2013    meds > 20 yrs    History of blood transfusion 1990s    with hysterectomy    History of normal resting EKG 1996    normal    History of ST elevation myocardial infarction (STEMI) 7/11/2017    History of transfusion of whole blood 1996    Excessive bleeding before hysterectomy    Hyperlipidemia     meds > 2 yrs    Hypertension     Hypothyroidism     past trx years ago then stopped / recent restart    Kidney disease     Low back pain     Morbid obesity due to excess calories ([address] 5/13/2017    Morbid obesity due to excess calories ([address] 6/6/2017    Osteoarthritis     Pneumonia     Shoulder dislocation     ST elevation myocardial infarction involving left circumflex coronary artery ([address] 5/13/2017    Unspecified gastritis and gastroduodenitis without mention of hemorrhage 05/06/2015    DR Sariah Donovan     Past Surgical History:   Procedure Laterality Date    CARDIAC SURGERY  2017    has x 4 cardiac stents    COLONOSCOPY  01/14/2015    DR LANE - DIVERTICULOSIS    COLONOSCOPY N/A 1/2/2021    COLONOSCOPY DIAGNOSTIC performed by Karen Romero MD at [address]  05/17/2017    x2 stents    CYST REMOVAL Left 2/7/2019    RESECTION OF LEFT PINNA LESION WITH GRAFT performed by Zaira Patel MD at [facility]. [address], COLON, DIAGNOSTIC      FINGER SURGERY  12/9/14    middle finger right hand due to infection   [facility]  [id number]   [place]  2008    shoulder dislocated - popped  back in Right shoulder    UPPER GASTROINTESTINAL ENDOSCOPY  01/14/2015    DR LANE - ULCER IN THE ANTRUM    UPPER GASTROINTESTINAL ENDOSCOPY  05/06/2015    DR Olga Sheets - GASTRITIS, PREVIOUS ULCER HEALED    UPPER GASTROINTESTINAL ENDOSCOPY N/A 1/2/2021    EGD DIAGNOSTIC ONLY performed by Karen Romero MD at [facility] History     Socioeconomic History    Marital status:      Spouse name: Not on file    Number of children: Not on file    Years of education: Not on file    Highest education level: Not on file   Occupational History    Not on file   Social Needs    Financial resource strain: Not on file    Food insecurity     Worry: Not on file     Inability: Not on file    Transportation needs     Medical: Not on file     Non-medical: Not on file   Tobacco Use    Smoking status: Never Smoker    Smokeless tobacco: Never Used   Substance and Sexual Activity    Alcohol use: Yes     Comment: glass of wine once a year    Drug use: No    Sexual activity: Yes     Partners: Male   Lifestyle    Physical activity     Days per week: Not on file     Minutes per session: Not on file    Stress: Not on file   Relationships    Social connections     Talks on phone: Not on file     Gets together: Not on file     Attends Baptism service: Not on file     Active member of club or organization: Not on file     Attends meetings of clubs or organizations: Not on file     Relationship status: Not on file    Intimate partner violence     Fear of current or ex partner: Not on file     Emotionally abused: Not on file     Physically abused: Not on file     Forced sexual activity: Not on file   Other Topics Concern    Not on file   Social History Narrative    Not on file     Family History   Problem Relation Age of Onset    Heart Disease Mother 61    Cancer Father 79        kidney    No Known Problems Daughter       Allergies   Allergen Reactions    Lisinopril Nausea Only and Other (See Comments)     cough    Tramadol Nausea Only     Current Outpatient Medications   Medication Sig Dispense Refill    pseudoephedrine (DECONGESTANT 12HOUR MAX ST) 120 MG extended release tablet Take 1 tablet by mouth every 12 hours for 7 days 30 tablet 0    guaiFENesin (MUCINEX) 600 MG extended release tablet Take 1 tablet by mouth 2 times daily for 15 days 30 tablet 0    isosorbide mononitrate (IMDUR) 30 MG extended release tablet Take 1 tablet by mouth daily 30 tablet 3    ferrous sulfate (FE TABS 325) 325 (65 Fe) MG EC tablet Take 1 tablet by mouth 2 times daily 60 tablet 0    pantoprazole (PROTONIX) 40 MG tablet Take 1 tablet by mouth daily 30 tablet 2    dilTIAZem (CARDIZEM CD) 240 MG extended release capsule Take 1 capsule by mouth daily 90 capsule 3    oxybutynin (DITROPAN XL) 10 MG extended release tablet Take 1 tablet by mouth daily 30 tablet 3    metoprolol tartrate (LOPRESSOR) 25 MG tablet TAKE 1 TABLET BY MOUTH TWICE DAILY 180 tablet 3    atorvastatin (LIPITOR) 80 MG tablet Take 0.5 tablets by mouth nightly 90 tablet 3    nitroGLYCERIN (NITROSTAT) 0.4 MG SL tablet Place 1 tablet under the tongue every 5 minutes as needed for Chest pain 25 tablet 3    acetaminophen (TYLENOL) 325 MG tablet Take 650 mg by mouth every 6 hours as needed for Pain        No current facility-administered medications for this visit.           Vitals:    02/10/21 1011   BP: 136/84   Site: Left Upper Arm   Position: Sitting   Cuff Size: Large Adult   Pulse: 64   Temp: 97.2 °F (36.2 °C)   TempSrc: Infrared   SpO2: 97%   Weight: 264 lb (119.7 kg)   Height: 5' 3.75\" (1.619 m)       Physical exam:  Physical Exam  Vitals signs reviewed. Constitutional:       General: She is not in acute distress. Appearance: She is well-developed. HENT:      Head: Normocephalic and atraumatic. Mouth/Throat:      Pharynx: No oropharyngeal exudate. Eyes:      Comments: Paleness of the b/l lower conjunctivae   Neck:      Musculoskeletal: Normal range of motion. Thyroid: No thyromegaly. Cardiovascular:      Rate and Rhythm: Normal rate and regular rhythm. Heart sounds: Normal heart sounds. No murmur. Pulmonary:      Effort: Pulmonary effort is normal. No respiratory distress. Breath sounds: Normal breath sounds. No wheezing. Abdominal:      General: There is no distension. Palpations: Abdomen is soft. Tenderness: There is no abdominal tenderness. There is no guarding or rebound. Lymphadenopathy:      Cervical: No cervical adenopathy. Skin:     General: Skin is warm and dry. Neurological:      Mental Status: She is alert and oriented to person, place, and time. Psychiatric:         Behavior: Behavior normal.         Assessment/Plan:  77 y.o. female here mainly for hosp f/u:  - Anemia: vitals stable and asymptomatic; just needs to f/u with GI to work up source of GI blood loss. Diagnosis Orders   1. Hospital discharge follow-up  NC DISCHARGE MEDS RECONCILED W/ CURRENT OUTPATIENT MED LIST   2. Claudication ([address]     3. Nasal congestion  pseudoephedrine (DECONGESTANT 12HOUR MAX ST) 120 MG extended release tablet    guaiFENesin (MUCINEX) 600 MG extended release tablet   4. Iron deficiency anemia due to chronic blood loss          Return if symptoms worsen or fail to improve.     Nori Gonsalves MD

## 2021-02-18 ENCOUNTER — APPOINTMENT (OUTPATIENT)
Dept: ENDOSCOPY | Age: 76
End: 2021-02-18
Payer: MEDICARE

## 2021-02-18 PROCEDURE — 3609019000 HC EGD CAPSULE ENDOSCOPY: Performed by: SPECIALIST

## 2021-02-18 NOTE — PROGRESS NOTES
Explained test to Salvatore. She understands. Connected equipment and activated endocapsule. Patient swallowed capsule with no problems. Live view showed capsule in the stomach. Instructions for the day given and explained.     [employer] Endocapsule Lot # [id number]  Exp 01/17/2022

## 2021-02-19 NOTE — PROGRESS NOTES
Abdias Carrera returned at 2:10pm 2/18. Live view showed the capsule still in her small bowel. I instructed her to wear equipment until 8:00pm and then take it off and return it to the center the next day.

## 2021-02-26 ENCOUNTER — HOSPITAL ENCOUNTER (OUTPATIENT)
Dept: LAB | Age: 76
Discharge: HOME | End: 2021-02-26
Payer: MEDICARE

## 2021-02-26 DIAGNOSIS — K92.2 GASTROINTESTINAL HEMORRHAGE, UNSPECIFIED GASTROINTESTINAL HEMORRHAGE TYPE: ICD-10-CM

## 2021-02-26 DIAGNOSIS — D50.9 IRON DEFICIENCY ANEMIA, UNSPECIFIED IRON DEFICIENCY ANEMIA TYPE: ICD-10-CM

## 2021-02-26 LAB
ACANTHOCYTES: ABNORMAL
ANISOCYTOSIS: ABNORMAL
BASOPHILS ABSOLUTE: 0 K/UL (ref 0–0.2)
BASOPHILS RELATIVE PERCENT: 0.6 %
EOSINOPHILS ABSOLUTE: 0 K/UL (ref 0–0.7)
EOSINOPHILS RELATIVE PERCENT: 2.4 %
HCT VFR BLD CALC: 28.8 % (ref 37–47)
HEMOGLOBIN: 8.7 G/DL (ref 12–16)
LYMPHOCYTES ABSOLUTE: 0.4 K/UL (ref 1–4.8)
LYMPHOCYTES RELATIVE PERCENT: 7 %
MCH RBC QN AUTO: 24.7 PG (ref 27–31.3)
MCHC RBC AUTO-ENTMCNC: 30.1 % (ref 33–37)
MCV RBC AUTO: 82 FL (ref 82–100)
MICROCYTES: ABNORMAL
MONOCYTES ABSOLUTE: 0.3 K/UL (ref 0.2–0.8)
MONOCYTES RELATIVE PERCENT: 4.8 %
NEUTROPHILS ABSOLUTE: 5.3 K/UL (ref 1.4–6.5)
NEUTROPHILS RELATIVE PERCENT: 89 %
PDW BLD-RTO: 22.6 % (ref 11.5–14.5)
PLATELET # BLD: 196 K/UL (ref 130–400)
PLATELET SLIDE REVIEW: ADEQUATE
POIKILOCYTES: ABNORMAL
RBC # BLD: 3.51 M/UL (ref 4.2–5.4)
SMUDGE CELLS: 1
WBC # BLD: 5.9 K/UL (ref 4.8–10.8)

## 2021-02-26 PROCEDURE — 36415 COLL VENOUS BLD VENIPUNCTURE: CPT

## 2021-02-26 PROCEDURE — 85025 COMPLETE CBC W/AUTO DIFF WBC: CPT

## 2021-03-01 ENCOUNTER — OFFICE VISIT (OUTPATIENT)
Dept: GASTROENTEROLOGY | Age: 76
End: 2021-03-01
Payer: MEDICARE

## 2021-03-01 VITALS
HEART RATE: 78 BPM | OXYGEN SATURATION: 94 % | BODY MASS INDEX: 45.24 KG/M2 | HEIGHT: 64 IN | WEIGHT: 265 LBS | RESPIRATION RATE: 16 BRPM

## 2021-03-01 DIAGNOSIS — K92.2 GASTROINTESTINAL HEMORRHAGE, UNSPECIFIED GASTROINTESTINAL HEMORRHAGE TYPE: Primary | ICD-10-CM

## 2021-03-01 DIAGNOSIS — D50.9 IRON DEFICIENCY ANEMIA, UNSPECIFIED IRON DEFICIENCY ANEMIA TYPE: ICD-10-CM

## 2021-03-01 DIAGNOSIS — K92.2 GASTROINTESTINAL HEMORRHAGE, UNSPECIFIED GASTROINTESTINAL HEMORRHAGE TYPE: ICD-10-CM

## 2021-03-01 PROCEDURE — 99212 OFFICE O/P EST SF 10 MIN: CPT | Performed by: SPECIALIST

## 2021-03-01 ASSESSMENT — ENCOUNTER SYMPTOMS
CONSTIPATION: 0
VOMITING: 0
DIARRHEA: 0
RESPIRATORY NEGATIVE: 1
BLOOD IN STOOL: 0
NAUSEA: 0
GASTROINTESTINAL NEGATIVE: 1
ABDOMINAL DISTENTION: 0
RECTAL PAIN: 0
EYES NEGATIVE: 1
ANAL BLEEDING: 0
ABDOMINAL PAIN: 0

## 2021-03-01 NOTE — PROGRESS NOTES
Gastroenterology Clinic Follow up Visit    Deena Barry  [id number]  Chief Complaint   Patient presents with    Follow-up       HPI and A/P at last visit summarized below:  Patient is here for follow-up, she has a history of anemia and had EGD colonoscopy which was unremarkable. Subsequently patient had a capsule endoscopy which did not show any source of bleeding. Stool is dark since patient is on oral iron supplements. ,  Patient discontinued iron supplements temporarily for capsule endoscopy and at that time stool was noted to be normal in color. No abdominal pain no nausea no vomiting, patient was recently admitted to the hospital with anemia and received IV iron infusion and transfusion of blood. Review of Systems   Constitutional: Negative. No history of any fatigue or shortness of breath   HENT: Negative. Eyes: Negative. Respiratory: Negative. Cardiovascular: Negative. Gastrointestinal: Negative. Negative for abdominal distention, abdominal pain, anal bleeding, blood in stool, constipation, diarrhea, nausea, rectal pain and vomiting. Endocrine: Negative. Genitourinary: Negative. Musculoskeletal: Negative. Skin: Negative. Allergic/Immunologic: Negative for food allergies. Neurological: Negative. Hematological: Negative. Psychiatric/Behavioral: Negative. Past medical history, past surgical history, medication list, social and familyhistory reviewed    Pulse 78, resp. rate 16, height 5' 3.5\" (1.613 m), weight 265 lb (120.2 kg), last menstrual period 09/17/1996, SpO2 94 %, not currently breastfeeding. Physical Exam  Constitutional:       Appearance: She is well-developed. Comments: No pallor   HENT:      Head: Normocephalic and atraumatic. Eyes:      Conjunctiva/sclera: Conjunctivae normal.      Pupils: Pupils are equal, round, and reactive to light. Neck:      Musculoskeletal: Normal range of motion.    Cardiovascular:      Rate and Rhythm: Normal rate. Pulmonary:      Effort: Pulmonary effort is normal.   Abdominal:      General: Bowel sounds are normal.      Palpations: Abdomen is soft. Musculoskeletal: Normal range of motion. Skin:     General: Skin is warm. Neurological:      Mental Status: She is alert. Laboratory, Pathology, Radiology reviewed in detail with relevantimportant investigations summarized below:    Recent Labs     02/26/21  1056 02/07/21  0712 02/06/21  0624   WBC 5.9 6.8 5.9   HGB 8.7* 7.8* 7.4*   HCT 28.8* 25.3* 24.2*   MCV 82.0 79.8* 78.1*    176 175     Lab Results   Component Value Date    ALT 6 02/07/2021    AST 12 02/07/2021    ALKPHOS 117 02/07/2021    BILITOT 0.5 02/07/2021     No results found. Endoscopic investigations:     Assessment and Plan:  Lucia Doss 77 y.o. female for follow up. Anemia and heme positive stool. Endoscopic evaluation and capsule endoscopy were unremarkable. Also recent CBC done on February 26 shows a hemoglobin of 8.7 and hematocrit 28.8, he on 2/7/2021 showed a hemoglobin of 7.8 and 25.3 . Will repeat CBC after 4 weeks   Diagnosis Orders   1. Gastrointestinal hemorrhage, unspecified gastrointestinal hemorrhage type  CBC Auto Differential       Return in about 4 weeks (around 3/29/2021). Marc Nixon MD   StaffGastroenterologist  [facility]    Please note this report has been partially produced using speech recognitionsoftware  and may cause contain errors related to that system including grammar, punctuation and spelling as well as words andphrases that may seem inappropriate. If there are questions or concerns please feel free to contact me to clarify.

## 2021-03-15 RX ORDER — LANOLIN ALCOHOL/MO/W.PET/CERES
325 CREAM (GRAM) TOPICAL 2 TIMES DAILY
Qty: 60 TABLET | Refills: 5 | Status: SHIPPED | OUTPATIENT
Start: 2021-03-15 | End: 2021-09-20

## 2021-03-15 NOTE — TELEPHONE ENCOUNTER
Rx requested:  Requested Prescriptions     Pending Prescriptions Disp Refills    ferrous sulfate (FE TABS 325) 325 (65 Fe) MG EC tablet 60 tablet 0     Sig: Take 1 tablet by mouth 2 times daily       Last Office Visit:   2/10/2021      Last filled:  2/6/21    Next Visit Date:  Future Appointments   Date Time Provider Alvarez Solo   3/24/2021  3:30 PM Brock Schroeder MD [facility] EMERGENCY [facility] AT [place]   5/13/2021 12:30 PM Waylan Hatchet, DO [facility] [place]   12/22/2021  9:00 AM Corrina Walters MD [address] [place] [address]

## 2021-03-22 ENCOUNTER — HOSPITAL ENCOUNTER (OUTPATIENT)
Dept: LAB | Age: 76
Discharge: HOME | End: 2021-03-22
Payer: MEDICARE

## 2021-03-22 DIAGNOSIS — K92.2 GASTROINTESTINAL HEMORRHAGE, UNSPECIFIED GASTROINTESTINAL HEMORRHAGE TYPE: ICD-10-CM

## 2021-03-22 LAB
ACANTHOCYTES: ABNORMAL
ANISOCYTOSIS: ABNORMAL
BASOPHILS ABSOLUTE: 0 K/UL (ref 0–0.2)
BASOPHILS RELATIVE PERCENT: 0.7 %
BURR CELLS: ABNORMAL
EOSINOPHILS ABSOLUTE: 0.1 K/UL (ref 0–0.7)
EOSINOPHILS RELATIVE PERCENT: 1 %
HCT VFR BLD CALC: 30.6 % (ref 37–47)
HEMATOLOGY PATH CONSULT: YES
HEMOGLOBIN: 9.3 G/DL (ref 12–16)
LYMPHOCYTES ABSOLUTE: 0.7 K/UL (ref 1–4.8)
LYMPHOCYTES RELATIVE PERCENT: 11 %
MCH RBC QN AUTO: 26.3 PG (ref 27–31.3)
MCHC RBC AUTO-ENTMCNC: 30.6 % (ref 33–37)
MCV RBC AUTO: 86 FL (ref 82–100)
MICROCYTES: ABNORMAL
MONOCYTES ABSOLUTE: 0.2 K/UL (ref 0.2–0.8)
MONOCYTES RELATIVE PERCENT: 3.8 %
NEUTROPHILS ABSOLUTE: 5 K/UL (ref 1.4–6.5)
NEUTROPHILS RELATIVE PERCENT: 84 %
OVALOCYTES: ABNORMAL
PDW BLD-RTO: 23.2 % (ref 11.5–14.5)
PLATELET # BLD: 192 K/UL (ref 130–400)
PLATELET SLIDE REVIEW: NORMAL
POIKILOCYTES: ABNORMAL
RBC # BLD: 3.56 M/UL (ref 4.2–5.4)
SCHISTOCYTES: ABNORMAL
TEAR DROP CELLS: ABNORMAL
WBC # BLD: 6 K/UL (ref 4.8–10.8)

## 2021-03-22 PROCEDURE — 85025 COMPLETE CBC W/AUTO DIFF WBC: CPT

## 2021-03-22 PROCEDURE — 36415 COLL VENOUS BLD VENIPUNCTURE: CPT

## 2021-03-23 LAB — HEMATOLOGY PATH CONSULT: NORMAL

## 2021-03-24 ENCOUNTER — OFFICE VISIT (OUTPATIENT)
Dept: GASTROENTEROLOGY | Age: 76
End: 2021-03-24
Payer: MEDICARE

## 2021-03-24 VITALS
HEIGHT: 64 IN | HEART RATE: 77 BPM | BODY MASS INDEX: 45.24 KG/M2 | OXYGEN SATURATION: 95 % | RESPIRATION RATE: 14 BRPM | WEIGHT: 265 LBS

## 2021-03-24 DIAGNOSIS — D50.9 IRON DEFICIENCY ANEMIA, UNSPECIFIED IRON DEFICIENCY ANEMIA TYPE: Primary | ICD-10-CM

## 2021-03-24 PROCEDURE — 99212 OFFICE O/P EST SF 10 MIN: CPT | Performed by: SPECIALIST

## 2021-03-24 ASSESSMENT — ENCOUNTER SYMPTOMS
CONSTIPATION: 0
DIARRHEA: 0
NAUSEA: 0
RESPIRATORY NEGATIVE: 1
ANAL BLEEDING: 0
RECTAL PAIN: 0
VOMITING: 0
GASTROINTESTINAL NEGATIVE: 1
ABDOMINAL PAIN: 0
BLOOD IN STOOL: 0
EYES NEGATIVE: 1
ABDOMINAL DISTENTION: 0

## 2021-03-24 NOTE — PROGRESS NOTES
Gastroenterology Clinic Follow up Visit    Franci Che  [id number]  Chief Complaint   Patient presents with    Follow-up       HPI and A/P at last visit summarized below:  Patient is here for follow-up, most recent CBC done on March 22 showed a hemoglobin of 9.3 and hematocrit of 30.6 which is higher than the previous value. Patient has been on oral iron supplements, does not feel fatigued or  short of breath, no abdominal pain or discomfort  Review of Systems   Constitutional: Negative. HENT: Negative. Eyes: Negative. Respiratory: Negative. Cardiovascular: Negative. Gastrointestinal: Negative. Negative for abdominal distention, abdominal pain, anal bleeding, blood in stool, constipation, diarrhea, nausea, rectal pain and vomiting. Endocrine: Negative. Genitourinary: Negative. Musculoskeletal: Positive for arthralgias. Skin: Negative. Allergic/Immunologic: Negative for food allergies. Neurological: Negative. Hematological: Negative. Psychiatric/Behavioral: Negative. Past medical history, past surgical history, medication list, social and familyhistory reviewed    Pulse 77, resp. rate 14, height 5' 3.5\" (1.613 m), weight 265 lb (120.2 kg), last menstrual period 09/17/1996, SpO2 95 %, not currently breastfeeding. Physical Exam  Constitutional:       Appearance: She is well-developed. HENT:      Head: Normocephalic and atraumatic. Eyes:      Conjunctiva/sclera: Conjunctivae normal.      Pupils: Pupils are equal, round, and reactive to light. Neck:      Musculoskeletal: Normal range of motion. Cardiovascular:      Rate and Rhythm: Normal rate. Comments: S1-S2 regular with a systolic murmur  Pulmonary:      Effort: Pulmonary effort is normal.   Abdominal:      General: Bowel sounds are normal.      Palpations: Abdomen is soft. Musculoskeletal: Normal range of motion. Skin:     General: Skin is warm. Neurological:      Mental Status: She is alert. Laboratory, Pathology, Radiology reviewed in detail with relevantimportant investigations summarized below:    Recent Labs     03/22/21  1030 02/26/21  1056   WBC 6.0 5.9   HGB 9.3* 8.7*   HCT 30.6* 28.8*   MCV 86.0 82.0    196     Lab Results   Component Value Date    ALT 6 02/07/2021    AST 12 02/07/2021    ALKPHOS 117 02/07/2021    BILITOT 0.5 02/07/2021     No results found. Endoscopic investigations:     Assessment and Plan:  Douglas Espitia 77 y.o. female for follow up. Anemia secondary to possible GI bleeding, no signs of active GI bleeding currently and hemoglobin and hematocrit shows improvement, continue oral iron supplements and will repeat CBC in 5 to 6 weeks, return second week of May   Diagnosis Orders   1. Iron deficiency anemia, unspecified iron deficiency anemia type  CBC Auto Differential       No follow-ups on file. Adele Taveras MD   StaffGastroenterologist  [facility]    Please note this report has been partially produced using speech recognitionsoftware  and may cause contain errors related to that system including grammar, punctuation and spelling as well as words andphrases that may seem inappropriate. If there are questions or concerns please feel free to contact me to clarify.

## 2021-04-09 DIAGNOSIS — R39.15 URINARY URGENCY: ICD-10-CM

## 2021-04-09 RX ORDER — OXYBUTYNIN CHLORIDE 10 MG/1
10 TABLET, EXTENDED RELEASE ORAL DAILY
Qty: 30 TABLET | Refills: 3 | Status: SHIPPED | OUTPATIENT
Start: 2021-04-09 | End: 2021-09-10

## 2021-04-09 NOTE — TELEPHONE ENCOUNTER
Rx requested:  Requested Prescriptions     Pending Prescriptions Disp Refills    oxybutynin (DITROPAN XL) 10 MG extended release tablet 30 tablet 3     Sig: Take 1 tablet by mouth daily       Last Office Visit:   2/10/2021      Last filled:  12/8/2020    Next Visit Date:  Future Appointments   Date Time Provider Alvarez Solo   5/12/2021  1:15 PM Antonio Abraham MD [address]   5/13/2021 12:30 PM DO JONO Florez CARDIO [facility] EMERGENCY [facility] CENTER AT [place]   12/22/2021  9:00 AM Flip Bhatia MD [address] [place] [address]

## 2021-04-23 DIAGNOSIS — I10 ESSENTIAL HYPERTENSION: ICD-10-CM

## 2021-04-23 RX ORDER — LOSARTAN POTASSIUM AND HYDROCHLOROTHIAZIDE 25; 100 MG/1; MG/1
1 TABLET ORAL DAILY
Qty: 90 TABLET | Refills: 3 | Status: SHIPPED | OUTPATIENT
Start: 2021-04-23 | End: 2022-04-25

## 2021-04-23 NOTE — TELEPHONE ENCOUNTER
Rx requested:  Requested Prescriptions     Pending Prescriptions Disp Refills    losartan-hydroCHLOROthiazide (HYZAAR) 100-25 MG per tablet 90 tablet 3     Sig: Take 1 tablet by mouth daily       Last Office Visit:   2/10/2021      Last filled:  12/24/20    Next Visit Date:  Future Appointments   Date Time Provider Alvarez Solo   5/12/2021  1:15 PM Benjamin Mccall MD [address]   5/13/2021 12:30 PM DO Himanshu Evans [facility]   12/22/2021  9:00 AM Emi Mcdonald MD [address] [place] [address]

## 2021-05-06 ENCOUNTER — HOSPITAL ENCOUNTER (OUTPATIENT)
Dept: LAB | Age: 76
Discharge: HOME | End: 2021-05-06
Payer: MEDICARE

## 2021-05-06 DIAGNOSIS — D50.9 IRON DEFICIENCY ANEMIA, UNSPECIFIED IRON DEFICIENCY ANEMIA TYPE: ICD-10-CM

## 2021-05-06 LAB
ANISOCYTOSIS: ABNORMAL
BASOPHILS ABSOLUTE: 0 K/UL (ref 0–0.2)
BASOPHILS RELATIVE PERCENT: 0.5 %
EOSINOPHILS ABSOLUTE: 0.3 K/UL (ref 0–0.7)
EOSINOPHILS RELATIVE PERCENT: 6.4 %
HCT VFR BLD CALC: 36.5 % (ref 37–47)
HEMOGLOBIN: 11.2 G/DL (ref 12–16)
LYMPHOCYTES ABSOLUTE: 0.6 K/UL (ref 1–4.8)
LYMPHOCYTES RELATIVE PERCENT: 13.8 %
MCH RBC QN AUTO: 27 PG (ref 27–31.3)
MCHC RBC AUTO-ENTMCNC: 30.6 % (ref 33–37)
MCV RBC AUTO: 88.5 FL (ref 82–100)
MICROCYTES: ABNORMAL
MONOCYTES ABSOLUTE: 0.3 K/UL (ref 0.2–0.8)
MONOCYTES RELATIVE PERCENT: 7.2 %
NEUTROPHILS ABSOLUTE: 3.3 K/UL (ref 1.4–6.5)
NEUTROPHILS RELATIVE PERCENT: 72.1 %
OVALOCYTES: ABNORMAL
PDW BLD-RTO: 18.2 % (ref 11.5–14.5)
PLATELET # BLD: 133 K/UL (ref 130–400)
POIKILOCYTES: ABNORMAL
RBC # BLD: 4.13 M/UL (ref 4.2–5.4)
WBC # BLD: 4.6 K/UL (ref 4.8–10.8)

## 2021-05-06 PROCEDURE — 36415 COLL VENOUS BLD VENIPUNCTURE: CPT

## 2021-05-06 PROCEDURE — 85025 COMPLETE CBC W/AUTO DIFF WBC: CPT

## 2021-05-12 ENCOUNTER — OFFICE VISIT (OUTPATIENT)
Dept: GASTROENTEROLOGY | Age: 76
End: 2021-05-12
Payer: MEDICARE

## 2021-05-12 VITALS
WEIGHT: 265 LBS | BODY MASS INDEX: 45.24 KG/M2 | HEART RATE: 60 BPM | RESPIRATION RATE: 18 BRPM | OXYGEN SATURATION: 96 % | HEIGHT: 64 IN

## 2021-05-12 DIAGNOSIS — D50.9 IRON DEFICIENCY ANEMIA, UNSPECIFIED IRON DEFICIENCY ANEMIA TYPE: Primary | ICD-10-CM

## 2021-05-12 DIAGNOSIS — K92.2 GASTROINTESTINAL HEMORRHAGE, UNSPECIFIED GASTROINTESTINAL HEMORRHAGE TYPE: ICD-10-CM

## 2021-05-12 PROCEDURE — 99213 OFFICE O/P EST LOW 20 MIN: CPT | Performed by: SPECIALIST

## 2021-05-12 ASSESSMENT — ENCOUNTER SYMPTOMS
RECTAL PAIN: 0
ABDOMINAL PAIN: 0
ABDOMINAL DISTENTION: 0
NAUSEA: 0
RESPIRATORY NEGATIVE: 1
VOMITING: 0
EYES NEGATIVE: 1
BLOOD IN STOOL: 0
GASTROINTESTINAL NEGATIVE: 1
ANAL BLEEDING: 0
CONSTIPATION: 0
DIARRHEA: 0

## 2021-05-12 NOTE — PROGRESS NOTES
Gastroenterology Clinic Follow up Visit    [facility]  [id number]  Chief Complaint   Patient presents with    Follow-up     Labs    Medication Refill     Protonix       HPI and A/P at last visit summarized below:  Patient is here for follow-up, CBC done on May 6 shows a hemoglobin of 11.2 and hematocrit of 36.5 it is better than the previous results. Patient is on oral iron supplements, EGD in January 2021 showed antral gastritis and duodenal erosions and the biopsy showed reactive gastropathy no HP was seen, been on Protonix 40 mg once a day, does not take aspirin or NSAIDs, patient feels okay, no abdominal pain    Review of Systems   Constitutional: Negative. HENT: Negative. Eyes: Negative. Respiratory: Negative. Cardiovascular: Negative. Gastrointestinal: Negative. Negative for abdominal distention, abdominal pain, anal bleeding, blood in stool, constipation, diarrhea, nausea, rectal pain and vomiting. Endocrine: Negative. Genitourinary: Negative. Musculoskeletal: Positive for arthralgias. Skin: Negative. Allergic/Immunologic: Positive for food allergies. Neurological: Negative. Hematological: Negative. Psychiatric/Behavioral: Negative. Past medical history, past surgical history, medication list, social and familyhistory reviewed    Pulse 60, resp. rate 18, height 5' 3.5\" (1.613 m), weight 265 lb (120.2 kg), last menstrual period 09/17/1996, SpO2 96 %, not currently breastfeeding. Physical Exam  Constitutional:       Appearance: She is well-developed. HENT:      Head: Normocephalic and atraumatic. Eyes:      Conjunctiva/sclera: Conjunctivae normal.      Pupils: Pupils are equal, round, and reactive to light. Neck:      Musculoskeletal: Normal range of motion. Cardiovascular:      Rate and Rhythm: Normal rate. Pulmonary:      Effort: Pulmonary effort is normal.   Abdominal:      General: Bowel sounds are normal.      Palpations: Abdomen is soft. Comments: Soft, obese . Nontender no palpable mass   Musculoskeletal: Normal range of motion. Skin:     General: Skin is warm. Neurological:      Mental Status: She is alert. Laboratory, Pathology, Radiology reviewed in detail with relevantimportant investigations summarized below:    Recent Labs     05/06/21  1045   WBC 4.6*   HGB 11.2*   HCT 36.5*   MCV 88.5        Lab Results   Component Value Date    ALT 6 02/07/2021    AST 12 02/07/2021    ALKPHOS 117 02/07/2021    BILITOT 0.5 02/07/2021     No results found. Endoscopic investigations:     Assessment and Plan:  Key Weinberg 77 y.o. female for follow up. Anemia and GI bleeding. Most recent CBC shows significant improvement in the hemoglobin and hematocrit. Patient has been on Protonix and does not take any aspirin or NSAIDs. Would hold PPI for now and continue oral iron supplements and repeat CBC in 8 to 10 weeks, return after 3 months   Diagnosis Orders   1. Iron deficiency anemia, unspecified iron deficiency anemia type  CBC Auto Differential   2. Gastrointestinal hemorrhage, unspecified gastrointestinal hemorrhage type  CBC Auto Differential       Return in about 3 months (around 8/12/2021). Lucinda Dillard MD   StaffGastroenterologist  [facility]    Please note this report has been partially produced using speech recognitionsoftware  and may cause contain errors related to that system including grammar, punctuation and spelling as well as words andphrases that may seem inappropriate. If there are questions or concerns please feel free to contact me to clarify.

## 2021-06-22 ENCOUNTER — OFFICE VISIT (OUTPATIENT)
Dept: CARDIOLOGY CLINIC | Age: 76
End: 2021-06-22
Payer: MEDICARE

## 2021-06-22 VITALS
OXYGEN SATURATION: 96 % | WEIGHT: 257 LBS | TEMPERATURE: 97 F | BODY MASS INDEX: 44.81 KG/M2 | DIASTOLIC BLOOD PRESSURE: 80 MMHG | HEART RATE: 80 BPM | SYSTOLIC BLOOD PRESSURE: 128 MMHG

## 2021-06-22 DIAGNOSIS — E78.2 MIXED HYPERLIPIDEMIA: ICD-10-CM

## 2021-06-22 DIAGNOSIS — I25.10 CORONARY ARTERY DISEASE INVOLVING NATIVE CORONARY ARTERY OF NATIVE HEART WITHOUT ANGINA PECTORIS: ICD-10-CM

## 2021-06-22 DIAGNOSIS — E66.01 MORBID OBESITY DUE TO EXCESS CALORIES (HCC): Primary | ICD-10-CM

## 2021-06-22 DIAGNOSIS — N18.30 STAGE 3 CHRONIC KIDNEY DISEASE, UNSPECIFIED WHETHER STAGE 3A OR 3B CKD (HCC): ICD-10-CM

## 2021-06-22 DIAGNOSIS — I25.2 HISTORY OF ST ELEVATION MYOCARDIAL INFARCTION (STEMI): ICD-10-CM

## 2021-06-22 DIAGNOSIS — I10 ESSENTIAL HYPERTENSION: ICD-10-CM

## 2021-06-22 DIAGNOSIS — R09.89 BILATERAL CAROTID BRUITS: ICD-10-CM

## 2021-06-22 PROBLEM — R07.9 CHEST PAIN: Status: RESOLVED | Noted: 2020-12-22 | Resolved: 2021-06-22

## 2021-06-22 PROCEDURE — 99214 OFFICE O/P EST MOD 30 MIN: CPT | Performed by: INTERNAL MEDICINE

## 2021-06-22 RX ORDER — ATORVASTATIN CALCIUM 80 MG/1
40 TABLET, FILM COATED ORAL NIGHTLY
Qty: 90 TABLET | Refills: 3 | Status: SHIPPED | OUTPATIENT
Start: 2021-06-22 | End: 2022-09-29

## 2021-06-22 NOTE — PROGRESS NOTES
Chief Complaint   Patient presents with    Coronary Artery Disease    Hypertension    Hyperlipidemia     CC: SOB, CAD    5-13-17: Hospital Course:   Brit Theodore is a 80 y.o. female admitted to [facility] on 5/13/2017 for chest pain. After an EKG was done at [facility] was found to be having an acute MI patient was flown here by helicopter taken directly to cath lab to drug eluted stents were put in place ALP INE 3.5 mm x 23 mm and a 2.5 mm x 8 mm . Tolerated procedure well denied chest pain denied right wrist pain no distress     6-6-17: Patient presents for initial medical evaluation. Patient is followed on a regular basis by Dr. Maggy Bray MD.   Pt denies chest pain, dyspnea, dyspnea on exertion, change in exercise capacity, fatigue,  nausea, vomiting, diarrhea, constipation, motor weakness, insomnia, weight loss, syncope, dizziness, lightheadedness, palpitations, PND, orthopnea, or claudication. No nitro use. BP and hr are good. CAD is stable. No LE discoloration or ulcers. No LE edema. No CHF type symptoms. Lipid profile is abnormal. Was placed on statin during hosp. No bleeding issues. S/p LHC with mild LAD diffuse disease, 99% mid CX, 80-90%90% proximal to mid RCA. S/p PCI to CX with 2 KEYSHA. Planned staging of RCA due to STEMI procedure. s/p ECHO. Conclusions   Summary   Normal mitral valve structure and function.   Normal aortic valve structure and function.   Normal tricuspid valve structure and function.   There is mild ( 1 +) tricuspid regurgitation with estimated RVSP of 38 mm   Hg.   Normal pulmonic valve structure and function.   Mildly dilated left atrium.   Left ventricular ejection fraction is visually estimated at 65%.   Impaired relaxation compatible with diastolic dysfunction.  ( reversed E/A   ratio)   Mild concentric left ventricular hypertrophy.   Normal right atrium.   Normal right ventricle structure and function.   No evidence of pericardial symptoms. Lipid profile is normal. No recent hospitalization. No change in meds. Has CKD, stage III      9-17-19: Pt denies chest pain, dyspnea, dyspnea on exertion, change in exercise capacity, fatigue,  nausea, vomiting, diarrhea, constipation, motor weakness, insomnia, weight loss, syncope, dizziness, lightheadedness, palpitations, PND, orthopnea, or claudication. No nitro use. BP and hr are good. CAD is stable. No LE discoloration or ulcers. No LE edema. No CHF type symptoms. Lipid profile is normal. No recent hospitalization. No change in meds. Hx of STEMI/CAD s/p PCI. On ASA only. Has CKD, stage III. 3-10-20: Pt denies chest pain, dyspnea, dyspnea on exertion, change in exercise capacity, fatigue,  nausea, vomiting, diarrhea, constipation, motor weakness, insomnia, weight loss, syncope, dizziness, lightheadedness, palpitations, PND, orthopnea. No nitro use. BP and hr are good. CAD is stable. No LE discoloration or ulcers. No LE edema. No CHF type symptoms. Lipid profile is normal. No recent hospitalization. No change in meds. On ASA. No bleeding issues. Has OA issues. C/o carmps and aches in her legs after going grocery shopping, has a lot of aches in her calves and feet. Hx of STEMI/CAD s/p PCI. Has stage III CKD. LDL is 91.       9-15-10: Pt denies chest pain, dyspnea, dyspnea on exertion, change in exercise capacity, fatigue,  nausea, vomiting, diarrhea, constipation, motor weakness, insomnia, weight loss, syncope, dizziness, lightheadedness, palpitations, PND, orthopnea, or claudication. No nitro use. BP and hr are good. CAD is stable. No LE discoloration or ulcers. No LE edema. No CHF type symptoms. Lipid profile is normal. No recent hospitalization. No change in meds. S/p b/l LE art duplex US with no stenosis/occlusion. Hx of CAD s/p PCI. Hx of stage III CKD. EKG with NSR, RBBB. 2-4-21: Post hospitalization for acute worsening anemia and GI bleed, antiplatelets were placed on hold. Status post endoscopy without identifying source of bleeding. Patient to undergo camera pill endoscopy soon. States she developed shortness of breath with exertion as well as midsternal chest discomfort that completely resolved with rest.  Patient did develop chest pain during hospitalization as well as elevated cardiac enzymes. History of coronary disease status post PCI of the LAD and RCA in 2017. Patient does have a history of gastric ulcer. Hydrochlorothiazide and lisinopril were discontinued as well. Status post echocardiogram on December 22, 2020 with normal LV function, 2+ mitral vegetation, grade 1 diastolic dysfunction. 6-22-21: Hgb is 11 now. S/p hospitalization post last office visit for anemia. ASA was DC'd. History of coronary disease status post PCI of the LAD and RCA in 2017. Pt denies chest pain, dyspnea, dyspnea on exertion, change in exercise capacity, fatigue,  nausea, vomiting, diarrhea, constipation, motor weakness, insomnia, weight loss, syncope, dizziness, lightheadedness, palpitations, PND, orthopnea, or claudication. S/p camera endoscopy which was negative. No nitro use. BP and hr are good. CAD is stable. No LE discoloration or ulcers. No LE edema. No CHF type symptoms. Lipid profile is normal. No recent hospitalization. No change in meds. Has stage III CKD, GFR of 42.            Patient Active Problem List   Diagnosis    Hypothyroid    Essential hypertension    Hyperlipidemia    Morbid obesity due to excess calories ([address]    History of ST elevation myocardial infarction (STEMI)    Coronary artery disease involving native coronary artery of native heart without angina pectoris    Chronic bilateral low back pain with bilateral sciatica    Primary osteoarthritis of both knees    Spinal stenosis of lumbar region with neurogenic claudication    Degenerative spondylolisthesis    Asthma    CKD (chronic kidney disease) stage 3, GFR 30-59 ml/min ([facility])    Claudication ([address]  Adenomatous polyp of descending colon    Adenomatous polyp of ascending colon    Adenomatous polyp of colon    Chronic gastritis without bleeding    Anemia    Gastrointestinal hemorrhage       Past Surgical History:   Procedure Laterality Date    CARDIAC SURGERY  2017    has x 4 cardiac stents    COLONOSCOPY  01/14/2015    DR Ralf Hoover - DIVERTICULOSIS    COLONOSCOPY N/A 1/2/2021    COLONOSCOPY DIAGNOSTIC performed by Brock Schroeder MD at [facility]  05/17/2017    x2 stents    CYST REMOVAL Left 2/7/2019    RESECTION OF LEFT PINNA LESION WITH GRAFT performed by Kee Hagan MD at [facility]. [address], COLON, DIAGNOSTIC      FINGER SURGERY  12/9/14    middle finger right hand due to infection   [facility]  [phone]   [place]  2008    shoulder dislocated - popped  back in Right shoulder    UPPER GASTROINTESTINAL ENDOSCOPY  01/14/2015    DR L[address] - ULCER IN THE ANTRUM    UPPER GASTROINTESTINAL ENDOSCOPY  05/06/2015    DR Ralf Hoover - GASTRITIS, PREVIOUS ULCER HEALED    UPPER GASTROINTESTINAL ENDOSCOPY N/A 1/2/2021    EGD DIAGNOSTIC ONLY performed by Brock Schroeder MD at [facility] History     Socioeconomic History    Marital status:      Spouse name: Not on file    Number of children: Not on file    Years of education: Not on file    Highest education level: Not on file   Occupational History    Not on file   Tobacco Use    Smoking status: Never Smoker    Smokeless tobacco: Never Used   Vaping Use    Vaping Use: Never used   Substance and Sexual Activity    Alcohol use: Yes     Comment: glass of wine once a year    Drug use: No    Sexual activity: Yes     Partners: Male   Other Topics Concern    Not on file   Social History Narrative    Not on file     Social Determinants of Health     Financial Resource Strain:     Difficulty of Paying Living Expenses:    Food Insecurity:     Worried About Running Out of Food in the Last Year:    [address] in the Last Year:    Transportation Needs:     Lack of Transportation (Medical):  Lack of Transportation (Non-Medical):    Physical Activity:     Days of Exercise per Week:     Minutes of Exercise per Session:    Stress:     Feeling of Stress :    Social Connections:     Frequency of Communication with Friends and Family:     Frequency of Social Gatherings with Friends and Family:     Attends [employer] Services:     Active Member of Clubs or Organizations:     Attends Club or Organization Meetings:     Marital Status:    Intimate Partner Violence:     Fear of Current or Ex-Partner:     Emotionally Abused:     Physically Abused:     Sexually Abused:        Family History   Problem Relation Age of Onset    Heart Disease Mother 61    Cancer Father 79        kidney    No Known Problems Daughter        Current Outpatient Medications   Medication Sig Dispense Refill    atorvastatin (LIPITOR) 80 MG tablet Take 0.5 tablets by mouth nightly 90 tablet 3    losartan-hydroCHLOROthiazide (HYZAAR) 100-25 MG per tablet Take 1 tablet by mouth daily 90 tablet 3    oxybutynin (DITROPAN XL) 10 MG extended release tablet Take 1 tablet by mouth daily 30 tablet 3    ferrous sulfate (FE TABS 325) 325 (65 Fe) MG EC tablet Take 1 tablet by mouth 2 times daily 60 tablet 5    dilTIAZem (CARDIZEM CD) 240 MG extended release capsule Take 1 capsule by mouth daily 90 capsule 3    metoprolol tartrate (LOPRESSOR) 25 MG tablet TAKE 1 TABLET BY MOUTH TWICE DAILY 180 tablet 3    nitroGLYCERIN (NITROSTAT) 0.4 MG SL tablet Place 1 tablet under the tongue every 5 minutes as needed for Chest pain 25 tablet 3    acetaminophen (TYLENOL) 325 MG tablet Take 650 mg by mouth every 6 hours as needed for Pain        No current facility-administered medications for this visit.        Lisinopril and Tramadol    Review of Systems:  General ROS: negative  Psychological ROS:

## 2021-06-29 RX ORDER — PANTOPRAZOLE SODIUM 40 MG/1
40 TABLET, DELAYED RELEASE ORAL DAILY
Qty: 30 TABLET | Refills: 2 | Status: SHIPPED | OUTPATIENT
Start: 2021-06-29 | End: 2022-01-04 | Stop reason: ALTCHOICE

## 2021-07-14 ENCOUNTER — HOSPITAL ENCOUNTER (OUTPATIENT)
Dept: ULTRASOUND IMAGING | Age: 76
Discharge: HOME | End: 2021-07-16
Payer: MEDICARE

## 2021-07-14 DIAGNOSIS — R09.89 BILATERAL CAROTID BRUITS: ICD-10-CM

## 2021-07-14 PROCEDURE — 93880 EXTRACRANIAL BILAT STUDY: CPT

## 2021-07-18 PROCEDURE — 93880 EXTRACRANIAL BILAT STUDY: CPT | Performed by: INTERNAL MEDICINE

## 2021-07-26 ENCOUNTER — TELEPHONE (OUTPATIENT)
Dept: CARDIOLOGY CLINIC | Age: 76
End: 2021-07-26

## 2021-07-26 NOTE — TELEPHONE ENCOUNTER
VALUES CORRESPOND TO 50-69% STENOSIS OF THE RIGHT INTERNAL CAROTID ARTERY.       VALUES CORRESPOND TO LESS THAN 50% STENOSIS OF THE LEFT INTERNAL CAROTID ARTERY.       ANTEGRADE FLOW OF BILATERAL VERTEBRAL ARTERIES.      Please review

## 2021-07-29 NOTE — TELEPHONE ENCOUNTER
Patient with 50 to 69% right internal carotid artery stenosis. We will repeat carotid ultrasound in 1 year.   Please notify patient

## 2021-08-06 DIAGNOSIS — I10 ESSENTIAL HYPERTENSION: ICD-10-CM

## 2021-09-09 DIAGNOSIS — R39.15 URINARY URGENCY: ICD-10-CM

## 2021-09-10 RX ORDER — OXYBUTYNIN CHLORIDE 10 MG/1
10 TABLET, EXTENDED RELEASE ORAL DAILY
Qty: 30 TABLET | Refills: 3 | Status: SHIPPED | OUTPATIENT
Start: 2021-09-10 | End: 2021-12-28

## 2021-09-10 NOTE — TELEPHONE ENCOUNTER
Comments: last filled 4/9/2021    Last Office Visit (last PCP visit):   2/10/2021    Next Visit Date:  Future Appointments   Date Time Provider [place] Solo   12/22/2021  9:00 AM Jeanmarie Rincon MD [address] [place] [address]   12/28/2021 10:30 AM Jordan AnnieDO Himanshu Martinez       **If hasn't been seen in over a year OR hasn't followed up according to last diabetes/ADHD visit, make appointment for patient before sending refill to provider.     Rx requested:  Requested Prescriptions     Pending Prescriptions Disp Refills    oxybutynin (DITROPAN-XL) 10 MG extended release tablet [Pharmacy Med Name: oxybutynin chloride ER 10 mg tablet,extended release 24 hr] 30 tablet 3     Sig: Take 1 tablet by mouth daily

## 2021-09-16 ENCOUNTER — HOSPITAL ENCOUNTER (OUTPATIENT)
Dept: LAB | Age: 76
Discharge: HOME | End: 2021-09-16
Payer: MEDICARE

## 2021-09-16 LAB
ALBUMIN SERPL-MCNC: 4.2 G/DL (ref 3.5–4.6)
ANION GAP SERPL CALCULATED.3IONS-SCNC: 12 MEQ/L (ref 9–15)
BASOPHILS ABSOLUTE: 0 K/UL (ref 0–0.2)
BASOPHILS RELATIVE PERCENT: 0.5 %
BUN BLDV-MCNC: 33 MG/DL (ref 8–23)
CALCIUM SERPL-MCNC: 9.2 MG/DL (ref 8.5–9.9)
CHLORIDE BLD-SCNC: 106 MEQ/L (ref 95–107)
CO2: 25 MEQ/L (ref 20–31)
CREAT SERPL-MCNC: 1.14 MG/DL (ref 0.5–0.9)
EOSINOPHILS ABSOLUTE: 0.2 K/UL (ref 0–0.7)
EOSINOPHILS RELATIVE PERCENT: 3.2 %
GFR AFRICAN AMERICAN: 56.2
GFR NON-AFRICAN AMERICAN: 46.4
GLUCOSE BLD-MCNC: 111 MG/DL (ref 70–99)
HCT VFR BLD CALC: 36.3 % (ref 37–47)
HEMOGLOBIN: 11.8 G/DL (ref 12–16)
LYMPHOCYTES ABSOLUTE: 1 K/UL (ref 1–4.8)
LYMPHOCYTES RELATIVE PERCENT: 20.4 %
MCH RBC QN AUTO: 31.7 PG (ref 27–31.3)
MCHC RBC AUTO-ENTMCNC: 32.6 % (ref 33–37)
MCV RBC AUTO: 97.1 FL (ref 82–100)
MONOCYTES ABSOLUTE: 0.4 K/UL (ref 0.2–0.8)
MONOCYTES RELATIVE PERCENT: 7.6 %
NEUTROPHILS ABSOLUTE: 3.4 K/UL (ref 1.4–6.5)
NEUTROPHILS RELATIVE PERCENT: 68.3 %
PARATHYROID HORMONE INTACT: 93 PG/ML (ref 15–65)
PDW BLD-RTO: 15.7 % (ref 11.5–14.5)
PHOSPHORUS: 3.8 MG/DL (ref 2.3–4.8)
PLATELET # BLD: 200 K/UL (ref 130–400)
POTASSIUM SERPL-SCNC: 5 MEQ/L (ref 3.4–4.9)
RBC # BLD: 3.73 M/UL (ref 4.2–5.4)
SODIUM BLD-SCNC: 143 MEQ/L (ref 135–144)
VITAMIN D 25-HYDROXY: 15.2 NG/ML (ref 30–100)
WBC # BLD: 5 K/UL (ref 4.8–10.8)

## 2021-09-16 PROCEDURE — 80069 RENAL FUNCTION PANEL: CPT

## 2021-09-16 PROCEDURE — 36415 COLL VENOUS BLD VENIPUNCTURE: CPT

## 2021-09-16 PROCEDURE — 83970 ASSAY OF PARATHORMONE: CPT

## 2021-09-16 PROCEDURE — 85025 COMPLETE CBC W/AUTO DIFF WBC: CPT

## 2021-09-16 PROCEDURE — 82306 VITAMIN D 25 HYDROXY: CPT

## 2021-09-20 RX ORDER — FERROUS SULFATE 325(65) MG
TABLET ORAL
Qty: 60 TABLET | Refills: 5 | Status: SHIPPED | OUTPATIENT
Start: 2021-09-20 | End: 2022-03-30

## 2021-09-20 NOTE — TELEPHONE ENCOUNTER
Comments:     Last Office Visit (last PCP visit):   2/10/2021    Next Visit Date:  Future Appointments   Date Time Provider Al[place] Solo   12/22/2021  9:00 AM Ruth Butcher MD [address] [place] [address]   12/28/2021 10:30 AM Jordan Chandra, DO [facility]       **If hasn't been seen in over a year OR hasn't followed up according to last diabetes/ADHD visit, make appointment for patient before sending refill to provider.     Rx requested:  Requested Prescriptions     Pending Prescriptions Disp Refills    FEROSUL 325 (65 Fe) MG tablet [Pharmacy Med Name: FeroSul 325 mg (65 mg iron) tablet] 60 tablet 5     Sig: Take 1 tablet by mouth 2 times daily

## 2022-01-04 ENCOUNTER — HOSPITAL ENCOUNTER (OUTPATIENT)
Age: 77
Setting detail: SPECIMEN
Discharge: HOME | End: 2022-01-04
Payer: MEDICARE

## 2022-01-04 ENCOUNTER — OFFICE VISIT (OUTPATIENT)
Dept: FAMILY MEDICINE CLINIC | Age: 77
End: 2022-01-04
Payer: MEDICARE

## 2022-01-04 VITALS
DIASTOLIC BLOOD PRESSURE: 80 MMHG | SYSTOLIC BLOOD PRESSURE: 132 MMHG | HEART RATE: 64 BPM | WEIGHT: 250 LBS | OXYGEN SATURATION: 96 % | TEMPERATURE: 97.1 F | BODY MASS INDEX: 47.2 KG/M2 | HEIGHT: 61 IN

## 2022-01-04 DIAGNOSIS — R06.2 WHEEZE: ICD-10-CM

## 2022-01-04 DIAGNOSIS — Z00.00 ROUTINE GENERAL MEDICAL EXAMINATION AT A HEALTH CARE FACILITY: ICD-10-CM

## 2022-01-04 DIAGNOSIS — Z00.00 ENCOUNTER FOR ANNUAL WELLNESS EXAM IN MEDICARE PATIENT: Primary | ICD-10-CM

## 2022-01-04 DIAGNOSIS — R05.9 COUGH: ICD-10-CM

## 2022-01-04 DIAGNOSIS — E03.9 HYPOTHYROIDISM, UNSPECIFIED TYPE: ICD-10-CM

## 2022-01-04 DIAGNOSIS — Z00.00 ENCOUNTER FOR ANNUAL WELLNESS EXAM IN MEDICARE PATIENT: ICD-10-CM

## 2022-01-04 LAB
ALBUMIN SERPL-MCNC: 4.1 G/DL (ref 3.5–4.6)
ALP BLD-CCNC: 118 U/L (ref 40–130)
ALT SERPL-CCNC: 11 U/L (ref 0–33)
ANION GAP SERPL CALCULATED.3IONS-SCNC: 9 MEQ/L (ref 9–15)
AST SERPL-CCNC: 17 U/L (ref 0–35)
BILIRUB SERPL-MCNC: 0.3 MG/DL (ref 0.2–0.7)
BUN BLDV-MCNC: 29 MG/DL (ref 8–23)
CALCIUM SERPL-MCNC: 9.9 MG/DL (ref 8.5–9.9)
CHLORIDE BLD-SCNC: 105 MEQ/L (ref 95–107)
CHOLESTEROL, FASTING: 157 MG/DL (ref 0–199)
CO2: 27 MEQ/L (ref 20–31)
CREAT SERPL-MCNC: 1.19 MG/DL (ref 0.5–0.9)
GFR AFRICAN AMERICAN: 53.4
GFR NON-AFRICAN AMERICAN: 44.2
GLOBULIN: 3.5 G/DL (ref 2.3–3.5)
GLUCOSE FASTING: 127 MG/DL (ref 70–99)
HDLC SERPL-MCNC: 44 MG/DL (ref 40–59)
LDL CHOLESTEROL CALCULATED: 87 MG/DL (ref 0–129)
POTASSIUM SERPL-SCNC: 5 MEQ/L (ref 3.4–4.9)
SODIUM BLD-SCNC: 141 MEQ/L (ref 135–144)
TOTAL PROTEIN: 7.6 G/DL (ref 6.3–8)
TRIGLYCERIDE, FASTING: 128 MG/DL (ref 0–150)
TSH SERPL DL<=0.05 MIU/L-ACNC: 4.24 UIU/ML (ref 0.44–3.86)

## 2022-01-04 PROCEDURE — 36415 COLL VENOUS BLD VENIPUNCTURE: CPT | Performed by: FAMILY MEDICINE

## 2022-01-04 PROCEDURE — 80053 COMPREHEN METABOLIC PANEL: CPT

## 2022-01-04 PROCEDURE — 84443 ASSAY THYROID STIM HORMONE: CPT

## 2022-01-04 PROCEDURE — G0439 PPPS, SUBSEQ VISIT: HCPCS | Performed by: FAMILY MEDICINE

## 2022-01-04 PROCEDURE — 80061 LIPID PANEL: CPT

## 2022-01-04 PROCEDURE — 99213 OFFICE O/P EST LOW 20 MIN: CPT | Performed by: FAMILY MEDICINE

## 2022-01-04 RX ORDER — DILTIAZEM HYDROCHLORIDE 240 MG/1
CAPSULE, EXTENDED RELEASE ORAL
COMMUNITY
Start: 2021-03-29 | End: 2022-01-04 | Stop reason: SDUPTHER

## 2022-01-04 SDOH — ECONOMIC STABILITY: FOOD INSECURITY: WITHIN THE PAST 12 MONTHS, YOU WORRIED THAT YOUR FOOD WOULD RUN OUT BEFORE YOU GOT MONEY TO BUY MORE.: NEVER TRUE

## 2022-01-04 SDOH — ECONOMIC STABILITY: FOOD INSECURITY: WITHIN THE PAST 12 MONTHS, THE FOOD YOU BOUGHT JUST DIDN'T LAST AND YOU DIDN'T HAVE MONEY TO GET MORE.: NEVER TRUE

## 2022-01-04 ASSESSMENT — PATIENT HEALTH QUESTIONNAIRE - PHQ9
SUM OF ALL RESPONSES TO PHQ QUESTIONS 1-9: 0
1. LITTLE INTEREST OR PLEASURE IN DOING THINGS: 0
SUM OF ALL RESPONSES TO PHQ QUESTIONS 1-9: 0
2. FEELING DOWN, DEPRESSED OR HOPELESS: 0
SUM OF ALL RESPONSES TO PHQ9 QUESTIONS 1 & 2: 0

## 2022-01-04 ASSESSMENT — ENCOUNTER SYMPTOMS
CONSTIPATION: 0
ABDOMINAL PAIN: 0
RHINORRHEA: 0
COUGH: 1
SORE THROAT: 0
SHORTNESS OF BREATH: 0
DIARRHEA: 0
WHEEZING: 1

## 2022-01-04 ASSESSMENT — LIFESTYLE VARIABLES: HOW OFTEN DO YOU HAVE A DRINK CONTAINING ALCOHOL: 0

## 2022-01-04 ASSESSMENT — SOCIAL DETERMINANTS OF HEALTH (SDOH): HOW HARD IS IT FOR YOU TO PAY FOR THE VERY BASICS LIKE FOOD, HOUSING, MEDICAL CARE, AND HEATING?: NOT HARD AT ALL

## 2022-01-04 NOTE — PROGRESS NOTES
[address] PRIMARY CARE  [address] [id number]  Dept: [phone]  Dept Fax: : [phone]     Chief Complaint  Chief Complaint   Patient presents with    Medicare AW       HPI:  77 y. o.female who presents for the following:      Cough and wheeze since thanksgiving; no SOB. Can get coughing spells. No fevers; seems to be slowly improving; hasn't been seen for this yet    Review of Systems   Constitutional: Negative for chills and fever. HENT: Negative for congestion, rhinorrhea and sore throat. Respiratory: Positive for cough and wheezing. Negative for shortness of breath. Gastrointestinal: Negative for abdominal pain, constipation and diarrhea. Endocrine: Negative for polydipsia and polyuria. Genitourinary: Negative for dysuria, frequency and urgency. Neurological: Negative for syncope, light-headedness, numbness and headaches. Psychiatric/Behavioral: Negative for sleep disturbance. The patient is not nervous/anxious.         Past Medical History:   Diagnosis Date    Antral ulcer 01/14/2015    DR Charles Cardoza    Asthma     \"cured by beestings\"    Coronary artery disease     Coronary artery disease involving native coronary artery of native heart without angina pectoris 7/10/2018    has x 4 cardiac stents / Dr. Sirisha Way Diverticulosis of colon (without mention of hemorrhage) 01/14/2015    DR Charles Cardoza    Essential hypertension 12/10/2013    meds > 20 yrs    History of blood transfusion 1990s    with hysterectomy    History of normal resting EKG 1996    normal    History of ST elevation myocardial infarction (STEMI) 7/11/2017    History of transfusion of whole blood 1996    Excessive bleeding before hysterectomy    Hyperlipidemia     meds > 2 yrs    Hypertension     Hypothyroidism     past trx years ago then stopped / recent restart    Kidney disease     Low back pain     Morbid obesity due to excess calories ([facility] 5/13/2017    Morbid obesity due to excess calories ([facility] 6/6/2017    Osteoarthritis     Pneumonia     Shoulder dislocation     ST elevation myocardial infarction involving left circumflex coronary artery ([facility] [address] 5/13/2017    Unspecified gastritis and gastroduodenitis without mention of hemorrhage 05/06/2015    DR Ashanti Lockhart     Past Surgical History:   Procedure Laterality Date    CARDIAC SURGERY  2017    has x 4 cardiac stents    COLONOSCOPY  01/14/2015    DR Ashanti Lockhart - DIVERTICULOSIS    COLONOSCOPY N/A 1/2/2021    COLONOSCOPY DIAGNOSTIC performed by Jaci Escoto MD at [place] [address]  05/17/2017    x2 stents    CYST REMOVAL Left 2/7/2019    RESECTION OF LEFT PINNA LESION WITH GRAFT performed by Nelson Caputo MD at [facility]. [address], COLON, DIAGNOSTIC      FINGER SURGERY  12/9/14    middle finger right hand due to infection   [facility]  [id number]   [facility]  2008    shoulder dislocated - popped  back in Right shoulder    UPPER GASTROINTESTINAL ENDOSCOPY  01/14/2015    DR LANE - ULCER IN THE ANTRUM    UPPER GASTROINTESTINAL ENDOSCOPY  05/06/2015    DR Ashanti Lockhart - GASTRITIS, PREVIOUS ULCER HEALED    UPPER GASTROINTESTINAL ENDOSCOPY N/A 1/2/2021    EGD DIAGNOSTIC ONLY performed by Jaci Escoto MD at [address] [place] [address] History     Socioeconomic History    Marital status:      Spouse name: Not on file    Number of children: Not on file    Years of education: Not on file    Highest education level: Not on file   Occupational History    Not on file   Tobacco Use    Smoking status: Never Smoker    Smokeless tobacco: Never Used   Vaping Use    Vaping Use: Never used   Substance and Sexual Activity    Alcohol use: Yes     Comment: glass of wine once a year    Drug use: No    Sexual activity: Yes     Partners: Male   Other Topics Concern    Not on file   Social History Narrative    Not on file     Social Determinants of Health     Financial Resource Strain: Low Risk     Difficulty of Paying Living Expenses: Not hard at all   Food Insecurity: No Food Insecurity    Worried About Running Out of Food in the Last Year: Never true    [address] in the Last Year: Never true   Transportation Needs:     Lack of Transportation (Medical): Not on file    Lack of Transportation (Non-Medical):  Not on file   Physical Activity:     Days of Exercise per Week: Not on file    Minutes of Exercise per Session: Not on file   Stress:     Feeling of Stress : Not on file   Social Connections:     Frequency of Communication with Friends and Family: Not on file    Frequency of Social Gatherings with Friends and Family: Not on file    Attends [employer] Services: Not on file    Active Member of [address] [place] [address] Inside Social or Organizations: Not on file    Attends Club or Organization Meetings: Not on file    Marital Status: Not on file   Intimate Partner Violence:     Fear of Current or Ex-Partner: Not on file    Emotionally Abused: Not on file    Physically Abused: Not on file    Sexually Abused: Not on file   Housing Stability:     Unable to Pay for Housing in the Last Year: Not on file    Number of Places Lived in the Last Year: Not on file    Unstable Housing in the Last Year: Not on file     Family History   Problem Relation Age of Onset    Heart Disease Mother 61    Cancer Father 79        kidney    No Known Problems Daughter       Allergies   Allergen Reactions    Lisinopril Nausea Only and Other (See Comments)     cough    Tramadol Nausea Only     Current Outpatient Medications   Medication Sig Dispense Refill    dilTIAZem (CARDIZEM CD) 240 MG extended release capsule TAKE 1 CAPSULE BY MOUTH DAILY 90 capsule 0    oxybutynin (DITROPAN-XL) 10 MG extended release tablet Take 1 tablet by mouth daily 30 tablet 0    FEROSUL 325 (65 Fe) MG tablet Take 1 tablet by mouth 2 times daily 60 tablet 5    metoprolol tartrate (LOPRESSOR) 25 MG tablet TAKE 1 TABLET BY MOUTH TWICE DAILY 180 tablet 3    atorvastatin (LIPITOR) 80 MG tablet Take 0.5 tablets by mouth nightly 90 tablet 3    losartan-hydroCHLOROthiazide (HYZAAR) 100-25 MG per tablet Take 1 tablet by mouth daily 90 tablet 3    nitroGLYCERIN (NITROSTAT) 0.4 MG SL tablet Place 1 tablet under the tongue every 5 minutes as needed for Chest pain 25 tablet 3    acetaminophen (TYLENOL) 325 MG tablet Take 650 mg by mouth every 6 hours as needed for Pain  (Patient not taking: Reported on 1/4/2022)       No current facility-administered medications for this visit. Vitals:    01/04/22 1049   BP: 132/80   Pulse: 64   Temp: 97.1 °F (36.2 °C)   TempSrc: Infrared   SpO2: 96%   Weight: 250 lb (113.4 kg)   Height: 5' 1\" (1.549 m)       Physical exam:  Physical Exam  Vitals reviewed. Constitutional:       General: She is not in acute distress. Appearance: She is well-developed. HENT:      Head: Normocephalic and atraumatic. Right Ear: Tympanic membrane, ear canal and external ear normal. Tympanic membrane is not erythematous. Tympanic membrane has normal mobility. Left Ear: Tympanic membrane, ear canal and external ear normal. Tympanic membrane is not erythematous. Tympanic membrane has normal mobility. Nose: Nose normal.      Mouth/Throat:      Pharynx: No oropharyngeal exudate. Neck:      Thyroid: No thyromegaly. Cardiovascular:      Rate and Rhythm: Normal rate and regular rhythm. Heart sounds: Normal heart sounds. No murmur heard. Pulmonary:      Effort: Pulmonary effort is normal. No respiratory distress. Breath sounds: Normal breath sounds. No wheezing. Abdominal:      General: Bowel sounds are normal. There is no distension. Palpations: Abdomen is soft. Tenderness: There is no abdominal tenderness. There is no guarding or rebound. Musculoskeletal:      Cervical back: Normal range of motion.    Lymphadenopathy: Cervical: No cervical adenopathy. Skin:     General: Skin is warm and dry. Neurological:      Mental Status: She is alert and oriented to person, place, and time. Psychiatric:         Behavior: Behavior normal.         Assessment/Plan:  77 y.o. female here mainly for cough:  - benign exam; seems to be improving; if worsens then can consider a CXR     Diagnosis Orders   1. Encounter for annual wellness exam in Medicare patient  Lipid, Fasting    Comprehensive Metabolic Panel, Fasting   2. Hypothyroidism, unspecified type  TSH Without Reflex   3. Routine general medical examination at a health care facility          Return for Medicare Annual Wellness Visit in 1 year. Mario Alberto Hawkins MD     Medicare Annual Wellness Visit  Name: Bean Knapp Date: 2022   MRN: [id number] Sex: Female   Age: 77 y.o. Ethnicity: Non- / Non    : 1945 Race: White (non-)      Jodee Mark is here for Medicare AWV    Screenings for behavioral, psychosocial and functional/safety risks, and cognitive dysfunction are all negative except as indicated below. These results, as well as other patient data from the [address] form, are documented in Flowsheets linked to this Encounter. Allergies   Allergen Reactions    Lisinopril Nausea Only and Other (See Comments)     cough    Tramadol Nausea Only       Prior to Visit Medications    Medication Sig Taking?  Authorizing Provider   dilTIAZem (CARDIZEM CD) 240 MG extended release capsule TAKE 1 CAPSULE BY MOUTH DAILY Yes Mario Alberto Hawkins MD   oxybutynin (DITROPAN-XL) 10 MG extended release tablet Take 1 tablet by mouth daily Yes Mario Alberto Hawkins MD   FEROSUL 325 (65 Fe) MG tablet Take 1 tablet by mouth 2 times daily Yes Mario Alberto Hawkins MD   metoprolol tartrate (LOPRESSOR) 25 MG tablet TAKE 1 TABLET BY MOUTH TWICE DAILY Yes Jordan Boland DO   atorvastatin (LIPITOR) 80 MG tablet Take 0.5 tablets by mouth nightly Yes Jordan Boland, DO losartan-hydroCHLOROthiazide (HYZAAR) 100-25 MG per tablet Take 1 tablet by mouth daily Yes Maria Luisa Diaz MD   nitroGLYCERIN (NITROSTAT) 0.4 MG SL tablet Place 1 tablet under the tongue every 5 minutes as needed for Chest pain Yes BEN Jacob - CNP   acetaminophen (TYLENOL) 325 MG tablet Take 650 mg by mouth every 6 hours as needed for Pain   Patient not taking: Reported on 1/4/2022  Historical Provider, MD       Past Medical History:   Diagnosis Date    Antral ulcer 01/14/2015    DR Mehul Vargas    Asthma     \"cured by beestings\"    Coronary artery disease     Coronary artery disease involving native coronary artery of native heart without angina pectoris 7/10/2018    has x 4 cardiac stents / Dr. Barbara Karimi Diverticulosis of colon (without mention of hemorrhage) 01/14/2015    DR Mehul Vargas    Essential hypertension 12/10/2013    meds > 20 yrs    History of blood transfusion 1990s    with hysterectomy    History of normal resting EKG 1996    normal    History of ST elevation myocardial infarction (STEMI) 7/11/2017    History of transfusion of whole blood 1996    Excessive bleeding before hysterectomy    Hyperlipidemia     meds > 2 yrs    Hypertension     Hypothyroidism     past trx years ago then stopped / recent restart    Kidney disease     Low back pain     Morbid obesity due to excess calories ([address] 5/13/2017    Morbid obesity due to excess calories ([address] 6/6/2017    Osteoarthritis     Pneumonia     Shoulder dislocation     ST elevation myocardial infarction involving left circumflex coronary artery ([address] 5/13/2017    Unspecified gastritis and gastroduodenitis without mention of hemorrhage 05/06/2015    DR Mehul Vargas       Past Surgical History:   Procedure Laterality Date    CARDIAC SURGERY  2017    has x 4 cardiac stents    COLONOSCOPY  01/14/2015    DR Mehul Vargas - DIVERTICULOSIS    COLONOSCOPY N/A 1/2/2021    COLONOSCOPY DIAGNOSTIC performed by Iban Veronica MD at [address] [place] [address] and/or referrals ordered. Positive Risk Factor Screenings with Interventions:            General Health and ACP:  General  In general, how would you say your health is?: Good  In the past 7 days, have you experienced any of the following?  New or Increased Pain, New or Increased Fatigue, Loneliness, Social Isolation, Stress or Anger?: None of These  Do you get the social and emotional support that you need?: Yes  Do you have a Living Will?: (!) No  Advance Directives     Power of [facility] Will ACP-Advance Directive ACP-Power of     Not on File Not on File Not on File Not on File      General Health Risk Interventions:  · No Living Will: Advance Care Planning addressed with patient today    Health Habits/Nutrition:  Health Habits/Nutrition  Do you exercise for at least 20 minutes 2-3 times per week?: (!) No  Have you lost any weight without trying in the past 3 months?: No  Do you eat only one meal per day?: No  Have you seen the dentist within the past year?: (!) No  Body mass index: (!) 47.23  Health Habits/Nutrition Interventions:  · Inadequate physical activity:  patient agrees to increase physical activity as follows: walking  · Dental exam overdue:  patient encouraged to make appointment with his/her dentist    Hearing/Vision:  No exam data present  Hearing/Vision  Do you or your family notice any trouble with your hearing that hasn't been managed with hearing aids?: No  Do you have difficulty driving, watching TV, or doing any of your daily activities because of your eyesight?: No  Have you had an eye exam within the past year?: (!) No  Hearing/Vision Interventions:  · Vision concerns:  patient encouraged to make appointment with his/her eye specialist        Personalized Preventive Plan   Current Health Maintenance Status  Immunization History   Administered Date(s) Administered    COVID-19, Bhardwaj Peter, PF, 30mcg/0.3mL 03/16/2021, 04/06/2021    Pneumococcal Conjugate 13-valent ([employer]) 10/30/2018    Pneumococcal Polysaccharide ([id number]) 10/02/2012    Tdap (Boostrix, Adacel) 12/05/2014        Health Maintenance   Topic Date Due    Shingles Vaccine (1 of 2) Never done    Flu vaccine (1) Never done    COVID-19 Vaccine (3 - Booster for Pfizer series) 10/06/2021    TSH testing  12/08/2021    Annual Wellness Visit (AWV)  12/09/2021    Lipid screen  12/08/2021    Depression Screen  02/10/2022    Potassium monitoring  09/16/2022    Creatinine monitoring  09/16/2022    Colon cancer screen colonoscopy  01/02/2024    DTaP/Tdap/Td vaccine (2 - Td or Tdap) 12/05/2024    Pneumococcal 65+ years Vaccine  Completed    Hepatitis C screen  Completed    DEXA (modify frequency per FRAX score)  Addressed    Hepatitis A vaccine  Aged Out    Hepatitis B vaccine  Aged Out    Hib vaccine  Aged Out    Meningococcal (ACWY) vaccine  Aged Out     Recommendations for [employer] Due: see orders and patient instructions/AVS.  . Recommended screening schedule for the next 5-10 years is provided to the patient in written form: see Patient Instructions/AVS.    Krissy Hernandez was seen today for medicare awv.     Diagnoses and all orders for this visit:    Encounter for annual wellness exam in Medicare patient    Hypothyroidism, unspecified type

## 2022-01-04 NOTE — PATIENT INSTRUCTIONS
Personalized Preventive Plan for Rohith Jimenez - 1/4/2022  Medicare offers a range of preventive health benefits. Some of the tests and screenings are paid in full while other may be subject to a deductible, co-insurance, and/or copay. Some of these benefits include a comprehensive review of your medical history including lifestyle, illnesses that may run in your family, and various assessments and screenings as appropriate. After reviewing your medical record and screening and assessments performed today your provider may have ordered immunizations, labs, imaging, and/or referrals for you. A list of these orders (if applicable) as well as your Preventive Care list are included within your After Visit Summary for your review. Other Preventive Recommendations:    · A preventive eye exam performed by an eye specialist is recommended every 1-2 years to screen for glaucoma; cataracts, macular degeneration, and other eye disorders. · A preventive dental visit is recommended every 6 months. · Try to get at least 150 minutes of exercise per week or 10,000 steps per day on a pedometer . · Order or download the FREE \"Exercise & Physical Activity: Your Everyday Guide\" from [employer]. Call 1-249.317.6057 or search [employer] online. · You need 7919-7512 mg of calcium and 1191-8876 IU of vitamin D per day. It is possible to meet your calcium requirement with diet alone, but a vitamin D supplement is usually necessary to meet this goal.  · When exposed to the sun, use a sunscreen that protects against both UVA and UVB radiation with an SPF of 30 or greater. Reapply every 2 to 3 hours or after sweating, drying off with a towel, or swimming. · Always wear a seat belt when traveling in a car. Always wear a helmet when riding a bicycle or motorcycle.

## 2022-01-05 DIAGNOSIS — R05.9 COUGH: Primary | ICD-10-CM

## 2022-01-05 RX ORDER — BENZONATATE 100 MG/1
100 CAPSULE ORAL 3 TIMES DAILY PRN
Qty: 21 CAPSULE | Refills: 0 | Status: SHIPPED | OUTPATIENT
Start: 2022-01-05 | End: 2022-01-12

## 2022-01-14 ENCOUNTER — HOSPITAL ENCOUNTER (OUTPATIENT)
Age: 77
Discharge: HOME | End: 2022-01-16
Payer: MEDICARE

## 2022-01-14 ENCOUNTER — HOSPITAL ENCOUNTER (OUTPATIENT)
Dept: GENERAL RADIOLOGY | Age: 77
Discharge: HOME | End: 2022-01-16
Payer: MEDICARE

## 2022-01-14 ENCOUNTER — TELEPHONE (OUTPATIENT)
Dept: FAMILY MEDICINE CLINIC | Age: 77
End: 2022-01-14

## 2022-01-14 DIAGNOSIS — R05.9 COUGHING: Primary | ICD-10-CM

## 2022-01-14 DIAGNOSIS — R05.9 COUGHING: ICD-10-CM

## 2022-01-14 PROCEDURE — 71046 X-RAY EXAM CHEST 2 VIEWS: CPT

## 2022-01-14 NOTE — TELEPHONE ENCOUNTER
Patient is reporting that her cough is not getting any better (it is worse at night) and that the prescripton is not helping. Patient is requesting something stronger and possibly an antibiotic.

## 2022-01-17 ENCOUNTER — TELEPHONE (OUTPATIENT)
Dept: FAMILY MEDICINE CLINIC | Age: 77
End: 2022-01-17

## 2022-01-17 DIAGNOSIS — J45.20 MILD INTERMITTENT ASTHMA WITHOUT COMPLICATION: Primary | ICD-10-CM

## 2022-01-17 RX ORDER — PREDNISONE 20 MG/1
60 TABLET ORAL DAILY
Qty: 15 TABLET | Refills: 0 | Status: SHIPPED | OUTPATIENT
Start: 2022-01-17 | End: 2022-01-24 | Stop reason: SDUPTHER

## 2022-01-17 NOTE — TELEPHONE ENCOUNTER
Pt called in asking for her results for her xray that was done on 1/4/2022. States she has been coughing since thanksgiving and the pills that were given to her are not helping at all and she would like for something called in for it.

## 2022-01-21 ENCOUNTER — TELEPHONE (OUTPATIENT)
Dept: FAMILY MEDICINE CLINIC | Age: 77
End: 2022-01-21

## 2022-01-21 DIAGNOSIS — R09.82 POSTNASAL DRIP: Primary | ICD-10-CM

## 2022-01-21 RX ORDER — FLUTICASONE PROPIONATE 50 MCG
1 SPRAY, SUSPENSION (ML) NASAL DAILY
Qty: 16 G | Refills: 0 | Status: SHIPPED | OUTPATIENT
Start: 2022-01-21 | End: 2022-01-22

## 2022-01-21 NOTE — TELEPHONE ENCOUNTER
Patient called to inform Dr. Morteza Bella how the prescribed steroids are working. Patient feels fine during the day but she is still bad at night when her sinuses start draining down her throat.

## 2022-01-21 NOTE — TELEPHONE ENCOUNTER
Might benefit from adding a nasal steroid for the sinus drainage. I've sent in flonase to try for a month.

## 2022-01-24 DIAGNOSIS — R39.15 URINARY URGENCY: ICD-10-CM

## 2022-01-24 DIAGNOSIS — J45.20 MILD INTERMITTENT ASTHMA WITHOUT COMPLICATION: ICD-10-CM

## 2022-01-24 RX ORDER — OXYBUTYNIN CHLORIDE 10 MG/1
10 TABLET, EXTENDED RELEASE ORAL DAILY
Qty: 30 TABLET | Refills: 0 | Status: CANCELLED | OUTPATIENT
Start: 2022-01-24

## 2022-01-24 RX ORDER — OXYBUTYNIN CHLORIDE 10 MG/1
10 TABLET, EXTENDED RELEASE ORAL DAILY
Qty: 30 TABLET | Refills: 0 | Status: SHIPPED | OUTPATIENT
Start: 2022-01-24 | End: 2022-02-21

## 2022-01-24 RX ORDER — PREDNISONE 20 MG/1
60 TABLET ORAL DAILY
Qty: 15 TABLET | Refills: 0 | Status: SHIPPED | OUTPATIENT
Start: 2022-01-24 | End: 2022-01-29

## 2022-01-24 NOTE — TELEPHONE ENCOUNTER
Comments: Patient stated that the prednisone helped but she started feeling bad again once she completed it. Patient feels one more \"round\" of it will  \"beat it\". Oxybutynin last filled: 12/28/21    Last Office Visit (last PCP visit):   1/4/2022    Next Visit Date:  Future Appointments   Date Time Provider Alvarez Solo   1/25/2022 11:45 AM Wes J Mendel Falter, DO [facility]   1/5/2023  9:30 AM Blaine Shahid MD [facility]       **If hasn't been seen in over a year OR hasn't followed up according to last diabetes/ADHD visit, make appointment for patient before sending refill to provider.     Rx requested:  Requested Prescriptions     Pending Prescriptions Disp Refills    predniSONE (DELTASONE) 20 MG tablet 15 tablet 0     Sig: Take 3 tablets by mouth daily for 5 days    oxybutynin (DITROPAN-XL) 10 MG extended release tablet 30 tablet 0     Sig: Take 1 tablet by mouth daily

## 2022-02-07 ENCOUNTER — OFFICE VISIT (OUTPATIENT)
Dept: FAMILY MEDICINE CLINIC | Age: 77
End: 2022-02-07
Payer: MEDICARE

## 2022-02-07 VITALS
HEART RATE: 93 BPM | SYSTOLIC BLOOD PRESSURE: 128 MMHG | OXYGEN SATURATION: 94 % | TEMPERATURE: 99.1 F | DIASTOLIC BLOOD PRESSURE: 80 MMHG

## 2022-02-07 DIAGNOSIS — J45.31 MILD PERSISTENT ASTHMA WITH ACUTE EXACERBATION: ICD-10-CM

## 2022-02-07 DIAGNOSIS — R05.3 CHRONIC COUGH: Primary | ICD-10-CM

## 2022-02-07 LAB — S PYO AG THROAT QL: NORMAL

## 2022-02-07 PROCEDURE — 87880 STREP A ASSAY W/OPTIC: CPT | Performed by: FAMILY MEDICINE

## 2022-02-07 PROCEDURE — 99214 OFFICE O/P EST MOD 30 MIN: CPT | Performed by: FAMILY MEDICINE

## 2022-02-07 RX ORDER — BUDESONIDE, GLYCOPYRROLATE, AND FORMOTEROL FUMARATE 160; 9; 4.8 UG/1; UG/1; UG/1
AEROSOL, METERED RESPIRATORY (INHALATION)
Qty: 1 EACH | Refills: 0 | COMMUNITY
Start: 2022-02-07 | End: 2022-08-16

## 2022-02-07 ASSESSMENT — ENCOUNTER SYMPTOMS
COUGH: 1
WHEEZING: 0
CONSTIPATION: 0
SORE THROAT: 0
SHORTNESS OF BREATH: 0
RHINORRHEA: 0
ABDOMINAL PAIN: 0
DIARRHEA: 0

## 2022-02-07 NOTE — PROGRESS NOTES
[address] [facility] [address] [facility] PRIMARY CARE  [address] [place] [id number]  Dept: [phone]  Dept Fax: [phone]  Loc: [phone]     Chief Complaint  Chief Complaint   Patient presents with    Cough     follow up since 1/4/2022 will take steroid and feel better, next day she starts feeling bad again        HPI:  77 y. o.female who presents for the following:      Chronic cough: ongoing for over 2mo; no wheezing; no other symptoms; improved with steroid course last month and returned immediately after both times. A CXR was suggestive of emphysema though she is not a smoker but has a hx of asthma. Has lost her voice due to the cough now. No other symptoms but is miserable with the cough. Review of Systems   Constitutional: Negative for chills and fever. HENT: Negative for congestion, rhinorrhea and sore throat. Respiratory: Positive for cough. Negative for shortness of breath and wheezing. Gastrointestinal: Negative for abdominal pain, constipation and diarrhea. Endocrine: Negative for polydipsia and polyuria. Genitourinary: Negative for dysuria, frequency and urgency. Neurological: Negative for syncope, light-headedness, numbness and headaches. Psychiatric/Behavioral: Negative for sleep disturbance. The patient is not nervous/anxious.         Past Medical History:   Diagnosis Date    Antral ulcer 01/14/2015    DR Sariah Donovan    Asthma     \"cured by beestings\"    Coronary artery disease     Coronary artery disease involving native coronary artery of native heart without angina pectoris 7/10/2018    has x 4 cardiac stents / Dr. Jennifer Sánchez Diverticulosis of colon (without mention of hemorrhage) 01/14/2015    DR Sariah Donovan    Essential hypertension 12/10/2013    meds > 20 yrs    History of blood transfusion 1990s    with hysterectomy    History of normal resting EKG 1996    normal    History of ST elevation myocardial infarction (STEMI) 7/11/2017    History of transfusion of whole blood 1996    Excessive bleeding before hysterectomy    Hyperlipidemia     meds > 2 yrs    Hypertension     Hypothyroidism     past trx years ago then stopped / recent restart    Kidney disease     Low back pain     Morbid obesity due to excess calories ([address] 5/13/2017    Morbid obesity due to excess calories ([address] 6/6/2017    Osteoarthritis     Pneumonia     Shoulder dislocation     ST elevation myocardial infarction involving left circumflex coronary artery ([address] 5/13/2017    Unspecified gastritis and gastroduodenitis without mention of hemorrhage 05/06/2015    DR Rommel Jarquin     Past Surgical History:   Procedure Laterality Date    CARDIAC SURGERY  2017    has x 4 cardiac stents    COLONOSCOPY  01/14/2015    DR Rommel Jarquin - DIVERTICULOSIS    COLONOSCOPY N/A 1/2/2021    COLONOSCOPY DIAGNOSTIC performed by Lydia Dubon MD at [place] [address]  05/17/2017    x2 stents    CYST REMOVAL Left 2/7/2019    RESECTION OF LEFT PINNA LESION WITH GRAFT performed by Brandi Johnson MD at [facility]. [address], COLON, DIAGNOSTIC      FINGER SURGERY  12/9/14    middle finger right hand due to infection   [address] [place], [place] [id number] [place] [address]  2008    shoulder dislocated - popped  back in Right shoulder    UPPER GASTROINTESTINAL ENDOSCOPY  01/14/2015    DR LANE - ULCER IN THE ANTRUM    UPPER GASTROINTESTINAL ENDOSCOPY  05/06/2015    DR Rommel Jarquin - GASTRITIS, PREVIOUS ULCER HEALED    UPPER GASTROINTESTINAL ENDOSCOPY N/A 1/2/2021    EGD DIAGNOSTIC ONLY performed by Lydia Dubon MD at [address] [place] [address] History     Socioeconomic History    Marital status:      Spouse name: Not on file    Number of children: Not on file    Years of education: Not on file    Highest education level: Not on file   Occupational History    Not on file   Tobacco Use    Smoking status: Never Smoker    Smokeless tobacco: Never Used   Vaping Use    Vaping Use: Never used   Substance and Sexual Activity    Alcohol use: Yes     Comment: glass of wine once a year    Drug use: No    Sexual activity: Yes     Partners: Male   Other Topics Concern    Not on file   Social History Narrative    Not on file     Social Determinants of Health     Financial Resource Strain: Low Risk     Difficulty of Paying Living Expenses: Not hard at all   Food Insecurity: No Food Insecurity    Worried About [address] in the Last Year: Never true    [facility] in the Last Year: Never true   Transportation Needs:     Lack of Transportation (Medical): Not on file    Lack of Transportation (Non-Medical):  Not on file   Physical Activity:     Days of Exercise per Week: Not on file    Minutes of Exercise per Session: Not on file   Stress:     Feeling of Stress : Not on file   Social Connections:     Frequency of Communication with Friends and Family: Not on file    Frequency of Social Gatherings with Friends and Family: Not on file    Attends Christian Services: Not on file    Active Member of [address] [place] [address] or Organizations: Not on file    Attends Club or Organization Meetings: Not on file    Marital Status: Not on file   Intimate Partner Violence:     Fear of Current or Ex-Partner: Not on file    Emotionally Abused: Not on file    Physically Abused: Not on file    Sexually Abused: Not on file   Housing Stability:     Unable to Pay for Housing in the Last Year: Not on file    Number of [place] in the Last Year: Not on file    Unstable Housing in the Last Year: Not on file     Family History   Problem Relation Age of Onset    Heart Disease Mother 61    Cancer Father 79        kidney    No Known Problems Daughter       Allergies   Allergen Reactions    Lisinopril Nausea Only and Other (See Comments)     cough    Tramadol Nausea Only     Current Outpatient Medications   Medication Sig Dispense Refill    Fluticasone furoate-vilanterol (BREO ELLIPTA) 200-25 MCG/INH AEPB inhaler Inhale 1 puff into the lungs daily 1 each 2    Budeson-Glycopyrrol-Formoterol (BREZTRI AEROSPHERE) 160-9-4.8 MCG/ACT AERO 2 Puffs BIG    Sample medication labeled with directions for administration and patient instructed on use. Lot: [id number] Exp: 05/2023 1 each 0    oxybutynin (DITROPAN-XL) 10 MG extended release tablet Take 1 tablet by mouth daily 30 tablet 0    dilTIAZem (CARDIZEM CD) 240 MG extended release capsule TAKE 1 CAPSULE BY MOUTH DAILY 90 capsule 0    FEROSUL 325 (65 Fe) MG tablet Take 1 tablet by mouth 2 times daily 60 tablet 5    metoprolol tartrate (LOPRESSOR) 25 MG tablet TAKE 1 TABLET BY MOUTH TWICE DAILY 180 tablet 3    atorvastatin (LIPITOR) 80 MG tablet Take 0.5 tablets by mouth nightly 90 tablet 3    losartan-hydroCHLOROthiazide (HYZAAR) 100-25 MG per tablet Take 1 tablet by mouth daily 90 tablet 3    nitroGLYCERIN (NITROSTAT) 0.4 MG SL tablet Place 1 tablet under the tongue every 5 minutes as needed for Chest pain 25 tablet 3     No current facility-administered medications for this visit. Vitals:    02/07/22 1558   BP: 128/80   Site: Left Upper Arm   Position: Sitting   Cuff Size: Large Adult   Pulse: 93   Temp: 99.1 °F (37.3 °C)   TempSrc: Infrared   SpO2: 94%       Physical exam:  Physical Exam  Vitals reviewed. Constitutional:       General: She is not in acute distress. Appearance: She is well-developed. HENT:      Head: Normocephalic and atraumatic. Right Ear: Tympanic membrane, ear canal and external ear normal. Tympanic membrane is not erythematous. Tympanic membrane has normal mobility. Left Ear: Tympanic membrane, ear canal and external ear normal. Tympanic membrane is not erythematous. Tympanic membrane has normal mobility. Nose: Nose normal.      Mouth/Throat:      Pharynx: No oropharyngeal exudate. Neck:      Thyroid: No thyromegaly.    Cardiovascular:      Rate and Rhythm: Normal rate and regular rhythm. Heart sounds: Normal heart sounds. No murmur heard. Pulmonary:      Effort: Pulmonary effort is normal. No respiratory distress. Breath sounds: Normal breath sounds. No wheezing. Abdominal:      General: Bowel sounds are normal. There is no distension. Palpations: Abdomen is soft. Tenderness: There is no abdominal tenderness. There is no guarding or rebound. Musculoskeletal:      Cervical back: Normal range of motion. Lymphadenopathy:      Cervical: No cervical adenopathy. Skin:     General: Skin is warm and dry. Neurological:      Mental Status: She is alert and oriented to person, place, and time. Psychiatric:         Behavior: Behavior normal.         Assessment/Plan:  77 y.o. female here mainly for chronic cough:  - Chronic cough: given the hx of asthma and CXR findings will trial a maintenance inhaler since we don't want to do PO steroid long-term. Ordered Breo for her to price check and gave her a sample of Breztri. If no improvement then will try replacing the ARB. If no relief, then may send to pulm with PFTs. Diagnosis Orders   1. Chronic cough  POCT rapid strep A    Budeson-Glycopyrrol-Formoterol (BREZTRI AEROSPHERE) 160-9-4.8 MCG/ACT AERO   2. Mild persistent asthma with acute exacerbation  Fluticasone furoate-vilanterol (BREO ELLIPTA) 200-25 MCG/INH AEPB inhaler    Budeson-Glycopyrrol-Formoterol (BREZTRI AEROSPHERE) 160-9-4.8 MCG/ACT AERO        Return if symptoms worsen or fail to improve.     Alexa Suárez MD

## 2022-02-11 ENCOUNTER — TELEPHONE (OUTPATIENT)
Dept: FAMILY MEDICINE CLINIC | Age: 77
End: 2022-02-11

## 2022-02-11 DIAGNOSIS — R05.3 CHRONIC COUGH: Primary | ICD-10-CM

## 2022-02-11 NOTE — TELEPHONE ENCOUNTER
Patient called in asking for a referral for to a lung specialist due to the coughing. She cannot handle it anymore she states. Would like it mailed once placed.

## 2022-02-11 NOTE — TELEPHONE ENCOUNTER
I've ordered PFTs with a pulm referral. Adrianna Coronado will likely want you to finish the PFTs prior to the visit to help with the workup.

## 2022-02-21 DIAGNOSIS — R39.15 URINARY URGENCY: ICD-10-CM

## 2022-02-21 RX ORDER — OXYBUTYNIN CHLORIDE 10 MG/1
10 TABLET, EXTENDED RELEASE ORAL DAILY
Qty: 30 TABLET | Refills: 11 | Status: SHIPPED | OUTPATIENT
Start: 2022-02-21

## 2022-03-17 ENCOUNTER — OFFICE VISIT (OUTPATIENT)
Dept: CARDIOLOGY CLINIC | Age: 77
End: 2022-03-17
Payer: MEDICARE

## 2022-03-17 VITALS
HEIGHT: 61 IN | WEIGHT: 251 LBS | OXYGEN SATURATION: 96 % | DIASTOLIC BLOOD PRESSURE: 68 MMHG | SYSTOLIC BLOOD PRESSURE: 156 MMHG | HEART RATE: 71 BPM | BODY MASS INDEX: 47.39 KG/M2

## 2022-03-17 DIAGNOSIS — E78.2 MIXED HYPERLIPIDEMIA: ICD-10-CM

## 2022-03-17 DIAGNOSIS — I25.10 CORONARY ARTERY DISEASE INVOLVING NATIVE CORONARY ARTERY OF NATIVE HEART WITHOUT ANGINA PECTORIS: ICD-10-CM

## 2022-03-17 DIAGNOSIS — I10 ESSENTIAL HYPERTENSION: Primary | ICD-10-CM

## 2022-03-17 DIAGNOSIS — I45.10 RBBB (RIGHT BUNDLE BRANCH BLOCK): ICD-10-CM

## 2022-03-17 DIAGNOSIS — E66.01 MORBID OBESITY DUE TO EXCESS CALORIES (HCC): ICD-10-CM

## 2022-03-17 DIAGNOSIS — I25.2 HISTORY OF ST ELEVATION MYOCARDIAL INFARCTION (STEMI): ICD-10-CM

## 2022-03-17 PROBLEM — I73.9 CLAUDICATION (HCC): Status: RESOLVED | Noted: 2020-03-10 | Resolved: 2022-03-17

## 2022-03-17 PROCEDURE — 99214 OFFICE O/P EST MOD 30 MIN: CPT | Performed by: INTERNAL MEDICINE

## 2022-03-17 PROCEDURE — 93000 ELECTROCARDIOGRAM COMPLETE: CPT | Performed by: INTERNAL MEDICINE

## 2022-03-17 NOTE — PROGRESS NOTES
Chief Complaint   Patient presents with    Follow-up    Coronary Artery Disease    Hypertension     CC: SOB, CAD    5-13-17: Hospital Course:   Paco Stanton is a 80 y.o. female admitted to [facility] on 5/13/2017 for chest pain. After an EKG was done at [facility] was found to be having an acute MI patient was flown here by helicopter taken directly to cath lab to drug eluted stents were put in place ALP INE 3.5 mm x 23 mm and a 2.5 mm x 8 mm . Tolerated procedure well denied chest pain denied right wrist pain no distress     6-6-17: Patient presents for initial medical evaluation. Patient is followed on a regular basis by Dr. Nick Hager MD.   Pt denies chest pain, dyspnea, dyspnea on exertion, change in exercise capacity, fatigue,  nausea, vomiting, diarrhea, constipation, motor weakness, insomnia, weight loss, syncope, dizziness, lightheadedness, palpitations, PND, orthopnea, or claudication. No nitro use. BP and hr are good. CAD is stable. No LE discoloration or ulcers. No LE edema. No CHF type symptoms. Lipid profile is abnormal. Was placed on statin during hosp. No bleeding issues. S/p LHC with mild LAD diffuse disease, 99% mid CX, 80-90%90% proximal to mid RCA. S/p PCI to CX with 2 KEYSHA. Planned staging of RCA due to STEMI procedure. s/p ECHO. Conclusions   Summary   Normal mitral valve structure and function.   Normal aortic valve structure and function.   Normal tricuspid valve structure and function.   There is mild ( 1 +) tricuspid regurgitation with estimated RVSP of 38 mm   Hg.   Normal pulmonic valve structure and function.   Mildly dilated left atrium.   Left ventricular ejection fraction is visually estimated at 65%.   Impaired relaxation compatible with diastolic dysfunction.  ( reversed E/A   ratio)   Mild concentric left ventricular hypertrophy.   Normal right atrium.   Normal right ventricle structure and function.   No evidence of pericardial effusion.   No evidence of pleural effusion. 7-11-17: s/p PCI of RCA at [facility]. Pt denies chest pain, dyspnea, dyspnea on exertion, change in exercise capacity, fatigue,  nausea, vomiting, diarrhea, constipation, motor weakness, insomnia, weight loss, syncope, dizziness, lightheadedness, palpitations, PND, orthopnea, or claudication. No nitro use. BP and hr are good. CAD is stable. No LE discoloration or ulcers. No LE edema. No CHF type symptoms. Lipid profile is normal.     1-9-18: Pt denies chest pain, dyspnea, dyspnea on exertion, change in exercise capacity, fatigue,  nausea, vomiting, diarrhea, constipation, motor weakness, insomnia, weight loss, syncope, dizziness, lightheadedness, palpitations, PND, orthopnea, or claudication. No nitro use. BP and hr are good. CAD is stable. No LE discoloration or ulcers. No LE edema. No CHF type symptoms. Lipid profile is normal.  No bleeding issues on DAPT. Compliant with meds. 7-10-18: doing well overall. On DAPT and no bleeding issues. Pt denies chest pain, dyspnea, dyspnea on exertion, change in exercise capacity, fatigue,  nausea, vomiting, diarrhea, constipation, motor weakness, insomnia, weight loss, syncope, dizziness, lightheadedness, palpitations, PND, orthopnea, or claudication. No nitro use. BP and hr are good. CAD is stable. No LE discoloration or ulcers. No LE edema. No CHF type symptoms. Lipid profile is normal. No recent hospitalization. No change in meds. ekg WITH NSR, RBBB. SHE NEEDS TO HAVE BACK WORK DONE. Did not tolerate full dose lipitor 80mg daily, caused her joint pain. 1-15-19: Pt denies chest pain, dyspnea, dyspnea on exertion, change in exercise capacity, fatigue,  nausea, vomiting, diarrhea, constipation, motor weakness, insomnia, weight loss, syncope, dizziness, lightheadedness, palpitations, PND, orthopnea, or claudication. No nitro use. BP and hr are good. CAD is stable. No LE discoloration or ulcers. No LE edema.  No CHF type symptoms. Lipid profile is normal. No recent hospitalization. No change in meds. Has CKD, stage III      9-17-19: Pt denies chest pain, dyspnea, dyspnea on exertion, change in exercise capacity, fatigue,  nausea, vomiting, diarrhea, constipation, motor weakness, insomnia, weight loss, syncope, dizziness, lightheadedness, palpitations, PND, orthopnea, or claudication. No nitro use. BP and hr are good. CAD is stable. No LE discoloration or ulcers. No LE edema. No CHF type symptoms. Lipid profile is normal. No recent hospitalization. No change in meds. Hx of STEMI/CAD s/p PCI. On ASA only. Has CKD, stage III. 3-10-20: Pt denies chest pain, dyspnea, dyspnea on exertion, change in exercise capacity, fatigue,  nausea, vomiting, diarrhea, constipation, motor weakness, insomnia, weight loss, syncope, dizziness, lightheadedness, palpitations, PND, orthopnea. No nitro use. BP and hr are good. CAD is stable. No LE discoloration or ulcers. No LE edema. No CHF type symptoms. Lipid profile is normal. No recent hospitalization. No change in meds. On ASA. No bleeding issues. Has OA issues. C/o carmps and aches in her legs after going grocery shopping, has a lot of aches in her calves and feet. Hx of STEMI/CAD s/p PCI. Has stage III CKD. LDL is 91.       9-15-10: Pt denies chest pain, dyspnea, dyspnea on exertion, change in exercise capacity, fatigue,  nausea, vomiting, diarrhea, constipation, motor weakness, insomnia, weight loss, syncope, dizziness, lightheadedness, palpitations, PND, orthopnea, or claudication. No nitro use. BP and hr are good. CAD is stable. No LE discoloration or ulcers. No LE edema. No CHF type symptoms. Lipid profile is normal. No recent hospitalization. No change in meds. S/p b/l LE art duplex US with no stenosis/occlusion. Hx of CAD s/p PCI. Hx of stage III CKD. EKG with NSR, RBBB. 2-4-21: Post hospitalization for acute worsening anemia and GI bleed, antiplatelets were placed on hold. Status post endoscopy without identifying source of bleeding. Patient to undergo camera pill endoscopy soon. States she developed shortness of breath with exertion as well as midsternal chest discomfort that completely resolved with rest.  Patient did develop chest pain during hospitalization as well as elevated cardiac enzymes. History of coronary disease status post PCI of the LAD and RCA in 2017. Patient does have a history of gastric ulcer. Hydrochlorothiazide and lisinopril were discontinued as well. Status post echocardiogram on December 22, 2020 with normal LV function, 2+ mitral vegetation, grade 1 diastolic dysfunction. 6-22-21: Hgb is 11 now. S/p hospitalization post last office visit for anemia. ASA was DC'd. History of coronary disease status post PCI of the LAD and RCA in 2017. Pt denies chest pain, dyspnea, dyspnea on exertion, change in exercise capacity, fatigue,  nausea, vomiting, diarrhea, constipation, motor weakness, insomnia, weight loss, syncope, dizziness, lightheadedness, palpitations, PND, orthopnea, or claudication. S/p camera endoscopy which was negative. No nitro use. BP and hr are good. CAD is stable. No LE discoloration or ulcers. No LE edema. No CHF type symptoms. Lipid profile is normal. No recent hospitalization. No change in meds. Has stage III CKD, GFR of 42.       3-17-22: No bleeding issues. History of anemia. History of cancer endoscopy which was negative. Patient with stage III chronic kidney disease. No angina or CHF type symptoms pt denies chest pain, dyspnea, dyspnea on exertion, change in exercise capacity, fatigue,  nausea, vomiting, diarrhea, constipation, motor weakness, insomnia, weight loss, syncope, dizziness, lightheadedness, palpitations, PND, orthopnea, or claudication. History of coronary disease status post PCI of the LAD and RCA in 2017. EKG with normal sinus rhythm, right bundle branch block.       Patient Active Problem List   Diagnosis    Hypothyroid    Essential hypertension    Hyperlipidemia    Morbid obesity due to excess calories (HCC)    History of ST elevation myocardial infarction (STEMI)    Coronary artery disease involving native coronary artery of native heart without angina pectoris    Chronic bilateral low back pain with bilateral sciatica    Primary osteoarthritis of both knees    Spinal stenosis of lumbar region with neurogenic claudication    Degenerative spondylolisthesis    Asthma    CKD (chronic kidney disease) stage 3, GFR 30-59 ml/min (HCC)    Adenomatous polyp of descending colon    Adenomatous polyp of ascending colon    Adenomatous polyp of colon    Chronic gastritis without bleeding    Anemia    Gastrointestinal hemorrhage    RBBB (right bundle branch block)       Past Surgical History:   Procedure Laterality Date    CARDIAC SURGERY  2017    has x 4 cardiac stents    COLONOSCOPY  01/14/2015    DR Emily Miramontes - DIVERTICULOSIS    COLONOSCOPY N/A 1/2/2021    COLONOSCOPY DIAGNOSTIC performed by Pradeep Bueno MD at [place] [address]  05/17/2017    x2 stents    CYST REMOVAL Left 2/7/2019    RESECTION OF LEFT PINNA LESION WITH GRAFT performed by Johnna Marquis MD at [facility]. [address], COLON, DIAGNOSTIC      FINGER SURGERY  12/9/14    middle finger right hand due to infection    HYSTERECTOMY  1996    LASIK  [id number]   [facility] SHOULDER SURGERY  2008    shoulder dislocated - popped  back in Right shoulder    UPPER GASTROINTESTINAL ENDOSCOPY  01/14/2015    DR L[address] - ULCER IN THE ANTRUM    UPPER GASTROINTESTINAL ENDOSCOPY  05/06/2015    DR Emily Miramontes - GASTRITIS, PREVIOUS ULCER HEALED    UPPER GASTROINTESTINAL ENDOSCOPY N/A 1/2/2021    EGD DIAGNOSTIC ONLY performed by Pradeep Bueno MD at [address] [place] [address] History     Socioeconomic History    Marital status:      Spouse name: None    Number of children: None    Years of education: None    Highest education level: None   Occupational History    None   Tobacco Use    Smoking status: Never Smoker    Smokeless tobacco: Never Used   Vaping Use    Vaping Use: Never used   Substance and Sexual Activity    Alcohol use: Yes     Comment: glass of wine once a year    Drug use: No    Sexual activity: Yes     Partners: Male   Other Topics Concern    None   Social History Narrative    None     Social Determinants of Health     Financial Resource Strain: Low Risk     Difficulty of Paying Living Expenses: Not hard at all   Food Insecurity: No Food Insecurity    Worried About Running Out of Food in the Last Year: Never true    Da of Food in the Last Year: Never true   Transportation Needs:     Lack of Transportation (Medical): Not on file    Lack of Transportation (Non-Medical):  Not on file   Physical Activity:     Days of Exercise per Week: Not on file    Minutes of Exercise per Session: Not on file   Stress:     Feeling of Stress : Not on file   Social Connections:     Frequency of Communication with Friends and Family: Not on file    Frequency of Social Gatherings with Friends and Family: Not on file    Attends Congregational Services: Not on file    Active Member of [address] [place] [address] or Organizations: Not on file    Attends Club or Organization Meetings: Not on file    Marital Status: Not on file   Intimate Partner Violence:     Fear of Current or Ex-Partner: Not on file    Emotionally Abused: Not on file    Physically Abused: Not on file    Sexually Abused: Not on file   Housing Stability:     Unable to Pay for Housing in the Last Year: Not on file    Number of [place] in the Last Year: Not on file    Unstable Housing in the Last Year: Not on file       Family History   Problem Relation Age of Onset    Heart Disease Mother 61    Cancer Father 79        kidney    No Known Problems Daughter        Current Outpatient Medications   Medication Sig Dispense Refill    oxybutynin (DITROPAN-XL) 10 MG extended release tablet Take 1 tablet by mouth daily 30 tablet 11    Fluticasone furoate-vilanterol (BREO ELLIPTA) 200-25 MCG/INH AEPB inhaler Inhale 1 puff into the lungs daily 1 each 2    Budeson-Glycopyrrol-Formoterol (BREZTRI AEROSPHERE) 160-9-4.8 MCG/ACT AERO 2 Puffs BIG    Sample medication labeled with directions for administration and patient instructed on use. Lot: [id number] Exp: 05/2023 1 each 0    dilTIAZem (CARDIZEM CD) 240 MG extended release capsule TAKE 1 CAPSULE BY MOUTH DAILY 90 capsule 0    FEROSUL 325 (65 Fe) MG tablet Take 1 tablet by mouth 2 times daily 60 tablet 5    metoprolol tartrate (LOPRESSOR) 25 MG tablet TAKE 1 TABLET BY MOUTH TWICE DAILY 180 tablet 3    atorvastatin (LIPITOR) 80 MG tablet Take 0.5 tablets by mouth nightly 90 tablet 3    losartan-hydroCHLOROthiazide (HYZAAR) 100-25 MG per tablet Take 1 tablet by mouth daily 90 tablet 3    nitroGLYCERIN (NITROSTAT) 0.4 MG SL tablet Place 1 tablet under the tongue every 5 minutes as needed for Chest pain 25 tablet 3     No current facility-administered medications for this visit. Lisinopril and Tramadol    Review of Systems:  General ROS: negative  Psychological ROS: negative  Hematological and Lymphatic ROS: No history of blood clots or bleeding disorder. Respiratory ROS: no cough, shortness of breath, or wheezing  Cardiovascular ROS: no chest pain or dyspnea on exertion  Gastrointestinal ROS: no abdominal pain, change in bowel habits, or black or bloody stools  Genito-Urinary ROS: no dysuria, trouble voiding, or hematuria  Musculoskeletal ROS: negative  Neurological ROS: no TIA or stroke symptoms  Dermatological ROS: negative    VITALS:  Blood pressure (!) 156/68, pulse 71, height 5' 1\" (1.549 m), weight 251 lb (113.9 kg), last menstrual period 09/17/1996, SpO2 96 %, not currently breastfeeding. Body mass index is 47.43 kg/m².     Physical Examination:  General appearance - alert, well appearing, and in no distress  Mental status - alert, oriented to person, place, and time  Neck - Neck is supple, no JVD. B/l carotid bruits. No thyromegaly or adenopathy. Chest - clear to auscultation, no wheezes, rales or rhonchi, symmetric air entry  Heart - normal rate, regular rhythm, normal S1, S2, no murmurs, rubs, clicks or gallops  Abdomen - soft, nontender, nondistended, no masses or organomegaly  Neurological - alert, oriented, normal speech, no focal findings or movement disorder noted  Extremities - peripheral pulses normal, no pedal edema, no clubbing or cyanosis  Skin - normal coloration and turgor, no rashes, no suspicious skin lesions noted      Orders Placed This Encounter   Procedures    EKG 12 lead       ASSESSMENT:     Diagnosis Orders   1. Essential hypertension  EKG 12 lead   2. Morbid obesity due to excess calories ([address]     3. Mixed hyperlipidemia     4. History of ST elevation myocardial infarction (STEMI)     5. Coronary artery disease involving native coronary artery of native heart without angina pectoris     6. RBBB (right bundle branch block)           PLAN:     Patient will need to continue to follow up with you for their general medical care     As always, aggressive risk factor modification is strongly recommended. We should adhere to the [address] guidelines for HTN management and the NCEP ATP III guidelines for LDL-C management. Cardiac diet is always recommended with low fat, cholesterol, calories and sodium. Continue medications at current doses. NO ASA due to GIB/anemia. Avoid nephrotoxic agents    Follow up with PCP for labs    Check EKG today. Check ECHo for MR 2+ in 2020- will check next OV    Check CUS given b/l Carotid bruits, left >>right. In July 2022    The proper use and anticipated side effects of nitroglycerine has been carefully explained.   If a single episode of chest pain is not relieved by one tablet, the patient will try another within 5 minutes; and if this doesn't relieve the pain, the patient is instructed to call 911 for transportation to an emergency department. Patient was advised and encouraged to check blood pressure at home or at a pharmacy, maintain a logbook, and also call us back if blood pressure are above the target ranges or if it is low. Patient clearly understands and agrees to the instructions. We will need to continue to monitor muscle and liver enzymes, BUN, CR, and electrolytes. Thank you for allowing me to participate in the care of your patient, please don't hesitate to contact me if you have any further questions.

## 2022-03-30 DIAGNOSIS — I21.21 ST ELEVATION MYOCARDIAL INFARCTION INVOLVING LEFT CIRCUMFLEX CORONARY ARTERY (HCC): ICD-10-CM

## 2022-03-30 DIAGNOSIS — R79.0 LOW FERRITIN LEVEL: Primary | ICD-10-CM

## 2022-03-30 RX ORDER — DILTIAZEM HYDROCHLORIDE 240 MG/1
240 CAPSULE, COATED, EXTENDED RELEASE ORAL DAILY
Qty: 90 CAPSULE | Refills: 3 | Status: SHIPPED | OUTPATIENT
Start: 2022-03-30

## 2022-03-30 RX ORDER — FERROUS SULFATE 325(65) MG
TABLET ORAL
Qty: 60 TABLET | Refills: 5 | Status: SHIPPED | OUTPATIENT
Start: 2022-03-30

## 2022-03-30 NOTE — TELEPHONE ENCOUNTER
Comments:     Last Office Visit (last PCP visit):   2/7/2022    Next Visit Date:  Future Appointments   Date Time Provider Alvarez Solo   9/15/2022  3:00 PM DO RIANA Pete CARDIO [facility] EM[facility] [place]   1/5/2023  9:30 AM Jamie Alberto MD [facility]       **If hasn't been seen in over a year OR hasn't followed up according to last diabetes/ADHD visit, make appointment for patient before sending refill to provider.     Rx requested:  Requested Prescriptions     Pending Prescriptions Disp Refills    dilTIAZem (CARDIZEM CD) 240 MG extended release capsule [Pharmacy Med Name: diltiazem  mg capsule,extended release 24 hr] 90 capsule 0     Sig: TAKE 1 CAPSULE BY MOUTH DAILY    FEROSUL 325 (65 Fe) MG tablet [Pharmacy Med Name: FeroSul 325 mg (65 mg iron) tablet] 60 tablet 0     Sig: Take 1 tablet by mouth 2 times daily

## 2022-04-25 DIAGNOSIS — I10 ESSENTIAL HYPERTENSION: ICD-10-CM

## 2022-04-25 RX ORDER — LOSARTAN POTASSIUM AND HYDROCHLOROTHIAZIDE 25; 100 MG/1; MG/1
TABLET ORAL
Qty: 90 TABLET | Refills: 3 | Status: SHIPPED | OUTPATIENT
Start: 2022-04-25

## 2022-04-25 NOTE — TELEPHONE ENCOUNTER
Comments:     Last Office Visit (last PCP visit):   2/7/2022    Next Visit Date:  Future Appointments   Date Time Provider Alvarez Solo   9/15/2022  3:00 PM DO RIANA Resendiz CARDIO [facility], [facility].   1/5/2023  9:30 AM Gale Amin MD [facility]       **If hasn't been seen in over a year OR hasn't followed up according to last diabetes/ADHD visit, make appointment for patient before sending refill to provider.     Rx requested:  Requested Prescriptions     Pending Prescriptions Disp Refills    losartan-hydroCHLOROthiazide (HYZAAR) 100-25 MG per tablet [Pharmacy Med Name: losartan 100 mg-hydrochlorothiazide 25 mg tablet] 90 tablet 3     Sig: TAKE 1 TABLET BY MOUTH EVERY DAY

## 2022-05-02 ENCOUNTER — TELEPHONE (OUTPATIENT)
Dept: FAMILY MEDICINE CLINIC | Age: 77
End: 2022-05-02

## 2022-05-31 ENCOUNTER — OFFICE VISIT (OUTPATIENT)
Dept: FAMILY MEDICINE CLINIC | Age: 77
End: 2022-05-31
Payer: MEDICARE

## 2022-05-31 VITALS
OXYGEN SATURATION: 97 % | WEIGHT: 249.2 LBS | DIASTOLIC BLOOD PRESSURE: 70 MMHG | SYSTOLIC BLOOD PRESSURE: 134 MMHG | HEART RATE: 62 BPM | TEMPERATURE: 98 F | BODY MASS INDEX: 47.09 KG/M2

## 2022-05-31 DIAGNOSIS — N18.30 STAGE 3 CHRONIC KIDNEY DISEASE, UNSPECIFIED WHETHER STAGE 3A OR 3B CKD (HCC): ICD-10-CM

## 2022-05-31 DIAGNOSIS — R21 RASH AND NONSPECIFIC SKIN ERUPTION: Primary | ICD-10-CM

## 2022-05-31 DIAGNOSIS — I20.8 OTHER FORMS OF ANGINA PECTORIS (HCC): ICD-10-CM

## 2022-05-31 PROCEDURE — 1123F ACP DISCUSS/DSCN MKR DOCD: CPT | Performed by: FAMILY MEDICINE

## 2022-05-31 PROCEDURE — 99212 OFFICE O/P EST SF 10 MIN: CPT | Performed by: FAMILY MEDICINE

## 2022-05-31 ASSESSMENT — ENCOUNTER SYMPTOMS
ABDOMINAL PAIN: 0
SORE THROAT: 0
COUGH: 0
DIARRHEA: 0
CONSTIPATION: 0
SHORTNESS OF BREATH: 0
WHEEZING: 0
RHINORRHEA: 0

## 2022-05-31 NOTE — PROGRESS NOTES
[address] [facility] [address] [facility] PRIMARY CARE  [address] [place] [address]  Dept: [phone]  Dept Fax: [phone]  Loc: [phone]     Chief Complaint  Chief Complaint   Patient presents with    Skin Problem     x 4 months, on and off, left cheek, itching around sore, popped it        HPI:  77 y. o.female who presents for the following:      Skin problem: L face; had a reccurent area of scabbing over the past 3-4mo; sometimes with itching; seemed to pop open and oozing liquid which she was able to squeeze out; seem sto be improved now    Review of Systems   Constitutional: Negative for chills and fever. HENT: Negative for congestion, rhinorrhea and sore throat. Respiratory: Negative for cough, shortness of breath and wheezing. Gastrointestinal: Negative for abdominal pain, constipation and diarrhea. Endocrine: Negative for polydipsia and polyuria. Genitourinary: Negative for dysuria, frequency and urgency. Skin: Positive for rash. Neurological: Negative for syncope, light-headedness, numbness and headaches. Psychiatric/Behavioral: Negative for sleep disturbance. The patient is not nervous/anxious.         Past Medical History:   Diagnosis Date    Antral ulcer 01/14/2015    DR Alcocer Lies    Asthma     \"cured by beestings\"    Coronary artery disease     Coronary artery disease involving native coronary artery of native heart without angina pectoris 7/10/2018    has x 4 cardiac stents / Dr. Dinah Valenzuela Diverticulosis of colon (without mention of hemorrhage) 01/14/2015    DR Carlyn Aranda    Essential hypertension 12/10/2013    meds > 20 yrs    History of blood transfusion 1990s    with hysterectomy    History of normal resting EKG 1996    normal    History of ST elevation myocardial infarction (STEMI) 7/11/2017    History of transfusion of whole blood 1996    Excessive bleeding before hysterectomy    Hyperlipidemia     meds > 2 yrs  Hypertension     Hypothyroidism     past trx years ago then stopped / recent restart    Kidney disease     Low back pain     Morbid obesity due to excess calories ([address] 5/13/2017    Morbid obesity due to excess calories ([address] 6/6/2017    Osteoarthritis     Pneumonia     RBBB (right bundle branch block) 3/17/2022    Shoulder dislocation     ST elevation myocardial infarction involving left circumflex coronary artery ([address] 5/13/2017    Unspecified gastritis and gastroduodenitis without mention of hemorrhage 05/06/2015    DR Carmen Boyd     Past Surgical History:   Procedure Laterality Date    CARDIAC SURGERY  2017    has x 4 cardiac stents    COLONOSCOPY  01/14/2015    DR Carmen Boyd - DIVERTICULOSIS    COLONOSCOPY N/A 1/2/2021    COLONOSCOPY DIAGNOSTIC performed by Sandi Mitchell MD at [address] [place] [address]  05/17/2017    x2 stents    CYST REMOVAL Left 2/7/2019    RESECTION OF LEFT PINNA LESION WITH GRAFT performed by Marta Luna MD at [facility]. [address], COLON, DIAGNOSTIC      FINGER SURGERY  12/9/14    middle finger right hand due to infection   [facility]  [id number]   [facility]  2008    shoulder dislocated - popped  back in Right shoulder    UPPER GASTROINTESTINAL ENDOSCOPY  01/14/2015    DR LANE - ULCER IN THE ANTRUM    UPPER GASTROINTESTINAL ENDOSCOPY  05/06/2015    DR Carmen Boyd - GASTRITIS, PREVIOUS ULCER HEALED    UPPER GASTROINTESTINAL ENDOSCOPY N/A 1/2/2021    EGD DIAGNOSTIC ONLY performed by Sandi Mitchell MD at [address] [place] [address] History     Socioeconomic History    Marital status:      Spouse name: Not on file    Number of children: Not on file    Years of education: Not on file    Highest education level: Not on file   Occupational History    Not on file   Tobacco Use    Smoking status: Never Smoker    Smokeless tobacco: Never Used   Vaping Use    Vaping Use: Never used   Substance and Sexual Activity    Alcohol use: Yes     Comment: glass of wine once a year    Drug use: No    Sexual activity: Yes     Partners: Male   Other Topics Concern    Not on file   Social History Narrative    Not on file     Social Determinants of Health     Financial Resource Strain: Low Risk     Difficulty of Paying Living Expenses: Not hard at all   Food Insecurity: No Food Insecurity    Worried About [address] in the Last Year: Never true    [address] in the Last Year: Never true   Transportation Needs:     Lack of Transportation (Medical): Not on file    Lack of Transportation (Non-Medical):  Not on file   Physical Activity:     Days of Exercise per Week: Not on file    Minutes of Exercise per Session: Not on file   Stress:     Feeling of Stress : Not on file   Social Connections:     Frequency of Communication with Friends and Family: Not on file    Frequency of Social Gatherings with Friends and Family: Not on file    Attends Faith Services: Not on file    Active Member of [address] [place] [address] or Organizations: Not on file    Attends Club or Organization Meetings: Not on file    Marital Status: Not on file   Intimate Partner Violence:     Fear of Current or Ex-Partner: Not on file    Emotionally Abused: Not on file    Physically Abused: Not on file    Sexually Abused: Not on file   Housing Stability:     Unable to Pay for Housing in the Last Year: Not on file    Number of [place] in the Last Year: Not on file    Unstable Housing in the Last Year: Not on file     Family History   Problem Relation Age of Onset    Heart Disease Mother 61    Cancer Father 79        kidney    No Known Problems Daughter       Allergies   Allergen Reactions    Lisinopril Nausea Only and Other (See Comments)     cough    Tramadol Nausea Only     Current Outpatient Medications   Medication Sig Dispense Refill    losartan-hydroCHLOROthiazide (HYZAAR) 100-25 MG per tablet TAKE 1 TABLET BY MOUTH EVERY DAY 90 tablet 3    dilTIAZem (CARDIZEM CD) 240 MG extended release capsule TAKE 1 CAPSULE BY MOUTH DAILY 90 capsule 3    FEROSUL 325 (65 Fe) MG tablet Take 1 tablet by mouth 2 times daily 60 tablet 5    oxybutynin (DITROPAN-XL) 10 MG extended release tablet Take 1 tablet by mouth daily 30 tablet 11    Fluticasone furoate-vilanterol (BREO ELLIPTA) 200-25 MCG/INH AEPB inhaler Inhale 1 puff into the lungs daily 1 each 2    Budeson-Glycopyrrol-Formoterol (BREZTRI AEROSPHERE) 160-9-4.8 MCG/ACT AERO 2 Puffs BIG    Sample medication labeled with directions for administration and patient instructed on use. Lot: [id number] Exp: 05/2023 1 each 0    metoprolol tartrate (LOPRESSOR) 25 MG tablet TAKE 1 TABLET BY MOUTH TWICE DAILY 180 tablet 3    atorvastatin (LIPITOR) 80 MG tablet Take 0.5 tablets by mouth nightly 90 tablet 3    nitroGLYCERIN (NITROSTAT) 0.4 MG SL tablet Place 1 tablet under the tongue every 5 minutes as needed for Chest pain 25 tablet 3     No current facility-administered medications for this visit. Vitals:    05/31/22 1041   BP: 134/70   Site: Right Upper Arm   Position: Sitting   Cuff Size: Large Adult   Pulse: 62   Temp: 98 °F (36.7 °C)   TempSrc: Infrared   SpO2: 97%   Weight: 249 lb 3.2 oz (113 kg)       Physical exam:  Physical Exam  Vitals reviewed. Constitutional:       General: She is not in acute distress. Appearance: She is well-developed. HENT:      Head: Normocephalic and atraumatic. Cardiovascular:      Rate and Rhythm: Normal rate. Pulmonary:      Effort: Pulmonary effort is normal. No respiratory distress. Musculoskeletal:      Cervical back: Normal range of motion. Skin:     General: Skin is warm and dry. Neurological:      Mental Status: She is alert and oriented to person, place, and time.    Psychiatric:         Behavior: Behavior normal.         Assessment/Plan:  77 y.o. female here mainly for face rash:  - small healing area of erythema; likely had a cyst in the area which is close to resolving; can just watch for now     Diagnosis Orders   1. Rash and nonspecific skin eruption     2. Stage 3 chronic kidney disease, unspecified whether stage 3a or 3b CKD ([facility]     3. Other forms of angina pectoris ([facility]          Return if symptoms worsen or fail to improve.     Peace Lerner MD

## 2022-08-14 NOTE — DISCHARGE INSTRUCTIONS
Patient given tylenol at 1:17pm patient was given a prescription for OxyCODONE-Acetaminophen 5-325mg oral tablets that can be taken at 5:17pm.       Total Knee Replacement  Discharge Instructions    To prevent Clot formation, you have been placed on the following medication:  Aspirin 81 mg twice a day for 30 days started on 8/30/22  Surgical Site Care:  . Change dressing once a day and PRN (as needed). Apply 4 x 4 sponge and light tape. If glue present, leave open to air. You may leave wound open to air after initial dressing removal, if wound is clean, dry and intact  If Aquacel Ag dressing is present, do not remove dressing for 7 days, unless heavily saturated. If heavily saturated, remove dressing and start using instructions above  Staples will be removed on post-operative day 14 and steri-strips applied  Showering is permitted starting POD1 if waterproof Aquacel dressing is present or when the incision is covered with 4 x 4 and Tegaderm waterproof dressing  Until all areas of incision are healed. Physical Therapy:  Weight Bearing Status:  WBAT (Weight bearing as tolerated)   Precautions, Per Physical Therapy Handout  Pain Medications  You were given oxycodone (Oxycontin, Oxyir)  Wean off pain medications as you deem appropriate as long as pain is under control  Cold packs/Ice packs/Machine  May be used 3 times daily for 15-30 minutes as necessary  Be sure to have a barrier (cloth, clothing, towel) between the site and the ice pack to prevent [address] for Orthopedics office if  Increased redness, swelling, drainage of any kind, and/or pain to surgery site. As well as new onset fevers and or chills. These could signify an infection. Calf or thigh tenderness to touch as well as increased swelling or redness. This could signify a clot formation. Numbness or tingling to an area around the incision site or below the incision site (toes).   Any rash appears, increased  or new onset nausea/vomiting occur. This may indicate a reaction to a medication. Phone # [phone].   Follow up with Surgeon   I acknowledge that I have received marisa hose and understand the instructions on how and when to wear them (on during the day, off at night)   Discharging RN who has gone over instructions and acknowledges marisa hose have been received

## 2022-08-16 ENCOUNTER — HOSPITAL ENCOUNTER (OUTPATIENT)
Dept: PREADMISSION TESTING | Age: 77
Discharge: HOME | End: 2022-08-20
Payer: MEDICARE

## 2022-08-16 VITALS
DIASTOLIC BLOOD PRESSURE: 75 MMHG | OXYGEN SATURATION: 98 % | WEIGHT: 239.4 LBS | RESPIRATION RATE: 16 BRPM | TEMPERATURE: 98 F | SYSTOLIC BLOOD PRESSURE: 169 MMHG | HEART RATE: 47 BPM | HEIGHT: 62 IN | BODY MASS INDEX: 44.05 KG/M2

## 2022-08-16 LAB
ABO/RH: NORMAL
ALBUMIN SERPL-MCNC: 4.4 G/DL (ref 3.5–4.6)
ALP BLD-CCNC: 120 U/L (ref 40–130)
ALT SERPL-CCNC: 19 U/L (ref 0–33)
ANION GAP SERPL CALCULATED.3IONS-SCNC: 14 MEQ/L (ref 9–15)
ANTIBODY SCREEN: NORMAL
APTT: 36.5 SEC (ref 24.4–36.8)
AST SERPL-CCNC: 16 U/L (ref 0–35)
BACTERIA: ABNORMAL /HPF
BASOPHILS ABSOLUTE: 0 K/UL (ref 0–0.2)
BASOPHILS RELATIVE PERCENT: 1 %
BILIRUB SERPL-MCNC: 0.4 MG/DL (ref 0.2–0.7)
BILIRUBIN URINE: NEGATIVE
BLOOD, URINE: ABNORMAL
BUN BLDV-MCNC: 39 MG/DL (ref 8–23)
CALCIUM SERPL-MCNC: 9.8 MG/DL (ref 8.5–9.9)
CHLORIDE BLD-SCNC: 109 MEQ/L (ref 95–107)
CLARITY: ABNORMAL
CO2: 19 MEQ/L (ref 20–31)
COLOR: YELLOW
CREAT SERPL-MCNC: 1.07 MG/DL (ref 0.5–0.9)
EOSINOPHILS ABSOLUTE: 0.2 K/UL (ref 0–0.7)
EOSINOPHILS RELATIVE PERCENT: 3.9 %
EPITHELIAL CELLS, UA: ABNORMAL /HPF (ref 0–5)
GFR AFRICAN AMERICAN: >60
GFR NON-AFRICAN AMERICAN: 49.8
GLOBULIN: 2.6 G/DL (ref 2.3–3.5)
GLUCOSE BLD-MCNC: 111 MG/DL (ref 70–99)
GLUCOSE URINE: NEGATIVE MG/DL
HCT VFR BLD CALC: 37 % (ref 37–47)
HEMOGLOBIN: 12 G/DL (ref 12–16)
HYALINE CASTS: ABNORMAL /HPF (ref 0–5)
INR BLD: 1
KETONES, URINE: NEGATIVE MG/DL
LEUKOCYTE ESTERASE, URINE: ABNORMAL
LYMPHOCYTES ABSOLUTE: 1 K/UL (ref 1–4.8)
LYMPHOCYTES RELATIVE PERCENT: 22.1 %
MCH RBC QN AUTO: 29.8 PG (ref 27–31.3)
MCHC RBC AUTO-ENTMCNC: 32.3 % (ref 33–37)
MCV RBC AUTO: 92.2 FL (ref 82–100)
MONOCYTES ABSOLUTE: 0.3 K/UL (ref 0.2–0.8)
MONOCYTES RELATIVE PERCENT: 7.5 %
NEUTROPHILS ABSOLUTE: 2.9 K/UL (ref 1.4–6.5)
NEUTROPHILS RELATIVE PERCENT: 65.5 %
NITRITE, URINE: POSITIVE
PDW BLD-RTO: 13.6 % (ref 11.5–14.5)
PH UA: 5.5 (ref 5–9)
PLATELET # BLD: 191 K/UL (ref 130–400)
POTASSIUM SERPL-SCNC: 4.5 MEQ/L (ref 3.4–4.9)
PROTEIN UA: 100 MG/DL
PROTHROMBIN TIME: 13.5 SEC (ref 12.3–14.9)
RBC # BLD: 4.01 M/UL (ref 4.2–5.4)
RBC UA: ABNORMAL /HPF (ref 0–5)
SODIUM BLD-SCNC: 142 MEQ/L (ref 135–144)
SPECIFIC GRAVITY UA: 1.02 (ref 1–1.03)
TOTAL PROTEIN: 7 G/DL (ref 6.3–8)
URINE REFLEX TO CULTURE: YES
UROBILINOGEN, URINE: 0.2 E.U./DL
WBC # BLD: 4.5 K/UL (ref 4.8–10.8)
WBC UA: >100 /HPF (ref 0–5)

## 2022-08-16 PROCEDURE — 85025 COMPLETE CBC W/AUTO DIFF WBC: CPT

## 2022-08-16 PROCEDURE — 86901 BLOOD TYPING SEROLOGIC RH(D): CPT

## 2022-08-16 PROCEDURE — 87077 CULTURE AEROBIC IDENTIFY: CPT

## 2022-08-16 PROCEDURE — 85730 THROMBOPLASTIN TIME PARTIAL: CPT

## 2022-08-16 PROCEDURE — 85610 PROTHROMBIN TIME: CPT

## 2022-08-16 PROCEDURE — 86900 BLOOD TYPING SEROLOGIC ABO: CPT

## 2022-08-16 PROCEDURE — 87086 URINE CULTURE/COLONY COUNT: CPT

## 2022-08-16 PROCEDURE — 87186 SC STD MICRODIL/AGAR DIL: CPT

## 2022-08-16 PROCEDURE — 87641 MR-STAPH DNA AMP PROBE: CPT

## 2022-08-16 PROCEDURE — 86850 RBC ANTIBODY SCREEN: CPT

## 2022-08-16 PROCEDURE — 81001 URINALYSIS AUTO W/SCOPE: CPT

## 2022-08-16 PROCEDURE — 80053 COMPREHEN METABOLIC PANEL: CPT

## 2022-08-16 RX ORDER — LIDOCAINE HYDROCHLORIDE 10 MG/ML
1 INJECTION, SOLUTION EPIDURAL; INFILTRATION; INTRACAUDAL; PERINEURAL
Status: CANCELLED | OUTPATIENT
Start: 2022-08-30 | End: 2022-08-30

## 2022-08-16 RX ORDER — SODIUM CHLORIDE, SODIUM LACTATE, POTASSIUM CHLORIDE, CALCIUM CHLORIDE 600; 310; 30; 20 MG/100ML; MG/100ML; MG/100ML; MG/100ML
INJECTION, SOLUTION INTRAVENOUS CONTINUOUS
Status: CANCELLED | OUTPATIENT
Start: 2022-08-30

## 2022-08-16 RX ORDER — SODIUM CHLORIDE 9 MG/ML
INJECTION, SOLUTION INTRAVENOUS PRN
Status: CANCELLED | OUTPATIENT
Start: 2022-08-30

## 2022-08-16 RX ORDER — SODIUM CHLORIDE 0.9 % (FLUSH) 0.9 %
5-40 SYRINGE (ML) INJECTION PRN
Status: CANCELLED | OUTPATIENT
Start: 2022-08-30

## 2022-08-16 RX ORDER — SODIUM CHLORIDE 0.9 % (FLUSH) 0.9 %
5-40 SYRINGE (ML) INJECTION EVERY 12 HOURS SCHEDULED
Status: CANCELLED | OUTPATIENT
Start: 2022-08-30

## 2022-08-17 LAB
MRSA, DNA, NASAL: ABNORMAL
ORGANISM: ABNORMAL
SPECIMEN DESCRIPTION: ABNORMAL
URINE CULTURE, ROUTINE: ABNORMAL
URINE CULTURE, ROUTINE: ABNORMAL

## 2022-08-22 ENCOUNTER — OFFICE VISIT (OUTPATIENT)
Dept: FAMILY MEDICINE CLINIC | Age: 77
End: 2022-08-22
Payer: MEDICARE

## 2022-08-22 VITALS
BODY MASS INDEX: 44.72 KG/M2 | HEART RATE: 61 BPM | TEMPERATURE: 99 F | DIASTOLIC BLOOD PRESSURE: 72 MMHG | SYSTOLIC BLOOD PRESSURE: 136 MMHG | OXYGEN SATURATION: 98 % | WEIGHT: 240.6 LBS

## 2022-08-22 DIAGNOSIS — I20.8 OTHER FORMS OF ANGINA PECTORIS (HCC): ICD-10-CM

## 2022-08-22 DIAGNOSIS — Z01.818 PREOP EXAM FOR INTERNAL MEDICINE: Primary | ICD-10-CM

## 2022-08-22 DIAGNOSIS — N18.30 STAGE 3 CHRONIC KIDNEY DISEASE, UNSPECIFIED WHETHER STAGE 3A OR 3B CKD (HCC): ICD-10-CM

## 2022-08-22 DIAGNOSIS — N30.00 ACUTE CYSTITIS WITHOUT HEMATURIA: ICD-10-CM

## 2022-08-22 PROCEDURE — 99212 OFFICE O/P EST SF 10 MIN: CPT | Performed by: FAMILY MEDICINE

## 2022-08-22 RX ORDER — SULFAMETHOXAZOLE AND TRIMETHOPRIM 800; 160 MG/1; MG/1
1 TABLET ORAL 2 TIMES DAILY
Qty: 6 TABLET | Refills: 0 | Status: SHIPPED | OUTPATIENT
Start: 2022-08-22 | End: 2022-08-25

## 2022-08-22 ASSESSMENT — ENCOUNTER SYMPTOMS
SORE THROAT: 0
COUGH: 0
WHEEZING: 0
CONSTIPATION: 0
ABDOMINAL PAIN: 0
DIARRHEA: 0
RHINORRHEA: 0
SHORTNESS OF BREATH: 0

## 2022-08-22 NOTE — PROGRESS NOTES
[address] PRIMARY CARE  [address] [place] [address]  Dept: [phone]  Dept Fax: [phone]  Loc: [phone]     Chief Complaint  Chief Complaint   Patient presents with    Pre-op Exam     Total Right Knee Arthroplasty on 08/30/2022 by Dr. Maddie Candelaria. Pre-Op clearance by cardiologist on 08/26/2022,    PAT done at [facility] on 8/16/2022         HPI:  69 y. o.female who presents for the following:      Planning R TKR with Dr. Ga Records 8/30/22; Seen by Dr. Lisa Lal for cardiac clearance; had preop labs 8/16/22 demonstrating UTI not treated yet but has no symptoms; no CP or SOB    Review of Systems   Constitutional:  Negative for chills and fever. HENT:  Negative for congestion, rhinorrhea and sore throat. Respiratory:  Negative for cough, shortness of breath and wheezing. Gastrointestinal:  Negative for abdominal pain, constipation and diarrhea. Endocrine: Negative for polydipsia and polyuria. Genitourinary:  Negative for dysuria, frequency and urgency. Neurological:  Negative for syncope, light-headedness, numbness and headaches. Psychiatric/Behavioral:  Negative for sleep disturbance. The patient is not nervous/anxious.       Past Medical History:   Diagnosis Date    Antral ulcer 01/14/2015    DR Giovanna Vasquez    Asthma     \"cured by beestings\"    Coronary artery disease     Coronary artery disease involving native coronary artery of native heart without angina pectoris 7/10/2018    has x 4 cardiac stents / Dr. Lisa Lal    Diverticulosis of colon (without mention of hemorrhage) 01/14/2015    DR Giovanna Vasquez    Essential hypertension 12/10/2013    meds > 20 yrs    History of blood transfusion 1990s    with hysterectomy    History of normal resting EKG 1996    normal    History of ST elevation myocardial infarction (STEMI) 7/11/2017    History of transfusion of whole blood 1996    Excessive bleeding before hysterectomy    Hyperlipidemia     meds > 2 yrs    Hypertension     Hypothyroidism     past trx years ago then stopped / recent restart    Kidney disease     Low back pain     Morbid obesity due to excess calories ([address] 5/13/2017    Morbid obesity due to excess calories ([address] 6/6/2017    Osteoarthritis     Pneumonia     RBBB (right bundle branch block) 3/17/2022    Shoulder dislocation     ST elevation myocardial infarction involving left circumflex coronary artery ([address] 5/13/2017    Unspecified gastritis and gastroduodenitis without mention of hemorrhage 05/06/2015    DR Donel Closs     Past Surgical History:   Procedure Laterality Date    CARDIAC SURGERY  2017    has x 4 cardiac stents    COLONOSCOPY  01/14/2015    DR Donel Closs - DIVERTICULOSIS    COLONOSCOPY N/A 1/2/2021    COLONOSCOPY DIAGNOSTIC performed by Jordon Wood MD at [facility]  05/17/2017    x2 stents    CYST REMOVAL Left 2/7/2019    RESECTION OF LEFT PINNA LESION WITH GRAFT performed by Maxwell Gage MD at [address] [place] [address], COLON, DIAGNOSTIC      FINGER SURGERY  12/9/14    middle finger right hand due to infection    [facility]CTOMY ([facility])  [id number]    SHOULDER SURGERY  2008    shoulder dislocated - popped  back in Right shoulder    UPPER GASTROINTESTINAL ENDOSCOPY  01/14/2015    DR LANE - ULCER IN THE ANTRUM    UPPER GASTROINTESTINAL ENDOSCOPY  05/06/2015    DR Donel Closs - GASTRITIS, PREVIOUS ULCER HEALED    UPPER GASTROINTESTINAL ENDOSCOPY N/A 1/2/2021    EGD DIAGNOSTIC ONLY performed by Jordon Wood MD at [address] [place] [address] History     Socioeconomic History    Marital status:      Spouse name: Not on file    Number of children: Not on file    Years of education: Not on file    Highest education level: Not on file   Occupational History    Not on file   Tobacco Use    Smoking status: Never    Smokeless tobacco: Never   Vaping Use    Vaping Use: Never used   Substance and Sexual Activity    Alcohol use: Yes     Comment: glass of wine once a year    Drug use: No    Sexual activity: Yes     Partners: Male   Other Topics Concern    Not on file   Social History Narrative    Not on file     Social Determinants of Health     Financial Resource Strain: Low Risk     Difficulty of Paying Living Expenses: Not hard at all   Food Insecurity: No Food Insecurity    Worried About Running Out of Food in the Last Year: Never true    Ran Out of Food in the Last Year: Never true   Transportation Needs: Not on file   Physical Activity: Not on file   Stress: Not on file   Social Connections: Not on file   Intimate Partner Violence: Not on file   Housing Stability: Not on file     Family History   Problem Relation Age of Onset    Heart Disease Mother 61    Cancer Father 79        kidney    No Known Problems Daughter       Allergies   Allergen Reactions    Lisinopril Nausea Only and Other (See Comments)     cough    Tramadol Nausea Only     Current Outpatient Medications   Medication Sig Dispense Refill    sulfamethoxazole-trimethoprim (BACTRIM DS;SEPTRA DS) 800-160 MG per tablet Take 1 tablet by mouth 2 times daily for 3 days 6 tablet 0    losartan-hydroCHLOROthiazide (HYZAAR) 100-25 MG per tablet TAKE 1 TABLET BY MOUTH EVERY DAY 90 tablet 3    dilTIAZem (CARDIZEM CD) 240 MG extended release capsule TAKE 1 CAPSULE BY MOUTH DAILY 90 capsule 3    FEROSUL 325 (65 Fe) MG tablet Take 1 tablet by mouth 2 times daily 60 tablet 5    oxybutynin (DITROPAN-XL) 10 MG extended release tablet Take 1 tablet by mouth daily 30 tablet 11    metoprolol tartrate (LOPRESSOR) 25 MG tablet TAKE 1 TABLET BY MOUTH TWICE DAILY 180 tablet 3    atorvastatin (LIPITOR) 80 MG tablet Take 0.5 tablets by mouth nightly 90 tablet 3    nitroGLYCERIN (NITROSTAT) 0.4 MG SL tablet Place 1 tablet under the tongue every 5 minutes as needed for Chest pain 25 tablet 3     No current facility-administered medications for this visit. Vitals:    08/22/22 1014   BP: 136/72   Site: Right Upper Arm   Position: Sitting   Cuff Size: Large Adult   Pulse: 61   Temp: 99 °F (37.2 °C)   TempSrc: Infrared   SpO2: 98%   Weight: 240 lb 9.6 oz (109.1 kg)       Physical exam:  Physical Exam  Vitals reviewed. Constitutional:       General: She is not in acute distress. Appearance: She is well-developed. HENT:      Head: Normocephalic and atraumatic. Right Ear: Tympanic membrane, ear canal and external ear normal. Tympanic membrane is not erythematous. Tympanic membrane has normal mobility. Left Ear: Tympanic membrane, ear canal and external ear normal. Tympanic membrane is not erythematous. Tympanic membrane has normal mobility. Nose: Nose normal.      Mouth/Throat:      Pharynx: No oropharyngeal exudate. Neck:      Thyroid: No thyromegaly. Cardiovascular:      Rate and Rhythm: Normal rate and regular rhythm. Heart sounds: Normal heart sounds. No murmur heard. Pulmonary:      Effort: Pulmonary effort is normal. No respiratory distress. Breath sounds: Normal breath sounds. No wheezing. Abdominal:      General: Bowel sounds are normal. There is no distension. Palpations: Abdomen is soft. Tenderness: There is no abdominal tenderness. There is no guarding or rebound. Musculoskeletal:      Cervical back: Normal range of motion. Lymphadenopathy:      Cervical: No cervical adenopathy. Skin:     General: Skin is warm and dry. Neurological:      Mental Status: She is alert and oriented to person, place, and time. Psychiatric:         Behavior: Behavior normal.       Assessment/Plan:  69 y.o. female here mainly for preop for knee surgery:  - from generalist perspective she is acceptable risk for surgery  - R skin lesion: she would rather wait until after surgery to see derm     Diagnosis Orders   1. Preop exam for internal medicine        2.  Stage 3 chronic kidney disease, unspecified whether stage 3a or 3b CKD ([facility]        3. Other forms of angina pectoris ([facility]        4. Acute cystitis without hematuria  sulfamethoxazole-trimethoprim (BACTRIM DS;SEPTRA DS) 800-160 MG per tablet           Return if symptoms worsen or fail to improve.     Teresa Barry MD

## 2022-08-26 ENCOUNTER — OFFICE VISIT (OUTPATIENT)
Dept: CARDIOLOGY CLINIC | Age: 77
End: 2022-08-26
Payer: MEDICARE

## 2022-08-26 VITALS
OXYGEN SATURATION: 95 % | WEIGHT: 242 LBS | HEART RATE: 78 BPM | SYSTOLIC BLOOD PRESSURE: 132 MMHG | BODY MASS INDEX: 44.99 KG/M2 | DIASTOLIC BLOOD PRESSURE: 68 MMHG

## 2022-08-26 DIAGNOSIS — R01.1 CARDIAC MURMUR: ICD-10-CM

## 2022-08-26 DIAGNOSIS — E78.2 MIXED HYPERLIPIDEMIA: ICD-10-CM

## 2022-08-26 DIAGNOSIS — I25.2 HISTORY OF ST ELEVATION MYOCARDIAL INFARCTION (STEMI): ICD-10-CM

## 2022-08-26 DIAGNOSIS — I10 ESSENTIAL HYPERTENSION: ICD-10-CM

## 2022-08-26 DIAGNOSIS — I45.10 RBBB (RIGHT BUNDLE BRANCH BLOCK): ICD-10-CM

## 2022-08-26 DIAGNOSIS — I25.10 CORONARY ARTERY DISEASE INVOLVING NATIVE CORONARY ARTERY OF NATIVE HEART WITHOUT ANGINA PECTORIS: ICD-10-CM

## 2022-08-26 DIAGNOSIS — Z01.818 PREOPERATIVE CLEARANCE: Primary | ICD-10-CM

## 2022-08-26 PROCEDURE — 1123F ACP DISCUSS/DSCN MKR DOCD: CPT | Performed by: INTERNAL MEDICINE

## 2022-08-26 PROCEDURE — 99214 OFFICE O/P EST MOD 30 MIN: CPT | Performed by: INTERNAL MEDICINE

## 2022-08-26 PROCEDURE — 93000 ELECTROCARDIOGRAM COMPLETE: CPT | Performed by: INTERNAL MEDICINE

## 2022-08-26 NOTE — PROGRESS NOTES
Chief Complaint   Patient presents with    Cardiac Clearance     Right total knee arthroplasty- Dr. Junior Hoffman- 8/30/22     CC: SOB, CAD    5-13-17: Hospital Course:   Dipti Joseph is a 80 y.o. female admitted to [facility] on 5/13/2017 for chest pain. After an EKG was done at [facility] was found to be having an acute MI patient was flown here by helicopter taken directly to cath lab to drug eluted stents were put in place ALP INE 3.5 mm x 23 mm and a 2.5 mm x 8 mm . Tolerated procedure well denied chest pain denied right wrist pain no distress     6-6-17: Patient presents for initial medical evaluation. Patient is followed on a regular basis by Dr. Myles Pettit MD.   Pt denies chest pain, dyspnea, dyspnea on exertion, change in exercise capacity, fatigue,  nausea, vomiting, diarrhea, constipation, motor weakness, insomnia, weight loss, syncope, dizziness, lightheadedness, palpitations, PND, orthopnea, or claudication. No nitro use. BP and hr are good. CAD is stable. No LE discoloration or ulcers. No LE edema. No CHF type symptoms. Lipid profile is abnormal. Was placed on statin during hosp. No bleeding issues. S/p LHC with mild LAD diffuse disease, 99% mid CX, 80-90%90% proximal to mid RCA. S/p PCI to CX with 2 KEYSHA. Planned staging of RCA due to STEMI procedure. s/p ECHO. Conclusions   Summary   Normal mitral valve structure and function. Normal aortic valve structure and function. Normal tricuspid valve structure and function. There is mild ( 1 +) tricuspid regurgitation with estimated RVSP of 38 mm   Hg. Normal pulmonic valve structure and function. Mildly dilated left atrium. Left ventricular ejection fraction is visually estimated at 65%. Impaired relaxation compatible with diastolic dysfunction. ( reversed E/A   ratio)   Mild concentric left ventricular hypertrophy. Normal right atrium. Normal right ventricle structure and function.    No evidence of pericardial effusion. No evidence of pleural effusion. 7-11-17: s/p PCI of RCA at [facility]. Pt denies chest pain, dyspnea, dyspnea on exertion, change in exercise capacity, fatigue,  nausea, vomiting, diarrhea, constipation, motor weakness, insomnia, weight loss, syncope, dizziness, lightheadedness, palpitations, PND, orthopnea, or claudication. No nitro use. BP and hr are good. CAD is stable. No LE discoloration or ulcers. No LE edema. No CHF type symptoms. Lipid profile is normal.     1-9-18: Pt denies chest pain, dyspnea, dyspnea on exertion, change in exercise capacity, fatigue,  nausea, vomiting, diarrhea, constipation, motor weakness, insomnia, weight loss, syncope, dizziness, lightheadedness, palpitations, PND, orthopnea, or claudication. No nitro use. BP and hr are good. CAD is stable. No LE discoloration or ulcers. No LE edema. No CHF type symptoms. Lipid profile is normal.  No bleeding issues on DAPT. Compliant with meds. 7-10-18: doing well overall. On DAPT and no bleeding issues. Pt denies chest pain, dyspnea, dyspnea on exertion, change in exercise capacity, fatigue,  nausea, vomiting, diarrhea, constipation, motor weakness, insomnia, weight loss, syncope, dizziness, lightheadedness, palpitations, PND, orthopnea, or claudication. No nitro use. BP and hr are good. CAD is stable. No LE discoloration or ulcers. No LE edema. No CHF type symptoms. Lipid profile is normal. No recent hospitalization. No change in meds. ekg WITH NSR, RBBB. SHE NEEDS TO HAVE BACK WORK DONE. Did not tolerate full dose lipitor 80mg daily, caused her joint pain. 1-15-19: Pt denies chest pain, dyspnea, dyspnea on exertion, change in exercise capacity, fatigue,  nausea, vomiting, diarrhea, constipation, motor weakness, insomnia, weight loss, syncope, dizziness, lightheadedness, palpitations, PND, orthopnea, or claudication. No nitro use. BP and hr are good. CAD is stable. No LE discoloration or ulcers. No LE edema.  No CHF type symptoms. Lipid profile is normal. No recent hospitalization. No change in meds. Has CKD, stage III      9-17-19: Pt denies chest pain, dyspnea, dyspnea on exertion, change in exercise capacity, fatigue,  nausea, vomiting, diarrhea, constipation, motor weakness, insomnia, weight loss, syncope, dizziness, lightheadedness, palpitations, PND, orthopnea, or claudication. No nitro use. BP and hr are good. CAD is stable. No LE discoloration or ulcers. No LE edema. No CHF type symptoms. Lipid profile is normal. No recent hospitalization. No change in meds. Hx of STEMI/CAD s/p PCI. On ASA only. Has CKD, stage III. 3-10-20: Pt denies chest pain, dyspnea, dyspnea on exertion, change in exercise capacity, fatigue,  nausea, vomiting, diarrhea, constipation, motor weakness, insomnia, weight loss, syncope, dizziness, lightheadedness, palpitations, PND, orthopnea. No nitro use. BP and hr are good. CAD is stable. No LE discoloration or ulcers. No LE edema. No CHF type symptoms. Lipid profile is normal. No recent hospitalization. No change in meds. On ASA. No bleeding issues. Has OA issues. C/o carmps and aches in her legs after going grocery shopping, has a lot of aches in her calves and feet. Hx of STEMI/CAD s/p PCI. Has stage III CKD. LDL is 91.       9-15-10: Pt denies chest pain, dyspnea, dyspnea on exertion, change in exercise capacity, fatigue,  nausea, vomiting, diarrhea, constipation, motor weakness, insomnia, weight loss, syncope, dizziness, lightheadedness, palpitations, PND, orthopnea, or claudication. No nitro use. BP and hr are good. CAD is stable. No LE discoloration or ulcers. No LE edema. No CHF type symptoms. Lipid profile is normal. No recent hospitalization. No change in meds. S/p b/l LE art duplex US with no stenosis/occlusion. Hx of CAD s/p PCI. Hx of stage III CKD. EKG with NSR, RBBB.     2-4-21: Post hospitalization for acute worsening anemia and GI bleed, antiplatelets were placed on hold.  Status post endoscopy without identifying source of bleeding. Patient to undergo camera pill endoscopy soon. States she developed shortness of breath with exertion as well as midsternal chest discomfort that completely resolved with rest.  Patient did develop chest pain during hospitalization as well as elevated cardiac enzymes. History of coronary disease status post PCI of the LAD and RCA in 2017. Patient does have a history of gastric ulcer. Hydrochlorothiazide and lisinopril were discontinued as well. Status post echocardiogram on December 22, 2020 with normal LV function, 2+ mitral vegetation, grade 1 diastolic dysfunction. 6-22-21: Hgb is 11 now. S/p hospitalization post last office visit for anemia. ASA was DC'd. History of coronary disease status post PCI of the LAD and RCA in 2017. Pt denies chest pain, dyspnea, dyspnea on exertion, change in exercise capacity, fatigue,  nausea, vomiting, diarrhea, constipation, motor weakness, insomnia, weight loss, syncope, dizziness, lightheadedness, palpitations, PND, orthopnea, or claudication. S/p camera endoscopy which was negative. No nitro use. BP and hr are good. CAD is stable. No LE discoloration or ulcers. No LE edema. No CHF type symptoms. Lipid profile is normal. No recent hospitalization. No change in meds. Has stage III CKD, GFR of 42.       3-17-22: No bleeding issues. History of anemia. History of cancer endoscopy which was negative. Patient with stage III chronic kidney disease. No angina or CHF type symptoms pt denies chest pain, dyspnea, dyspnea on exertion, change in exercise capacity, fatigue,  nausea, vomiting, diarrhea, constipation, motor weakness, insomnia, weight loss, syncope, dizziness, lightheadedness, palpitations, PND, orthopnea, or claudication. History of coronary disease status post PCI of the LAD and RCA in 2017. EKG with normal sinus rhythm, right bundle branch block.     8-26-22: needs preoperative clearance for knee replacement, right. Patient with history of coronary disease status post PCI to LAD and RCA in 2017. She is doing well overall. No angina or heart failure type symptoms.   EKG with normal sinus rhythm right bundle branch block    Patient Active Problem List   Diagnosis    Hypothyroid    Essential hypertension    Hyperlipidemia    Morbid obesity due to excess calories ([address]    History of ST elevation myocardial infarction (STEMI)    Coronary artery disease involving native coronary artery of native heart without angina pectoris    Chronic bilateral low back pain with bilateral sciatica    Primary osteoarthritis of both knees    Spinal stenosis of lumbar region with neurogenic claudication    Degenerative spondylolisthesis    Asthma    CKD (chronic kidney disease) stage 3, GFR 30-59 ml/min ([facility])    Adenomatous polyp of descending colon    Adenomatous polyp of ascending colon    Adenomatous polyp of colon    Chronic gastritis without bleeding    Anemia    Gastrointestinal hemorrhage    RBBB (right bundle branch block)       Past Surgical History:   Procedure Laterality Date    CARDIAC SURGERY  2017    has x 4 cardiac stents    COLONOSCOPY  01/14/2015    DR Tyrel Shaw - DIVERTICULOSIS    COLONOSCOPY N/A 1/2/2021    COLONOSCOPY DIAGNOSTIC performed by Alfonzo Gilliland MD at [facility]  05/17/2017    x2 stents    CYST REMOVAL Left 2/7/2019    RESECTION OF LEFT PINNA LESION WITH GRAFT performed by Nataly Albarran MD at [address] [place] [address], COLON, DIAGNOSTIC      FINGER SURGERY  12/9/14    middle finger right hand due to infection    HYSTERECTOMY ([address])  1996    LASIK  [id number]    SHOULDER SURGERY  2008    shoulder dislocated - popped  back in Right shoulder    UPPER GASTROINTESTINAL ENDOSCOPY  01/14/2015    DR LANE - ULCER IN THE ANTRUM    UPPER GASTROINTESTINAL ENDOSCOPY  05/06/2015    DR Tyrel Shaw - GASTRITIS, PREVIOUS ULCER HEALED    UPPER wheezing  Cardiovascular ROS: no chest pain or dyspnea on exertion  Gastrointestinal ROS: no abdominal pain, change in bowel habits, or black or bloody stools  Genito-Urinary ROS: no dysuria, trouble voiding, or hematuria  Musculoskeletal ROS: negative  Neurological ROS: no TIA or stroke symptoms  Dermatological ROS: negative    VITALS:  Blood pressure 132/68, pulse 78, weight 242 lb (109.8 kg), last menstrual period 09/17/1996, SpO2 95 %, not currently breastfeeding. Body mass index is 44.99 kg/m². Physical Examination:  General appearance - alert, well appearing, and in no distress  Mental status - alert, oriented to person, place, and time  Neck - Neck is supple, no JVD. B/l carotid bruits. No thyromegaly or adenopathy. Chest - clear to auscultation, no wheezes, rales or rhonchi, symmetric air entry  Heart - normal rate, regular rhythm, normal S1, S2, no murmurs, rubs, clicks or gallops  Abdomen - soft, nontender, nondistended, no masses or organomegaly  Neurological - alert, oriented, normal speech, no focal findings or movement disorder noted  Extremities - peripheral pulses normal, no pedal edema, no clubbing or cyanosis  Skin - normal coloration and turgor, no rashes, no suspicious skin lesions noted      Orders Placed This Encounter   Procedures    EKG 12 lead    ECHO Complete 2D W Doppler W Color       ASSESSMENT:     Diagnosis Orders   1. Preoperative clearance  EKG 12 lead      2. Cardiac murmur  ECHO Complete 2D W Doppler W Color      3. Essential hypertension        4. History of ST elevation myocardial infarction (STEMI)        5. Mixed hyperlipidemia        6. Coronary artery disease involving native coronary artery of native heart without angina pectoris        7. RBBB (right bundle branch block)          Preoperative    PLAN:     Patient will need to continue to follow up with you for their general medical care     As always, aggressive risk factor modification is strongly recommended.  We should adhere to the [address] guidelines for HTN management and the NCEP ATP III guidelines for LDL-C management. Cardiac diet is always recommended with low fat, cholesterol, calories and sodium. Continue medications at current doses. Patient is an intermediate risk patient for the proposed procedure/surgery. No active cardiac conditions such as ischemia, CHF or arrhythmias. Recomment peripoperative BBlocker, GI/DVT prophylaxis. NO ASA due to GIB/anemia. Avoid nephrotoxic agents    Follow up with PCP for labs    Check EKG today. Check ECHo for MR 2+ / ? AS    Check CUS given b/l Carotid bruits, left >>right. ? Related to AS    The proper use and anticipated side effects of nitroglycerine has been carefully explained. If a single episode of chest pain is not relieved by one tablet, the patient will try another within 5 minutes; and if this doesn't relieve the pain, the patient is instructed to call 911 for transportation to an emergency department. Patient was advised and encouraged to check blood pressure at home or at a pharmacy, maintain a logbook, and also call us back if blood pressure are above the target ranges or if it is low. Patient clearly understands and agrees to the instructions. We will need to continue to monitor muscle and liver enzymes, BUN, CR, and electrolytes. Thank you for allowing me to participate in the care of your patient, please don't hesitate to contact me if you have any further questions.

## 2022-08-30 ENCOUNTER — HOSPITAL ENCOUNTER (OUTPATIENT)
Age: 77
Discharge: HOME HEALTH | End: 2022-08-31
Attending: ORTHOPAEDIC SURGERY | Admitting: ORTHOPAEDIC SURGERY
Payer: MEDICARE

## 2022-08-30 ENCOUNTER — APPOINTMENT (OUTPATIENT)
Dept: GENERAL RADIOLOGY | Age: 77
End: 2022-08-30
Attending: ORTHOPAEDIC SURGERY
Payer: MEDICARE

## 2022-08-30 ENCOUNTER — ANESTHESIA (OUTPATIENT)
Dept: OPERATING ROOM | Age: 77
End: 2022-08-30
Payer: MEDICARE

## 2022-08-30 ENCOUNTER — ANESTHESIA EVENT (OUTPATIENT)
Dept: OPERATING ROOM | Age: 77
End: 2022-08-30
Payer: MEDICARE

## 2022-08-30 PROBLEM — Z96.651 STATUS POST TOTAL KNEE REPLACEMENT, RIGHT: Status: ACTIVE | Noted: 2022-08-30

## 2022-08-30 LAB
ABO/RH: NORMAL
ANTIBODY SCREEN: NORMAL

## 2022-08-30 PROCEDURE — 86850 RBC ANTIBODY SCREEN: CPT

## 2022-08-30 PROCEDURE — 3700000001 HC ADD 15 MINUTES (ANESTHESIA): Performed by: ORTHOPAEDIC SURGERY

## 2022-08-30 PROCEDURE — 7100000001 HC PACU RECOVERY - ADDTL 15 MIN: Performed by: ORTHOPAEDIC SURGERY

## 2022-08-30 PROCEDURE — 7100000000 HC PACU RECOVERY - FIRST 15 MIN: Performed by: ORTHOPAEDIC SURGERY

## 2022-08-30 PROCEDURE — 97162 PT EVAL MOD COMPLEX 30 MIN: CPT

## 2022-08-30 PROCEDURE — 2580000003 HC RX 258: Performed by: ANESTHESIOLOGY

## 2022-08-30 PROCEDURE — 6370000000 HC RX 637 (ALT 250 FOR IP): Performed by: NURSE PRACTITIONER

## 2022-08-30 PROCEDURE — 6360000002 HC RX W HCPCS: Performed by: ANESTHESIOLOGY

## 2022-08-30 PROCEDURE — 86900 BLOOD TYPING SEROLOGIC ABO: CPT

## 2022-08-30 PROCEDURE — 2580000003 HC RX 258: Performed by: NURSE PRACTITIONER

## 2022-08-30 PROCEDURE — 97166 OT EVAL MOD COMPLEX 45 MIN: CPT

## 2022-08-30 PROCEDURE — 6360000002 HC RX W HCPCS: Performed by: ORTHOPAEDIC SURGERY

## 2022-08-30 PROCEDURE — 86901 BLOOD TYPING SEROLOGIC RH(D): CPT

## 2022-08-30 PROCEDURE — 2709999900 HC NON-CHARGEABLE SUPPLY: Performed by: ORTHOPAEDIC SURGERY

## 2022-08-30 PROCEDURE — 3600000014 HC SURGERY LEVEL 4 ADDTL 15MIN: Performed by: ORTHOPAEDIC SURGERY

## 2022-08-30 PROCEDURE — 2500000003 HC RX 250 WO HCPCS: Performed by: ANESTHESIOLOGY

## 2022-08-30 PROCEDURE — 2580000003 HC RX 258: Performed by: ORTHOPAEDIC SURGERY

## 2022-08-30 PROCEDURE — C1776 JOINT DEVICE (IMPLANTABLE): HCPCS | Performed by: ORTHOPAEDIC SURGERY

## 2022-08-30 PROCEDURE — 73560 X-RAY EXAM OF KNEE 1 OR 2: CPT

## 2022-08-30 PROCEDURE — C1713 ANCHOR/SCREW BN/BN,TIS/BN: HCPCS | Performed by: ORTHOPAEDIC SURGERY

## 2022-08-30 PROCEDURE — 3700000000 HC ANESTHESIA ATTENDED CARE: Performed by: ORTHOPAEDIC SURGERY

## 2022-08-30 PROCEDURE — 97535 SELF CARE MNGMENT TRAINING: CPT

## 2022-08-30 PROCEDURE — 2720000010 HC SURG SUPPLY STERILE: Performed by: ORTHOPAEDIC SURGERY

## 2022-08-30 PROCEDURE — A4217 STERILE WATER/SALINE, 500 ML: HCPCS | Performed by: ORTHOPAEDIC SURGERY

## 2022-08-30 PROCEDURE — 6360000002 HC RX W HCPCS: Performed by: NURSE PRACTITIONER

## 2022-08-30 PROCEDURE — 3600000004 HC SURGERY LEVEL 4 BASE: Performed by: ORTHOPAEDIC SURGERY

## 2022-08-30 PROCEDURE — 64447 NJX AA&/STRD FEMORAL NRV IMG: CPT | Performed by: ANESTHESIOLOGY

## 2022-08-30 DEVICE — IMPL KNEE PERSONA POLY PATELLA 26MM: Type: IMPLANTABLE DEVICE | Site: PATELLA | Status: FUNCTIONAL

## 2022-08-30 DEVICE — PSN TIB STM 5 DEG SZ F R: Type: IMPLANTABLE DEVICE | Site: KNEE | Status: FUNCTIONAL

## 2022-08-30 DEVICE — COMPONENT FEM SZ 9 STD R KNEE CO CHROM CEM POST STBL COR: Type: IMPLANTABLE DEVICE | Site: KNEE | Status: FUNCTIONAL

## 2022-08-30 DEVICE — EXTENSION STEM L30MM DIA14MM KNEE TAPR CEM PERSONA: Type: IMPLANTABLE DEVICE | Site: KNEE | Status: FUNCTIONAL

## 2022-08-30 DEVICE — SYSTEM TOT KNEE: Type: IMPLANTABLE DEVICE | Site: KNEE | Status: FUNCTIONAL

## 2022-08-30 DEVICE — CEMENT BNE 40GM HI VISC RADPQ FOR REV SURG: Type: IMPLANTABLE DEVICE | Site: KNEE | Status: FUNCTIONAL

## 2022-08-30 DEVICE — SURFACE ARTC TIB EF FEM 6-9 H14MM RT KNEE VIVACIT-E CONSTRN: Type: IMPLANTABLE DEVICE | Site: KNEE | Status: FUNCTIONAL

## 2022-08-30 RX ORDER — SODIUM CHLORIDE 9 MG/ML
25 INJECTION, SOLUTION INTRAVENOUS PRN
Status: DISCONTINUED | OUTPATIENT
Start: 2022-08-30 | End: 2022-08-30 | Stop reason: HOSPADM

## 2022-08-30 RX ORDER — OXYCODONE HYDROCHLORIDE 5 MG/1
10 TABLET ORAL PRN
Status: DISCONTINUED | OUTPATIENT
Start: 2022-08-30 | End: 2022-08-30 | Stop reason: HOSPADM

## 2022-08-30 RX ORDER — ONDANSETRON 4 MG/1
4 TABLET, ORALLY DISINTEGRATING ORAL EVERY 8 HOURS PRN
Status: DISCONTINUED | OUTPATIENT
Start: 2022-08-30 | End: 2022-08-31 | Stop reason: HOSPADM

## 2022-08-30 RX ORDER — BUPIVACAINE HYDROCHLORIDE 7.5 MG/ML
INJECTION, SOLUTION INTRASPINAL PRN
Status: DISCONTINUED | OUTPATIENT
Start: 2022-08-30 | End: 2022-08-30 | Stop reason: SDUPTHER

## 2022-08-30 RX ORDER — ATORVASTATIN CALCIUM 40 MG/1
40 TABLET, FILM COATED ORAL NIGHTLY
Status: DISCONTINUED | OUTPATIENT
Start: 2022-08-30 | End: 2022-08-31 | Stop reason: HOSPADM

## 2022-08-30 RX ORDER — SODIUM CHLORIDE 9 MG/ML
INJECTION, SOLUTION INTRAVENOUS PRN
Status: DISCONTINUED | OUTPATIENT
Start: 2022-08-30 | End: 2022-08-31 | Stop reason: HOSPADM

## 2022-08-30 RX ORDER — ONDANSETRON 2 MG/ML
4 INJECTION INTRAMUSCULAR; INTRAVENOUS EVERY 6 HOURS PRN
Status: DISCONTINUED | OUTPATIENT
Start: 2022-08-30 | End: 2022-08-31 | Stop reason: HOSPADM

## 2022-08-30 RX ORDER — ACETAMINOPHEN 500 MG
1000 TABLET ORAL ONCE
Status: COMPLETED | OUTPATIENT
Start: 2022-08-30 | End: 2022-08-30

## 2022-08-30 RX ORDER — ACETAMINOPHEN 325 MG/1
650 TABLET ORAL EVERY 6 HOURS
Status: DISCONTINUED | OUTPATIENT
Start: 2022-08-30 | End: 2022-08-31 | Stop reason: HOSPADM

## 2022-08-30 RX ORDER — SODIUM CHLORIDE 0.9 % (FLUSH) 0.9 %
5-40 SYRINGE (ML) INJECTION EVERY 12 HOURS SCHEDULED
Status: DISCONTINUED | OUTPATIENT
Start: 2022-08-30 | End: 2022-08-30 | Stop reason: HOSPADM

## 2022-08-30 RX ORDER — ONDANSETRON 2 MG/ML
4 INJECTION INTRAMUSCULAR; INTRAVENOUS
Status: DISCONTINUED | OUTPATIENT
Start: 2022-08-30 | End: 2022-08-30 | Stop reason: HOSPADM

## 2022-08-30 RX ORDER — SODIUM CHLORIDE 0.9 % (FLUSH) 0.9 %
5-40 SYRINGE (ML) INJECTION EVERY 12 HOURS SCHEDULED
Status: DISCONTINUED | OUTPATIENT
Start: 2022-08-30 | End: 2022-08-31 | Stop reason: HOSPADM

## 2022-08-30 RX ORDER — ROPIVACAINE HYDROCHLORIDE 5 MG/ML
INJECTION, SOLUTION EPIDURAL; INFILTRATION; PERINEURAL PRN
Status: DISCONTINUED | OUTPATIENT
Start: 2022-08-30 | End: 2022-08-30 | Stop reason: SDUPTHER

## 2022-08-30 RX ORDER — MEPERIDINE HYDROCHLORIDE 25 MG/ML
12.5 INJECTION INTRAMUSCULAR; INTRAVENOUS; SUBCUTANEOUS
Status: DISCONTINUED | OUTPATIENT
Start: 2022-08-30 | End: 2022-08-30 | Stop reason: HOSPADM

## 2022-08-30 RX ORDER — DIPHENHYDRAMINE HYDROCHLORIDE 50 MG/ML
12.5 INJECTION INTRAMUSCULAR; INTRAVENOUS
Status: DISCONTINUED | OUTPATIENT
Start: 2022-08-30 | End: 2022-08-30 | Stop reason: HOSPADM

## 2022-08-30 RX ORDER — MAGNESIUM HYDROXIDE 1200 MG/15ML
LIQUID ORAL CONTINUOUS PRN
Status: DISCONTINUED | OUTPATIENT
Start: 2022-08-30 | End: 2022-08-30 | Stop reason: HOSPADM

## 2022-08-30 RX ORDER — KETOROLAC TROMETHAMINE 15 MG/ML
15 INJECTION, SOLUTION INTRAMUSCULAR; INTRAVENOUS EVERY 6 HOURS
Status: COMPLETED | OUTPATIENT
Start: 2022-08-30 | End: 2022-08-30

## 2022-08-30 RX ORDER — CELECOXIB 200 MG/1
200 CAPSULE ORAL ONCE
Status: COMPLETED | OUTPATIENT
Start: 2022-08-30 | End: 2022-08-30

## 2022-08-30 RX ORDER — MORPHINE SULFATE 2 MG/ML
2 INJECTION, SOLUTION INTRAMUSCULAR; INTRAVENOUS
Status: DISCONTINUED | OUTPATIENT
Start: 2022-08-30 | End: 2022-08-31 | Stop reason: HOSPADM

## 2022-08-30 RX ORDER — OXYCODONE HYDROCHLORIDE 5 MG/1
5 TABLET ORAL EVERY 4 HOURS PRN
Status: DISCONTINUED | OUTPATIENT
Start: 2022-08-30 | End: 2022-08-31 | Stop reason: HOSPADM

## 2022-08-30 RX ORDER — SODIUM CHLORIDE 0.9 % (FLUSH) 0.9 %
5-40 SYRINGE (ML) INJECTION PRN
Status: DISCONTINUED | OUTPATIENT
Start: 2022-08-30 | End: 2022-08-30 | Stop reason: HOSPADM

## 2022-08-30 RX ORDER — GLYCOPYRROLATE 0.2 MG/ML
0.2 INJECTION INTRAMUSCULAR; INTRAVENOUS ONCE
Status: COMPLETED | OUTPATIENT
Start: 2022-08-30 | End: 2022-08-30

## 2022-08-30 RX ORDER — OXYBUTYNIN CHLORIDE 5 MG/1
10 TABLET, EXTENDED RELEASE ORAL DAILY
Status: DISCONTINUED | OUTPATIENT
Start: 2022-08-31 | End: 2022-08-31 | Stop reason: HOSPADM

## 2022-08-30 RX ORDER — PROPOFOL 10 MG/ML
INJECTION, EMULSION INTRAVENOUS PRN
Status: DISCONTINUED | OUTPATIENT
Start: 2022-08-30 | End: 2022-08-30 | Stop reason: SDUPTHER

## 2022-08-30 RX ORDER — SODIUM CHLORIDE, SODIUM LACTATE, POTASSIUM CHLORIDE, CALCIUM CHLORIDE 600; 310; 30; 20 MG/100ML; MG/100ML; MG/100ML; MG/100ML
INJECTION, SOLUTION INTRAVENOUS CONTINUOUS
Status: DISCONTINUED | OUTPATIENT
Start: 2022-08-30 | End: 2022-08-31

## 2022-08-30 RX ORDER — OXYCODONE HYDROCHLORIDE 5 MG/1
5 TABLET ORAL PRN
Status: DISCONTINUED | OUTPATIENT
Start: 2022-08-30 | End: 2022-08-30 | Stop reason: HOSPADM

## 2022-08-30 RX ORDER — TRANEXAMIC ACID 650 1/1
1950 TABLET ORAL ONCE
Status: COMPLETED | OUTPATIENT
Start: 2022-08-30 | End: 2022-08-30

## 2022-08-30 RX ORDER — SODIUM CHLORIDE, SODIUM LACTATE, POTASSIUM CHLORIDE, CALCIUM CHLORIDE 600; 310; 30; 20 MG/100ML; MG/100ML; MG/100ML; MG/100ML
INJECTION, SOLUTION INTRAVENOUS CONTINUOUS PRN
Status: DISCONTINUED | OUTPATIENT
Start: 2022-08-30 | End: 2022-08-30 | Stop reason: SDUPTHER

## 2022-08-30 RX ORDER — METOCLOPRAMIDE HYDROCHLORIDE 5 MG/ML
10 INJECTION INTRAMUSCULAR; INTRAVENOUS
Status: DISCONTINUED | OUTPATIENT
Start: 2022-08-30 | End: 2022-08-30 | Stop reason: HOSPADM

## 2022-08-30 RX ORDER — TRANEXAMIC ACID 650 1/1
1950 TABLET ORAL
Status: DISCONTINUED | OUTPATIENT
Start: 2022-08-30 | End: 2022-08-30 | Stop reason: HOSPADM

## 2022-08-30 RX ORDER — TRANEXAMIC ACID 650 1/1
1950 TABLET ORAL ONCE
Status: COMPLETED | OUTPATIENT
Start: 2022-08-31 | End: 2022-08-31

## 2022-08-30 RX ORDER — TIZANIDINE 2 MG/1
2 TABLET ORAL EVERY 6 HOURS PRN
Status: DISCONTINUED | OUTPATIENT
Start: 2022-08-30 | End: 2022-08-31 | Stop reason: HOSPADM

## 2022-08-30 RX ORDER — SODIUM CHLORIDE 9 MG/ML
INJECTION, SOLUTION INTRAVENOUS PRN
Status: DISCONTINUED | OUTPATIENT
Start: 2022-08-30 | End: 2022-08-30 | Stop reason: HOSPADM

## 2022-08-30 RX ORDER — ASPIRIN 81 MG/1
81 TABLET ORAL 2 TIMES DAILY
Status: DISCONTINUED | OUTPATIENT
Start: 2022-08-30 | End: 2022-08-31 | Stop reason: HOSPADM

## 2022-08-30 RX ORDER — PROPOFOL 10 MG/ML
INJECTION, EMULSION INTRAVENOUS CONTINUOUS PRN
Status: DISCONTINUED | OUTPATIENT
Start: 2022-08-30 | End: 2022-08-30 | Stop reason: SDUPTHER

## 2022-08-30 RX ORDER — SODIUM CHLORIDE 0.9 % (FLUSH) 0.9 %
5-40 SYRINGE (ML) INJECTION PRN
Status: DISCONTINUED | OUTPATIENT
Start: 2022-08-30 | End: 2022-08-31 | Stop reason: HOSPADM

## 2022-08-30 RX ORDER — LOSARTAN POTASSIUM AND HYDROCHLOROTHIAZIDE 12.5; 5 MG/1; MG/1
2 TABLET ORAL DAILY
Status: DISCONTINUED | OUTPATIENT
Start: 2022-08-31 | End: 2022-08-31 | Stop reason: HOSPADM

## 2022-08-30 RX ORDER — SODIUM CHLORIDE, SODIUM LACTATE, POTASSIUM CHLORIDE, CALCIUM CHLORIDE 600; 310; 30; 20 MG/100ML; MG/100ML; MG/100ML; MG/100ML
INJECTION, SOLUTION INTRAVENOUS CONTINUOUS
Status: DISCONTINUED | OUTPATIENT
Start: 2022-08-30 | End: 2022-08-30 | Stop reason: HOSPADM

## 2022-08-30 RX ORDER — FENTANYL CITRATE 50 UG/ML
25 INJECTION, SOLUTION INTRAMUSCULAR; INTRAVENOUS EVERY 10 MIN PRN
Status: DISCONTINUED | OUTPATIENT
Start: 2022-08-30 | End: 2022-08-30 | Stop reason: HOSPADM

## 2022-08-30 RX ORDER — LOSARTAN POTASSIUM AND HYDROCHLOROTHIAZIDE 25; 100 MG/1; MG/1
1 TABLET ORAL DAILY
Status: DISCONTINUED | OUTPATIENT
Start: 2022-08-31 | End: 2022-08-30 | Stop reason: CLARIF

## 2022-08-30 RX ORDER — DILTIAZEM HYDROCHLORIDE 240 MG/1
240 CAPSULE, COATED, EXTENDED RELEASE ORAL DAILY
Status: DISCONTINUED | OUTPATIENT
Start: 2022-08-31 | End: 2022-08-31 | Stop reason: HOSPADM

## 2022-08-30 RX ORDER — OXYCODONE HYDROCHLORIDE 5 MG/1
10 TABLET ORAL EVERY 4 HOURS PRN
Status: DISCONTINUED | OUTPATIENT
Start: 2022-08-30 | End: 2022-08-31 | Stop reason: HOSPADM

## 2022-08-30 RX ORDER — OXYCODONE HCL 10 MG/1
10 TABLET, FILM COATED, EXTENDED RELEASE ORAL ONCE
Status: COMPLETED | OUTPATIENT
Start: 2022-08-30 | End: 2022-08-30

## 2022-08-30 RX ORDER — LIDOCAINE HYDROCHLORIDE 10 MG/ML
1 INJECTION, SOLUTION EPIDURAL; INFILTRATION; INTRACAUDAL; PERINEURAL
Status: DISCONTINUED | OUTPATIENT
Start: 2022-08-30 | End: 2022-08-30 | Stop reason: HOSPADM

## 2022-08-30 RX ADMIN — OXYCODONE HYDROCHLORIDE 10 MG: 10 TABLET, FILM COATED, EXTENDED RELEASE ORAL at 06:56

## 2022-08-30 RX ADMIN — METOPROLOL TARTRATE 25 MG: 25 TABLET, FILM COATED ORAL at 20:52

## 2022-08-30 RX ADMIN — Medication 10 ML: at 20:53

## 2022-08-30 RX ADMIN — PROPOFOL 100 MCG/KG/MIN: 10 INJECTION, EMULSION INTRAVENOUS at 08:25

## 2022-08-30 RX ADMIN — TRANEXAMIC ACID 1950 MG: 650 TABLET ORAL at 15:27

## 2022-08-30 RX ADMIN — ACETAMINOPHEN 650 MG: 325 TABLET ORAL at 18:01

## 2022-08-30 RX ADMIN — KETOROLAC TROMETHAMINE 15 MG: 15 INJECTION, SOLUTION INTRAMUSCULAR; INTRAVENOUS at 13:34

## 2022-08-30 RX ADMIN — CEFAZOLIN SODIUM 2000 MG: 10 INJECTION, POWDER, FOR SOLUTION INTRAVENOUS at 08:14

## 2022-08-30 RX ADMIN — SODIUM CHLORIDE, POTASSIUM CHLORIDE, SODIUM LACTATE AND CALCIUM CHLORIDE 1000 ML: 600; 310; 30; 20 INJECTION, SOLUTION INTRAVENOUS at 06:52

## 2022-08-30 RX ADMIN — GLYCOPYRROLATE 0.2 MG: 0.4 INJECTION INTRAMUSCULAR; INTRAVENOUS at 11:14

## 2022-08-30 RX ADMIN — ACETAMINOPHEN 650 MG: 325 TABLET ORAL at 12:23

## 2022-08-30 RX ADMIN — VANCOMYCIN HYDROCHLORIDE 1000 MG: 1 INJECTION, POWDER, LYOPHILIZED, FOR SOLUTION INTRAVENOUS at 12:12

## 2022-08-30 RX ADMIN — CELECOXIB 200 MG: 200 CAPSULE ORAL at 06:56

## 2022-08-30 RX ADMIN — ASPIRIN 81 MG: 81 TABLET, COATED ORAL at 20:51

## 2022-08-30 RX ADMIN — TRANEXAMIC ACID 1950 MG: 650 TABLET ORAL at 06:56

## 2022-08-30 RX ADMIN — MORPHINE SULFATE 2 MG: 2 INJECTION, SOLUTION INTRAMUSCULAR; INTRAVENOUS at 12:13

## 2022-08-30 RX ADMIN — OXYCODONE 10 MG: 5 TABLET ORAL at 13:33

## 2022-08-30 RX ADMIN — ATORVASTATIN CALCIUM 40 MG: 40 TABLET, FILM COATED ORAL at 20:51

## 2022-08-30 RX ADMIN — CEFAZOLIN 2000 MG: 10 INJECTION, POWDER, FOR SOLUTION INTRAVENOUS at 15:30

## 2022-08-30 RX ADMIN — KETOROLAC TROMETHAMINE 15 MG: 15 INJECTION, SOLUTION INTRAMUSCULAR; INTRAVENOUS at 18:01

## 2022-08-30 RX ADMIN — ROPIVACAINE HYDROCHLORIDE 30 ML: 5 INJECTION, SOLUTION EPIDURAL; INFILTRATION; PERINEURAL at 07:48

## 2022-08-30 RX ADMIN — SODIUM CHLORIDE, POTASSIUM CHLORIDE, SODIUM LACTATE AND CALCIUM CHLORIDE: 600; 310; 30; 20 INJECTION, SOLUTION INTRAVENOUS at 08:13

## 2022-08-30 RX ADMIN — SODIUM CHLORIDE, POTASSIUM CHLORIDE, SODIUM LACTATE AND CALCIUM CHLORIDE: 600; 310; 30; 20 INJECTION, SOLUTION INTRAVENOUS at 09:02

## 2022-08-30 RX ADMIN — BUPIVACAINE HYDROCHLORIDE 1.2 ML: 7.5 INJECTION, SOLUTION INTRASPINAL at 08:11

## 2022-08-30 RX ADMIN — VANCOMYCIN HYDROCHLORIDE 1000 MG: 1 INJECTION, POWDER, LYOPHILIZED, FOR SOLUTION INTRAVENOUS at 08:20

## 2022-08-30 RX ADMIN — PROPOFOL 50 MG: 10 INJECTION, EMULSION INTRAVENOUS at 08:22

## 2022-08-30 RX ADMIN — ACETAMINOPHEN 1000 MG: 500 TABLET ORAL at 06:56

## 2022-08-30 RX ADMIN — SODIUM CHLORIDE, POTASSIUM CHLORIDE, SODIUM LACTATE AND CALCIUM CHLORIDE: 600; 310; 30; 20 INJECTION, SOLUTION INTRAVENOUS at 11:33

## 2022-08-30 ASSESSMENT — PAIN DESCRIPTION - ORIENTATION
ORIENTATION: RIGHT

## 2022-08-30 ASSESSMENT — PAIN SCALES - GENERAL
PAINLEVEL_OUTOF10: 7
PAINLEVEL_OUTOF10: 9
PAINLEVEL_OUTOF10: 8
PAINLEVEL_OUTOF10: 9

## 2022-08-30 ASSESSMENT — PAIN DESCRIPTION - LOCATION
LOCATION: KNEE

## 2022-08-30 ASSESSMENT — PAIN DESCRIPTION - DESCRIPTORS
DESCRIPTORS: ACHING
DESCRIPTORS: ACHING;THROBBING
DESCRIPTORS: ACHING

## 2022-08-30 ASSESSMENT — PAIN - FUNCTIONAL ASSESSMENT: PAIN_FUNCTIONAL_ASSESSMENT: NONE - DENIES PAIN

## 2022-08-30 NOTE — PLAN OF CARE
See OT evaluation for all goals and OT POC.  Electronically signed by Tony Prieto OTR/L on 8/30/2022 at 3:06 PM

## 2022-08-30 NOTE — CONSULTS
Consult Note    Reason for Consult:  medical management    Requesting Physician:  Rosa Gomes MD    HISTORY OF PRESENT ILLNESS:    The patient is a 69 y.o. female who is admitted under the orthopedic surgery service. Underwent R TKA secondary to R knee DJD after outpatient conserbatie measures were no longer effective in managing symptoms. Has PMH of CAD s/p PCI to LAD and RCA (2017), HTN, STEMI, CKD3,GIB, and asthma. Upon exam, alert and oriented. Denies CP, SOB, N/V/D. C/o pain to RLE. We are consulted to assist with medical management post-operatively.        Past Medical History:   Diagnosis Date    Antral ulcer 01/14/2015    DR Yamel Riley    Asthma     \"cured by beestings\"    Coronary artery disease     Coronary artery disease involving native coronary artery of native heart without angina pectoris 7/10/2018    has x 4 cardiac stents / Dr. Jose Witt    Diverticulosis of colon (without mention of hemorrhage) 01/14/2015    DR Yamel Riley    Essential hypertension 12/10/2013    meds > 20 yrs    History of blood transfusion 1990s    with hysterectomy    History of normal resting EKG 1996    normal    History of ST elevation myocardial infarction (STEMI) 7/11/2017    History of transfusion of whole blood 1996    Excessive bleeding before hysterectomy    Hyperlipidemia     meds > 2 yrs    Hypertension     Hypothyroidism     past trx years ago then stopped / recent restart    Kidney disease     Low back pain     Morbid obesity due to excess calories ([address] 5/13/2017    Morbid obesity due to excess calories ([address] 6/6/2017    Osteoarthritis     Pneumonia     RBBB (right bundle branch block) 3/17/2022    Shoulder dislocation     ST elevation myocardial infarction involving left circumflex coronary artery ([address] 5/13/2017    Unspecified gastritis and gastroduodenitis without mention of hemorrhage 05/06/2015    DR Yamel Riley       Past Surgical History:   Procedure Laterality Date    CARDIAC SURGERY  2017    has x 4 cardiac stents COLONOSCOPY  01/14/2015    DR Levi Stephens - DIVERTICULOSIS    COLONOSCOPY N/A 1/2/2021    COLONOSCOPY DIAGNOSTIC performed by Hermes Zuluaga MD at [facility]  05/17/2017    x2 stents    CYST REMOVAL Left 2/7/2019    RESECTION OF LEFT PINNA LESION WITH GRAFT performed by Sonam Stubbs MD at [address] [place] [address], COLON, DIAGNOSTIC      FINGER SURGERY  12/9/14    middle finger right hand due to infection    HYSTERECTOMY ([address])  [address]  [id number]    SHOULDER SURGERY  2008    shoulder dislocated - popped  back in Right shoulder    UPPER GASTROINTESTINAL ENDOSCOPY  01/14/2015    DR LANE - ULCER IN THE ANTRUM    UPPER GASTROINTESTINAL ENDOSCOPY  05/06/2015    DR Levi Stephens - GASTRITIS, PREVIOUS ULCER HEALED    UPPER GASTROINTESTINAL ENDOSCOPY N/A 1/2/2021    EGD DIAGNOSTIC ONLY performed by Hermes Zuluaga MD at [facility]       Prior to Admission medications    Medication Sig Start Date End Date Taking?  Authorizing Provider   losartan-hydroCHLOROthiazide (HYZAAR) 100-25 MG per tablet TAKE 1 TABLET BY MOUTH EVERY DAY 4/25/22   Myles Pettit MD   dilTIAZem (CARDIZEM CD) 240 MG extended release capsule TAKE 1 CAPSULE BY MOUTH DAILY 3/30/22   Myles Pettit MD   FEROSUL 325 (65 Fe) MG tablet Take 1 tablet by mouth 2 times daily 3/30/22   Myles Pettit MD   oxybutynin (DITROPAN-XL) 10 MG extended release tablet Take 1 tablet by mouth daily 2/21/22   Myles Pettit MD   metoprolol tartrate (LOPRESSOR) 25 MG tablet TAKE 1 TABLET BY MOUTH TWICE DAILY 8/6/21   Jordan J Holiday, DO   atorvastatin (LIPITOR) 80 MG tablet Take 0.5 tablets by mouth nightly 6/22/21   Jordan J Holiday, DO   nitroGLYCERIN (NITROSTAT) 0.4 MG SL tablet Place 1 tablet under the tongue every 5 minutes as needed for Chest pain 5/15/17   BEN Chapman - CNP       Scheduled Meds:   [START ON 8/31/2022] tranexamic acid  1,950 mg Oral Once    tranexamic acid  1,950 mg Oral Once    sodium chloride flush  5-40 mL IntraVENous 2 times per day    acetaminophen  650 mg Oral Q6H    ketorolac  15 mg IntraVENous Q6H    ceFAZolin (ANCEF) IVPB  2,000 mg IntraVENous Q8H    aspirin  81 mg Oral BID    vancomycin (VANCOCIN) 1000mg in 250mL D5W IVPB  1,000 mg IntraVENous On Call to OR     Continuous Infusions:   lactated ringers 50 mL/hr at 08/30/22 1133    sodium chloride       PRN Meds:sodium chloride flush, sodium chloride, oxyCODONE **OR** oxyCODONE, morphine, ondansetron **OR** ondansetron, tiZANidine    Allergies   Allergen Reactions    Lisinopril Nausea Only and Other (See Comments)     cough    Tramadol Nausea Only       Social History     Socioeconomic History    Marital status:      Spouse name: Not on file    Number of children: Not on file    Years of education: Not on file    Highest education level: Not on file   Occupational History    Not on file   Tobacco Use    Smoking status: Never    Smokeless tobacco: Never   Vaping Use    Vaping Use: Never used   Substance and Sexual Activity    Alcohol use: Yes     Comment: glass of wine once a year    Drug use: No    Sexual activity: Yes     Partners: Male   Other Topics Concern    Not on file   Social History Narrative    Not on file     Social Determinants of Health     Financial Resource Strain: Low Risk     Difficulty of Paying Living Expenses: Not hard at all   Food Insecurity: No Food Insecurity    Worried About Running Out of Food in the Last Year: Never true    Ran Out of Food in the Last Year: Never true   Transportation Needs: Not on file   Physical Activity: Not on file   Stress: Not on file   Social Connections: Not on file   Intimate Partner Violence: Not on file   Housing Stability: Not on file       Family History   Problem Relation Age of Onset    Heart Disease Mother 61    Cancer Father 79        kidney    No Known Problems Daughter        Review Of Systems:   12 point ROS negative unless indicated below or in the HPI    Physical Exam:  Vitals:    08/30/22 1120 08/30/22 1130 08/30/22 1135 08/30/22 1155   BP:  (!) 154/74  (!) 160/79   Pulse: 52 57 58 57   Resp: 11 11 14   Temp:  97 °F (36.1 °C)  97.5 °F (36.4 °C)   TempSrc:  Temporal  Axillary   SpO2: 97% 98% 99% 98%   Weight:       Height:           CONSTITUTIONAL:  awake, alert, cooperative, no apparent distress, and appears stated age  LUNGS:  Clear to auscultation bilaterally, no crackles or wheezing  CARDIOVASCULAR:  Regular rate and rhythm, normal S1 and S2  ABDOMEN: Normal bowel sounds, soft, non-distended, non-tender, no masses palpated, no hepatosplenomegally  MUSCULOSKELETAL:  There is no redness, warmth, or swelling of the joints. Cap refill <3 seconds. Wiggles toes. NEUROLOGIC:  Awake, alert, oriented to name, place and time.     SKIN:  Warm and dry    Labs:  Recent Results (from the past 24 hour(s))   TYPE AND SCREEN    Collection Time: 08/30/22  6:51 AM   Result Value Ref Range    ABO/Rh O POS     Antibody Screen NEG      Assessment/Plan:    69 y.o. female with PMH of CAD s/p PCI to LAD and RCA (2017), HTN, STEMI, CKD3,GIB, and asthma presented with:    R knee DJD  - s/p R TKA  - management per primary team  - received vancomycin pre-op given positive MRSA screen    HTN  - BP elevated  - pain likely contributing  - resume antihypertensives    CKD3  - stable    CAD  -s/p PCI to LAD and RCA (2017)  - continue statin    Asthma  - not in exacerbation    Electronically signed by BEN Ellis NP on 8/30/22 at 12:32 PM EDT

## 2022-08-30 NOTE — ANESTHESIA PRE PROCEDURE
Department of Anesthesiology  Preprocedure Note       Name:  Casi Crews   Age:  69 y.o.  :  1945                                          MRN:  [id number]         Date:  2022      Surgeon: Kathi Rios):  Evelio Hollingsworth MD    Procedure: Procedure(s):  RIGHT KNEE TOTAL KNEE ARTHROPLASTY SUPINE, JELENA, POSS CPS, REVISION STEMS AVAILABLE. EBONY    Medications prior to admission:   Prior to Admission medications    Medication Sig Start Date End Date Taking?  Authorizing Provider   losartan-hydroCHLOROthiazide (HYZAAR) 100-25 MG per tablet TAKE 1 TABLET BY MOUTH EVERY DAY 22   Jamal Mosley MD   dilTIAZem (CARDIZEM CD) 240 MG extended release capsule TAKE 1 CAPSULE BY MOUTH DAILY 3/30/22   Jamal Mosley MD   FEROSUL 325 (65 Fe) MG tablet Take 1 tablet by mouth 2 times daily 3/30/22   Jamal Mosley MD   oxybutynin (DITROPAN-XL) 10 MG extended release tablet Take 1 tablet by mouth daily 22   Jamal Mosley MD   metoprolol tartrate (LOPRESSOR) 25 MG tablet TAKE 1 TABLET BY MOUTH TWICE DAILY 21   Jordan CUNNINGHAM Holiday, DO   atorvastatin (LIPITOR) 80 MG tablet Take 0.5 tablets by mouth nightly 21   Jordan MARISA Boland, DO   nitroGLYCERIN (NITROSTAT) 0.4 MG SL tablet Place 1 tablet under the tongue every 5 minutes as needed for Chest pain 5/15/17   BEN Malagon CNP       Current medications:    Current Facility-Administered Medications   Medication Dose Route Frequency Provider Last Rate Last Admin    tranexamic acid (LYSTEDA) tablet 1,950 mg  1,950 mg Oral 90 Min Pre-Op Adjanaisi Rea SandersistBEN CNP   1,950 mg at 22 0656    lidocaine PF 1 % injection 1 mL  1 mL IntraDERmal Once PRN Marga Kussmaul, APRN - CNP        lactated ringers infusion   IntraVENous Continuous Marga Kussmaul, APRN -  mL/hr at 22 0652 1,000 mL at 22 5603    sodium chloride flush 0.9 % injection 5-40 mL  5-40 mL IntraVENous 2 times per day Marga Kussmaul, APRN - CNP  sodium chloride flush 0.9 % injection 5-40 mL  5-40 mL IntraVENous PRN Stephani Linear, APRN - CNP        0.9 % sodium chloride infusion   IntraVENous PRN Stephani Linear, APRN - CNP        ceFAZolin (ANCEF) 2000 mg in dextrose 5 % 100 mL IVPB  2,000 mg IntraVENous Once Madhavi Villagran MD        vancomycin 1000 mg IVPB in 250 mL D5W addavial  1,000 mg IntraVENous Once Madhavi Villagran MD           Allergies:     Allergies   Allergen Reactions    Lisinopril Nausea Only and Other (See Comments)     cough    Tramadol Nausea Only       Problem List:    Patient Active Problem List   Diagnosis Code    Hypothyroid E03.9    Essential hypertension I10    Hyperlipidemia E78.5    Morbid obesity due to excess calories (HCC) E66.01    History of ST elevation myocardial infarction (STEMI) I25.2    Coronary artery disease involving native coronary artery of native heart without angina pectoris I25.10    Chronic bilateral low back pain with bilateral sciatica M54.42, M54.41, G89.29    Primary osteoarthritis of both knees M17.0    Spinal stenosis of lumbar region with neurogenic claudication M48.062    Degenerative spondylolisthesis M43.10    Asthma J45.909    CKD (chronic kidney disease) stage 3, GFR 30-59 ml/min ([facility]) N18.30    Adenomatous polyp of descending colon D12.4    Adenomatous polyp of ascending colon D12.2    Adenomatous polyp of colon D12.6    Chronic gastritis without bleeding K29.50    Anemia D64.9    Gastrointestinal hemorrhage K92.2    RBBB (right bundle branch block) I45.10       Past Medical History:        Diagnosis Date    Antral ulcer 01/14/2015     [address] [facility]    Asthma     \"cured by beestings\"    Coronary artery disease     Coronary artery disease involving native coronary artery of native heart without angina pectoris 7/10/2018    has x 4 cardiac stents / Dr. Barbara Dykes Diverticulosis of colon (without mention of hemorrhage) 01/14/2015    DR Jaylene Jimenes Essential hypertension 12/10/2013    meds > 20 yrs    History of blood transfusion 1990s    with hysterectomy    History of normal resting EKG 1996    normal    History of ST elevation myocardial infarction (STEMI) 7/11/2017    History of transfusion of whole blood 1996    Excessive bleeding before hysterectomy    Hyperlipidemia     meds > 2 yrs    Hypertension     Hypothyroidism     past trx years ago then stopped / recent restart    Kidney disease     Low back pain     Morbid obesity due to excess calories ([address] 5/13/2017    Morbid obesity due to excess calories ([address] 6/6/2017    Osteoarthritis     Pneumonia     RBBB (right bundle branch block) 3/17/2022    Shoulder dislocation     ST elevation myocardial infarction involving left circumflex coronary artery ([address] 5/13/2017    Unspecified gastritis and gastroduodenitis without mention of hemorrhage 05/06/2015    DR Levi Stephens       Past Surgical History:        Procedure Laterality Date    CARDIAC SURGERY  2017    has x 4 cardiac stents    COLONOSCOPY  01/14/2015    DR Levi Stephens - DIVERTICULOSIS    COLONOSCOPY N/A 1/2/2021    COLONOSCOPY DIAGNOSTIC performed by Hermes Zuluaga MD at [place] [address]  05/17/2017    x2 stents    CYST REMOVAL Left 2/7/2019    RESECTION OF LEFT PINNA LESION WITH GRAFT performed by Sonam Stubbs MD at [facility]. [address], COLON, DIAGNOSTIC      FINGER SURGERY  12/9/14    middle finger right hand due to infection    HYSTERECTOMY (CERVIX STATUS UNKNOWN)  1996    LASIK  [id number]    SHOULDER SURGERY  2008    shoulder dislocated - popped  back in Right shoulder    UPPER GASTROINTESTINAL ENDOSCOPY  01/14/2015    DR LANE - ULCER IN THE ANTRUM    UPPER GASTROINTESTINAL ENDOSCOPY  05/06/2015    DR Levi Stephens - GASTRITIS, PREVIOUS ULCER HEALED    UPPER GASTROINTESTINAL ENDOSCOPY N/A 1/2/2021    EGD DIAGNOSTIC ONLY performed by Hermes Zuluaga MD at [address] [place] [address] History:    Social History Tobacco Use    Smoking status: Never    Smokeless tobacco: Never   Substance Use Topics    Alcohol use: Yes     Comment: glass of wine once a year                                Counseling given: Not Answered      Vital Signs (Current):   Vitals:    08/30/22 0628 08/30/22 0656   BP: (!) 184/75    Pulse: 57    Resp: 16 16   Temp: 97.7 °F (36.5 °C)    TempSrc: Temporal    SpO2: 98%    Weight: 239 lb (108.4 kg)    Height: 5' 1.5\" (1.562 m)                                               BP Readings from Last 3 Encounters:   08/30/22 (!) 184/75   08/26/22 132/68   08/22/22 136/72       NPO Status: Time of last liquid consumption: 2200                        Time of last solid consumption: 2200                        Date of last liquid consumption: 08/29/22                        Date of last solid food consumption: 08/29/22    BMI:   Wt Readings from Last 3 Encounters:   08/30/22 239 lb (108.4 kg)   08/26/22 242 lb (109.8 kg)   08/22/22 240 lb 9.6 oz (109.1 kg)     Body mass index is 44.43 kg/m².     CBC:   Lab Results   Component Value Date/Time    WBC 4.5 08/16/2022 12:39 PM    RBC 4.01 08/16/2022 12:39 PM    HGB 12.0 08/16/2022 12:39 PM    HCT 37.0 08/16/2022 12:39 PM    MCV 92.2 08/16/2022 12:39 PM    RDW 13.6 08/16/2022 12:39 PM     08/16/2022 12:39 PM       CMP:   Lab Results   Component Value Date/Time     08/16/2022 04:12 PM    K 4.5 08/16/2022 04:12 PM    K 4.1 02/04/2021 04:19 PM     08/16/2022 04:12 PM    CO2 19 08/16/2022 04:12 PM    BUN 39 08/16/2022 04:12 PM    CREATININE 1.07 08/16/2022 04:12 PM    GFRAA >60.0 08/16/2022 04:12 PM    LABGLOM 49.8 08/16/2022 04:12 PM    GLUCOSE 111 08/16/2022 04:12 PM    PROT 7.0 08/16/2022 04:12 PM    CALCIUM 9.8 08/16/2022 04:12 PM    BILITOT 0.4 08/16/2022 04:12 PM    ALKPHOS 120 08/16/2022 04:12 PM    AST 16 08/16/2022 04:12 PM    ALT 19 08/16/2022 04:12 PM       POC Tests: No results for input(s): POCGLU, POCNA, POCK, POCCL, POCBUN, POCHEMO,

## 2022-08-30 NOTE — PROGRESS NOTES
[facility] OCCUPATIONAL THERAPY EVALUATION - ACUTE     NAME: Robyn Bach  : 1945 (69 y.o.)  MRN: [id number]  CODE STATUS: Full Code  [id number]    Date of Service: 2022    Patient Diagnosis(es): Osteoarthritis of right knee, unspecified osteoarthritis type [M17.11]  Status post total knee replacement, right [Z96.651]   Patient Active Problem List    Diagnosis Date Noted    Morbid obesity due to excess calories ([facility] 2017    Status post total knee replacement, right 2022    RBBB (right bundle branch block) 2022    Gastrointestinal hemorrhage     Anemia 2021    Adenomatous polyp of descending colon     Adenomatous polyp of ascending colon     Adenomatous polyp of colon     Chronic gastritis without bleeding     CKD (chronic kidney disease) stage 3, GFR 30-59 ml/min ([facility]) 2019    Asthma     Spinal stenosis of lumbar region with neurogenic claudication 2019    Degenerative spondylolisthesis 2019    Chronic bilateral low back pain with bilateral sciatica 10/30/2018    Primary osteoarthritis of both knees 10/30/2018    Coronary artery disease involving native coronary artery of native heart without angina pectoris 07/10/2018    History of ST elevation myocardial infarction (STEMI) 2017    Hypothyroid 12/10/2013    Essential hypertension 12/10/2013    Hyperlipidemia 12/10/2013        Past Medical History:   Diagnosis Date    Antral ulcer 2015    DR LANE    Asthma     \"cured by beestings\"    Coronary artery disease     Coronary artery disease involving native coronary artery of native heart without angina pectoris 7/10/2018    has x 4 cardiac stents / Dr. Lio Gardiner    Diverticulosis of colon (without mention of hemorrhage) 2015    DR Romayne Denmark    Essential hypertension 12/10/2013    meds > 20 yrs    History of blood transfusion     with hysterectomy    History of normal resting EKG     normal    History of ST elevation myocardial infarction (STEMI) 7/11/2017    History of transfusion of whole blood 1996    Excessive bleeding before hysterectomy    Hyperlipidemia     meds > 2 yrs    Hypertension     Hypothyroidism     past trx years ago then stopped / recent restart    Kidney disease     Low back pain     Morbid obesity due to excess calories ([address] 5/13/2017    Morbid obesity due to excess calories ([address] 6/6/2017    Osteoarthritis     Pneumonia     RBBB (right bundle branch block) 3/17/2022    Shoulder dislocation     ST elevation myocardial infarction involving left circumflex coronary artery ([address] 5/13/2017    Unspecified gastritis and gastroduodenitis without mention of hemorrhage 05/06/2015    DR Ravin Flower     Past Surgical History:   Procedure Laterality Date    CARDIAC SURGERY  2017    has x 4 cardiac stents    COLONOSCOPY  01/14/2015    DR Ravin Flower - DIVERTICULOSIS    COLONOSCOPY N/A 1/2/2021    COLONOSCOPY DIAGNOSTIC performed by Nilesh Sevilla MD at [facility]  05/17/2017    x2 stents    CYST REMOVAL Left 2/7/2019    RESECTION OF LEFT PINNA LESION WITH GRAFT performed by Marilee Morales MD at [address] [place] [address], COLON, DIAGNOSTIC      FINGER SURGERY  12/9/14    middle finger right hand due to infection    HYSTERECTOMY ([address])  1996    LASIK  [id number]    SHOULDER SURGERY  2008    shoulder dislocated - popped  back in Right shoulder    UPPER GASTROINTESTINAL ENDOSCOPY  01/14/2015    DR LANE - ULCER IN THE ANTRUM    UPPER GASTROINTESTINAL ENDOSCOPY  05/06/2015    DR Ravin Flower - GASTRITIS, PREVIOUS ULCER HEALED    UPPER GASTROINTESTINAL ENDOSCOPY N/A 1/2/2021    EGD DIAGNOSTIC ONLY performed by Nilesh [facility] MD at [facility]        Restrictions  Restrictions/Precautions: Weight Bearing  Required Braces or Orthoses?: Yes      Right Lower Extremity Weight Bearing: Weight Bearing As Tolerated       Safety Devices: Safety Devices  Type of Devices: Left in bed;Bed alarm in place;Nurse Limited d/t pain and anxiety    Perception Status:  Perception  Overall Perceptual Status: WFL    Vision and Hearing Status:  Hearing  Hearing: Exceptions to [facility]  Hearing Exceptions: Hard of hearing/hearing concerns;Bilateral hearing aid   Vision - Basic Assessment  Prior Vision: Wears glasses only for reading  Visual History: No significant visual history  Patient Visual Report: No visual complaint reported. Visual Field Cut: No  Oculo Motor Control: WNL    GROSS ASSESSMENT AROM/PROM:  AROM: Within functional limits  PROM: Within functional limits    ROM:   LUE AROM (degrees)  LUE AROM : WFL  Left Hand AROM (degrees)  Left Hand AROM: WFL  RUE AROM (degrees)  RUE AROM : WFL  Right Hand AROM (degrees)  Right Hand AROM: WFL    UE STRENGTH:  Strength: Within functional limits    UE COORDINATION:  Coordination: Within functional limits    UE TONE:  Tone: Normal    UE SENSATION:  Sensation: Intact    Hand Dominance:  Hand Dominance  Hand Dominance: Right    ADL Status:  ADL  Feeding: Independent  Grooming: Supervision  UE Bathing: Stand by assistance  LE Bathing: Maximum assistance  UE Dressing: Stand by assistance  LE Dressing: Maximum assistance  Toileting: Dependent/Total  Additional Comments: Simulated ADLs as above. Limited d/t significant pain and requires increased time and effort. Toilet Transfers  Toilet Transfer: Unable to assess  Toilet Transfers Comments: Anticipate mod A    Educated pt. on mobility and dressing techniques with LE limitations; F attention d/t pain. Educated on toilet transfer to [facility] and best options overnight (bed pan, pur-wick) d/t pt reports significant urgency with urination at baseline. Functional Mobility:    Transfers  Sit to stand: Minimal assistance  Stand to sit: Minimal assistance    Patient ambulated  one small step forward and back  with [employer] at [address] level. Max increased time for step, pt very anxious d/t pain and significant effort for stepping.     Bed Mobility  Bed meals?: None  AM-[facility] Inpatient Daily Activity Raw Score: 16  AM-PAC Inpatient ADL T-Scale Score : 35.96  ADL Inpatient CMS 0-100% Score: 53.32    Therapy key for assistance levels -   Independent/Mod I = Pt. is able to perform task with no assistance but may require a device   Stand by assistance = Pt. does not perform task at an independent level but does not need physical assistance, requires verbal cues  Minimal, Moderate, Maximal Assistance = Pt. requires physical assistance (25%, 50%, 75% assist from helper) for task but is able to actively participate in task   Dependent = Pt. requires total assistance with task and is not able to actively participate with task completion     Plan:  Plan  Times per Week: 1-4x  Plan Weeks: Length of acute stay  Current Treatment Recommendations: Strengthening, Balance training, Functional mobility training, Endurance training, Pain management, Safety education & training, Self-Care / ADL, Patient/Caregiver education & training, Equipment evaluation, education, & procurement, Positioning    Goals:   Patient will:    - Improve functional endurance to tolerate/complete 30 mins of ADL's  - Be Mod I in UB ADLs   - Be Min A in LB ADLs  - Be Min A in ADL transfers without LOB  - Be Min A in toileting tasks  - Improve B UE strength and endurance to 4/5 in order to participate in self-care activities as projected. - Access appropriate D/C site with as few architectural barriers as possible. - Sequence self-care tasks with no verbal cues for technique    Patient Goal: Patient goals : \"I want to get home\"     Discussed and agreed upon: Yes Comments:       Therapy Time:   Individual   Time In 1353   Time Out 1422   Minutes 29       ADL/IADL training: 10 minutes  Eval: 19 minutes     Electronically signed by:     MALICK Ricketts,   8/30/2022, 3:03 PM

## 2022-08-30 NOTE — PROGRESS NOTES
Patient ID:  Jaun Feng  [id number]  69 y.o.  1945  BOVIE PAD SITE CLEAR AND INTACT PRE AND POST OP. TAKEN TO PACU,   ATTACHED TO MONITOR AND REPORT GIVEN TO RN.   VSS DRSG DRY AND INTACT, IMMOBILIZER ON         Electronically signed by Jessica Silva RN on 8/30/2022

## 2022-08-30 NOTE — ANESTHESIA PROCEDURE NOTES
Spinal Block    Patient location during procedure: pre-op  End time: 8/30/2022 8:11 AM  Reason for block: primary anesthetic  Staffing  Performed: anesthesiologist   Anesthesiologist: Torey Mercedes MD  Spinal Block  Patient position: sitting  Prep: Betadine  Patient monitoring: cardiac monitor, continuous pulse ox and frequent blood pressure checks  Approach: midline  Location: L3/L4  Provider prep: mask and sterile gloves  Local infiltration: lidocaine  Needle  Needle type: Pencan   Needle gauge: 24 G  Needle length: 3.5 in  Assessment  Sensory level: T6  Events: cerebrospinal fluid  Lysis level: CSF aspirated. CSF: clear  Attempts: 3+  Hemodynamics: stable  Additional Notes  Free flowing CSF. Negative heme. Negative paresthesia.   .  Preanesthetic Checklist  Completed: patient identified, IV checked, site marked, risks and benefits discussed, surgical/procedural consents, equipment checked, pre-op evaluation, timeout performed, anesthesia consent given, oxygen available and monitors applied/VS acknowledged

## 2022-08-30 NOTE — FLOWSHEET NOTE
Anesthesiologist made aware of patient HR, orders received and medication given. Report called and given to [place].  VSS. Right knee dressing cdi. Ice to area and knee immobilizer on.  +2 pedal pulse to RLE, patient states has numbness BLE post spinal and block, patient able to move feet. Family updated on patient status and room number.   Waiting for transport to take patient up to room

## 2022-08-30 NOTE — OP NOTE
[address]                      [address] [facility], [address] [facility]                                OPERATIVE REPORT    PATIENT NAME: Dwayne Maya               :        1945  MED REC NO:   [id number]                            ROOM:       [id number]  ACCOUNT NO:   [de-identified]                           ADMIT DATE: 2022  PROVIDER:     Tiana Guerrero MD    DATE OF PROCEDURE:  2022    LOCATION:  [facility]. PREOPERATIVE DIAGNOSIS:  Right knee DJD with valgus deformity. POSTOPERATIVE DIAGNOSIS:  Right knee DJD with valgus deformity. PROCEDURES PERFORMED:  Right total knee arthroplasty using a Hernando size  9 cemented Persona, posterior stabilized femoral component, size  estimated Persona cemented tibial tray with 30-mm stem extension, 14-mm  Persona constrained posterior stabilized polyethylene spacer, and 26-mm  Persona all poly-patellar inset button. SURGEON:  Tiana Guerrero MD    ASSISTANTS:  1.  Je Pires PA-C, was present throughout the entire case. Given  the nature of the disease process and the procedure, a skilled surgical  first assistant was necessary during the case. The assistant was  necessary to hold retractors and manipulate the extremity during the  procedure. A certified scrub tech was at the back table managing the  instruments and supplies for the surgical case. 2.  Micaela Mcelroy PA-C, was present throughout the entire case. Given  the nature of the disease process and the procedure, a skilled surgical  first assistant was necessary during the case. The assistant was  necessary to hold retractors and manipulate the extremity during the  procedure. A certified scrub tech was at the back table managing the  instruments and supplies for the surgical case. ANESTHESIA:  Spinal plus IV sedation. COMPLICATIONS:  None.     ESTIMATED BLOOD LOSS:  Minimal.    INDICATIONS:  The patient is a 50-year-old female with history of  endstage right knee DJD. She had valgus deformity as well. Risks and  benefits of total knee arthroplasty were discussed at length with the  patient and family. These include infection, stiffness, nerve damage,  continued pain and deformity as well as need for subsequent operations. I specifically, discussed the complications with valgus knee alignment  and potential neurovascular damage. Understands options his option and  limitation. She elected you to proceed. Informed consent was obtained  prior to arrival in the operating room. CLINICAL NOTE:  The patient has progressive knee pain which has been  refractory to conservative treatment. The patient has decided to  undergo elective total knee replacement using the Hernando [employer]  computer-assisted custom cutting guides. The risks, benefits, outcomes  and postoperative course were fully explained to the patient. We  discussed wear, loosening, infection, blood clot, loss of lift, loss of  limb, nerve damage, numbness, failure of the procedure, stiffness,  progressive arthritis and the need for potential revision knee  replacement. The patient understands the procedure, risks and benefits  and consents to this surgical intervention. OPERATION AND FINDINGS:  The patient was brought to the operating room  and placed on the surgical table in the supine position whereupon  adequate anesthesia was then obtained. A surgical time-out was  performed. The patient received preoperative antibiotics. The patient  was prepped and draped in the usual sterile manner. The leg was then  exsanguinated with an Esmarch bandage and the tourniquet was applied at  300 mmHg. A linear midline incision was made from the superior pole of the patella  to the tibial tubercle, identifying the entire extensor mechanism. A  medial parapatellar arthrotomy was performed and the patella was  everted.   The fat pad was excised and once this was

## 2022-08-30 NOTE — ANESTHESIA PROCEDURE NOTES
Peripheral Block    Patient location during procedure: pre-op  Reason for block: post-op pain management and at surgeon's request  Start time: 8/30/2022 7:45 AM  End time: 8/30/2022 7:52 AM  Staffing  Performed: anesthesiologist   Anesthesiologist: Ferny Givens MD  Preanesthetic Checklist  Completed: patient identified, IV checked, site marked, risks and benefits discussed, surgical/procedural consents, equipment checked, pre-op evaluation, timeout performed, anesthesia consent given, oxygen available and monitors applied/VS acknowledged  Peripheral Block   Patient position: supine  Prep: ChloraPrep  Provider prep: mask and sterile gloves (Sterile probe cover)  Patient monitoring: cardiac monitor, continuous pulse ox, frequent blood pressure checks and IV access  Block type: Saphenous and iPacks  Laterality: right  Injection technique: single-shot  Guidance: ultrasound guided  Local infiltration: ropivacaine  Infiltration strength: 0.5 %  Local infiltration: ropivacaine  Dose: 30 mL    Needle   Needle type: combined needle/nerve stimulator   Needle gauge: 21 G  Needle localization: anatomical landmarks and ultrasound guidance  Needle length: 10 cm  Assessment   Injection assessment: negative aspiration for heme, no paresthesia on injection and local visualized surrounding nerve on ultrasound  Paresthesia pain: immediately resolved  Slow fractionated injection: yes  Hemodynamics: stable  Real-time US image taken/store: yes    Additional Notes  Ultrasound image printed and saved in patient chart.     Sterile probe cover used

## 2022-08-30 NOTE — ADDENDUM NOTE
Addendum  created 08/30/22 1107 by Damir Courtney MD    Order list changed, Pharmacy for encounter modified

## 2022-08-30 NOTE — FLOWSHEET NOTE
Pt admitted to floor from PACU for total right knee replacement. Alert et oriented x4. No c/o chest pain,sob or nausea. Dressing dry et intact with knee immobilizer in place. Pt repositioned for comfort. Oriented to room et call light. Family at bedside. Electronically signed by Tangela Beebe RN on 8/30/2022 at 1:28 PM

## 2022-08-30 NOTE — ANESTHESIA POSTPROCEDURE EVALUATION
Department of Anesthesiology  Postprocedure Note    Patient: Wenceslao Sarabia  MRN: [id number]  YOB: 1945  Date of evaluation: 8/30/2022      Procedure Summary     Date: 08/30/22 Room / Location: [address]    Anesthesia Start: 0813 Anesthesia Stop: 9449    Procedure: RIGHT KNEE TOTAL KNEE ARTHROPLASTY (Right: Knee) Diagnosis:       Osteoarthritis of right knee, unspecified osteoarthritis type      (RIGHT KNEE DEGENERATIVE JOINT DISEASE)    Surgeons: Delia Alonso MD Responsible Provider: Andrei Swanson MD    Anesthesia Type: MAC, regional, spinal ASA Status: 3          Anesthesia Type: No value filed.     Darin Phase I: Darin Score: 10    Darin Phase II:        Anesthesia Post Evaluation    Patient location during evaluation: PACU  Patient participation: complete - patient participated  Level of consciousness: awake and alert  Pain score: 0  Airway patency: patent  Nausea & Vomiting: no vomiting and no nausea  Complications: no  Cardiovascular status: hemodynamically stable  Respiratory status: nasal cannula  Hydration status: stable  Multimodal analgesia pain management approach

## 2022-08-30 NOTE — PROGRESS NOTES
Physical Therapy Med Surg Initial Assessment  [facility]Department[facility]  [facility]     NAME: Adiel Mejias  : 1945 (85 y.o.)  MRN: [id number]  CODE STATUS: Full Code    Date of Service: 2022    Patient Diagnosis(es): Osteoarthritis of right knee, unspecified osteoarthritis type [M17.11]  Status post total knee replacement, right [Z96.651]   No chief complaint on file.     Patient Active Problem List    Diagnosis Date Noted    Morbid obesity due to excess calories ([facility] 2017    Status post total knee replacement, right 2022    RBBB (right bundle branch block) 2022    Gastrointestinal hemorrhage     Anemia 2021    Adenomatous polyp of descending colon     Adenomatous polyp of ascending colon     Adenomatous polyp of colon     Chronic gastritis without bleeding     CKD (chronic kidney disease) stage 3, GFR 30-59 ml/min ([facility]) 2019    Asthma     Spinal stenosis of lumbar region with neurogenic claudication 2019    Degenerative spondylolisthesis 2019    Chronic bilateral low back pain with bilateral sciatica 10/30/2018    Primary osteoarthritis of both knees 10/30/2018    Coronary artery disease involving native coronary artery of native heart without angina pectoris 07/10/2018    History of ST elevation myocardial infarction (STEMI) 2017    Hypothyroid 12/10/2013    Essential hypertension 12/10/2013    Hyperlipidemia 12/10/2013      Past Medical History:   Diagnosis Date    Antral ulcer 2015    DR LANE    Asthma     \"cured by beestings\"    Coronary artery disease     Coronary artery disease involving native coronary artery of native heart without angina pectoris 7/10/2018    has x 4 cardiac stents / Dr. Mile Gross    Diverticulosis of colon (without mention of hemorrhage) 2015    DR Ashley Garza    Essential hypertension 12/10/2013    meds > 20 yrs    History of blood transfusion 1990s    with hysterectomy    History of normal resting EKG 1996    normal    History of ST elevation myocardial infarction (STEMI) 7/11/2017    History of transfusion of whole blood 1996    Excessive bleeding before hysterectomy    Hyperlipidemia     meds > 2 yrs    Hypertension     Hypothyroidism     past trx years ago then stopped / recent restart    Kidney disease     Low back pain     Morbid obesity due to excess calories ([address] 5/13/2017    Morbid obesity due to excess calories ([address] 6/6/2017    Osteoarthritis     Pneumonia     RBBB (right bundle branch block) 3/17/2022    Shoulder dislocation     ST elevation myocardial infarction involving left circumflex coronary artery ([address] 5/13/2017    Unspecified gastritis and gastroduodenitis without mention of hemorrhage 05/06/2015    DR Sharad Zurita     Past Surgical History:   Procedure Laterality Date    CARDIAC SURGERY  2017    has x 4 cardiac stents    COLONOSCOPY  01/14/2015    DR Sharda Zurita - DIVERTICULOSIS    COLONOSCOPY N/A 1/2/2021    COLONOSCOPY DIAGNOSTIC performed by Shantal Beltran MD at [facility]  05/17/2017    x2 stents    CYST REMOVAL Left 2/7/2019    RESECTION OF LEFT PINNA LESION WITH GRAFT performed by Chloe Vaca MD at [address] [place] [address], COLON, DIAGNOSTIC      FINGER SURGERY  12/9/14    middle finger right hand due to infection    HYSTERECTOMY ([address])  1996    LASIK  [id number]    SHOULDER SURGERY  2008    shoulder dislocated - popped  back in Right shoulder    UPPER GASTROINTESTINAL ENDOSCOPY  01/14/2015    DR LANE - ULCER IN THE ANTRUM    UPPER GASTROINTESTINAL ENDOSCOPY  05/06/2015    DR Sharda Zurita - GASTRITIS, PREVIOUS ULCER HEALED    UPPER GASTROINTESTINAL ENDOSCOPY N/A 1/2/2021    EGD DIAGNOSTIC ONLY performed by Shantal Beltran MD at [facility] OR     Chart Reviewed: Yes  Patient assessed for rehabilitation services?: Yes  Family / Caregiver Present: Yes (spouse and son departed at beginning of session)    Restrictions:  Restrictions/Precautions: Weight Bearing  Lower Extremity Weight Bearing Restrictions  Right Lower Extremity Weight Bearing: Weight Bearing As Tolerated  Required Braces or Orthoses  Right Lower Extremity Brace: Knee Immobilizer     SUBJECTIVE:        Pain   Pre treatment screening: 10/10  []  Pt declined intervention  [x]  Pt able to proceed PT session  [x]  RN or physician notified- pt was medicated prior to arrival  []  RN called to bedside to administer meds.     Post treatment screening 8/10    Prior Level of Function:  Social/Functional History  Lives With: Spouse  Type of Home: House  Home Layout: One level  Home Access: Ramped entrance (to front door)  Entrance Stairs - Number of Steps: 5  Bathroom Shower/Tub: Tub/Shower unit  Bathroom Equipment: Grab bars in shower, Toilet raiser  Home Equipment: Cane  ADL Assistance: Independent  Homemaking Assistance: Independent  Homemaking Responsibilities: Yes  Ambulation Assistance: Independent (cane)  Transfer Assistance: Independent  Active : Yes    OBJECTIVE:   Vision  Vision: Impaired  Hearing: Exceptions to [facility]  Hearing Exceptions: Hard of hearing/hearing concerns;Bilateral hearing aid    Cognition:  Orientation Level: Oriented to place, Oriented to time, Oriented to situation, Oriented to person    Observation/Palpation  Posture: Fair  Observation: pleasant, cooperative, no acute distress noted, on RA, R knee dressingand compression bandage intact, R knee immobilizer adjusted for fit prior to mobility, Ice bag in place upon departure    ROM:  AROM:  (R knee not formally assessed d/t high pain levels and bulky bandages in place; impaired s/p R TKA)  PROM:  (R knee not formally assessed d/t high pain levels and bulky bandages in place; impaired s/p R TKA)    Strength:  Strength: Generally decreased, functional (-SLR, LLE functionally >/=3+/5)    Neuro:  Balance  Sitting - Static: Fair;+  Sitting - Dynamic: Fair;+  Standing - Static: heavy VC with Min A. Pt would benefit from physical therapy to address above deficits and allow for safe return home at highest level of function, decrease risk for falls, and improve QOL. Requires PT Follow-Up: Yes       PLAN OF CARE:  Plan  Plan: 1 time a day 3-6 times a week  Current Treatment Recommendations: Strengthening, Balance training, Functional mobility training, ROM, Transfer training, Gait training, Stair training, Return to work related activity, Home exercise program, Safety education & training, Equipment evaluation, education, & procurement, Patient/Caregiver education & training, Therapeutic activities, Neuromuscular re-education    Safety Devices  Type of Devices: Left in bed, Bed alarm in place, Nurse notified, Call light within reach    Goals:  Patient goals : to go home  [address] term goal 1: Bed mobility with indep  Long term goal 2: Functional transfers with indep  Long term goal 3: Amb 50ft with 2ww and indep  Long term goal 4: Pt will negotiate 10 ft ramp with 2ww and SBA to enter/exit home  Long term goal 5: Pt will be indep with HEP to improve LE strength, ROM, and activity tolerance    [facility] (6 CLICK) BASIC MOBILITY  AM-PAC Inpatient Mobility Raw Score : 13     Therapy Time:   Individual   Time In 1349   Time Out 1423   Minutes 34       Bed mob/transfers 8 min    [facility], PT, 08/30/22 at 2:43 PM         Definitions for assistance levels  Independent = pt does not require any physical supervision or assistance from another person for activity completion. Device may be needed.   Stand by assistance = pt requires verbal cues or instructions from another person, close to but not touching, to perform the activity  Minimal assistance= pt performs 75% or more of the activity; assistance is required to complete the activity  Moderate assistance= pt performs 50% of the activity; assistance is required to complete the activity  Maximal assistance = pt performs 25% of the activity; assistance is required to complete the activity  Dependent = pt requires total physical assistance to accomplish the task

## 2022-08-30 NOTE — BRIEF OP NOTE
Brief Postoperative Note      Patient: Bean Dove  YOB: 1945  MRN: [id number]    Date of Procedure: 8/30/2022    Pre-Op Diagnosis: RIGHT KNEE DEGENERATIVE JOINT DISEASE    Post-Op Diagnosis: Same       Procedure(s):  RIGHT KNEE TOTAL KNEE ARTHROPLASTY    Surgeon(s):  Sandy Garcia MD    Assistant:  Physician Assistant: Alton Justin PA-C    Anesthesia: Spinal    Estimated Blood Loss (mL): Minimal    Complications: None    Specimens:   * No specimens in log *    Implants:  Implant Name Type Inv.  Item Serial No.  Lot No. LRB No. Used Action   CEMENT BNE 40GM HI VISC RADPQ FOR REV SURG - [id number]  CEMENT BNE 40GM HI VISC RADPQ FOR REV SURG [id number] [employer]- [id number] Right 2 Implanted   PSN TIB STM 5 DEG SZ F R - [id number]  PSN TIB STM 5 DEG SZ F R [id number] [employer]- [id number] Right 1 Implanted   SURFACE ARTC TIB EF FEM 6-9 H14MM RT KNEE VIVACIT-E CONSTRN - [id number]  SURFACE ARTC TIB EF FEM 6-9 H14MM RT KNEE VIVACIT-E CONSTRN [id number] [employer]- [id number] Right 1 Implanted   EXTENSION STEM L30MM [id number] KNEE TAPR MADDY PERSONA - [id number]  EXTENSION STEM L30MM [id number] KNEE TAPR MADDY PERSONA [id number] [employer]- [id number] Right 1 Implanted   COMPONENT FEM SZ 9 STD R KNEE CO CHROM MADDY POST STBL COR - [id number]  COMPONENT FEM SZ 9 STD R KNEE CO CHROM MADDY POST STBL COR [id number] [employer]- [id number] Right 1 Implanted   IMPL KNEE PERSONA POLY PATELLA 26MM - [id number] Knee IMPL KNEE PERSONA POLY PATELLA 26MM [id number] K[facility]-PMM [id number] Right 1 Implanted         Drains: * No LDAs found *    Findings: none    Electronically signed by Sandy Garcia MD on 8/30/2022 at 9:47 AM

## 2022-08-31 VITALS
DIASTOLIC BLOOD PRESSURE: 70 MMHG | OXYGEN SATURATION: 96 % | BODY MASS INDEX: 43.98 KG/M2 | SYSTOLIC BLOOD PRESSURE: 156 MMHG | HEIGHT: 62 IN | WEIGHT: 239 LBS | RESPIRATION RATE: 12 BRPM | TEMPERATURE: 97.9 F | HEART RATE: 68 BPM

## 2022-08-31 LAB
ANION GAP SERPL CALCULATED.3IONS-SCNC: 8 MEQ/L (ref 9–15)
BUN BLDV-MCNC: 39 MG/DL (ref 8–23)
CALCIUM SERPL-MCNC: 8.7 MG/DL (ref 8.5–9.9)
CHLORIDE BLD-SCNC: 109 MEQ/L (ref 95–107)
CO2: 22 MEQ/L (ref 20–31)
CREAT SERPL-MCNC: 1.72 MG/DL (ref 0.5–0.9)
GFR AFRICAN AMERICAN: 34.9
GFR NON-AFRICAN AMERICAN: 28.8
GLUCOSE BLD-MCNC: 98 MG/DL (ref 70–99)
HCT VFR BLD CALC: 28.7 % (ref 37–47)
HEMOGLOBIN: 9.6 G/DL (ref 12–16)
MCH RBC QN AUTO: 30.9 PG (ref 27–31.3)
MCHC RBC AUTO-ENTMCNC: 33.3 % (ref 33–37)
MCV RBC AUTO: 92.6 FL (ref 82–100)
PDW BLD-RTO: 13.6 % (ref 11.5–14.5)
PLATELET # BLD: 112 K/UL (ref 130–400)
POTASSIUM REFLEX MAGNESIUM: 5.3 MEQ/L (ref 3.4–4.9)
RBC # BLD: 3.11 M/UL (ref 4.2–5.4)
SODIUM BLD-SCNC: 139 MEQ/L (ref 135–144)
WBC # BLD: 4.9 K/UL (ref 4.8–10.8)

## 2022-08-31 PROCEDURE — 97116 GAIT TRAINING THERAPY: CPT

## 2022-08-31 PROCEDURE — 97110 THERAPEUTIC EXERCISES: CPT

## 2022-08-31 PROCEDURE — 85027 COMPLETE CBC AUTOMATED: CPT

## 2022-08-31 PROCEDURE — 6370000000 HC RX 637 (ALT 250 FOR IP): Performed by: NURSE PRACTITIONER

## 2022-08-31 PROCEDURE — 2580000003 HC RX 258: Performed by: NURSE PRACTITIONER

## 2022-08-31 PROCEDURE — 36415 COLL VENOUS BLD VENIPUNCTURE: CPT

## 2022-08-31 PROCEDURE — 2580000003 HC RX 258: Performed by: INTERNAL MEDICINE

## 2022-08-31 PROCEDURE — 80048 BASIC METABOLIC PNL TOTAL CA: CPT

## 2022-08-31 PROCEDURE — 6360000002 HC RX W HCPCS: Performed by: NURSE PRACTITIONER

## 2022-08-31 PROCEDURE — 97535 SELF CARE MNGMENT TRAINING: CPT

## 2022-08-31 RX ORDER — SODIUM CHLORIDE 9 MG/ML
INJECTION, SOLUTION INTRAVENOUS CONTINUOUS
Status: DISCONTINUED | OUTPATIENT
Start: 2022-08-31 | End: 2022-08-31 | Stop reason: HOSPADM

## 2022-08-31 RX ORDER — ASPIRIN 81 MG/1
81 TABLET ORAL 2 TIMES DAILY
Qty: 60 TABLET | Refills: 0 | Status: SHIPPED | OUTPATIENT
Start: 2022-08-31 | End: 2022-09-30

## 2022-08-31 RX ADMIN — TRANEXAMIC ACID 1950 MG: 650 TABLET ORAL at 05:19

## 2022-08-31 RX ADMIN — ACETAMINOPHEN 650 MG: 325 TABLET ORAL at 13:17

## 2022-08-31 RX ADMIN — SODIUM CHLORIDE: 9 INJECTION, SOLUTION INTRAVENOUS at 12:03

## 2022-08-31 RX ADMIN — OXYBUTYNIN CHLORIDE 10 MG: 5 TABLET, EXTENDED RELEASE ORAL at 09:10

## 2022-08-31 RX ADMIN — ASPIRIN 81 MG: 81 TABLET, COATED ORAL at 09:10

## 2022-08-31 RX ADMIN — ACETAMINOPHEN 650 MG: 325 TABLET ORAL at 00:28

## 2022-08-31 RX ADMIN — METOPROLOL TARTRATE 25 MG: 25 TABLET, FILM COATED ORAL at 09:10

## 2022-08-31 RX ADMIN — LOSARTAN POTASSIUM AND HYDROCHLOROTHIAZIDE 2 TABLET: 50; 12.5 TABLET, FILM COATED ORAL at 09:10

## 2022-08-31 RX ADMIN — CEFAZOLIN 2000 MG: 10 INJECTION, POWDER, FOR SOLUTION INTRAVENOUS at 00:28

## 2022-08-31 RX ADMIN — ACETAMINOPHEN 650 MG: 325 TABLET ORAL at 05:18

## 2022-08-31 RX ADMIN — DILTIAZEM HYDROCHLORIDE 240 MG: 240 CAPSULE, COATED, EXTENDED RELEASE ORAL at 09:10

## 2022-08-31 RX ADMIN — OXYCODONE 5 MG: 5 TABLET ORAL at 09:10

## 2022-08-31 RX ADMIN — Medication 10 ML: at 09:16

## 2022-08-31 ASSESSMENT — PAIN SCALES - GENERAL
PAINLEVEL_OUTOF10: 5
PAINLEVEL_OUTOF10: 3
PAINLEVEL_OUTOF10: 5
PAINLEVEL_OUTOF10: 2

## 2022-08-31 ASSESSMENT — PAIN DESCRIPTION - LOCATION
LOCATION: KNEE
LOCATION: KNEE
LOCATION: LEG

## 2022-08-31 ASSESSMENT — PAIN DESCRIPTION - ORIENTATION
ORIENTATION: RIGHT

## 2022-08-31 NOTE — PROGRESS NOTES
Assessment completed earlier in the shift. VSS. Pt c/o 3/10 rt knee pain. Medicated with scheduled Tylenol and applied ice. Ace wrap and immobilizer in place. LR running at 50 ml/hr. IV Ancef given. Denies any n/v. External catheter in place per pt request. Safety measures in place. Call light in reach.  Electronically signed by Shaquille Kahn RN on 8/31/2022 at 1:56 AM

## 2022-08-31 NOTE — PLAN OF CARE
Problem: Discharge Planning  Goal: Discharge to home or other facility with appropriate resources  8/31/2022 1459 by Vi Barraza RN  Outcome: Completed  8/31/2022 1459 by Vi Barraza RN  Outcome: Adequate for Discharge  8/31/2022 1102 by Vi Barraza RN  Outcome: Progressing     Problem: ABCDS Injury Assessment  Goal: Absence of physical injury  8/31/2022 1459 by Vi Barraza RN  Outcome: Completed  8/31/2022 1459 by Vi Barraza RN  Outcome: Adequate for Discharge  8/31/2022 1102 by Vi Barraza RN  Outcome: Progressing     Problem: Safety - Adult  Goal: Free from fall injury  8/31/2022 1459 by Vi Barraza RN  Outcome: Completed  8/31/2022 1459 by Vi Barraza RN  Outcome: Adequate for Discharge  8/31/2022 1102 by Vi Barraza RN  Outcome: Progressing     Problem: Pain  Goal: Verbalizes/displays adequate comfort level or baseline comfort level  8/31/2022 1459 by Vi Barraza RN  Outcome: Completed  8/31/2022 1459 by Vi Barraza RN  Outcome: Adequate for Discharge  8/31/2022 1102 by Vi Barraza RN  Outcome: Progressing     Problem: Skin/Tissue Integrity  Goal: Absence of new skin breakdown  Description: 1. Monitor for areas of redness and/or skin breakdown  2. Assess vascular access sites hourly  3. Every 4-6 hours minimum:  Change oxygen saturation probe site  4. Every 4-6 hours:  If on nasal continuous positive airway pressure, respiratory therapy assess nares and determine need for appliance change or resting period.   8/31/2022 1459 by Vi Barraza RN  Outcome: Completed  8/31/2022 1459 by Vi Barraza RN  Outcome: Adequate for Discharge  8/31/2022 1102 by Vi Barraza RN  Outcome: Progressing

## 2022-08-31 NOTE — PROGRESS NOTES
Progress Note   TKA    Subjective:     Post-Operative Day: 1 Status Post right Total Knee Arthroplasty  Systemic or Specific Complaints:No Complaints    Objective:     CURRENT VITALS:  BP (!) 156/69   Pulse 75   Temp 98.1 °F (36.7 °C) (Oral)   Resp 18   Ht 5' 1.5\" (1.562 m)   Wt 239 lb (108.4 kg)   LMP 09/17/1996   SpO2 97%   BMI 44.43 kg/m²     General: alert, appears stated age, and cooperative   Wound: No Erythema, No Edema, No Drainage, and dressing clean dry and intact   Motion: Painful range of Motion   DVT Exam: No evidence of DVT seen on physical exam.  Negative Catrachita's sign. No significant calf/ankle edema. Knee swollen but thigh soft to palpation. Moving foot and ankle. Good distal pulses. Data Review  Recent Labs     08/31/22  0548   WBC 4.9   RBC 3.11*   HGB 9.6*   HCT 28.7*   MCV 92.6   MCH 30.9   MCHC 33.3   RDW 13.6   *         Assessment:     Status Post right Total Knee Arthroplasty. Doing well postoperatively. 8/21/22  -Acute postoperative [place].:  Patient reports mild to moderate pain to the operative extremity however is tolerable with current pain medication regimen.  -Patient is currently unable to perform straight leg raise most likely due to residual of block. We will continue to monitor her progress. Knee immobilizer to remain in place until patient is able to perform this appropriately.  -Acute blood loss anemia secondary to expected surgical blood loss. Hemoglobin 9.6 this morning. Patient is asymptomatic, remains hemodynamically stable with no signs of active bleeding. Plan:      1: Continues current post-op course :  2:  Continue Deep venous thrombosis prophylaxis: ASA 81 mg BID for 30 days   3:  Continue physical therapy: WBAT to operative extremity. Continue to use knee immobilizer until patient is able to straight leg raise  4:  Continue Pain Control  5. Discharge planning: Patient requests to go home with home care.  She does not want to go to SNF

## 2022-08-31 NOTE — PROGRESS NOTES
Physical Therapy Med Surg Daily Treatment Note  Facility/Department: [facility]  [facility]       NAME: Fabiana Connor  : 1945 (50 y.o.)  MRN: [id number]  CODE STATUS: Full Code    Date of Service: 2022    Patient Diagnosis(es): Osteoarthritis of right knee, unspecified osteoarthritis type [M17.11]  Status post total knee replacement, right [Z96.651]   No chief complaint on file.     Patient Active Problem List    Diagnosis Date Noted    Morbid obesity due to excess calories ([facility] 2017    Status post total knee replacement, right 2022    RBBB (right bundle branch block) 2022    Gastrointestinal hemorrhage     Anemia 2021    Adenomatous polyp of descending colon     Adenomatous polyp of ascending colon     Adenomatous polyp of colon     Chronic gastritis without bleeding     CKD (chronic kidney disease) stage 3, GFR 30-59 ml/min ([facility]) 2019    Asthma     Spinal stenosis of lumbar region with neurogenic claudication 2019    Degenerative spondylolisthesis 2019    Chronic bilateral low back pain with bilateral sciatica 10/30/2018    Primary osteoarthritis of both knees 10/30/2018    Coronary artery disease involving native coronary artery of native heart without angina pectoris 07/10/2018    History of ST elevation myocardial infarction (STEMI) 2017    Hypothyroid 12/10/2013    Essential hypertension 12/10/2013    Hyperlipidemia 12/10/2013        Past Medical History:   Diagnosis Date    Antral ulcer 2015    DR LANE    Asthma     \"cured by beestings\"    Coronary artery disease     Coronary artery disease involving native coronary artery of native heart without angina pectoris 7/10/2018    has x 4 cardiac stents / Dr. Hattie Man    Diverticulosis of colon (without mention of hemorrhage) 2015    DR Sarah Ng    Essential hypertension 12/10/2013    meds > 20 yrs    History of blood transfusion 1990s    with hysterectomy    History of normal resting EKG 1996    normal    History of ST elevation myocardial infarction (STEMI) 7/11/2017    History of transfusion of whole blood 1996    Excessive bleeding before hysterectomy    Hyperlipidemia     meds > 2 yrs    Hypertension     Hypothyroidism     past trx years ago then stopped / recent restart    Kidney disease     Low back pain     Morbid obesity due to excess calories ([address] 5/13/2017    Morbid obesity due to excess calories ([address] 6/6/2017    Osteoarthritis     Pneumonia     RBBB (right bundle branch block) 3/17/2022    Shoulder dislocation     ST elevation myocardial infarction involving left circumflex coronary artery ([address] 5/13/2017    Unspecified gastritis and gastroduodenitis without mention of hemorrhage 05/06/2015    DR Liz Jennings     Past Surgical History:   Procedure Laterality Date    CARDIAC SURGERY  2017    has x 4 cardiac stents    COLONOSCOPY  01/14/2015    DR Liz Jennings - DIVERTICULOSIS    COLONOSCOPY N/A 1/2/2021    COLONOSCOPY DIAGNOSTIC performed by Leroy Watson MD at [facility]  05/17/2017    x2 stents    CYST REMOVAL Left 2/7/2019    RESECTION OF LEFT PINNA LESION WITH GRAFT performed by Linda Grant MD at [address] [place] [address], COLON, DIAGNOSTIC      FINGER SURGERY  12/9/14    middle finger right hand due to infection    HYSTERECTOMY ([address])  1996    LASIK  [id number]    SHOULDER SURGERY  2008    shoulder dislocated - popped  back in Right shoulder    TOTAL KNEE ARTHROPLASTY Right 8/30/2022    RIGHT KNEE TOTAL KNEE ARTHROPLASTY performed by Rebeca Pereira MD at [address]  01/14/2015    DR Liz Jennings - ULCER IN THE ANTRUM    UPPER GASTROINTESTINAL ENDOSCOPY  05/06/2015    DR Liz Jennings - GASTRITIS, PREVIOUS ULCER HEALED    UPPER GASTROINTESTINAL ENDOSCOPY N/A 1/2/2021    EGD DIAGNOSTIC ONLY performed by Leroy Watson MD at [facility] OR       Chart Reviewed: Yes  Family / Caregiver Present: No    Restrictions:  Restrictions/Precautions: Fall Risk;Weight Bearing  Lower Extremity Weight Bearing Restrictions  Right Lower Extremity Weight Bearing: Weight Bearing As Tolerated  Required Braces or Orthoses  Right Lower Extremity Brace: Knee Immobilizer    SUBJECTIVE:   Subjective: \"I do better without that brace, they walked me to the bathroom without it. \"    Pain  Pain: 3/10 R knee post tx. OBJECTIVE:        Bed mobility  Supine to Sit: Stand by assistance (increased time and effort, pt reports she sleeps in a recliner at baseline.)  Sit to Supine: Stand by assistance  Bed Mobility Comments: pt able to advance BLE into and out of bed. Transfers  Sit to Stand: Contact guard assistance  Stand to sit: Contact guard assistance  Comment: increased time and effort to complete, vc's for hand placement and anterior weight shifting. Ambulation  WB Status: R LE WBAT  Ambulation  Surface: level tile  Device: Rolling Walker  Other Apparatus:  (pt declines to wear KI for gait training this session.)  Assistance: Stand by assistance  Quality of Gait: step to pattern, antalgic. no instability noted in RLE despite being without KI and -SLR. Distance: 27'  Comments: vc's for [employer] management and safety awareness. pt educated on reason for wearing KI and that it is in her orders pt verbalizes understanding but insists she feels better without it. Stairs/Curb  Stairs?: No (pt reports having ramped entrance, no stairs.)              PT Exercises  Exercise Treatment: written HEP for supine/seated therex, instructed on technique dosage and frequency of all exercises, pt verbalizes understanding. A/AROM Exercises: supine A/P QS/GS/Hip Abd/HS x 10 pt unable to SLR this session. Activity Tolerance  Activity Tolerance: Patient tolerated treatment well          ASSESSMENT   Assessment: pt increases gait distance and declines to use KI this session. pt states confidence homegoing at [facility].      Discharge Recommendations:  Continue to assess pending progress         Goals  Long Term Goals  Long term goal 1: Bed mobility with indep  Long term goal 2: Functional transfers with indep  Long term goal 3: Amb 50ft with 2ww and indep  Long term goal 4: Pt will negotiate 10 ft ramp with 2ww and SBA to enter/exit home  Long term goal 5: Pt will be indep with HEP to improve LE strength, ROM, and activity tolerance  Patient Goals   Patient goals : to go home    PLAN    Plan: 1 time a day 3-6 times a week  Safety Devices  Type of Devices: Bed alarm in place, Call light within reach, Left in bed     [facility] (6 CLICK) BASIC MOBILITY  AM-PAC Inpatient Mobility Raw Score : 17      Therapy Time   Individual   Time In 1338   Time Out 1402   Minutes 24      Gait: 12  BM/Trsf: 12        Brennen Martin PTA, 08/31/22 at 2:34 PM         Definitions for assistance levels  Independent = pt does not require any physical supervision or assistance from another person for activity completion. Device may be needed.   Stand by assistance = pt requires verbal cues or instructions from another person, close to but not touching, to perform the activity  Minimal assistance= pt performs 75% or more of the activity; assistance is required to complete the activity  Moderate assistance= pt performs 50% of the activity; assistance is required to complete the activity  Maximal assistance = pt performs 25% of the activity; assistance is required to complete the activity  Dependent = pt requires total physical assistance to accomplish the task

## 2022-08-31 NOTE — PROGRESS NOTES
Patient discharged at [id number]. Temp 97.9, heart rate 68, blood pressure 156/70, SPO2 96% room air. All discharge instructions explained and medication prescription given to patient. Advised patient of next pain medication dose due at 1715. Advised patient to follow up with MD Amrailis Medellin within the next 2 weeks for continuation of care. [facility] care ordered and medications sent to patients requested pharmacy. Patient escorted by wheel chair to car and is accompanied by spouse and son. Patient transferred into car independently.

## 2022-08-31 NOTE — CARE COORDINATION
Met with patient at bedside. Patient had Right TKR on 8/30/22. PT/OT recommending SNF vs HHC. Patient does not want to go to SNF or rehab. She would like to go with HHC. [facility] list given to patient to offer [address]. Patient states that she would like [address]. Order already placed for [facility]. Notified Stephani Daniela with [address]. CM/SW to continue to follow for d/c planning needs.

## 2022-08-31 NOTE — PROGRESS NOTES
[facility]ER[facility] MED SURG TREATMENT NOTE     Date: 2022  Patient Name: Marely Cannon        MRN: [id number]  Account: [de-identified]   : 1945  (69 y.o.)  Room: P164/X229-36    Chart Review:    Restrictions  Restrictions/Precautions  Restrictions/Precautions: Fall Risk, Weight Bearing  Lower Extremity Weight Bearing Restrictions  Right Lower Extremity Weight Bearing: Weight Bearing As Tolerated     Safety:  Safety Devices  Type of Devices: All fall risk precautions in place;Call light within reach; Left in chair;Chair alarm in place    Patient's birthday verified: Yes    Subjective:    Pain at start of treatment: Yes: 6/10    Pain at end of treatment: Yes: 6/10    Location: Right Knee  Nursing notified: Declined  Intervention: Cold applied  Pt reports that she had pain medication. Cold applied to right knee at end of session. Objective: Pt declined full ADL secondary to pain. Pt agreeable to completing dressing and grooming. Pt completed as follows. ADL Status:  Grooming: Setup  Grooming Skilled Clinical Factors: To complete oral care, hair care, and wash face  UE Dressing: Setup; Increased time to complete  UE Dressing Skilled Clinical Factors: To don bra and t-shirt  LE Dressing: Stand by assistance; Increased time to complete  LE Dressing Skilled Clinical Factors: To don underwear and shorts. Pt able to bend down with increased time and effort to manage LB clothing past feet. Pt declined to change socks. Pt reports that she does not wear socks and is familiar with the sock aid in the event that she needs to wear them.      KATHERINE Hose donned: Yes      Transfers:  Transfers  Sit to stand: Stand by assistance  Stand to sit: Stand by assistance    Cognition Status:  Cognition  Overall Cognitive Status: WFL    Therapy key for assistance levels -   Independent/Mod I = Pt. is able to perform task with no assistance but may require a device   Stand by assistance = Pt. does not perform signed by:    MYRON Mcleod    8/31/2022, 9:57 AM

## 2022-08-31 NOTE — PROGRESS NOTES
Physical Therapy Med Surg Daily Treatment Note  [facility]  [facility]       NAME: Mart Harris  : 1945 (27 y.o.)  MRN: [id number]  CODE STATUS: Full Code    Date of Service: 2022    Patient Diagnosis(es): Osteoarthritis of right knee, unspecified osteoarthritis type [M17.11]  Status post total knee replacement, right [Z96.651]   No chief complaint on file.     Patient Active Problem List    Diagnosis Date Noted    Morbid obesity due to excess calories ([facility] [address] [address] 2017    Status post total knee replacement, right 2022    RBBB (right bundle branch block) 2022    Gastrointestinal hemorrhage     Anemia 2021    Adenomatous polyp of descending colon     Adenomatous polyp of ascending colon     Adenomatous polyp of colon     Chronic gastritis without bleeding     CKD (chronic kidney disease) stage 3, GFR 30-59 ml/min ([facility]) 2019    Asthma     Spinal stenosis of lumbar region with neurogenic claudication 2019    Degenerative spondylolisthesis 2019    Chronic bilateral low back pain with bilateral sciatica 10/30/2018    Primary osteoarthritis of both knees 10/30/2018    Coronary artery disease involving native coronary artery of native heart without angina pectoris 07/10/2018    History of ST elevation myocardial infarction (STEMI) 2017    Hypothyroid 12/10/2013    Essential hypertension 12/10/2013    Hyperlipidemia 12/10/2013        Past Medical History:   Diagnosis Date    Antral ulcer 2015    DR LANE    Asthma     \"cured by beestings\"    Coronary artery disease     Coronary artery disease involving native coronary artery of native heart without angina pectoris 7/10/2018    has x 4 cardiac stents / Dr. Velia Osuna    Diverticulosis of colon (without mention of hemorrhage) 2015    DR Meenakshi Barahona    Essential hypertension 12/10/2013    meds > 20 yrs    History of blood transfusion 1990s    with hysterectomy    History of normal resting EKG 1996    normal    History of ST elevation myocardial infarction (STEMI) 7/11/2017    History of transfusion of whole blood 1996    Excessive bleeding before hysterectomy    Hyperlipidemia     meds > 2 yrs    Hypertension     Hypothyroidism     past trx years ago then stopped / recent restart    Kidney disease     Low back pain     Morbid obesity due to excess calories ([address] 5/13/2017    Morbid obesity due to excess calories ([address] 6/6/2017    Osteoarthritis     Pneumonia     RBBB (right bundle branch block) 3/17/2022    Shoulder dislocation     ST elevation myocardial infarction involving left circumflex coronary artery ([address] 5/13/2017    Unspecified gastritis and gastroduodenitis without mention of hemorrhage 05/06/2015    DR Luis Beebe     Past Surgical History:   Procedure Laterality Date    CARDIAC SURGERY  2017    has x 4 cardiac stents    COLONOSCOPY  01/14/2015    DR uLis Beebe - DIVERTICULOSIS    COLONOSCOPY N/A 1/2/2021    COLONOSCOPY DIAGNOSTIC performed by Melburn Buerger, MD at [facility]  05/17/2017    x2 stents    CYST REMOVAL Left 2/7/2019    RESECTION OF LEFT PINNA LESION WITH GRAFT performed by Polly Negro MD at [address] [place] [address], COLON, DIAGNOSTIC      FINGER SURGERY  12/9/14    middle finger right hand due to infection    HYSTERECTOMY ([address])  1996    LASIK  [id number]    SHOULDER SURGERY  2008    shoulder dislocated - popped  back in Right shoulder    UPPER GASTROINTESTINAL ENDOSCOPY  01/14/2015    DR LANE - ULCER IN THE ANTRUM    UPPER GASTROINTESTINAL ENDOSCOPY  05/06/2015    DR Luis Beebe - GASTRITIS, PREVIOUS ULCER HEALED    UPPER GASTROINTESTINAL ENDOSCOPY N/A 1/2/2021    EGD DIAGNOSTIC ONLY performed by Melburn Buerger, MD at [facility]       Chart Reviewed: Yes  Family / Caregiver Present: No    Restrictions:  Restrictions/Precautions: Weight Bearing  Lower Extremity Weight Bearing Restrictions  Right Lower Extremity Weight Bearing: Weight Bearing As Tolerated  Required Braces or Orthoses  Right Lower Extremity Brace: Knee Immobilizer    SUBJECTIVE:   Subjective: \"It hurts right at the top of the knee. \"    Pain  Pain: 7/10 R knee post tx. OBJECTIVE:        Bed mobility  Supine to Sit: Stand by assistance (increased time and effort, pt reports she sleeps in a recliner at baseline.)  Sit to Supine:  (DNT pt into chair to promote OOB activity.)    Transfers  Sit to Stand: Contact guard assistance  Stand to sit: Contact guard assistance  Comment: increased time and effort to complete, vc's for hand placement and anterior weight shifting. Ambulation  WB Status: R LE WBAT  Ambulation  Surface: level tile  Device: Rolling Walker  Other Apparatus: Right;Knee Immobilizer  Assistance: Minimal assistance  Quality of Gait: step to pattern, antalgic. Distance: 13'  Comments: vc's for [address] management and safety awareness. Stairs/Curb  Stairs?: No (pt reports having ramped entrance, no stairs.)              PT Exercises  Exercise Treatment: written HEP for supine/seated therex, instructed on technique dosage and frequency of all exercises, pt verbalizes understanding. A/AROM Exercises: supine A/P QS/GS/Hip Abd/HS x 10 pt unable to SLR this session. Activity Tolerance  Activity Tolerance: Patient tolerated treatment well          ASSESSMENT   Assessment: pt able to progress to gait training this session, unable to SLR. pt has ramped entrance into home and sleeps in recliner.      Discharge Recommendations:  Continue to assess pending progress         Goals  Long Term Goals  Long term goal 1: Bed mobility with indep  Long term goal 2: Functional transfers with indep  Long term goal 3: Amb 50ft with 2ww and indep  Long term goal 4: Pt will negotiate 10 ft ramp with 2ww and SBA to enter/exit home  Long term goal 5: Pt will be indep with HEP to improve LE strength, ROM, and activity tolerance  Patient Goals   Patient goals : to go home    PLAN    Plan: 1 time a day 3-6 times a week  Safety Devices  Type of Devices: Call light within reach, Chair alarm in place, Left in chair, Nurse notified     [facility] (6 CLICK) [address] Mobility Raw Score : 17      Therapy Time   Individual   Time In 0846   Time Out 0915   Minutes 29      Gait:10  Therex: 13  BM/Trsf: BRENNEN Alba, 08/31/22 at 9:30 AM         Definitions for assistance levels  Independent = pt does not require any physical supervision or assistance from another person for activity completion. Device may be needed.   Stand by assistance = pt requires verbal cues or instructions from another person, close to but not touching, to perform the activity  Minimal assistance= pt performs 75% or more of the activity; assistance is required to complete the activity  Moderate assistance= pt performs 50% of the activity; assistance is required to complete the activity  Maximal assistance = pt performs 25% of the activity; assistance is required to complete the activity  Dependent = pt requires total physical assistance to accomplish the task

## 2022-08-31 NOTE — PROGRESS NOTES
Hospitalist Progress Note      PCP: Ga Cervantes MD    Date of Admission: 8/30/2022    Chief Complaint:  no acute events, afebrile, stable HD    Medications:  Reviewed    Infusion Medications    sodium chloride      sodium chloride       Scheduled Medications    sodium chloride flush  5-40 mL IntraVENous 2 times per day    acetaminophen  650 mg Oral Q6H    aspirin  81 mg Oral BID    atorvastatin  40 mg Oral Nightly    dilTIAZem  240 mg Oral Daily    metoprolol tartrate  25 mg Oral BID    oxybutynin  10 mg Oral Daily    [Held by provider] losartan-hydroCHLOROthiazide  2 tablet Oral Daily     PRN Meds: sodium chloride flush, sodium chloride, oxyCODONE **OR** oxyCODONE, morphine, ondansetron **OR** ondansetron, tiZANidine    No intake or output data in the 24 hours ending 08/31/22 1126    Exam:    BP (!) 152/70   Pulse 70   Temp 97.7 °F (36.5 °C) (Oral)   Resp 12   Ht 5' 1.5\" (1.562 m)   Wt 239 lb (108.4 kg)   LMP 09/17/1996   SpO2 97%   BMI 44.43 kg/m²     General appearance: appears stated age and cooperative. Respiratory:  clear to auscultation, bilaterally  Cardiovascular: Regular rate and rhythm, S1/S2. Abdomen: Soft, active bowel sounds. Musculoskeletal: No edema bilaterally. Labs:   Recent Labs     08/31/22  0548   WBC 4.9   HGB 9.6*   HCT 28.7*   *     Recent Labs     08/31/22  0548      K 5.3*   *   CO2 22   BUN 39*   CREATININE 1.72*   CALCIUM 8.7     No results for input(s): AST, ALT, BILIDIR, BILITOT, ALKPHOS in the last 72 hours. No results for input(s): INR in the last 72 hours. No results for input(s): Amina Fuchs in the last 72 hours.     Urinalysis:      Lab Results   Component Value Date/Time    NITRU POSITIVE 08/16/2022 12:35 PM    WBCUA >100 08/16/2022 12:46 PM    BACTERIA MANY 08/16/2022 12:46 PM    RBCUA 6-10 08/16/2022 12:46 PM    BLOODU TRACE 08/16/2022 12:35 PM    SPECGRAV 1.018 08/16/2022 12:35 PM    GLUCOSEU Negative 08/16/2022 12:35 PM Radiology:  XR KNEE RIGHT (1-2 VIEWS)   Final Result   Unremarkable alignment of the right knee prosthesis, postoperative changes. Assessment/Plan:    69 y.o. female with PMH of CAD s/p PCI to LAD and RCA (2017), HTN, STEMI, CKD3,GIB, and asthma presented with:    R knee DJD s/p R TKA  - management per primary team    Acute blood loss anemia  - likely due to perioperative blood loss  - management per primary team     HTN  - continue Cardizem, metoprolo with holding parameters  - hold diuretic regimen due to JP     JP /CKD3  - continue IVFs  - hold diuretics  - monitor renal function    Hyperkalemia   - mild, monitor    Thrombocytopenia   - mild, monitor     CAD  - s/p PCI to LAD and RCA (2017)  - continue statin     Diet: ADULT DIET;  Regular    Code Status: Full Code              Electronically signed by Levi Barrera MD on 8/31/2022 at 11:26 AM

## 2022-08-31 NOTE — PLAN OF CARE
Problem: Discharge Planning  Goal: Discharge to home or other facility with appropriate resources  Outcome: Progressing  Flowsheets  Taken 8/30/2022 1424 by Delphine Paredes RN  Discharge to home or other facility with appropriate resources:   Identify barriers to discharge with patient and caregiver   Arrange for needed discharge resources and transportation as appropriate  Taken 8/30/2022 1155 by Delphine Paredes RN  Discharge to home or other facility with appropriate resources: Identify barriers to discharge with patient and caregiver     Problem: ABCDS Injury Assessment  Goal: Absence of physical injury  Outcome: Progressing     Problem: Safety - Adult  Goal: Free from fall injury  Outcome: Progressing     Problem: Physical Therapy - Adult  Goal: By Discharge: Performs mobility at highest level of function for planned discharge setting. See evaluation for individualized goals. 8/30/2022 1445 by Shane Billy PT  Outcome: Progressing     Problem: Occupational Therapy - Adult  Goal: By Discharge: Performs self-care activities at highest level of function for planned discharge setting. See evaluation for individualized goals.   8/30/2022 1506 by Kemi Werner OTR/L  Outcome: Progressing

## 2022-09-29 RX ORDER — ATORVASTATIN CALCIUM 80 MG/1
TABLET, FILM COATED ORAL
Qty: 90 TABLET | Refills: 3 | Status: SHIPPED | OUTPATIENT
Start: 2022-09-29

## 2022-10-19 ENCOUNTER — HOSPITAL ENCOUNTER (OUTPATIENT)
Dept: LAB | Age: 77
Discharge: HOME | End: 2022-10-19

## 2022-10-19 LAB
ALBUMIN SERPL-MCNC: 4.2 G/DL (ref 3.5–4.6)
ANION GAP SERPL CALCULATED.3IONS-SCNC: 15 MEQ/L (ref 9–15)
BUN BLDV-MCNC: 31 MG/DL (ref 8–23)
CALCIUM SERPL-MCNC: 9.4 MG/DL (ref 8.5–9.9)
CHLORIDE BLD-SCNC: 104 MEQ/L (ref 95–107)
CO2: 22 MEQ/L (ref 20–31)
CREAT SERPL-MCNC: 1.73 MG/DL (ref 0.5–0.9)
GFR SERPL CREATININE-BSD FRML MDRD: 30.2 ML/MIN/{1.73_M2}
GLUCOSE BLD-MCNC: 107 MG/DL (ref 70–99)
PHOSPHORUS: 3.6 MG/DL (ref 2.3–4.8)
POTASSIUM SERPL-SCNC: 4.3 MEQ/L (ref 3.4–4.9)
SODIUM BLD-SCNC: 141 MEQ/L (ref 135–144)

## 2022-10-19 PROCEDURE — 83970 ASSAY OF PARATHORMONE: CPT

## 2022-10-19 PROCEDURE — 80069 RENAL FUNCTION PANEL: CPT

## 2022-10-19 PROCEDURE — 82306 VITAMIN D 25 HYDROXY: CPT

## 2022-10-19 PROCEDURE — 36415 COLL VENOUS BLD VENIPUNCTURE: CPT

## 2022-10-20 LAB — VITAMIN D 25-HYDROXY: 40.3 NG/ML

## 2022-10-22 LAB
COMMENT: NORMAL
MISCELLANEOUS LAB TEST RESULT: NORMAL

## 2022-11-08 DIAGNOSIS — I10 ESSENTIAL HYPERTENSION: ICD-10-CM

## 2022-11-08 NOTE — TELEPHONE ENCOUNTER
Requesting medication refill. Please approve or deny this request.    Rx requested:  Requested Prescriptions     Pending Prescriptions Disp Refills    metoprolol tartrate (LOPRESSOR) 25 MG tablet [Pharmacy Med Name: metoprolol tartrate 25 mg tablet] 180 tablet 3     Sig: TAKE 1 TABLET BY MOUTH TWICE DAILY         Last Office Visit:   8/26/2022      Next Visit Date:  Future Appointments   Date Time Provider Alvarez Solo   11/15/2022 12:00 PM [facility] ECHO [facility] [address]   2/28/2023 11:00 AM Jordan Boland, DO [facility]               Last refill 8/5/2022. Please approve or deny.

## 2022-11-15 ENCOUNTER — HOSPITAL ENCOUNTER (OUTPATIENT)
Dept: NON INVASIVE DIAGNOSTICS | Age: 77
Discharge: HOME | End: 2022-11-15
Payer: MEDICARE

## 2022-11-15 DIAGNOSIS — R01.1 CARDIAC MURMUR: ICD-10-CM

## 2022-11-15 LAB
LV EF: 58 %
LVEF MODALITY: NORMAL

## 2022-11-15 PROCEDURE — 93306 TTE W/DOPPLER COMPLETE: CPT

## 2022-12-07 ENCOUNTER — HOSPITAL ENCOUNTER (OUTPATIENT)
Dept: LAB | Age: 77
Discharge: HOME | End: 2022-12-07
Payer: MEDICARE

## 2022-12-07 LAB
ALBUMIN SERPL-MCNC: 4.3 G/DL (ref 3.5–4.6)
ANION GAP SERPL CALCULATED.3IONS-SCNC: 9 MEQ/L (ref 9–15)
BUN BLDV-MCNC: 33 MG/DL (ref 8–23)
CALCIUM SERPL-MCNC: 10 MG/DL (ref 8.5–9.9)
CHLORIDE BLD-SCNC: 109 MEQ/L (ref 95–107)
CO2: 24 MEQ/L (ref 20–31)
CREAT SERPL-MCNC: 1.18 MG/DL (ref 0.5–0.9)
GFR SERPL CREATININE-BSD FRML MDRD: 47.7 ML/MIN/{1.73_M2}
GLUCOSE BLD-MCNC: 110 MG/DL (ref 70–99)
PHOSPHORUS: 4.3 MG/DL (ref 2.3–4.8)
POTASSIUM SERPL-SCNC: 5.1 MEQ/L (ref 3.4–4.9)
SODIUM BLD-SCNC: 142 MEQ/L (ref 135–144)

## 2022-12-07 PROCEDURE — 80069 RENAL FUNCTION PANEL: CPT

## 2022-12-07 PROCEDURE — 36415 COLL VENOUS BLD VENIPUNCTURE: CPT

## 2022-12-09 ENCOUNTER — HOSPITAL ENCOUNTER (OUTPATIENT)
Dept: MRI IMAGING | Age: 77
End: 2022-12-09
Payer: MEDICARE

## 2022-12-09 DIAGNOSIS — M75.40 IMPINGEMENT SYNDROME OF SHOULDER REGION, UNSPECIFIED LATERALITY: ICD-10-CM

## 2022-12-09 PROCEDURE — 73221 MRI JOINT UPR EXTREM W/O DYE: CPT

## 2023-01-11 DIAGNOSIS — R39.15 URINARY URGENCY: ICD-10-CM

## 2023-01-11 RX ORDER — OXYBUTYNIN CHLORIDE 10 MG/1
10 TABLET, EXTENDED RELEASE ORAL DAILY
Qty: 30 TABLET | Refills: 5 | Status: SHIPPED | OUTPATIENT
Start: 2023-01-11

## 2023-01-11 NOTE — TELEPHONE ENCOUNTER
Comments:     Last Office Visit (last PCP visit):   8/22/2022    Next Visit Date:  Future Appointments   Date Time Provider Alvarez Solo   2/28/2023 11:00 AM Jordan Harris, DO [facility] [place]       **If hasn't been seen in over a year OR hasn't followed up according to last diabetes/ADHD visit, make appointment for patient before sending refill to provider.     Rx requested:  Requested Prescriptions     Pending Prescriptions Disp Refills    oxybutynin (DITROPAN-XL) 10 MG extended release tablet [Pharmacy Med Name: oxybutynin chloride ER 10 mg tablet,extended release 24 hr] 30 tablet 11     Sig: Take 1 tablet by mouth daily

## 2023-02-14 ENCOUNTER — OFFICE VISIT (OUTPATIENT)
Dept: CARDIOLOGY CLINIC | Age: 78
End: 2023-02-14
Payer: MEDICARE

## 2023-02-14 VITALS
HEART RATE: 50 BPM | SYSTOLIC BLOOD PRESSURE: 130 MMHG | WEIGHT: 221.6 LBS | DIASTOLIC BLOOD PRESSURE: 80 MMHG | BODY MASS INDEX: 41.19 KG/M2

## 2023-02-14 DIAGNOSIS — N18.30 STAGE 3 CHRONIC KIDNEY DISEASE, UNSPECIFIED WHETHER STAGE 3A OR 3B CKD (HCC): ICD-10-CM

## 2023-02-14 DIAGNOSIS — Z01.818 PREOPERATIVE CLEARANCE: Primary | ICD-10-CM

## 2023-02-14 DIAGNOSIS — E66.01 MORBID OBESITY DUE TO EXCESS CALORIES (HCC): ICD-10-CM

## 2023-02-14 DIAGNOSIS — I25.10 CORONARY ARTERY DISEASE INVOLVING NATIVE CORONARY ARTERY OF NATIVE HEART WITHOUT ANGINA PECTORIS: ICD-10-CM

## 2023-02-14 DIAGNOSIS — I10 ESSENTIAL HYPERTENSION: ICD-10-CM

## 2023-02-14 DIAGNOSIS — I25.2 HISTORY OF ST ELEVATION MYOCARDIAL INFARCTION (STEMI): ICD-10-CM

## 2023-02-14 DIAGNOSIS — I45.10 RBBB (RIGHT BUNDLE BRANCH BLOCK): ICD-10-CM

## 2023-02-14 DIAGNOSIS — I65.23 BILATERAL CAROTID ARTERY STENOSIS: ICD-10-CM

## 2023-02-14 PROCEDURE — 1123F ACP DISCUSS/DSCN MKR DOCD: CPT | Performed by: INTERNAL MEDICINE

## 2023-02-14 PROCEDURE — 93000 ELECTROCARDIOGRAM COMPLETE: CPT | Performed by: INTERNAL MEDICINE

## 2023-02-14 PROCEDURE — 3075F SYST BP GE 130 - 139MM HG: CPT | Performed by: INTERNAL MEDICINE

## 2023-02-14 PROCEDURE — 3079F DIAST BP 80-89 MM HG: CPT | Performed by: INTERNAL MEDICINE

## 2023-02-14 PROCEDURE — 99214 OFFICE O/P EST MOD 30 MIN: CPT | Performed by: INTERNAL MEDICINE

## 2023-02-14 NOTE — PROGRESS NOTES
Chief Complaint   Patient presents with    Cardiac Clearance     LEFT KNEE 3/7/23     CC: SOB, CAD    5-13-17: Hospital Course:   Marnie Williamson is a 80 y.o. female admitted to [facility] on 5/13/2017 for chest pain. After an EKG was done at [facility] was found to be having an acute MI patient was flown here by helicopter taken directly to cath lab to drug eluted stents were put in place ALP INE 3.5 mm x 23 mm and a 2.5 mm x 8 mm . Tolerated procedure well denied chest pain denied right wrist pain no distress     6-6-17: Patient presents for initial medical evaluation. Patient is followed on a regular basis by Dr. Maki Nava MD.   Pt denies chest pain, dyspnea, dyspnea on exertion, change in exercise capacity, fatigue,  nausea, vomiting, diarrhea, constipation, motor weakness, insomnia, weight loss, syncope, dizziness, lightheadedness, palpitations, PND, orthopnea, or claudication. No nitro use. BP and hr are good. CAD is stable. No LE discoloration or ulcers. No LE edema. No CHF type symptoms. Lipid profile is abnormal. Was placed on statin during hosp. No bleeding issues. S/p LHC with mild LAD diffuse disease, 99% mid CX, 80-90%90% proximal to mid RCA. S/p PCI to CX with 2 KEYSHA. Planned staging of RCA due to STEMI procedure. s/p ECHO. Conclusions   Summary   Normal mitral valve structure and function. Normal aortic valve structure and function. Normal tricuspid valve structure and function. There is mild ( 1 +) tricuspid regurgitation with estimated RVSP of 38 mm   Hg. Normal pulmonic valve structure and function. Mildly dilated left atrium. Left ventricular ejection fraction is visually estimated at 65%. Impaired relaxation compatible with diastolic dysfunction. ( reversed E/A   ratio)   Mild concentric left ventricular hypertrophy. Normal right atrium. Normal right ventricle structure and function. No evidence of pericardial effusion.    No evidence of pleural effusion. 7-11-17: s/p PCI of RCA at [facility]. Pt denies chest pain, dyspnea, dyspnea on exertion, change in exercise capacity, fatigue,  nausea, vomiting, diarrhea, constipation, motor weakness, insomnia, weight loss, syncope, dizziness, lightheadedness, palpitations, PND, orthopnea, or claudication. No nitro use. BP and hr are good. CAD is stable. No LE discoloration or ulcers. No LE edema. No CHF type symptoms. Lipid profile is normal.     1-9-18: Pt denies chest pain, dyspnea, dyspnea on exertion, change in exercise capacity, fatigue,  nausea, vomiting, diarrhea, constipation, motor weakness, insomnia, weight loss, syncope, dizziness, lightheadedness, palpitations, PND, orthopnea, or claudication. No nitro use. BP and hr are good. CAD is stable. No LE discoloration or ulcers. No LE edema. No CHF type symptoms. Lipid profile is normal.  No bleeding issues on DAPT. Compliant with meds. 7-10-18: doing well overall. On DAPT and no bleeding issues. Pt denies chest pain, dyspnea, dyspnea on exertion, change in exercise capacity, fatigue,  nausea, vomiting, diarrhea, constipation, motor weakness, insomnia, weight loss, syncope, dizziness, lightheadedness, palpitations, PND, orthopnea, or claudication. No nitro use. BP and hr are good. CAD is stable. No LE discoloration or ulcers. No LE edema. No CHF type symptoms. Lipid profile is normal. No recent hospitalization. No change in meds. ekg WITH NSR, RBBB. SHE NEEDS TO HAVE BACK WORK DONE. Did not tolerate full dose lipitor 80mg daily, caused her joint pain. 1-15-19: Pt denies chest pain, dyspnea, dyspnea on exertion, change in exercise capacity, fatigue,  nausea, vomiting, diarrhea, constipation, motor weakness, insomnia, weight loss, syncope, dizziness, lightheadedness, palpitations, PND, orthopnea, or claudication. No nitro use. BP and hr are good. CAD is stable. No LE discoloration or ulcers. No LE edema. No CHF type symptoms.  Lipid profile is normal. No recent hospitalization. No change in meds. Has CKD, stage III      9-17-19: Pt denies chest pain, dyspnea, dyspnea on exertion, change in exercise capacity, fatigue,  nausea, vomiting, diarrhea, constipation, motor weakness, insomnia, weight loss, syncope, dizziness, lightheadedness, palpitations, PND, orthopnea, or claudication. No nitro use. BP and hr are good. CAD is stable. No LE discoloration or ulcers. No LE edema. No CHF type symptoms. Lipid profile is normal. No recent hospitalization. No change in meds. Hx of STEMI/CAD s/p PCI. On ASA only. Has CKD, stage III. 3-10-20: Pt denies chest pain, dyspnea, dyspnea on exertion, change in exercise capacity, fatigue,  nausea, vomiting, diarrhea, constipation, motor weakness, insomnia, weight loss, syncope, dizziness, lightheadedness, palpitations, PND, orthopnea. No nitro use. BP and hr are good. CAD is stable. No LE discoloration or ulcers. No LE edema. No CHF type symptoms. Lipid profile is normal. No recent hospitalization. No change in meds. On ASA. No bleeding issues. Has OA issues. C/o carmps and aches in her legs after going grocery shopping, has a lot of aches in her calves and feet. Hx of STEMI/CAD s/p PCI. Has stage III CKD. LDL is 91.       9-15-10: Pt denies chest pain, dyspnea, dyspnea on exertion, change in exercise capacity, fatigue,  nausea, vomiting, diarrhea, constipation, motor weakness, insomnia, weight loss, syncope, dizziness, lightheadedness, palpitations, PND, orthopnea, or claudication. No nitro use. BP and hr are good. CAD is stable. No LE discoloration or ulcers. No LE edema. No CHF type symptoms. Lipid profile is normal. No recent hospitalization. No change in meds. S/p b/l LE art duplex US with no stenosis/occlusion. Hx of CAD s/p PCI. Hx of stage III CKD. EKG with NSR, RBBB. 2-4-21: Post hospitalization for acute worsening anemia and GI bleed, antiplatelets were placed on hold.   Status post endoscopy without identifying source of bleeding. Patient to undergo camera pill endoscopy soon. States she developed shortness of breath with exertion as well as midsternal chest discomfort that completely resolved with rest.  Patient did develop chest pain during hospitalization as well as elevated cardiac enzymes. History of coronary disease status post PCI of the LAD and RCA in 2017. Patient does have a history of gastric ulcer. Hydrochlorothiazide and lisinopril were discontinued as well. Status post echocardiogram on December 22, 2020 with normal LV function, 2+ mitral vegetation, grade 1 diastolic dysfunction. 6-22-21: Hgb is 11 now. S/p hospitalization post last office visit for anemia. ASA was DC'd. History of coronary disease status post PCI of the LAD and RCA in 2017. Pt denies chest pain, dyspnea, dyspnea on exertion, change in exercise capacity, fatigue,  nausea, vomiting, diarrhea, constipation, motor weakness, insomnia, weight loss, syncope, dizziness, lightheadedness, palpitations, PND, orthopnea, or claudication. S/p camera endoscopy which was negative. No nitro use. BP and hr are good. CAD is stable. No LE discoloration or ulcers. No LE edema. No CHF type symptoms. Lipid profile is normal. No recent hospitalization. No change in meds. Has stage III CKD, GFR of 42.       3-17-22: No bleeding issues. History of anemia. History of cancer endoscopy which was negative. Patient with stage III chronic kidney disease. No angina or CHF type symptoms pt denies chest pain, dyspnea, dyspnea on exertion, change in exercise capacity, fatigue,  nausea, vomiting, diarrhea, constipation, motor weakness, insomnia, weight loss, syncope, dizziness, lightheadedness, palpitations, PND, orthopnea, or claudication. History of coronary disease status post PCI of the LAD and RCA in 2017. EKG with normal sinus rhythm, right bundle branch block. 8-26-22: needs preoperative clearance for knee replacement, right.   Patient with history of coronary disease status post PCI to LAD and RCA in 2017. She is doing well overall. No angina or heart failure type symptoms. EKG with normal sinus rhythm right bundle branch block    2/14/2023: Patient needs preoperative clearance for left knee replacement now. She had no issues with her right knee replacement. Does have history of coronary disease status post PCI of the LAD and RCA in 2017. She is doing well overall. Denies any angina or heart failure type symptoms. Blood pressure is well controlled  Status post echocardiogram in November 2022 with ejection fraction of 55 to 60%, mild AS, mild AI, mild mitral regurgitation.   EKG with normal sinus, rbbb        Patient Active Problem List   Diagnosis    Hypothyroid    Essential hypertension    Hyperlipidemia    Morbid obesity due to excess calories ([facility])    History of ST elevation myocardial infarction (STEMI)    Coronary artery disease involving native coronary artery of native heart without angina pectoris    Chronic bilateral low back pain with bilateral sciatica    Primary osteoarthritis of both knees    Spinal stenosis of lumbar region with neurogenic claudication    Degenerative spondylolisthesis    Asthma    CKD (chronic kidney disease) stage 3, GFR 30-59 ml/min ([facility])    Adenomatous polyp of descending colon    Adenomatous polyp of ascending colon    Adenomatous polyp of colon    Chronic gastritis without bleeding    Anemia    Gastrointestinal hemorrhage    RBBB (right bundle branch block)    Status post total knee replacement, right       Past Surgical History:   Procedure Laterality Date    CARDIAC SURGERY  2017    has x 4 cardiac stents    COLONOSCOPY  01/14/2015    DR Hawa Nolen - DIVERTICULOSIS    COLONOSCOPY N/A 1/2/2021    COLONOSCOPY DIAGNOSTIC performed by Rukhsana Torres MD at [facility]  05/17/2017    x2 stents    CYST REMOVAL Left 2/7/2019    RESECTION OF LEFT PINNA LESION WITH GRAFT performed by Julianne Ferrera MD at [address], COLON, DIAGNOSTIC      FINGER SURGERY  12/9/14    middle finger right hand due to infection    HYSTERECTOMY ([facility])  Eran Javed  [id number]    SHOULDER SURGERY  2008    shoulder dislocated - popped  back in Right shoulder    TOTAL KNEE ARTHROPLASTY Right 8/30/2022    RIGHT KNEE TOTAL KNEE ARTHROPLASTY performed by Tonie Ridley MD at [facility]  01/14/2015    DR Sariah Donovan - ULCER IN THE ANTRUM    UPPER GASTROINTESTINAL ENDOSCOPY  05/06/2015    DR Sariah Donovan - GASTRITIS, PREVIOUS ULCER HEALED    UPPER GASTROINTESTINAL ENDOSCOPY N/A 1/2/2021    EGD DIAGNOSTIC ONLY performed by Rukhsana Torres MD at [address] [facility] [address] History     Socioeconomic History    Marital status:    Tobacco Use    Smoking status: Never    Smokeless tobacco: Never   Vaping Use    Vaping Use: Never used   Substance and Sexual Activity    Alcohol use: Yes     Comment: glass of wine once a year    Drug use: No    Sexual activity: Yes     Partners: Male       Family History   Problem Relation Age of Onset    Heart Disease Mother 61    Cancer Father 79        kidney    No Known Problems Daughter        Current Outpatient Medications   Medication Sig Dispense Refill    oxybutynin (DITROPAN-XL) 10 MG extended release tablet Take 1 tablet by mouth daily 30 tablet 5    metoprolol tartrate (LOPRESSOR) 25 MG tablet TAKE 1 TABLET BY MOUTH TWICE DAILY 180 tablet 3    atorvastatin (LIPITOR) 80 MG tablet TAKE 1/2 (ONE-HALF) OF A TABLET BY MOUTH NIGHTLY 90 tablet 3    losartan-hydroCHLOROthiazide (HYZAAR) 100-25 MG per tablet TAKE 1 TABLET BY MOUTH EVERY DAY 90 tablet 3    dilTIAZem (CARDIZEM CD) 240 MG extended release capsule TAKE 1 CAPSULE BY MOUTH DAILY 90 capsule 3    FEROSUL 325 (65 Fe) MG tablet Take 1 tablet by mouth 2 times daily 60 tablet 5    nitroGLYCERIN (NITROSTAT) 0.4 MG SL tablet Place 1 tablet under the tongue every 5 minutes as needed for Chest pain 25 tablet 3    aspirin 81 MG EC tablet Take 1 tablet by mouth 2 times daily 60 tablet 0     No current facility-administered medications for this visit. Lisinopril and Tramadol    Review of Systems:  General ROS: negative  Psychological ROS: negative  Hematological and Lymphatic ROS: No history of blood clots or bleeding disorder. Respiratory ROS: no cough, shortness of breath, or wheezing  Cardiovascular ROS: no chest pain or dyspnea on exertion  Gastrointestinal ROS: no abdominal pain, change in bowel habits, or black or bloody stools  Genito-Urinary ROS: no dysuria, trouble voiding, or hematuria  Musculoskeletal ROS: negative  Neurological ROS: no TIA or stroke symptoms  Dermatological ROS: negative    VITALS:  Blood pressure 130/80, pulse 50, weight 221 lb 9.6 oz (100.5 kg), last menstrual period 09/17/1996, not currently breastfeeding. Body mass index is 41.19 kg/m². Physical Examination:  General appearance - alert, well appearing, and in no distress  Mental status - alert, oriented to person, place, and time  Neck - Neck is supple, no JVD. B/l carotid bruits. No thyromegaly or adenopathy. Chest - clear to auscultation, no wheezes, rales or rhonchi, symmetric air entry  Heart - normal rate, regular rhythm, normal S1, S2, no murmurs, rubs, clicks or gallops  Abdomen - soft, nontender, nondistended, no masses or organomegaly  Neurological - alert, oriented, normal speech, no focal findings or movement disorder noted  Extremities - peripheral pulses normal, no pedal edema, no clubbing or cyanosis  Skin - normal coloration and turgor, no rashes, no suspicious skin lesions noted      Orders Placed This Encounter   Procedures    US CAROTID ARTERY BILATERAL    EKG 12 Lead       ASSESSMENT:     Diagnosis Orders   1. Preoperative clearance  EKG 12 Lead      2. Bilateral carotid artery stenosis  US CAROTID ARTERY BILATERAL      3.  Coronary artery disease involving native coronary artery of native heart without angina pectoris        4. Essential hypertension        5. History of ST elevation myocardial infarction (STEMI)        6. Morbid obesity due to excess calories ([address]        7. RBBB (right bundle branch block)        8. Stage 3 chronic kidney disease, unspecified whether stage 3a or 3b CKD ([facility]          Preoperative    PLAN:     Patient will need to continue to follow up with you for their general medical care     As always, aggressive risk factor modification is strongly recommended. We should adhere to the [address] guidelines for HTN management and the NCEP ATP III guidelines for LDL-C management. Cardiac diet is always recommended with low fat, cholesterol, calories and sodium. Continue medications at current doses. Patient is an intermediate risk patient for the proposed procedure/surgery. No active cardiac conditions such as ischemia, CHF or arrhythmias. Recomment peripoperative BBlocker, GI/DVT prophylaxis. NO ASA due to GIB/anemia. Avoid nephrotoxic agents    Follow up with PCP for labs    Check EKG today. Check echo in future for mild AS, mild aortic regurgitation. Check CUS given b/l Carotid bruits, left >>right. ? Related to AS    The proper use and anticipated side effects of nitroglycerine has been carefully explained. If a single episode of chest pain is not relieved by one tablet, the patient will try another within 5 minutes; and if this doesn't relieve the pain, the patient is instructed to call 911 for transportation to an emergency department. Patient was advised and encouraged to check blood pressure at home or at a pharmacy, maintain a logbook, and also call us back if blood pressure are above the target ranges or if it is low. Patient clearly understands and agrees to the instructions. We will need to continue to monitor muscle and liver enzymes, BUN, CR, and electrolytes.     Thank you for allowing me to participate in the care of your patient, please don't hesitate to contact me if you have any further questions.

## 2023-02-16 ENCOUNTER — OFFICE VISIT (OUTPATIENT)
Dept: FAMILY MEDICINE CLINIC | Age: 78
End: 2023-02-16

## 2023-02-16 VITALS
DIASTOLIC BLOOD PRESSURE: 78 MMHG | HEART RATE: 52 BPM | HEIGHT: 62 IN | SYSTOLIC BLOOD PRESSURE: 128 MMHG | WEIGHT: 221 LBS | OXYGEN SATURATION: 96 % | BODY MASS INDEX: 40.67 KG/M2

## 2023-02-16 DIAGNOSIS — Z01.818 PREOP EXAMINATION: Primary | ICD-10-CM

## 2023-02-16 SDOH — ECONOMIC STABILITY: HOUSING INSECURITY
IN THE LAST 12 MONTHS, WAS THERE A TIME WHEN YOU DID NOT HAVE A STEADY PLACE TO SLEEP OR SLEPT IN A SHELTER (INCLUDING NOW)?: NO

## 2023-02-16 SDOH — ECONOMIC STABILITY: FOOD INSECURITY: WITHIN THE PAST 12 MONTHS, YOU WORRIED THAT YOUR FOOD WOULD RUN OUT BEFORE YOU GOT MONEY TO BUY MORE.: NEVER TRUE

## 2023-02-16 SDOH — ECONOMIC STABILITY: INCOME INSECURITY: HOW HARD IS IT FOR YOU TO PAY FOR THE VERY BASICS LIKE FOOD, HOUSING, MEDICAL CARE, AND HEATING?: NOT HARD AT ALL

## 2023-02-16 SDOH — ECONOMIC STABILITY: FOOD INSECURITY: WITHIN THE PAST 12 MONTHS, THE FOOD YOU BOUGHT JUST DIDN'T LAST AND YOU DIDN'T HAVE MONEY TO GET MORE.: NEVER TRUE

## 2023-02-16 ASSESSMENT — ENCOUNTER SYMPTOMS
RHINORRHEA: 0
WHEEZING: 0
ABDOMINAL PAIN: 0
COUGH: 0
CONSTIPATION: 0
DIARRHEA: 0
SHORTNESS OF BREATH: 0
SORE THROAT: 0

## 2023-02-16 ASSESSMENT — PATIENT HEALTH QUESTIONNAIRE - PHQ9
2. FEELING DOWN, DEPRESSED OR HOPELESS: 0
1. LITTLE INTEREST OR PLEASURE IN DOING THINGS: 0
SUM OF ALL RESPONSES TO PHQ QUESTIONS 1-9: 0
SUM OF ALL RESPONSES TO PHQ9 QUESTIONS 1 & 2: 0
SUM OF ALL RESPONSES TO PHQ QUESTIONS 1-9: 0

## 2023-02-16 NOTE — PROGRESS NOTES
[facility] PRIMARY CARE  [address] [place] [id number]  Dept: [phone]  Dept Fax: [phone]  Loc: [phone]     Chief Complaint  Chief Complaint   Patient presents with    Pre-op Exam     Procedure- Total left knee replacement, 3/7/2023. PAT [id number] 2/28/2023, EKG done by Dr. Kristopher Eng. HPI:  69 y. o.female who presents for the following:      Preop exam: planning L TKR 3/7/23 with Dr. Dimple Campbell; Seeing cardiology for hx of CAD/MI and clearance already placed    Review of Systems   Constitutional:  Negative for chills and fever. HENT:  Negative for congestion, rhinorrhea and sore throat. Respiratory:  Negative for cough, shortness of breath and wheezing. Gastrointestinal:  Negative for abdominal pain, constipation and diarrhea. Endocrine: Negative for polydipsia and polyuria. Genitourinary:  Negative for dysuria, frequency and urgency. Musculoskeletal:  Positive for arthralgias. Neurological:  Negative for syncope, light-headedness, numbness and headaches. Psychiatric/Behavioral:  Negative for sleep disturbance. The patient is not nervous/anxious.       Past Medical History:   Diagnosis Date    Antral ulcer 01/14/2015    DR Karena Banda    Asthma     \"cured by beestings\"    Coronary artery disease     Coronary artery disease involving native coronary artery of native heart without angina pectoris 7/10/2018    has x 4 cardiac stents / Dr. Kristopher Eng    Diverticulosis of colon (without mention of hemorrhage) 01/14/2015    DR Karena Banda    Essential hypertension 12/10/2013    meds > 20 yrs    History of blood transfusion 1990s    with hysterectomy    History of normal resting EKG 1996    normal    History of ST elevation myocardial infarction (STEMI) 7/11/2017    History of transfusion of whole blood 1996    Excessive bleeding before hysterectomy    Hyperlipidemia     meds > 2 yrs    Hypertension     Hypothyroidism     past trx years ago then stopped / recent restart    Kidney disease     Low back pain     Morbid obesity due to excess calories ([address] 5/13/2017    Morbid obesity due to excess calories ([address] 6/6/2017    Osteoarthritis     Pneumonia     RBBB (right bundle branch block) 3/17/2022    Shoulder dislocation     ST elevation myocardial infarction involving left circumflex coronary artery ([address] 5/13/2017    Unspecified gastritis and gastroduodenitis without mention of hemorrhage 05/06/2015    DR Tawnya Johnson     Past Surgical History:   Procedure Laterality Date    CARDIAC SURGERY  2017    has x 4 cardiac stents    COLONOSCOPY  01/14/2015    DR Tawnya Johnson - DIVERTICULOSIS    COLONOSCOPY N/A 1/2/2021    COLONOSCOPY DIAGNOSTIC performed by Lisa Quiroz MD at [facility]  05/17/2017    x2 stents    CYST REMOVAL Left 2/7/2019    RESECTION OF LEFT PINNA LESION WITH GRAFT performed by Crow Palma MD at [address] [place] [address], COLON, DIAGNOSTIC      FINGER SURGERY  12/9/14    middle finger right hand due to infection    HYSTERECTOMY ([address] [place] [address])  1996    LASIK  [id number]    SHOULDER SURGERY  2008    shoulder dislocated - popped  back in Right shoulder    TOTAL KNEE ARTHROPLASTY Right 8/30/2022    RIGHT KNEE TOTAL KNEE ARTHROPLASTY performed by Weston Forte MD at [address] [place] [address]  01/14/2015    DR Tawnya Johnson - ULCER IN THE ANTRUM    UPPER GASTROINTESTINAL ENDOSCOPY  05/06/2015    DR Tawnya Johnson - GASTRITIS, PREVIOUS ULCER HEALED    UPPER GASTROINTESTINAL ENDOSCOPY N/A 1/2/2021    EGD DIAGNOSTIC ONLY performed by Lisa Quiroz MD at [address] [place] [address] History     Socioeconomic History    Marital status:      Spouse name: Not on file    Number of children: Not on file    Years of education: Not on file    Highest education level: Not on file   Occupational History    Not on file   Tobacco Use    Smoking status: Never    Smokeless tobacco: Never   Vaping Use Vaping Use: Never used   Substance and Sexual Activity    Alcohol use: Yes     Comment: glass of wine once a year    Drug use: No    Sexual activity: Yes     Partners: Male   Other Topics Concern    Not on file   Social History Narrative    Not on file     Social Determinants of Health     Financial Resource Strain: Low Risk     Difficulty of Paying Living Expenses: Not hard at all   Food Insecurity: No Food Insecurity    Worried About [address] in the Last Year: Never true    [address] in the Last Year: Never true   Transportation Needs: Unknown    Lack of Transportation (Medical): Not on file    Lack of Transportation (Non-Medical):  No   Physical Activity: Not on file   Stress: Not on file   Social Connections: Not on file   Intimate Partner Violence: Not on file   Housing Stability: Unknown    Unable to Pay for Housing in the Last Year: Not on file    Number of Places Lived in the Last Year: Not on file    Unstable Housing in the Last Year: No     Family History   Problem Relation Age of Onset    Heart Disease Mother 61    Cancer Father 79        kidney    No Known Problems Daughter       Allergies   Allergen Reactions    Lisinopril Nausea Only and Other (See Comments)     cough    Tramadol Nausea Only     Current Outpatient Medications   Medication Sig Dispense Refill    oxybutynin (DITROPAN-XL) 10 MG extended release tablet Take 1 tablet by mouth daily 30 tablet 5    metoprolol tartrate (LOPRESSOR) 25 MG tablet TAKE 1 TABLET BY MOUTH TWICE DAILY 180 tablet 3    atorvastatin (LIPITOR) 80 MG tablet TAKE 1/2 (ONE-HALF) OF A TABLET BY MOUTH NIGHTLY 90 tablet 3    aspirin 81 MG EC tablet Take 1 tablet by mouth 2 times daily 60 tablet 0    losartan-hydroCHLOROthiazide (HYZAAR) 100-25 MG per tablet TAKE 1 TABLET BY MOUTH EVERY DAY 90 tablet 3    dilTIAZem (CARDIZEM CD) 240 MG extended release capsule TAKE 1 CAPSULE BY MOUTH DAILY 90 capsule 3    FEROSUL 325 (65 Fe) MG tablet Take 1 tablet by mouth 2 times daily 60 tablet 5    nitroGLYCERIN (NITROSTAT) 0.4 MG SL tablet Place 1 tablet under the tongue every 5 minutes as needed for Chest pain 25 tablet 3     No current facility-administered medications for this visit. Vitals:    02/16/23 0942   BP: 128/78   Site: Right Upper Arm   Position: Sitting   Cuff Size: Large Adult   Pulse: 52   SpO2: 96%   Weight: 221 lb (100.2 kg)   Height: 5' 1.5\" (1.562 m)       Physical exam:  Physical Exam  Vitals reviewed. Constitutional:       General: She is not in acute distress. Appearance: She is well-developed. HENT:      Head: Normocephalic and atraumatic. Mouth/Throat:      Pharynx: No oropharyngeal exudate. Neck:      Thyroid: No thyromegaly. Cardiovascular:      Rate and Rhythm: Normal rate and regular rhythm. Heart sounds: Normal heart sounds. No murmur heard. Pulmonary:      Effort: Pulmonary effort is normal. No respiratory distress. Breath sounds: Normal breath sounds. No wheezing. Abdominal:      General: There is no distension. Palpations: Abdomen is soft. Tenderness: There is no abdominal tenderness. There is no guarding or rebound. Musculoskeletal:      Cervical back: Normal range of motion. Lymphadenopathy:      Cervical: No cervical adenopathy. Skin:     General: Skin is warm and dry. Neurological:      Mental Status: She is alert and oriented to person, place, and time. Psychiatric:         Behavior: Behavior normal.       Assessment/Plan:  69 y.o. female here mainly for preop exam:  - from generalist perspective, she is acceptable risk for surgery; Cardiac clearance already in place from Dr. Mile Gross. Diagnosis Orders   1. Preop examination             Return if symptoms worsen or fail to improve.     Glendia Brittle, MD

## 2023-02-28 ENCOUNTER — HOSPITAL ENCOUNTER (OUTPATIENT)
Dept: PREADMISSION TESTING | Age: 78
Discharge: HOME | End: 2023-03-04
Payer: MEDICARE

## 2023-02-28 VITALS
RESPIRATION RATE: 16 BRPM | OXYGEN SATURATION: 98 % | HEIGHT: 61 IN | BODY MASS INDEX: 42.44 KG/M2 | SYSTOLIC BLOOD PRESSURE: 140 MMHG | WEIGHT: 224.8 LBS | TEMPERATURE: 97.1 F | DIASTOLIC BLOOD PRESSURE: 62 MMHG | HEART RATE: 45 BPM

## 2023-02-28 LAB
ABO/RH: NORMAL
ALBUMIN SERPL-MCNC: 4 G/DL (ref 3.5–4.6)
ALP BLD-CCNC: 141 U/L (ref 40–130)
ALT SERPL-CCNC: 15 U/L (ref 0–33)
ANION GAP SERPL CALCULATED.3IONS-SCNC: 13 MEQ/L (ref 9–15)
ANTIBODY SCREEN: NORMAL
APTT: 33.9 SEC (ref 24.4–36.8)
AST SERPL-CCNC: 17 U/L (ref 0–35)
BACTERIA: ABNORMAL /HPF
BASOPHILS ABSOLUTE: 0 K/UL (ref 0–0.2)
BASOPHILS RELATIVE PERCENT: 0.7 %
BILIRUB SERPL-MCNC: <0.2 MG/DL (ref 0.2–0.7)
BILIRUBIN URINE: NEGATIVE
BLOOD, URINE: NEGATIVE
BUN BLDV-MCNC: 36 MG/DL (ref 8–23)
CALCIUM SERPL-MCNC: 9.8 MG/DL (ref 8.5–9.9)
CHLORIDE BLD-SCNC: 109 MEQ/L (ref 95–107)
CLARITY: CLEAR
CO2: 21 MEQ/L (ref 20–31)
COLOR: YELLOW
CREAT SERPL-MCNC: 1.39 MG/DL (ref 0.5–0.9)
EOSINOPHILS ABSOLUTE: 0.3 K/UL (ref 0–0.7)
EOSINOPHILS RELATIVE PERCENT: 4.9 %
EPITHELIAL CELLS, UA: ABNORMAL /HPF (ref 0–5)
GFR SERPL CREATININE-BSD FRML MDRD: 39.2 ML/MIN/{1.73_M2}
GLOBULIN: 2.6 G/DL (ref 2.3–3.5)
GLUCOSE BLD-MCNC: 114 MG/DL (ref 70–99)
GLUCOSE URINE: NEGATIVE MG/DL
HCT VFR BLD CALC: 36.3 % (ref 37–47)
HEMOGLOBIN: 11.7 G/DL (ref 12–16)
HYALINE CASTS: ABNORMAL /HPF (ref 0–5)
INR BLD: 0.9
KETONES, URINE: NEGATIVE MG/DL
LEUKOCYTE ESTERASE, URINE: ABNORMAL
LYMPHOCYTES ABSOLUTE: 1 K/UL (ref 1–4.8)
LYMPHOCYTES RELATIVE PERCENT: 18 %
MCH RBC QN AUTO: 30.3 PG (ref 27–31.3)
MCHC RBC AUTO-ENTMCNC: 32.4 % (ref 33–37)
MCV RBC AUTO: 93.6 FL (ref 79.4–94.8)
MONOCYTES ABSOLUTE: 0.4 K/UL (ref 0.2–0.8)
MONOCYTES RELATIVE PERCENT: 6.8 %
NEUTROPHILS ABSOLUTE: 3.8 K/UL (ref 1.4–6.5)
NEUTROPHILS RELATIVE PERCENT: 69.6 %
NITRITE, URINE: POSITIVE
PDW BLD-RTO: 14.2 % (ref 11.5–14.5)
PH UA: 5 (ref 5–9)
PLATELET # BLD: 179 K/UL (ref 130–400)
POTASSIUM SERPL-SCNC: 5.1 MEQ/L (ref 3.4–4.9)
PROTEIN UA: 100 MG/DL
PROTHROMBIN TIME: 12.7 SEC (ref 12.3–14.9)
RBC # BLD: 3.88 M/UL (ref 4.2–5.4)
RBC UA: ABNORMAL /HPF (ref 0–5)
SODIUM BLD-SCNC: 143 MEQ/L (ref 135–144)
SPECIFIC GRAVITY UA: 1.02 (ref 1–1.03)
SPECIMEN DESCRIPTION: NORMAL
TOTAL PROTEIN: 6.6 G/DL (ref 6.3–8)
URINE REFLEX TO CULTURE: YES
UROBILINOGEN, URINE: 0.2 E.U./DL
WBC # BLD: 5.4 K/UL (ref 4.8–10.8)
WBC UA: ABNORMAL /HPF (ref 0–5)

## 2023-02-28 PROCEDURE — 87641 MR-STAPH DNA AMP PROBE: CPT

## 2023-02-28 PROCEDURE — 87088 URINE BACTERIA CULTURE: CPT

## 2023-02-28 PROCEDURE — 87186 SC STD MICRODIL/AGAR DIL: CPT

## 2023-02-28 PROCEDURE — 86900 BLOOD TYPING SEROLOGIC ABO: CPT

## 2023-02-28 PROCEDURE — 85025 COMPLETE CBC W/AUTO DIFF WBC: CPT

## 2023-02-28 PROCEDURE — 86850 RBC ANTIBODY SCREEN: CPT

## 2023-02-28 PROCEDURE — 86901 BLOOD TYPING SEROLOGIC RH(D): CPT

## 2023-02-28 PROCEDURE — 85730 THROMBOPLASTIN TIME PARTIAL: CPT

## 2023-02-28 PROCEDURE — 87086 URINE CULTURE/COLONY COUNT: CPT

## 2023-02-28 PROCEDURE — 81001 URINALYSIS AUTO W/SCOPE: CPT

## 2023-02-28 PROCEDURE — 80053 COMPREHEN METABOLIC PANEL: CPT

## 2023-02-28 PROCEDURE — 85610 PROTHROMBIN TIME: CPT

## 2023-02-28 RX ORDER — CEFAZOLIN SODIUM IN 0.9 % NACL 2 G/100 ML
2000 PLASTIC BAG, INJECTION (ML) INTRAVENOUS
OUTPATIENT
Start: 2023-03-07 | End: 2023-03-07

## 2023-03-01 LAB
ORGANISM: ABNORMAL
URINE CULTURE, ROUTINE: ABNORMAL
URINE CULTURE, ROUTINE: ABNORMAL

## 2023-03-06 ENCOUNTER — ANESTHESIA EVENT (OUTPATIENT)
Dept: OPERATING ROOM | Age: 78
End: 2023-03-06
Payer: MEDICARE

## 2023-03-06 NOTE — DISCHARGE INSTRUCTIONS
Total Knee Replacement  Discharge Instructions    To prevent Clot formation, you have been placed on the following medication:  Aspirin 81 mg twice a day for 30 days started on 3/7/23  Surgical Site Care:  . Change dressing once a day and PRN (as needed). Apply 4 x 4 sponge and light tape. If glue present, leave open to air. You may leave wound open to air after initial dressing removal, if wound is clean, dry and intact  If Aquacel Ag dressing is present, do not remove dressing for 7 days, unless heavily saturated. If heavily saturated, remove dressing and start using instructions above  Staples will be removed on post-operative day 14 and steri-strips applied  Showering is permitted starting POD1 if waterproof Aquacel dressing is present or when the incision is covered with 4 x 4 and Tegaderm waterproof dressing  Until all areas of incision are healed. Physical Therapy:  Weight Bearing Status:  WBAT (Weight bearing as tolerated)   Precautions, Per Physical Therapy Handout  Pain Medications  You were given oxycodone/acetaminophen (Percocet)  Wean off pain medications as you deem appropriate as long as pain is under control  Cold packs/Ice packs/Machine  May be used 3 times daily for 15-30 minutes as necessary  Be sure to have a barrier (cloth, clothing, towel) between the site and the ice pack to prevent [address] for Orthopedics office if  Increased redness, swelling, drainage of any kind, and/or pain to surgery site. As well as new onset fevers and or chills. These could signify an infection. Calf or thigh tenderness to touch as well as increased swelling or redness. This could signify a clot formation. Numbness or tingling to an area around the incision site or below the incision site (toes). Any rash appears, increased  or new onset nausea/vomiting occur. This may indicate a reaction to a medication. Phone # [phone].   Follow up with Surgeon   I acknowledge that I have received marisa hose and understand the instructions on how and when to wear them (on during the day, off at night)   Discharging RN who has gone over instructions and acknowledges marisa hose have been received       If you are going home with home health care and do not receive a call within the first 48 hours after returning home, please contact your surgeon's office and your home health care agency.

## 2023-03-07 ENCOUNTER — APPOINTMENT (OUTPATIENT)
Dept: GENERAL RADIOLOGY | Age: 78
DRG: 981 | End: 2023-03-07
Attending: ORTHOPAEDIC SURGERY
Payer: MEDICARE

## 2023-03-07 ENCOUNTER — HOSPITAL ENCOUNTER (OUTPATIENT)
Age: 78
Setting detail: OBSERVATION
Discharge: HOME | DRG: 981 | End: 2023-03-08
Attending: ORTHOPAEDIC SURGERY | Admitting: ORTHOPAEDIC SURGERY
Payer: MEDICARE

## 2023-03-07 ENCOUNTER — ANESTHESIA (OUTPATIENT)
Dept: OPERATING ROOM | Age: 78
End: 2023-03-07
Payer: MEDICARE

## 2023-03-07 DIAGNOSIS — N18.30 STAGE 3 CHRONIC KIDNEY DISEASE, UNSPECIFIED WHETHER STAGE 3A OR 3B CKD (HCC): ICD-10-CM

## 2023-03-07 DIAGNOSIS — Z96.652 STATUS POST TOTAL KNEE REPLACEMENT, LEFT: Primary | ICD-10-CM

## 2023-03-07 PROCEDURE — A4217 STERILE WATER/SALINE, 500 ML: HCPCS | Performed by: ORTHOPAEDIC SURGERY

## 2023-03-07 PROCEDURE — 6370000000 HC RX 637 (ALT 250 FOR IP): Performed by: INTERNAL MEDICINE

## 2023-03-07 PROCEDURE — 2709999900 HC NON-CHARGEABLE SUPPLY: Performed by: ORTHOPAEDIC SURGERY

## 2023-03-07 PROCEDURE — 64447 NJX AA&/STRD FEMORAL NRV IMG: CPT | Performed by: ANESTHESIOLOGY

## 2023-03-07 PROCEDURE — 73560 X-RAY EXAM OF KNEE 1 OR 2: CPT

## 2023-03-07 PROCEDURE — 7100000001 HC PACU RECOVERY - ADDTL 15 MIN: Performed by: ORTHOPAEDIC SURGERY

## 2023-03-07 PROCEDURE — 2580000003 HC RX 258: Performed by: ANESTHESIOLOGY

## 2023-03-07 PROCEDURE — 6370000000 HC RX 637 (ALT 250 FOR IP): Performed by: NURSE PRACTITIONER

## 2023-03-07 PROCEDURE — 6360000002 HC RX W HCPCS: Performed by: ORTHOPAEDIC SURGERY

## 2023-03-07 PROCEDURE — 2500000003 HC RX 250 WO HCPCS: Performed by: STUDENT IN AN ORGANIZED HEALTH CARE EDUCATION/TRAINING PROGRAM

## 2023-03-07 PROCEDURE — 94150 VITAL CAPACITY TEST: CPT

## 2023-03-07 PROCEDURE — 2720000010 HC SURG SUPPLY STERILE: Performed by: ORTHOPAEDIC SURGERY

## 2023-03-07 PROCEDURE — C1776 JOINT DEVICE (IMPLANTABLE): HCPCS | Performed by: ORTHOPAEDIC SURGERY

## 2023-03-07 PROCEDURE — 2500000003 HC RX 250 WO HCPCS: Performed by: ANESTHESIOLOGY

## 2023-03-07 PROCEDURE — 3700000000 HC ANESTHESIA ATTENDED CARE: Performed by: ORTHOPAEDIC SURGERY

## 2023-03-07 PROCEDURE — 3E0T3BZ INTRODUCTION OF ANESTHETIC AGENT INTO PERIPHERAL NERVES AND PLEXI, PERCUTANEOUS APPROACH: ICD-10-PCS | Performed by: ANESTHESIOLOGY

## 2023-03-07 PROCEDURE — 0SRD069 REPLACEMENT OF LEFT KNEE JOINT WITH OXIDIZED ZIRCONIUM ON POLYETHYLENE SYNTHETIC SUBSTITUTE, CEMENTED, OPEN APPROACH: ICD-10-PCS | Performed by: ORTHOPAEDIC SURGERY

## 2023-03-07 PROCEDURE — 7100000000 HC PACU RECOVERY - FIRST 15 MIN: Performed by: ORTHOPAEDIC SURGERY

## 2023-03-07 PROCEDURE — 6370000000 HC RX 637 (ALT 250 FOR IP): Performed by: ORTHOPAEDIC SURGERY

## 2023-03-07 PROCEDURE — 6360000002 HC RX W HCPCS: Performed by: NURSE PRACTITIONER

## 2023-03-07 PROCEDURE — G0378 HOSPITAL OBSERVATION PER HR: HCPCS

## 2023-03-07 PROCEDURE — 3600000014 HC SURGERY LEVEL 4 ADDTL 15MIN: Performed by: ORTHOPAEDIC SURGERY

## 2023-03-07 PROCEDURE — 2580000003 HC RX 258: Performed by: NURSE PRACTITIONER

## 2023-03-07 PROCEDURE — 3700000001 HC ADD 15 MINUTES (ANESTHESIA): Performed by: ORTHOPAEDIC SURGERY

## 2023-03-07 PROCEDURE — 2580000003 HC RX 258: Performed by: ORTHOPAEDIC SURGERY

## 2023-03-07 PROCEDURE — 6360000002 HC RX W HCPCS: Performed by: ANESTHESIOLOGY

## 2023-03-07 PROCEDURE — 3600000004 HC SURGERY LEVEL 4 BASE: Performed by: ORTHOPAEDIC SURGERY

## 2023-03-07 PROCEDURE — C1713 ANCHOR/SCREW BN/BN,TIS/BN: HCPCS | Performed by: ORTHOPAEDIC SURGERY

## 2023-03-07 DEVICE — SYSTEM TOT KNEE: Type: IMPLANTABLE DEVICE | Site: KNEE | Status: FUNCTIONAL

## 2023-03-07 DEVICE — PSN TIB STM 5 DEG SZ F L: Type: IMPLANTABLE DEVICE | Site: KNEE | Status: FUNCTIONAL

## 2023-03-07 DEVICE — IMPLANTABLE DEVICE: Type: IMPLANTABLE DEVICE | Site: KNEE | Status: FUNCTIONAL

## 2023-03-07 DEVICE — SCREW BNE L25MM DIA2.5MM KNEE FULL THRD HEX FEM PERSONA: Type: IMPLANTABLE DEVICE | Site: KNEE | Status: FUNCTIONAL

## 2023-03-07 DEVICE — IMPL KNEE PERSONA POLY PATELLA 26MM: Type: IMPLANTABLE DEVICE | Site: KNEE | Status: FUNCTIONAL

## 2023-03-07 DEVICE — CEMENT BNE 40GM HI VISC RADPQ FOR REV SURG: Type: IMPLANTABLE DEVICE | Site: KNEE | Status: FUNCTIONAL

## 2023-03-07 DEVICE — IMPL KNEE FEM PSN PS CMT MRW SZ10 L: Type: IMPLANTABLE DEVICE | Site: KNEE | Status: FUNCTIONAL

## 2023-03-07 DEVICE — EXTENSION STEM L30MM DIA14MM KNEE TAPR CEM PERSONA: Type: IMPLANTABLE DEVICE | Site: KNEE | Status: FUNCTIONAL

## 2023-03-07 RX ORDER — ONDANSETRON 2 MG/ML
4 INJECTION INTRAMUSCULAR; INTRAVENOUS EVERY 6 HOURS PRN
Status: DISCONTINUED | OUTPATIENT
Start: 2023-03-07 | End: 2023-03-08 | Stop reason: HOSPADM

## 2023-03-07 RX ORDER — LIDOCAINE HYDROCHLORIDE 10 MG/ML
1 INJECTION, SOLUTION EPIDURAL; INFILTRATION; INTRACAUDAL; PERINEURAL ONCE
Status: COMPLETED | OUTPATIENT
Start: 2023-03-07 | End: 2023-03-07

## 2023-03-07 RX ORDER — OXYCODONE HYDROCHLORIDE 5 MG/1
5 TABLET ORAL EVERY 4 HOURS PRN
Status: DISCONTINUED | OUTPATIENT
Start: 2023-03-07 | End: 2023-03-08 | Stop reason: HOSPADM

## 2023-03-07 RX ORDER — SULFAMETHOXAZOLE AND TRIMETHOPRIM 800; 160 MG/1; MG/1
1 TABLET ORAL
Status: COMPLETED | OUTPATIENT
Start: 2023-03-07 | End: 2023-03-07

## 2023-03-07 RX ORDER — ACETAMINOPHEN 325 MG/1
650 TABLET ORAL EVERY 6 HOURS
Status: DISCONTINUED | OUTPATIENT
Start: 2023-03-07 | End: 2023-03-08 | Stop reason: HOSPADM

## 2023-03-07 RX ORDER — CELECOXIB 200 MG/1
200 CAPSULE ORAL ONCE
Status: COMPLETED | OUTPATIENT
Start: 2023-03-07 | End: 2023-03-07

## 2023-03-07 RX ORDER — CEFAZOLIN SODIUM IN 0.9 % NACL 2 G/100 ML
2000 PLASTIC BAG, INJECTION (ML) INTRAVENOUS
Status: COMPLETED | OUTPATIENT
Start: 2023-03-07 | End: 2023-03-07

## 2023-03-07 RX ORDER — OXYCODONE HCL 10 MG/1
10 TABLET, FILM COATED, EXTENDED RELEASE ORAL ONCE
Status: COMPLETED | OUTPATIENT
Start: 2023-03-07 | End: 2023-03-07

## 2023-03-07 RX ORDER — ONDANSETRON 4 MG/1
4 TABLET, ORALLY DISINTEGRATING ORAL EVERY 8 HOURS PRN
Status: DISCONTINUED | OUTPATIENT
Start: 2023-03-07 | End: 2023-03-08 | Stop reason: HOSPADM

## 2023-03-07 RX ORDER — ATORVASTATIN CALCIUM 40 MG/1
40 TABLET, FILM COATED ORAL NIGHTLY
Status: DISCONTINUED | OUTPATIENT
Start: 2023-03-07 | End: 2023-03-07

## 2023-03-07 RX ORDER — PROPOFOL 10 MG/ML
INJECTION, EMULSION INTRAVENOUS CONTINUOUS PRN
Status: DISCONTINUED | OUTPATIENT
Start: 2023-03-07 | End: 2023-03-07 | Stop reason: SDUPTHER

## 2023-03-07 RX ORDER — MIDAZOLAM HYDROCHLORIDE 1 MG/ML
INJECTION INTRAMUSCULAR; INTRAVENOUS PRN
Status: DISCONTINUED | OUTPATIENT
Start: 2023-03-07 | End: 2023-03-07 | Stop reason: SDUPTHER

## 2023-03-07 RX ORDER — OXYCODONE HYDROCHLORIDE 5 MG/1
10 TABLET ORAL EVERY 4 HOURS PRN
Status: DISCONTINUED | OUTPATIENT
Start: 2023-03-07 | End: 2023-03-08 | Stop reason: HOSPADM

## 2023-03-07 RX ORDER — LOSARTAN POTASSIUM 25 MG/1
100 TABLET ORAL DAILY
Status: DISCONTINUED | OUTPATIENT
Start: 2023-03-07 | End: 2023-03-08 | Stop reason: HOSPADM

## 2023-03-07 RX ORDER — ASPIRIN 81 MG/1
81 TABLET ORAL 2 TIMES DAILY
Status: DISCONTINUED | OUTPATIENT
Start: 2023-03-07 | End: 2023-03-08 | Stop reason: HOSPADM

## 2023-03-07 RX ORDER — SODIUM CHLORIDE, SODIUM LACTATE, POTASSIUM CHLORIDE, CALCIUM CHLORIDE 600; 310; 30; 20 MG/100ML; MG/100ML; MG/100ML; MG/100ML
INJECTION, SOLUTION INTRAVENOUS CONTINUOUS
Status: DISCONTINUED | OUTPATIENT
Start: 2023-03-07 | End: 2023-03-07

## 2023-03-07 RX ORDER — SODIUM CHLORIDE 9 MG/ML
INJECTION, SOLUTION INTRAVENOUS CONTINUOUS
Status: DISCONTINUED | OUTPATIENT
Start: 2023-03-07 | End: 2023-03-08 | Stop reason: HOSPADM

## 2023-03-07 RX ORDER — BUPIVACAINE HYDROCHLORIDE 5 MG/ML
INJECTION, SOLUTION EPIDURAL; INTRACAUDAL
Status: COMPLETED | OUTPATIENT
Start: 2023-03-07 | End: 2023-03-07

## 2023-03-07 RX ORDER — SULFAMETHOXAZOLE AND TRIMETHOPRIM 800; 160 MG/1; MG/1
1 TABLET ORAL EVERY 12 HOURS SCHEDULED
Status: DISCONTINUED | OUTPATIENT
Start: 2023-03-07 | End: 2023-03-08 | Stop reason: HOSPADM

## 2023-03-07 RX ORDER — DILTIAZEM HYDROCHLORIDE 240 MG/1
240 CAPSULE, COATED, EXTENDED RELEASE ORAL DAILY
Status: DISCONTINUED | OUTPATIENT
Start: 2023-03-08 | End: 2023-03-08 | Stop reason: HOSPADM

## 2023-03-07 RX ORDER — SODIUM CHLORIDE, SODIUM LACTATE, POTASSIUM CHLORIDE, CALCIUM CHLORIDE 600; 310; 30; 20 MG/100ML; MG/100ML; MG/100ML; MG/100ML
INJECTION, SOLUTION INTRAVENOUS
Status: DISPENSED
Start: 2023-03-07 | End: 2023-03-08

## 2023-03-07 RX ORDER — SODIUM CHLORIDE 0.9 % (FLUSH) 0.9 %
5-40 SYRINGE (ML) INJECTION PRN
Status: DISCONTINUED | OUTPATIENT
Start: 2023-03-07 | End: 2023-03-08 | Stop reason: HOSPADM

## 2023-03-07 RX ORDER — GLYCOPYRROLATE 1 MG/5 ML
SYRINGE (ML) INTRAVENOUS PRN
Status: DISCONTINUED | OUTPATIENT
Start: 2023-03-07 | End: 2023-03-07 | Stop reason: SDUPTHER

## 2023-03-07 RX ORDER — TRANEXAMIC ACID 650 MG/1
1950 TABLET ORAL ONCE
Status: COMPLETED | OUTPATIENT
Start: 2023-03-08 | End: 2023-03-08

## 2023-03-07 RX ORDER — POLYETHYLENE GLYCOL 3350 17 G/17G
17 POWDER, FOR SOLUTION ORAL DAILY PRN
Status: DISCONTINUED | OUTPATIENT
Start: 2023-03-07 | End: 2023-03-08 | Stop reason: HOSPADM

## 2023-03-07 RX ORDER — CEFAZOLIN SODIUM IN 0.9 % NACL 2 G/100 ML
2000 PLASTIC BAG, INJECTION (ML) INTRAVENOUS EVERY 8 HOURS
Status: COMPLETED | OUTPATIENT
Start: 2023-03-07 | End: 2023-03-08

## 2023-03-07 RX ORDER — SENNA AND DOCUSATE SODIUM 50; 8.6 MG/1; MG/1
1 TABLET, FILM COATED ORAL 2 TIMES DAILY
Status: DISCONTINUED | OUTPATIENT
Start: 2023-03-07 | End: 2023-03-08 | Stop reason: HOSPADM

## 2023-03-07 RX ORDER — HYDROCHLOROTHIAZIDE 25 MG/1
25 TABLET ORAL DAILY
Status: DISCONTINUED | OUTPATIENT
Start: 2023-03-07 | End: 2023-03-08 | Stop reason: HOSPADM

## 2023-03-07 RX ORDER — SODIUM CHLORIDE, SODIUM LACTATE, POTASSIUM CHLORIDE, CALCIUM CHLORIDE 600; 310; 30; 20 MG/100ML; MG/100ML; MG/100ML; MG/100ML
INJECTION, SOLUTION INTRAVENOUS CONTINUOUS
Status: ACTIVE | OUTPATIENT
Start: 2023-03-07 | End: 2023-03-08

## 2023-03-07 RX ORDER — SODIUM CHLORIDE 9 MG/ML
INJECTION, SOLUTION INTRAVENOUS PRN
Status: DISCONTINUED | OUTPATIENT
Start: 2023-03-07 | End: 2023-03-08 | Stop reason: HOSPADM

## 2023-03-07 RX ORDER — LOSARTAN POTASSIUM AND HYDROCHLOROTHIAZIDE 25; 100 MG/1; MG/1
1 TABLET ORAL DAILY
Status: DISCONTINUED | OUTPATIENT
Start: 2023-03-08 | End: 2023-03-07 | Stop reason: CLARIF

## 2023-03-07 RX ORDER — TRANEXAMIC ACID 650 MG/1
1950 TABLET ORAL ONCE
Status: COMPLETED | OUTPATIENT
Start: 2023-03-07 | End: 2023-03-07

## 2023-03-07 RX ORDER — TRANEXAMIC ACID 650 MG/1
1950 TABLET ORAL
Status: DISCONTINUED | OUTPATIENT
Start: 2023-03-07 | End: 2023-03-07 | Stop reason: HOSPADM

## 2023-03-07 RX ORDER — ACETAMINOPHEN 500 MG
1000 TABLET ORAL ONCE
Status: COMPLETED | OUTPATIENT
Start: 2023-03-07 | End: 2023-03-07

## 2023-03-07 RX ORDER — SODIUM CHLORIDE 0.9 % (FLUSH) 0.9 %
5-40 SYRINGE (ML) INJECTION EVERY 12 HOURS SCHEDULED
Status: DISCONTINUED | OUTPATIENT
Start: 2023-03-07 | End: 2023-03-08 | Stop reason: HOSPADM

## 2023-03-07 RX ORDER — MAGNESIUM HYDROXIDE 1200 MG/15ML
LIQUID ORAL CONTINUOUS PRN
Status: DISCONTINUED | OUTPATIENT
Start: 2023-03-07 | End: 2023-03-07 | Stop reason: HOSPADM

## 2023-03-07 RX ORDER — ROPIVACAINE HYDROCHLORIDE 5 MG/ML
INJECTION, SOLUTION EPIDURAL; INFILTRATION; PERINEURAL
Status: COMPLETED | OUTPATIENT
Start: 2023-03-07 | End: 2023-03-07

## 2023-03-07 RX ADMIN — SODIUM CHLORIDE, POTASSIUM CHLORIDE, SODIUM LACTATE AND CALCIUM CHLORIDE: 600; 310; 30; 20 INJECTION, SOLUTION INTRAVENOUS at 10:59

## 2023-03-07 RX ADMIN — ACETAMINOPHEN 650 MG: 325 TABLET ORAL at 15:59

## 2023-03-07 RX ADMIN — Medication 2000 MG: at 12:14

## 2023-03-07 RX ADMIN — SODIUM CHLORIDE, POTASSIUM CHLORIDE, SODIUM LACTATE AND CALCIUM CHLORIDE: 600; 310; 30; 20 INJECTION, SOLUTION INTRAVENOUS at 18:37

## 2023-03-07 RX ADMIN — OXYCODONE HYDROCHLORIDE 10 MG: 10 TABLET, FILM COATED, EXTENDED RELEASE ORAL at 10:35

## 2023-03-07 RX ADMIN — LIDOCAINE HYDROCHLORIDE 1 ML: 10 INJECTION, SOLUTION EPIDURAL; INFILTRATION; INTRACAUDAL; PERINEURAL at 10:59

## 2023-03-07 RX ADMIN — Medication 0.4 MG: at 14:49

## 2023-03-07 RX ADMIN — TRANEXAMIC ACID 1950 MG: 650 TABLET ORAL at 10:35

## 2023-03-07 RX ADMIN — SENNOSIDES AND DOCUSATE SODIUM 1 TABLET: 50; 8.6 TABLET ORAL at 20:23

## 2023-03-07 RX ADMIN — OXYCODONE HYDROCHLORIDE 5 MG: 5 TABLET ORAL at 15:59

## 2023-03-07 RX ADMIN — SULFAMETHOXAZOLE AND TRIMETHOPRIM 1 TABLET: 800; 160 TABLET ORAL at 20:23

## 2023-03-07 RX ADMIN — ACETAMINOPHEN 1000 MG: 500 TABLET ORAL at 10:35

## 2023-03-07 RX ADMIN — PROPOFOL 60 MCG/KG/MIN: 10 INJECTION, EMULSION INTRAVENOUS at 12:04

## 2023-03-07 RX ADMIN — ACETAMINOPHEN 650 MG: 325 TABLET ORAL at 20:23

## 2023-03-07 RX ADMIN — VANCOMYCIN HYDROCHLORIDE 1000 MG: 1 INJECTION, POWDER, LYOPHILIZED, FOR SOLUTION INTRAVENOUS at 11:33

## 2023-03-07 RX ADMIN — ROPIVACAINE HYDROCHLORIDE 20 ML: 5 INJECTION, SOLUTION EPIDURAL; INFILTRATION; PERINEURAL at 11:33

## 2023-03-07 RX ADMIN — SODIUM CHLORIDE: 9 INJECTION, SOLUTION INTRAVENOUS at 11:34

## 2023-03-07 RX ADMIN — CELECOXIB 200 MG: 200 CAPSULE ORAL at 10:35

## 2023-03-07 RX ADMIN — SULFAMETHOXAZOLE AND TRIMETHOPRIM 1 TABLET: 800; 160 TABLET ORAL at 11:04

## 2023-03-07 RX ADMIN — VANCOMYCIN HYDROCHLORIDE 1000 MG: 1 INJECTION, POWDER, LYOPHILIZED, FOR SOLUTION INTRAVENOUS at 11:59

## 2023-03-07 RX ADMIN — METOPROLOL TARTRATE 25 MG: 25 TABLET, FILM COATED ORAL at 20:23

## 2023-03-07 RX ADMIN — TRANEXAMIC ACID 1950 MG: 650 TABLET ORAL at 18:41

## 2023-03-07 RX ADMIN — Medication 2000 MG: at 20:28

## 2023-03-07 RX ADMIN — MIDAZOLAM HYDROCHLORIDE 2 MG: 1 INJECTION, SOLUTION INTRAMUSCULAR; INTRAVENOUS at 11:54

## 2023-03-07 RX ADMIN — BUPIVACAINE HYDROCHLORIDE 10 MG: 5 INJECTION, SOLUTION EPIDURAL; INTRACAUDAL at 11:54

## 2023-03-07 ASSESSMENT — PAIN SCALES - GENERAL
PAINLEVEL_OUTOF10: 6
PAINLEVEL_OUTOF10: 5
PAINLEVEL_OUTOF10: 0
PAINLEVEL_OUTOF10: 0

## 2023-03-07 NOTE — HOME CARE
Prehab telephone screening completed. FOC offered, pt requested [facility] for post op in-home therapy. Prehab in-home eval was declined.

## 2023-03-07 NOTE — ANESTHESIA PROCEDURE NOTES
Peripheral Block    Patient location during procedure: pre-op  Reason for block: post-op pain management and at surgeon's request  Start time: 3/7/2023 11:33 AM  End time: 3/7/2023 11:43 AM  Staffing  Performed: anesthesiologist   Anesthesiologist: Shanell Monreal MD  Preanesthetic Checklist  Completed: patient identified, IV checked, site marked, risks and benefits discussed, surgical/procedural consents, equipment checked, pre-op evaluation, timeout performed, anesthesia consent given, oxygen available and monitors applied/VS acknowledged  Peripheral Block   Patient position: supine  Prep: ChloraPrep  Provider prep: mask and sterile gloves (Sterile probe cover)  Patient monitoring: cardiac monitor, continuous pulse ox, frequent blood pressure checks and IV access  Block type: Femoral  Adductor canal  Laterality: left  Injection technique: single-shot  Guidance: nerve stimulator and ultrasound guided  Local infiltration: ropivacaine  Infiltration strength: 0.5 %  Local infiltration: ropivacaine  Dose: 20 mL    Needle   Needle type: combined needle/nerve stimulator   Needle gauge: 22 G  Needle localization: anatomical landmarks and ultrasound guidance  Needle length: 5 cm  Assessment   Injection assessment: negative aspiration for heme, no paresthesia on injection and local visualized surrounding nerve on ultrasound  Paresthesia pain: immediately resolved  Slow fractionated injection: yes  Hemodynamics: stable  Real-time US image taken/store: yes    Additional Notes  Ultrasound image printed and saved in patient chart.     Sterile probe cover used    Medications Administered  ropivacaine (NAROPIN) injection 0.5% - Perineural   20 mL - 3/7/2023 11:33:00 AM

## 2023-03-07 NOTE — OP NOTE
[place] [id number]                                OPERATIVE REPORT    PATIENT NAME: REKHA NESBITT               :        1945  MED REC NO:   [id number]                            ROOM:  ACCOUNT NO:   [id number]                           ADMIT DATE: 2023  PROVIDER:     Vincenzo Wadsworth MD    DATE OF PROCEDURE:  2023    LOCATION:  [facility].    PREOPERATIVE DIAGNOSIS:  Left knee DJD.    POSTOPERATIVE DIAGNOSIS:  Left knee DJD.    PROCEDURES PERFORMED:  Left total knee arthroplasty using a Hernando size  10 cemented Persona narrow posterior stabilized femoral component, size  F cemented Persona tibial tray with 12-mm constrained posterior  stabilized polyethylene spacer, 30-mm tibial stem extension, and 26-mm  Persona all poly-patellar inset button.    SURGEON:  Vincenzo Wadsworth MD    ASSISTANT:  Carla Fallon PA-C was present throughout the entire  case.  Given the nature of the disease process and the procedure, a  skilled surgical first assistant was necessary during the case.  The  assistant was necessary to hold retractors and manipulate the extremity  during the procedure.  A certified scrub tech was at the back table  managing the instruments and supplies for the surgical case.    ANESTHETIC:  Spinal plus IV sedation.    COMPLICATIONS:  None.    ESTIMATED BLOOD LOSS:  Minimal.    INDICATIONS:  The patient is a 77-year-old female with history of  endstage left knee DJD.  She had failed multiple conservative measures  and elected to proceed with total knee arthroplasty.  Risks and benefits  of knee replacement were discussed at length with the patient and  family.  These include infection, stiffness, nerve damage, continued  pain and deformity, as well as need for subsequent operations.   Understanding these options and limitations, she elected to proceed.   Informed consent was  obtained prior to arrival in the operating room. CLINICAL NOTE:  The patient has progressive knee pain which has been  refractory to conservative treatment. The patient has decided to  undergo elective total knee replacement using the Hernando PSI  computer-assisted custom cutting guides. The risks, benefits, outcomes  and postoperative course were fully explained to the patient. We  discussed wear, loosening, infection, blood clot, loss of lift, loss of  limb, nerve damage, numbness, failure of the procedure, stiffness,  progressive arthritis and the need for potential revision knee  replacement. The patient understands the procedure, risks and benefits  and consents to this surgical intervention. OPERATION AND FINDINGS:  The patient was brought to the operating room  and placed on the surgical table in the supine position whereupon  adequate anesthesia was then obtained. A surgical time-out was  performed. The patient received preoperative antibiotics. The patient  was prepped and draped in the usual sterile manner. The leg was then  exsanguinated with an Esmarch bandage and the tourniquet was applied at  300 mmHg. A linear midline incision was made from the superior pole of the patella  to the tibial tubercle, identifying the entire extensor mechanism. A  medial parapatellar arthrotomy was performed and the patella was  everted. The fat pad was excised and once this was complete the patient  was noted to have significant hypertrophic synovium with degenerative  joint disease and large osteophyte formation. Medial and lateral femoral retractors were inserted. The Hernando PSI  custom cutting guide was applied and pinned into position. Once this  was complete, the guide was removed and the distal femoral resection was  performed. With the distal femoral resection complete, the pins were  inserted over the distal femur and the multipurpose cutting guide was  applied and screwed into position. With the guide in position, the  anterior, posterior and chamfer cuts were made without difficulty. Once  the femoral cuts were complete, a posterior retractor was inserted. The  medial and lateral tibial retractors were inserted and the meniscal  remnants were excised. The Hernando custom PSI cutting pin guide for the  tibia was pinned in position. The guide was removed and the tibial  resection was made after checking the extramedullary alignment with the  extramedullary alignment device. Once the tibial resection was complete, the tibial components were  inserted. Patellar reaming was performed. Peg holes were drilled in  the patella and a trial patella was applied. The knee was placed  through a range of motion and once appropriate stability was obtained,  trial components were removed. All bony surfaces were irrigated with copious amounts of saline  irrigation. The components were inserted as indicated. A deep drain  placed in the knee joint. The capsule was closed using interrupted #1,  the subcutaneous tissues were closed using #3-0 Monocryl and staples  were then used for the skin. Dry sterile bulky dressing applied. The  patient tolerated the procedure well, taken to the PACU good condition  without complication.         Marika Mitchell MD    D: 03/07/2023 13:31:03       T: 03/07/2023 16:12:27     [id number]  Job#: [id number]     Doc#: [id number]    CC:

## 2023-03-07 NOTE — ANESTHESIA POSTPROCEDURE EVALUATION
Department of Anesthesiology  Postprocedure Note    Patient: Jaun Feng  MRN: [id number]  YOB: 1945  Date of evaluation: 3/7/2023      Procedure Summary     Date: 03/07/23 Room / Location: [address]    Anesthesia Start: 1159 Anesthesia Stop: 6820    Procedure: LEFT KNEE TOTAL KNEE ARTHROPLASTY (Left: Knee) Diagnosis:       Osteoarthritis of left knee, unspecified osteoarthritis type      (LEFT KNEE DJD)    Surgeons: Kai Arreguin MD Responsible Provider: Shanell Monreal MD    Anesthesia Type: MAC, Spinal, Regional ASA Status: 3          Anesthesia Type: MAC, Spinal, Regional    Darin Phase I:      Darin Phase II:        Anesthesia Post Evaluation    Patient location during evaluation: bedside  Patient participation: complete - patient participated  Level of consciousness: awake and awake and alert  Airway patency: patent  Nausea & Vomiting: no nausea and no vomiting  Complications: no  Cardiovascular status: blood pressure returned to baseline and hemodynamically stable  Respiratory status: acceptable  Hydration status: euvolemic

## 2023-03-07 NOTE — PLAN OF CARE
Consult received. 78-year-old female admitted for elective left TKA performed for DJD. Postoperative antibiotics, pain control, DVT prophylaxis, wound care, activity per surgeon. She has history of class III obesity, CAD (KEYSHA placed 2017), CKD 3, gastritis, and chronic low back pain with sciatica. Basic blood work is ordered for the morning. Incentive spirometry as ordered. Statin, Cardizem, beta-blocker have been reordered. Holding parameters placed on losartan-HCTZ. Full consult note to follow 3/8.     Mich Perez DO

## 2023-03-07 NOTE — PROGRESS NOTES
[facility] Occupational Therapy      Date: 3/7/2023  Patient Name: Oneyda Jeronimo        MRN: [id number]  Account: [de-identified]   : 1945  (69 y.o.)  Room: [facility] [place]/NONE    Chart reviewed, attempted OT at [phone] for eval. Patient not seen 2° to: Other: Pt still with residual anesthesia in LLE. Spoke to PACU RN, RN aware. Will attempt again when able.     Electronically signed by MALICK Tee on 3/7/2023 at 4:02 PM

## 2023-03-07 NOTE — PROGRESS NOTES
Patient ID:  Bryson Hawkins  [id number]  69 y.o.  1945  BOVIE PAD SITE CLEAR AND INTACT PRE AND POST OP. TAKEN TO PACU,   ATTACHED TO MONITOR AND REPORT GIVEN TO RN.   VSS DRSG DRY AND INTACT, IMMOBILIZER ON   GLASSES AND BILATERAL HEARING AIDS IN LABELED BAG ON PATIENT BED      Electronically signed by Veronica Boston RN on 3/7/2023

## 2023-03-07 NOTE — ANESTHESIA PROCEDURE NOTES
Spinal Block    Patient location during procedure: pre-op  End time: 3/7/2023 11:59 AM  Reason for block: primary anesthetic  Staffing  Performed: anesthesiologist   Anesthesiologist: Sneha Oliva MD  Spinal Block  Patient position: sitting  Prep: ChloraPrep and site prepped and draped  Patient monitoring: continuous pulse ox, frequent blood pressure checks and oxygen  Approach: midline  Location: L3/L4  Provider prep: mask and sterile gloves  Local infiltration: lidocaine  Needle  Needle type: Pencan   Needle gauge: 25 G  Needle length: 3.5 in  Assessment  Swirl obtained: Yes  CSF: clear  Attempts: 1  Hemodynamics: stable  Preanesthetic Checklist  Completed: patient identified, IV checked, site marked, risks and benefits discussed, surgical/procedural consents, equipment checked, pre-op evaluation, timeout performed, anesthesia consent given, oxygen available, monitors applied/VS acknowledged, fire risk safety assessment completed and verbalized and blood product R/B/A discussed and consented

## 2023-03-07 NOTE — ANESTHESIA PRE PROCEDURE
Department of Anesthesiology  Preprocedure Note       Name:  Zoya Martínez   Age:  77 y.o.  :  1945                                          MRN:  [id number]         Date:  3/7/2023      Surgeon: Surgeon(s):  Vincenzo Wadsworth MD    Procedure: Procedure(s):  LEFT KNEE TOTAL KNEE ARTHROPLASTY SUPINE, JELENA. JOSHUA    Medications prior to admission:   Prior to Admission medications    Medication Sig Start Date End Date Taking? Authorizing Provider   oxybutynin (DITROPAN-XL) 10 MG extended release tablet Take 1 tablet by mouth daily 23   Romario Bazan MD   metoprolol tartrate (LOPRESSOR) 25 MG tablet TAKE 1 TABLET BY MOUTH TWICE DAILY 22   BEN Lewis CNP   atorvastatin (LIPITOR) 80 MG tablet TAKE 1/2 (ONE-HALF) OF A TABLET BY MOUTH NIGHTLY 22   BEN Lewis CNP   losartan-hydroCHLOROthiazide (HYZAAR) 100-25 MG per tablet TAKE 1 TABLET BY MOUTH EVERY DAY 22   Romario Bazan MD   dilTIAZem (CARDIZEM CD) 240 MG extended release capsule TAKE 1 CAPSULE BY MOUTH DAILY 3/30/22   Romario Bazan MD   FEROSUL 325 (65 Fe) MG tablet Take 1 tablet by mouth 2 times daily 3/30/22   Romario Bazan MD   nitroGLYCERIN (NITROSTAT) 0.4 MG SL tablet Place 1 tablet under the tongue every 5 minutes as needed for Chest pain 5/15/17   BEN Lora CNP       Current medications:    No current facility-administered medications for this visit.     No current outpatient medications on file.     Facility-Administered Medications Ordered in Other Visits   Medication Dose Route Frequency Provider Last Rate Last Admin   • acetaminophen (TYLENOL) tablet 1,000 mg  1,000 mg Oral Once BEN Stevens CNP       • celecoxib (CELEBREX) capsule 200 mg  200 mg Oral Once BEN Stevens CNP       • oxyCODONE (OXYCONTIN) extended release tablet 10 mg  10 mg Oral Once BEN Stevens CNP       • tranexamic acid (LYSTEDA) tablet 1,950 mg  1,950 mg Oral 90 Min  Pre-Op Nicolas Paredes, APRN - CNP        ceFAZolin (ANCEF) 2000 mg in 0.9% sodium chloride 100 mL IVPB  2,000 mg IntraVENous On Call to 212 MD Xavi        lactated ringers IV soln infusion   IntraVENous Continuous Khushi Kaufman MD        lidocaine PF 1 % injection 1 mL  1 mL IntraDERmal Once Khushi Kaufman MD           Allergies:     Allergies   Allergen Reactions    Lisinopril Nausea Only and Other (See Comments)     cough    Tramadol Nausea Only       Problem List:    Patient Active Problem List   Diagnosis Code    Hypothyroid E03.9    Essential hypertension I10    Hyperlipidemia E78.5    Morbid obesity due to excess calories ([facility]) E66.01    History of ST elevation myocardial infarction (STEMI) I25.2    Coronary artery disease involving native coronary artery of native heart without angina pectoris I25.10    Chronic bilateral low back pain with bilateral sciatica M54.42, M54.41, G89.29    Primary osteoarthritis of both knees M17.0    Spinal stenosis of lumbar region with neurogenic claudication M48.062    Degenerative spondylolisthesis M43.10    Asthma J45.909    CKD (chronic kidney disease) stage 3, GFR 30-59 ml/min ([facility]) N18.30    Adenomatous polyp of descending colon D12.4    Adenomatous polyp of ascending colon D12.2    Adenomatous polyp of colon D12.6    Chronic gastritis without bleeding K29.50    Anemia D64.9    Gastrointestinal hemorrhage K92.2    RBBB (right bundle branch block) I45.10    Status post total knee replacement, right Z96.651       Past Medical History:        Diagnosis Date    Antral ulcer 01/14/2015    DR Bowling Spearing    Asthma     \"cured by beestings\"    Coronary artery disease     Coronary artery disease involving native coronary artery of native heart without angina pectoris 7/10/2018    has x 4 cardiac stents / Dr. Ashlee Gross Diverticulosis of colon (without mention of hemorrhage) 01/14/2015    DR Bowling Spearing    Essential hypertension 12/10/2013 meds > 20 yrs    History of blood transfusion 1990s    with hysterectomy    History of normal resting EKG 1996    normal    History of ST elevation myocardial infarction (STEMI) 7/11/2017    History of transfusion of whole blood 1996    Excessive bleeding before hysterectomy    Hyperlipidemia     meds > 2 yrs    Hypertension     Hypothyroidism     past trx years ago then stopped / recent restart    Kidney disease     Low back pain     Morbid obesity due to excess calories ([address] 5/13/2017    Morbid obesity due to excess calories ([address] 6/6/2017    Osteoarthritis     Pneumonia     RBBB (right bundle branch block) 3/17/2022    Shoulder dislocation     ST elevation myocardial infarction involving left circumflex coronary artery ([address] 5/13/2017    Unspecified gastritis and gastroduodenitis without mention of hemorrhage 05/06/2015    DR Liz Jennings       Past Surgical History:        Procedure Laterality Date    CARDIAC SURGERY  2017    has x 4 cardiac stents    COLONOSCOPY  01/14/2015    DR Liz Jennings - DIVERTICULOSIS    COLONOSCOPY N/A 1/2/2021    COLONOSCOPY DIAGNOSTIC performed by Leroy Watson MD at [address] [place] [address]  05/17/2017    x2 stents    CYST REMOVAL Left 2/7/2019    RESECTION OF LEFT PINNA LESION WITH GRAFT performed by Linda Grant MD at [facility]. [address], COLON, DIAGNOSTIC      FINGER SURGERY  12/9/14    middle finger right hand due to infection    HYSTERECTOMY (CERVIX STATUS UNKNOWN)  1996    LASIK  [id number]    SHOULDER SURGERY  2008    shoulder dislocated - popped  back in Right shoulder    TOTAL KNEE ARTHROPLASTY Right 8/30/2022    RIGHT KNEE TOTAL KNEE ARTHROPLASTY performed by Rebeca Pereira MD at [facility]  01/14/2015    DR Liz Jennings - ULCER IN THE ANTRUM    UPPER GASTROINTESTINAL ENDOSCOPY  05/06/2015    DR LANE - GASTRITIS, PREVIOUS ULCER HEALED    UPPER GASTROINTESTINAL ENDOSCOPY N/A 1/2/2021    EGD DIAGNOSTIC ONLY performed by Charles Javier MD at [address] [facility] [address] [facility] [address] History:    Social History     Tobacco Use    Smoking status: Never    Smokeless tobacco: Never   Substance Use Topics    Alcohol use: Yes     Comment: glass of wine once a year                                Counseling given: Not Answered      Vital Signs (Current): There were no vitals filed for this visit. BP Readings from Last 3 Encounters:   03/07/23 (!) 163/73   02/28/23 (!) 140/62   02/16/23 128/78       NPO Status:                                                                                 BMI:   Wt Readings from Last 3 Encounters:   03/07/23 224 lb 12.8 oz (102 kg)   02/28/23 224 lb 12.8 oz (102 kg)   02/16/23 221 lb (100.2 kg)     There is no height or weight on file to calculate BMI.    CBC:   Lab Results   Component Value Date/Time    WBC 5.4 02/28/2023 10:03 AM    RBC 3.88 02/28/2023 10:03 AM    HGB 11.7 02/28/2023 10:03 AM    HCT 36.3 02/28/2023 10:03 AM    MCV 93.6 02/28/2023 10:03 AM    RDW 14.2 02/28/2023 10:03 AM     02/28/2023 10:03 AM       CMP:   Lab Results   Component Value Date/Time     02/28/2023 10:08 AM    K 5.1 02/28/2023 10:08 AM    K 5.3 08/31/2022 05:48 AM     02/28/2023 10:08 AM    CO2 21 02/28/2023 10:08 AM    BUN 36 02/28/2023 10:08 AM    CREATININE 1.39 02/28/2023 10:08 AM    GFRAA 34.9 08/31/2022 05:48 AM    LABGLOM 39.2 02/28/2023 10:08 AM    GLUCOSE 114 02/28/2023 10:08 AM    PROT 6.6 02/28/2023 10:08 AM    CALCIUM 9.8 02/28/2023 10:08 AM    BILITOT <0.2 02/28/2023 10:08 AM    ALKPHOS 141 02/28/2023 10:08 AM    AST 17 02/28/2023 10:08 AM    ALT 15 02/28/2023 10:08 AM       POC Tests: No results for input(s): POCGLU, POCNA, POCK, POCCL, POCBUN, POCHEMO, POCHCT in the last 72 hours.     Coags:   Lab Results   Component Value Date/Time    PROTIME 12.7 02/28/2023 10:03 AM    INR 0.9 02/28/2023 10:03 AM    APTT 33.9 02/28/2023 10:03 AM       HCG (If Applicable): No results found for: PREGTESTUR, PREGSERUM, HCG, HCGQUANT     ABGs: No results found for: PHART, PO2ART, ALQ7MFW, BVN8UEC, BEART, R3ARAUPQ     Type & Screen (If Applicable):  No results found for: LABABO, LABRH    Drug/Infectious Status (If Applicable):  No results found for: HIV, HEPCAB    COVID-19 Screening (If Applicable):   Lab Results   Component Value Date/Time    COVID19 Not Detected 02/04/2021 05:20 PM           Anesthesia Evaluation  Patient summary reviewed and Nursing notes reviewed  Airway: Mallampati: II  TM distance: >3 FB   Neck ROM: full  Mouth opening: > = 3 FB   Dental: normal exam         Pulmonary: breath sounds clear to auscultation      (-) pneumonia and asthma                           Cardiovascular:  Exercise tolerance: no interval change,   (+) hypertension:, past MI: > 6 months and no interval change, CAD: obstructive and no interval change, CABG/stent:,       ECG reviewed  Rhythm: regular    Echocardiogram reviewed         Beta Blocker:  Dose within 24 Hrs         Neuro/Psych:   Negative Neuro/Psych ROS              GI/Hepatic/Renal: Neg GI/Hepatic/Renal ROS  (+) morbid obesity     (-) PUD       Endo/Other:    (+) hypothyroidism::., .                 Abdominal:             Vascular: negative vascular ROS. Other Findings:             Anesthesia Plan      MAC, regional and spinal     ASA 3     (US guided Saphenous block and spinal)  Induction: intravenous. MIPS: Prophylactic antiemetics administered. Anesthetic plan and risks discussed with patient. Use of blood products discussed with patient whom consented to blood products.    Plan discussed with surgical team.    Attending anesthesiologist reviewed and agrees with Preprocedure content      Post-op pain plan if not by surgeon: single peripheral nerve block            Zulema Keith MD   3/7/2023

## 2023-03-08 VITALS
HEIGHT: 61 IN | RESPIRATION RATE: 18 BRPM | HEART RATE: 80 BPM | BODY MASS INDEX: 42.44 KG/M2 | OXYGEN SATURATION: 94 % | DIASTOLIC BLOOD PRESSURE: 50 MMHG | WEIGHT: 224.8 LBS | SYSTOLIC BLOOD PRESSURE: 143 MMHG | TEMPERATURE: 98.7 F

## 2023-03-08 LAB
ANION GAP SERPL CALCULATED.3IONS-SCNC: 11 MEQ/L (ref 9–15)
ANION GAP SERPL CALCULATED.3IONS-SCNC: 9 MEQ/L (ref 9–15)
BUN BLDV-MCNC: 34 MG/DL (ref 8–23)
BUN BLDV-MCNC: 36 MG/DL (ref 8–23)
CALCIUM SERPL-MCNC: 8.7 MG/DL (ref 8.5–9.9)
CALCIUM SERPL-MCNC: 8.7 MG/DL (ref 8.5–9.9)
CHLORIDE BLD-SCNC: 110 MEQ/L (ref 95–107)
CHLORIDE BLD-SCNC: 114 MEQ/L (ref 95–107)
CO2: 19 MEQ/L (ref 20–31)
CO2: 21 MEQ/L (ref 20–31)
CREAT SERPL-MCNC: 1.68 MG/DL (ref 0.5–0.9)
CREAT SERPL-MCNC: 1.8 MG/DL (ref 0.5–0.9)
GFR SERPL CREATININE-BSD FRML MDRD: 28.7 ML/MIN/{1.73_M2}
GFR SERPL CREATININE-BSD FRML MDRD: 31.2 ML/MIN/{1.73_M2}
GLUCOSE BLD-MCNC: 112 MG/DL (ref 70–99)
GLUCOSE BLD-MCNC: 123 MG/DL (ref 70–99)
HCT VFR BLD CALC: 27.1 % (ref 37–47)
HEMOGLOBIN: 8.7 G/DL (ref 12–16)
MCH RBC QN AUTO: 30.4 PG (ref 27–31.3)
MCHC RBC AUTO-ENTMCNC: 32.2 % (ref 33–37)
MCV RBC AUTO: 94.4 FL (ref 79.4–94.8)
PDW BLD-RTO: 14 % (ref 11.5–14.5)
PLATELET # BLD: 144 K/UL (ref 130–400)
POTASSIUM REFLEX MAGNESIUM: 5.3 MEQ/L (ref 3.4–4.9)
POTASSIUM SERPL-SCNC: 5.4 MEQ/L (ref 3.4–4.9)
RBC # BLD: 2.87 M/UL (ref 4.2–5.4)
SODIUM BLD-SCNC: 140 MEQ/L (ref 135–144)
SODIUM BLD-SCNC: 144 MEQ/L (ref 135–144)
WBC # BLD: 5.6 K/UL (ref 4.8–10.8)

## 2023-03-08 PROCEDURE — 2580000003 HC RX 258: Performed by: NURSE PRACTITIONER

## 2023-03-08 PROCEDURE — 6370000000 HC RX 637 (ALT 250 FOR IP): Performed by: NURSE PRACTITIONER

## 2023-03-08 PROCEDURE — 96372 THER/PROPH/DIAG INJ SC/IM: CPT

## 2023-03-08 PROCEDURE — 2700000000 HC OXYGEN THERAPY PER DAY

## 2023-03-08 PROCEDURE — 80048 BASIC METABOLIC PNL TOTAL CA: CPT

## 2023-03-08 PROCEDURE — 36415 COLL VENOUS BLD VENIPUNCTURE: CPT

## 2023-03-08 PROCEDURE — G0378 HOSPITAL OBSERVATION PER HR: HCPCS

## 2023-03-08 PROCEDURE — 85027 COMPLETE CBC AUTOMATED: CPT

## 2023-03-08 PROCEDURE — 97166 OT EVAL MOD COMPLEX 45 MIN: CPT

## 2023-03-08 PROCEDURE — 6360000002 HC RX W HCPCS: Performed by: INTERNAL MEDICINE

## 2023-03-08 PROCEDURE — 6370000000 HC RX 637 (ALT 250 FOR IP): Performed by: INTERNAL MEDICINE

## 2023-03-08 PROCEDURE — 2580000003 HC RX 258: Performed by: ANESTHESIOLOGY

## 2023-03-08 PROCEDURE — 6360000002 HC RX W HCPCS: Performed by: NURSE PRACTITIONER

## 2023-03-08 PROCEDURE — 97535 SELF CARE MNGMENT TRAINING: CPT

## 2023-03-08 PROCEDURE — 97162 PT EVAL MOD COMPLEX 30 MIN: CPT

## 2023-03-08 RX ORDER — SENNA AND DOCUSATE SODIUM 50; 8.6 MG/1; MG/1
1 TABLET, FILM COATED ORAL 2 TIMES DAILY
Qty: 20 TABLET | Refills: 0 | Status: SHIPPED | OUTPATIENT
Start: 2023-03-08 | End: 2023-03-18

## 2023-03-08 RX ORDER — SODIUM CHLORIDE, SODIUM LACTATE, POTASSIUM CHLORIDE, CALCIUM CHLORIDE 600; 310; 30; 20 MG/100ML; MG/100ML; MG/100ML; MG/100ML
INJECTION, SOLUTION INTRAVENOUS CONTINUOUS
Status: DISCONTINUED | OUTPATIENT
Start: 2023-03-08 | End: 2023-03-08 | Stop reason: HOSPADM

## 2023-03-08 RX ORDER — ASPIRIN 81 MG/1
81 TABLET ORAL 2 TIMES DAILY
Qty: 60 TABLET | Refills: 0 | Status: SHIPPED | OUTPATIENT
Start: 2023-03-08 | End: 2023-04-07

## 2023-03-08 RX ORDER — SULFAMETHOXAZOLE AND TRIMETHOPRIM 800; 160 MG/1; MG/1
1 TABLET ORAL EVERY 12 HOURS SCHEDULED
Qty: 3 TABLET | Refills: 0 | Status: ON HOLD | OUTPATIENT
Start: 2023-03-08 | End: 2023-03-10

## 2023-03-08 RX ADMIN — EPOETIN ALFA-EPBX 10000 UNITS: 10000 INJECTION, SOLUTION INTRAVENOUS; SUBCUTANEOUS at 15:21

## 2023-03-08 RX ADMIN — TRANEXAMIC ACID 1950 MG: 650 TABLET ORAL at 05:44

## 2023-03-08 RX ADMIN — DILTIAZEM HYDROCHLORIDE 240 MG: 240 CAPSULE, COATED, EXTENDED RELEASE ORAL at 08:20

## 2023-03-08 RX ADMIN — OXYCODONE HYDROCHLORIDE 5 MG: 5 TABLET ORAL at 08:19

## 2023-03-08 RX ADMIN — OXYCODONE HYDROCHLORIDE 5 MG: 5 TABLET ORAL at 01:28

## 2023-03-08 RX ADMIN — LOSARTAN POTASSIUM 100 MG: 25 TABLET, FILM COATED ORAL at 08:09

## 2023-03-08 RX ADMIN — ASPIRIN 81 MG: 81 TABLET, COATED ORAL at 08:10

## 2023-03-08 RX ADMIN — SODIUM CHLORIDE: 9 INJECTION, SOLUTION INTRAVENOUS at 04:53

## 2023-03-08 RX ADMIN — SODIUM CHLORIDE, PRESERVATIVE FREE 10 ML: 5 INJECTION INTRAVENOUS at 08:27

## 2023-03-08 RX ADMIN — SODIUM CHLORIDE, POTASSIUM CHLORIDE, SODIUM LACTATE AND CALCIUM CHLORIDE: 600; 310; 30; 20 INJECTION, SOLUTION INTRAVENOUS at 08:25

## 2023-03-08 RX ADMIN — ACETAMINOPHEN 650 MG: 325 TABLET ORAL at 04:48

## 2023-03-08 RX ADMIN — SENNOSIDES AND DOCUSATE SODIUM 1 TABLET: 50; 8.6 TABLET ORAL at 08:10

## 2023-03-08 RX ADMIN — OXYCODONE HYDROCHLORIDE 10 MG: 5 TABLET ORAL at 15:20

## 2023-03-08 RX ADMIN — SULFAMETHOXAZOLE AND TRIMETHOPRIM 1 TABLET: 800; 160 TABLET ORAL at 08:20

## 2023-03-08 RX ADMIN — METOPROLOL TARTRATE 25 MG: 25 TABLET, FILM COATED ORAL at 08:10

## 2023-03-08 RX ADMIN — ACETAMINOPHEN 650 MG: 325 TABLET ORAL at 15:21

## 2023-03-08 RX ADMIN — ACETAMINOPHEN 650 MG: 325 TABLET ORAL at 10:24

## 2023-03-08 RX ADMIN — Medication 2000 MG: at 04:54

## 2023-03-08 ASSESSMENT — PAIN DESCRIPTION - ORIENTATION
ORIENTATION: LEFT

## 2023-03-08 ASSESSMENT — ENCOUNTER SYMPTOMS
EYE DISCHARGE: 0
SHORTNESS OF BREATH: 0
ABDOMINAL DISTENTION: 0

## 2023-03-08 ASSESSMENT — PAIN SCALES - GENERAL
PAINLEVEL_OUTOF10: 8
PAINLEVEL_OUTOF10: 4
PAINLEVEL_OUTOF10: 3
PAINLEVEL_OUTOF10: 8
PAINLEVEL_OUTOF10: 6

## 2023-03-08 ASSESSMENT — PAIN DESCRIPTION - LOCATION
LOCATION: KNEE

## 2023-03-08 ASSESSMENT — PAIN DESCRIPTION - DESCRIPTORS
DESCRIPTORS: SORE;ACHING
DESCRIPTORS: SORE;ACHING
DESCRIPTORS: ACHING;BURNING

## 2023-03-08 NOTE — PROGRESS NOTES
Physical Therapy Med Surg Initial Assessment  Facility/Department: [address] [place] [address] Yunior Fallon [address]  Room: [place] [address]       NAME: Franny Dexter  : 1945 (19 y.o.)  MRN: [id number]  CODE STATUS: Full Code    Date of Service: 3/8/2023    Patient Diagnosis(es): Osteoarthritis of left knee, unspecified osteoarthritis type [M17.12]  Status post total knee replacement, left [Z96.652]   No chief complaint on file.     Patient Active Problem List    Diagnosis Date Noted    Morbid obesity due to excess calories ([facility] 2017    Status post total knee replacement, left 2023    Status post total knee replacement, right 2022    RBBB (right bundle branch block) 2022    Gastrointestinal hemorrhage     Anemia 2021    Adenomatous polyp of descending colon     Adenomatous polyp of ascending colon     Adenomatous polyp of colon     Chronic gastritis without bleeding     CKD (chronic kidney disease) stage 3, GFR 30-59 ml/min ([facility]) 2019    Asthma     Spinal stenosis of lumbar region with neurogenic claudication 2019    Degenerative spondylolisthesis 2019    Chronic bilateral low back pain with bilateral sciatica 10/30/2018    Primary osteoarthritis of both knees 10/30/2018    Coronary artery disease involving native coronary artery of native heart without angina pectoris 07/10/2018    History of ST elevation myocardial infarction (STEMI) 2017    Hypothyroid 12/10/2013    Essential hypertension 12/10/2013    Hyperlipidemia 12/10/2013        Past Medical History:   Diagnosis Date    Antral ulcer 2015    DR LANE    Asthma     \"cured by beestings\"    Coronary artery disease     Coronary artery disease involving native coronary artery of native heart without angina pectoris 7/10/2018    has x 4 cardiac stents / Dr. Michael Bishop    Diverticulosis of colon (without mention of hemorrhage) 2015    DR Sharda Zurita    Essential hypertension 12/10/2013    meds > 20 yrs    History of blood transfusion 1990s    with hysterectomy    History of normal resting EKG 1996    normal    History of ST elevation myocardial infarction (STEMI) 7/11/2017    History of transfusion of whole blood 1996    Excessive bleeding before hysterectomy    Hyperlipidemia     meds > 2 yrs    Hypertension     Hypothyroidism     past trx years ago then stopped / recent restart    Kidney disease     Low back pain     Morbid obesity due to excess calories ([address] 5/13/2017    Morbid obesity due to excess calories ([address] 6/6/2017    Osteoarthritis     Pneumonia     RBBB (right bundle branch block) 3/17/2022    Shoulder dislocation     ST elevation myocardial infarction involving left circumflex coronary artery ([address] 5/13/2017    Unspecified gastritis and gastroduodenitis without mention of hemorrhage 05/06/2015    DR Yamel Riley     Past Surgical History:   Procedure Laterality Date    CARDIAC SURGERY  2017    has x 4 cardiac stents    COLONOSCOPY  01/14/2015    DR Yamel Riley - DIVERTICULOSIS    COLONOSCOPY N/A 1/2/2021    COLONOSCOPY DIAGNOSTIC performed by Maximo White MD at [facility]  05/17/2017    x2 stents    CYST REMOVAL Left 2/7/2019    RESECTION OF LEFT PINNA LESION WITH GRAFT performed by Sabrina Deutsch MD at [address] [place] [address], COLON, DIAGNOSTIC      FINGER SURGERY  12/9/14    middle finger right hand due to infection    HYSTERECTOMY ([address])  1996    LASIK  [id number]    SHOULDER SURGERY  2008    shoulder dislocated - popped  back in Right shoulder    TOTAL KNEE ARTHROPLASTY Right 8/30/2022    RIGHT KNEE TOTAL KNEE ARTHROPLASTY performed by Rosa Gomes MD at [facility]  01/14/2015    DR Yamel Riley - ULCER IN THE ANTRUM    UPPER GASTROINTESTINAL ENDOSCOPY  05/06/2015    DR Yamel Riley - GASTRITIS, PREVIOUS ULCER HEALED    UPPER GASTROINTESTINAL ENDOSCOPY N/A 1/2/2021    EGD DIAGNOSTIC ONLY performed by Maximo White MD at [facility] Restrictions:  Restrictions/Precautions: Weight Bearing  Lower Extremity Weight Bearing Restrictions  Left Lower Extremity Weight Bearing: Weight Bearing As Tolerated  Position Activity Restriction  Other position/activity restrictions: Knee immobilizer on when up until able to SLR     SUBJECTIVE:        Pain  Pain: no pain reported before and after session; min pain left lat knee at 6-7/10 during gait only    Prior Level of Function:  Social/Functional History  Lives With: Spouse  Type of Home: House  Home Layout: One level  Home Access: Ramped entrance, Stairs to enter with rails  Bathroom Shower/Tub: Tub/Shower unit  Bathroom Equipment: Grab bars in shower, Toilet raiser  Home Equipment: Merrill Hatchet, rolling, Long-handled shoehorn  ADL Assistance: Independent  Homemaking Assistance: Independent  Ambulation Assistance: Independent (with cane)  Transfer Assistance: Independent  Active : Yes    OBJECTIVE:   Vision  Vision: Impaired  Vision Exceptions: Wears glasses at all times  Hearing: Exceptions to [facility] [employer] [facility]  Hearing Exceptions: Hard of hearing/hearing concerns    Cognition: WFL            ROM:  RLE PROM: WFL  LLE PROM: WFL  LLE General PROM: except knee flex to approx 50 degrees    Strength:  Strength RLE  Comment: 4-/5  Strength LLE  Comment: 4-/5 except 3/5 knee - able to SLR    Neuro:  Balance  Sitting - Static: Good  Sitting - Dynamic: Good  Standing - Static: Good; - (with ww)  Standing - Dynamic: Good;-                                Bed mobility  Supine to Sit: Modified independent (increased time)  Sit to Supine:  (NT - pt up in chair following)  Bed Mobility Comments: Pt reports she will sleep in recliner originally    Transfers  Sit to Stand: Stand by assistance;Contact guard assistance  Stand to Sit: Stand by assistance;Contact guard assistance  Comment: mild delay in left quad activation in sit to stand - increased time and effort required    Ambulation  WB Status: WBAT - no immobilizer needed as pt is Indep with SLR  Ambulation  Device: Rolling Walker  Assistance: Stand by assistance;Contact guard assistance  Quality of Gait: slow pace, decreased LLE stance time, flexed trunk, heavy UE support required  Distance: 25 feet x 2  More Ambulation?: No (pt has ramp entry)                   Activity Tolerance  Activity Tolerance: Patient tolerated evaluation without incident    Patient Education  Education Given To: Patient  Education Provided: Role of Therapy;Plan of Care  Education Outcome: Verbalized understanding;Demonstrated understanding       ASSESSMENT:   Body Structures, Functions, Activity Limitations Requiring Skilled Therapeutic Intervention: Decreased functional mobility   Decision Making: Medium Complexity  History: high  Exam: med  Clinical Presentation: med    Barriers to Learning: none         DISCHARGE RECOMMENDATIONS:  Discharge Recommendations: Continue to assess pending progress    Assessment: Pt demonstrates deficits folloing LTKR.  Pt is in need of short term PT at this level of care  Requires PT Follow-Up: Yes       PLAN OF CARE:  Physcial Therapy Plan  General Plan: 2 times a day 7 days a week  Current Treatment Recommendations: Functional mobility training, Transfer training, Gait training  Additional Comments: Pt had RTKR recently and will continue with HEP    Safety Devices  Type of Devices: Call light within reach, Left in chair, Chair alarm in place    Goals:  Short Term Goals  Short Term Goal 1: indep sit to stand - SBA car transfers  Short Term Goal 2: indep bed mobility  Short Term Goal 3: indep gait 25 feet with ww and SBA 50 feet including ramp    AMPAC (6 CLICK) BASIC MOBILITY  AM-PAC Inpatient Mobility Raw Score : 20     Therapy Time:   Individual   Time In 0910   Time Out 0935   Minutes [address] [place] [address], 03/08/23 at 9:44 AM         Definitions for assistance levels  Independent = pt does not require any physical supervision or assistance from another person for activity completion. Device may be needed.   Stand by assistance = pt requires verbal cues or instructions from another person, close to but not touching, to perform the activity  Minimal assistance= pt performs 75% or more of the activity; assistance is required to complete the activity  Moderate assistance= pt performs 50% of the activity; assistance is required to complete the activity  Maximal assistance = pt performs 25% of the activity; assistance is required to complete the activity  Dependent = pt requires total physical assistance to accomplish the task

## 2023-03-08 NOTE — PROGRESS NOTES
[facility] OCCUPATIONAL THERAPY EVALUATION - ACUTE     NAME: Jonah Prado  : 1945 (69 y.o.)  MRN: [id number]  CODE STATUS: Full Code  Room: [place] [address]    Date of Service: 3/8/2023    Patient Diagnosis(es): Osteoarthritis of left knee, unspecified osteoarthritis type [M17.12]  Status post total knee replacement, left [Z96.652]   Patient Active Problem List    Diagnosis Date Noted    Morbid obesity due to excess calories ([address] 2017    Status post total knee replacement, left 2023    Status post total knee replacement, right 2022    RBBB (right bundle branch block) 2022    Gastrointestinal hemorrhage     Anemia 2021    Adenomatous polyp of descending colon     Adenomatous polyp of ascending colon     Adenomatous polyp of colon     Chronic gastritis without bleeding     CKD (chronic kidney disease) stage 3, GFR 30-59 ml/min ([facility]) 2019    Asthma     Spinal stenosis of lumbar region with neurogenic claudication 2019    Degenerative spondylolisthesis 2019    Chronic bilateral low back pain with bilateral sciatica 10/30/2018    Primary osteoarthritis of both knees 10/30/2018    Coronary artery disease involving native coronary artery of native heart without angina pectoris 07/10/2018    History of ST elevation myocardial infarction (STEMI) 2017    Hypothyroid 12/10/2013    Essential hypertension 12/10/2013    Hyperlipidemia 12/10/2013        Past Medical History:   Diagnosis Date    Antral ulcer 2015    DR LANE    Asthma     \"cured by beestings\"    Coronary artery disease     Coronary artery disease involving native coronary artery of native heart without angina pectoris 7/10/2018    has x 4 cardiac stents / Dr. Charbel Galaviz    Diverticulosis of colon (without mention of hemorrhage) 2015    DR Everardo Ramirez    Essential hypertension 12/10/2013    meds > 20 yrs    History of blood transfusion 1990s    with hysterectomy    History of normal resting EKG 1996    normal    History of ST elevation myocardial infarction (STEMI) 7/11/2017    History of transfusion of whole blood 1996    Excessive bleeding before hysterectomy    Hyperlipidemia     meds > 2 yrs    Hypertension     Hypothyroidism     past trx years ago then stopped / recent restart    Kidney disease     Low back pain     Morbid obesity due to excess calories ([address] 5/13/2017    Morbid obesity due to excess calories ([address] 6/6/2017    Osteoarthritis     Pneumonia     RBBB (right bundle branch block) 3/17/2022    Shoulder dislocation     ST elevation myocardial infarction involving left circumflex coronary artery ([address] 5/13/2017    Unspecified gastritis and gastroduodenitis without mention of hemorrhage 05/06/2015    DR Ralf King     Past Surgical History:   Procedure Laterality Date    CARDIAC SURGERY  2017    has x 4 cardiac stents    COLONOSCOPY  01/14/2015    DR Ralf King - DIVERTICULOSIS    COLONOSCOPY N/A 1/2/2021    COLONOSCOPY DIAGNOSTIC performed by Scooter Swanson MD at [facility]  05/17/2017    x2 stents    CYST REMOVAL Left 2/7/2019    RESECTION OF LEFT PINNA LESION WITH GRAFT performed by Ryan Green MD at [facility], COLON, DIAGNOSTIC      FINGER SURGERY  12/9/14    middle finger right hand due to infection    HYSTERECTOMY ([address])  1996    LASIK  [id number]    SHOULDER SURGERY  2008    shoulder dislocated - popped  back in Right shoulder    TOTAL KNEE ARTHROPLASTY Right 8/30/2022    RIGHT KNEE TOTAL KNEE ARTHROPLASTY performed by Delia Alonso MD at [facility]  01/14/2015    DR Ralf King - ULCER IN THE ANTRUM    UPPER GASTROINTESTINAL ENDOSCOPY  05/06/2015    DR Ralf King - GASTRITIS, PREVIOUS ULCER HEALED    UPPER GASTROINTESTINAL ENDOSCOPY N/A 1/2/2021    EGD DIAGNOSTIC ONLY performed by Scooter Swanson MD at [facility]        Restrictions  Restrictions/Precautions: Weight Bearing      Left Lower Extremity Weight Bearing: Weight Bearing As Tolerated  Other position/activity restrictions: Knee immobilizer on when up until able to SLR- +SLR noted 03/08    Safety Devices: Safety Devices  Type of Devices: Call light within reach; Left in chair;Chair alarm in place     Patient's date of birth confirmed: Yes    General:  Chart Reviewed: Yes  Patient assessed for rehabilitation services?: Yes    Subjective  Subjective: \"I feel okay. \"       Pain at start of treatment: No    Pain at end of treatment: No    Location:   Description:   Nursing notified: Not Applicable  RN:   Intervention: Repositioned    Prior Level of Function:  Social/Functional History  Lives With: Spouse  Type of Home: House  Home Layout: One level  Home Access: Ramped entrance, Stairs to enter with rails  Bathroom Shower/Tub: Tub/Shower unit  Bathroom Equipment: Grab bars in shower, Toilet raiser  Home Equipment: Raisa Prayer, rolling, Long-handled shoehorn  ADL Assistance: Independent  Homemaking Assistance: Independent  Ambulation Assistance: Independent (with cane)  Transfer Assistance: Independent  Active : Yes    OBJECTIVE:     Orientation Status:  Orientation  Overall Orientation Status: Within Functional Limits    Observation:  Observation/Palpation  Posture: Good  Observation: Pt alert and attentive, agreeable to OT evaluation, no acute distress noted. Knee immobilizer not donned as pt completes SLR.  Pt reports her KATHERINE hose are too tight on operative leg and declines therapist donning, wearing KATHERINE on non-operative leg    Cognition Status:  Cognition  Overall Cognitive Status: WFL    Perception Status:  Perception  Overall Perceptual Status: WFL    Vision and Hearing Status:  Vision  Vision Exceptions: Wears glasses at all times  Hearing  Hearing: Exceptions to [facility]  Hearing Exceptions: Hard of hearing/hearing concerns   Vision - Basic Assessment  Prior Vision: Wears glasses all the time  Visual History: No significant visual history  Patient Visual Report: No visual complaint reported. Visual Field Cut: No  Oculo Motor Control: WNL    GROSS ASSESSMENT AROM/PROM:  AROM: Within functional limits       ROM:   LUE AROM (degrees)  LUE AROM : WFL  Left Hand AROM (degrees)  Left Hand AROM: WFL  RUE AROM (degrees)  RUE AROM : WFL  Right Hand AROM (degrees)  Right Hand AROM: WFL    UE STRENGTH:  Strength: Generally decreased, functional    UE COORDINATION:  Coordination: Generally decreased, functional    UE TONE:  Tone: Normal    UE SENSATION:  Sensation: Intact    Hand Dominance:  Hand Dominance  Hand Dominance: Right    ADL Status:  ADL  Feeding: Independent  Grooming: Supervision  UE Bathing: Supervision  LE Bathing: Minimal assistance  UE Dressing: Supervision  LE Dressing: Minimal assistance  LE Dressing Skilled Clinical Factors: Assist pulling legs fully through pant legs d/t stretchy pants. States spouse can assist if needed  Toileting: Supervision  Toilet Transfers  Toilet - Technique: Ambulating  Equipment Used: Grab bars  Toilet Transfer: Supervision  Toilet Transfers Comments: Increased effort for transfers    Pt declines full shower ADL stating she showered prior to surgery and would rather wait until she gets home. Toileted and stood for managing clothing, performed nova hygiene and washed face/hands. Declines to wash legs. KATHERINE hose on non-operative leg only per pt request, states she has too much pain if it is on operative leg. Functional Mobility:    Transfers  Sit to stand: Supervision  Stand to sit: Supervision    Patient ambulated to/from bathroom with [address] at Supervision level.  G safety awareness, increased time and effort for navigating turns    Bed Mobility  Bed mobility  Supine to Sit: Modified independent (increased time)  Sit to Supine:  (NT - pt up in chair following)  Bed Mobility Comments: Pt reports she will sleep in recliner originally    Seated and Standing Balance:  Balance  Sitting: Intact  Standing: Impaired  Standing - Static: Good  Standing - Dynamic: Fair    Functional Endurance:  Activity Tolerance  Activity Tolerance: Patient Tolerated treatment well    D/C Recommendations:  OT D/C RECOMMENDATIONS  REQUIRES OT FOLLOW-UP: Yes    Equipment Recommendations:  OT Equipment Recommendations  Other: Continue to assess    OT Education:   Patient Education  Education Given To: Patient  Education Provided: Role of Therapy;Plan of Care  Education Method: Verbal  Barriers to Learning: None  Education Outcome: Verbalized understanding    OT Follow Up:   OT D/C RECOMMENDATIONS  REQUIRES OT FOLLOW-UP: Yes       Assessment/Discharge Disposition:  Assessment: Pt is a 69year old woman from home who presents to [address] [place] [address] for elective L total knee replacement. Pt demonstrates decreased balance, endurance and strength for mobility and ADLs. Pt would benefit from continued OT to maximize independence and safety with transfers, ADLs and IADL tasks.   Performance deficits / Impairments: Decreased functional mobility , Decreased ADL status, Decreased endurance, Decreased balance, Decreased high-level IADLs, Decreased strength  Prognosis: Good  Discharge Recommendations: Continue to assess pending progress  Decision Making: Medium Complexity  History: Pt's medical history is moderately complex  Exam: Pt. has 6 performance deficits  Assistance / Modification: Pt requires min A    AMPAC (Six Click) Self care Score   How much help is needed for putting on and taking off regular lower body clothing?: A Little  How much help is needed for bathing (which includes washing, rinsing, drying)?: A Little  How much help is needed for toileting (which includes using toilet, bedpan, or urinal)?: A Little  How much help is needed for putting on and taking off regular upper body clothing?: A Little  How much help is needed for taking care of personal grooming?: A Little  How much help for eating meals?: None  AM-PAC Inpatient Daily Activity Raw Score: 23  AM-PAC Inpatient ADL T-Scale Score : 40.22  ADL Inpatient CMS 0-100% Score: 42.8    Therapy key for assistance levels -   Independent/Mod I = Pt. is able to perform task with no assistance but may require a device   Stand by assistance = Pt. does not perform task at an independent level but does not need physical assistance, requires verbal cues  Minimal, Moderate, Maximal Assistance = Pt. requires physical assistance (25%, 50%, 75% assist from helper) for task but is able to actively participate in task   Dependent = Pt. requires total assistance with task and is not able to actively participate with task completion     Plan:  Occupational Therapy Plan  Times Per Week: 1-4x  Therapy Duration:  (Length of acute stay)  Current Treatment Recommendations: Strengthening, Balance training, Functional mobility training, Endurance training, Pain management, Safety education & training, Patient/Caregiver education & training, Equipment evaluation, education, & procurement, Self-Care / ADL, Home management training    Goals:   Patient will:    - Improve functional endurance to tolerate/complete 30 mins of ADL's  - Be Mod I in UB ADLs   - Be Mod I in LB ADLs  - Be Mod I in ADL transfers without LOB  - Be Mod I in toileting tasks  - Improve B UE strength and endurance to 4/5 in order to participate in self-care activities as projected. - Access appropriate D/C site with as few architectural barriers as possible. - Sequence self-care tasks with no verbal cues for safety    Patient Goal: Patient goals : \"I want to get home\"     Discussed and agreed upon: Yes Comments:       Therapy Time:   Individual   Time In 910   Time Out 0942   Minutes 32     ADL/IADL trainin minutes  Eval: 12 minutes     Electronically signed by:     Dore Merlin, OTR/L,   3/8/2023, 10:33 AM

## 2023-03-08 NOTE — CARE COORDINATION
MET WITH PT AT BEDSIDE. PT FROM HOME W/. HAS WALKER. SPOKE WITH [address] THIS AM, PT IS ACCEPTED TO [address]. PT DENIES FURTHER NEEDS.

## 2023-03-08 NOTE — PLAN OF CARE
See OT evaluation for all goals and OT POC.  Electronically signed by MIRTA Aldridge/L on 3/8/2023 at 10:38 AM

## 2023-03-08 NOTE — DISCHARGE INSTR - COC
Continuity of Care Form    Patient Name: Thierno Hanks   :  1945  MRN:  [id number]    Admit date:  3/7/2023  Discharge date:  23    Code Status Order: Full Code   Advance Directives:   Advance Care Flowsheet Documentation       Date/Time Healthcare Directive Type of Healthcare Directive Copy in [address] Agent's Name Healthcare Agent's Phone Number    23 1026 No, patient does not have an advance directive for healthcare treatment -- -- -- -- --            Admitting Physician:  Weston Forte MD  PCP: Harley Sanchez MD    Discharging Nurse: [facility] Unit/Room#: [facility] Unit Phone Number: [phone]    Emergency Contact:   Extended Emergency Contact Information  Primary Emergency Contact: [address] of [address] [place] [address] Phone: [phone]  Mobile Phone: [phone]  Relation: Child  Secondary Emergency Contact: Dahiana Machado[address] Phone: [phone]  Mobile Phone: [phone]  Relation: Child    Past Surgical History:  Past Surgical History:   Procedure Laterality Date    CARDIAC SURGERY  2017    has x 4 cardiac stents    COLONOSCOPY  2015    DR Tawnya Johnson - DIVERTICULOSIS    COLONOSCOPY N/A 2021    COLONOSCOPY DIAGNOSTIC performed by Lisa Quiroz MD at [facility]  05/17/2017    x2 stents    CYST REMOVAL Left 2019    RESECTION OF LEFT PINNA LESION WITH GRAFT performed by Crow Palma MD at [address] [facility], COLON, DIAGNOSTIC      FINGER SURGERY  14    middle finger right hand due to infection    HYSTERECTOMY ([facility])  [address]  [id number]    SHOULDER SURGERY  2008    shoulder dislocated - popped  back in Right shoulder    TOTAL KNEE ARTHROPLASTY Right 2022    RIGHT KNEE TOTAL KNEE ARTHROPLASTY performed by Weston Forte MD at [address] [place] [address] Left 3/7/2023    LEFT KNEE TOTAL KNEE ARTHROPLASTY performed by Janeth Kennedy MD at [address]  01/14/2015    DR Sariah Donovan - ULCER IN THE ANTRUM    UPPER GASTROINTESTINAL ENDOSCOPY  05/06/2015    DR Sariah Donovan - GASTRITIS, PREVIOUS ULCER HEALED    UPPER GASTROINTESTINAL ENDOSCOPY N/A 1/2/2021    EGD DIAGNOSTIC ONLY performed by Chung Ndiaye MD at [facility]       Immunization History:   Immunization History   Administered Date(s) Administered    COVID-19, PFIZER PURPLE top, DILUTE for use, (age 15 y+), 30mcg/0.3mL 03/16/2021, 04/06/2021    Pneumococcal Conjugate 13-valent (Bvixont31) 10/30/2018    Pneumococcal Polysaccharide (Ypbsjeqtf05) 10/02/2012    Tdap (Boostrix, Adacel) 12/05/2014       Active Problems:  Patient Active Problem List   Diagnosis Code    Hypothyroid E03.9    Essential hypertension I10    Hyperlipidemia E78.5    Morbid obesity due to excess calories ([facility]) E66.01    History of ST elevation myocardial infarction (STEMI) I25.2    Coronary artery disease involving native coronary artery of native heart without angina pectoris I25.10    Chronic bilateral low back pain with bilateral sciatica M54.42, M54.41, G89.29    Primary osteoarthritis of both knees M17.0    Spinal stenosis of lumbar region with neurogenic claudication M48.062    Degenerative spondylolisthesis M43.10    Asthma J45.909    CKD (chronic kidney disease) stage 3, GFR 30-59 ml/min ([facility]) N18.30    Adenomatous polyp of descending colon D12.4    Adenomatous polyp of ascending colon D12.2    Adenomatous polyp of colon D12.6    Chronic gastritis without bleeding K29.50    Anemia D64.9    Gastrointestinal hemorrhage K92.2    RBBB (right bundle branch block) I45.10    Status post total knee replacement, right Z96.651    Status post total knee replacement, left G93.182       Isolation/Infection:   Isolation            Contact          Patient Infection Status       Infection Onset Added Last Indicated Last Indicated By Review Planned Expiration Resolved Resolved By    MRSA 08/16/22 08/17/22 08/16/22 MRSA DNA Probe, Nasal        Resolved    COVID-19 (Rule Out) 02/04/21 02/04/21 02/04/21 COVID-19 (Ordered)   02/04/21 Rule-Out Test Resulted    COVID-19 (Rule Out) 12/22/20 12/22/20 12/22/20 COVID-19 (Ordered)   12/22/20 Rule-Out Test Resulted            Nurse Assessment:  Last Vital Signs: BP (!) 143/50   Pulse 80   Temp 98.7 °F (37.1 °C) (Oral)   Resp 18   Ht 5' 0.5\" (1.537 m)   Wt 224 lb 12.8 oz (102 kg)   LMP 09/17/1996   SpO2 94%   BMI 43.18 kg/m²     Last documented pain score (0-10 scale): Pain Level: 8  Last Weight:   Wt Readings from Last 1 Encounters:   03/07/23 224 lb 12.8 oz (102 kg)     Mental Status:  oriented and alert    IV Access:  - None    Nursing Mobility/ADLs:  Walking   Assisted  Transfer  Assisted  Bathing  Assisted  Dressing  Assisted  Toileting  Assisted  Feeding  Independent  Med Admin  Independent  Med Delivery   whole    Wound Care Documentation and Therapy:  Incision 03/07/23 Knee Anterior; Left (Active)   Dressing Status Clean;Dry; Intact 03/08/23 0830   Incision Cleansed Cleansed with saline 03/07/23 1247   Closure Surgical glue; Sutures 03/08/23 0830   Margins Approximated 03/07/23 1247   Incision Assessment Dry 03/08/23 0830   Drainage Amount None 03/08/23 0830   Odor None 03/08/23 0830   Pati-incision Assessment Warm; Intact 03/08/23 0830   Number of days: 1        Elimination:  Continence: Bowel: Yes  Bladder: Yes  Urinary Catheter: None   Colostomy/Ileostomy/Ileal Conduit: No       Date of Last BM: 03/07/23      Intake/Output Summary (Last 24 hours) at 3/8/2023 1547  Last data filed at 3/8/2023 0827  Gross per 24 hour   Intake 5 ml   Output --   Net 5 ml     I/O last 3 completed shifts: In: 1358.3 [I.V.:1000; IV Piggyback:358.3]  Out: -     Safety Concerns:      At Risk for Falls    Impairments/Disabilities:      None    Nutrition Therapy:  Current Nutrition Therapy:   - Oral Diet: General    Routes of Feeding: Oral  Liquids: Thin Liquids  Daily Fluid Restriction: no  Last Modified Barium Swallow with Video (Video Swallowing Test): not done    Treatments at the Time of Hospital Discharge:   Respiratory Treatments: n/a    Oxygen Therapy:  is not on home oxygen therapy. Ventilator:    - No ventilator support    Rehab Therapies: Physical Therapy and Occupational Therapy  Weight Bearing Status/Restrictions: No weight bearing restrictions  Other Medical Equipment (for information only, NOT a DME order):  walker  Other Treatments: Left TKA     Patient's personal belongings (please select all that are sent with patient):  Glasses, Hearing Aides right    RN SIGNATURE:  Electronically signed by Maribel Andrade RN on 3/8/23 at 3:50 PM EST    CASE MANAGEMENT/SOCIAL WORK SECTION    Inpatient Status Date: ***    Readmission Risk Assessment Score:  Readmission Risk              Risk of Unplanned Readmission:  0           Discharging to Facility/ Agency   Name:   Address:  Phone:  Fax:    Dialysis Facility (if applicable)   Name:  Address:  Dialysis Schedule:  Phone:  Fax:    / signature: {Esignature:[id number]}    PHYSICIAN SECTION    Prognosis: {Prognosis:5539689728}    Condition at Discharge: [address] [place] [address] Patient Condition:[id number]}    Rehab Potential (if transferring to Rehab): {Prognosis:2813972049}    Recommended Labs or Other Treatments After Discharge: ***    Physician Certification: I certify the above information and transfer of Thierno Hanks  is necessary for the continuing treatment of the diagnosis listed and that she requires {Admit to Appropriate Level of Care:18222} for {GREATER/LESS:736082364} 30 days.      Update Admission H&P: {CHP DME Changes in [facility]:749446727}    PHYSICIAN SIGNATURE:  {Esignature:[id number]}

## 2023-03-08 NOTE — PROGRESS NOTES
Ace wrap and fluff dressing removed. Pt states stockings are too tight on legs and refused for them to be placed.

## 2023-03-08 NOTE — CARE COORDINATION
Met with pt at bedside and reviewed Medicare Outpatient Observation Notice, pt gave verbal consent and copy given to pt. Pt states her son and spouse will be picking her up on discharge and going home with [facility].

## 2023-03-08 NOTE — CARE COORDINATION
MET W/PT TO ASSESS NEEDS AND DISCUSS DISCHARGE PLAN WHICH IS HOME [address]. PT HAS WALKER. [address] IS ABLE TO ACCEPT. PT IS UP IN CHAIR WITH [facility] INFUSING. WILL FOLLOW. NEPHROLOGY CONSULTED.

## 2023-03-08 NOTE — PROGRESS NOTES
Progress Note   TKA    Subjective:     Post-Operative Day: 1 Status Post left Total Knee Arthroplasty  Systemic or Specific Complaints:No Complaints    Objective:     CURRENT VITALS:  BP (!) 117/39   Pulse 95   Temp 98.4 °F (36.9 °C) (Oral)   Resp 18   Ht 5' 0.5\" (1.537 m)   Wt 224 lb 12.8 oz (102 kg)   LMP 09/17/1996   SpO2 92%   BMI 43.18 kg/m²     General: alert, appears stated age, and cooperative   Wound: No Erythema, No Edema, No Drainage, and Dressing CDI   Motion: Painless Range of Motion   DVT Exam: No evidence of DVT seen on physical exam.  Negative Catrachita's sign. No significant calf/ankle edema. Knee swollen but thigh soft to palpation. Moving foot and ankle. Good distal pulses. Data Review  Recent Labs     03/08/23  0605   WBC 5.6   RBC 2.87*   HGB 8.7*   HCT 27.1*   MCV 94.4   MCH 30.4   MCHC 32.2*   RDW 14.0            Assessment:     Status Post left Total Knee Arthroplasty. Doing well postoperatively. Acute blood loss anemia secondary to expected surgical blood loss: stable. Hgb this am 8.7. Patient asymptomatic, remains hemodynamically stable with no signs of active bleeding. Recommend daily CBC's during hospitalization. Patient has no complaints of any pain at this time. Patient found to have preoperative UTI and has been started on bactrim po BID for 3 days. CKD: renal function elevated postoperatively. Will provide LR and hold all neprhotoxic agents. Plan:      1: Discharge today, Return to Clinic: 2 weeks :  2:  Continue Deep venous thrombosis prophylaxis: ASA 81 mg BID for 30 days   3:  Continue physical therapy: WBAT to operative extremity   4:  Continue Pain Control  5. Discharge planning: patient plans to return to home with home care. Orders placed. CM and SW following for discharge planning.

## 2023-03-08 NOTE — CONSULTS
[facility]  [facility]ult [facility]           Reason for Consult:  shonda, ckd stage 3  Requesting Physician:  Dr. Milli Mckay    Chief Complaint:  left knee replacement  History Obtained From:  patient, electronic medical record    History of Present Ilness:    69y.o. year old female admitted with left knee DJD for left knee TKR. Has done well. I see for ckd stage 3. Risk factors of HTN and CAD. When first seen in 2016, cr was 2.2.  improved when NSAIDs were stopped. On losartan/hctz as well. B/l cr was around 1 but recently in mid 1's. Was 1.3 up to 1.8 now. K a little high. Her losartan/hctz was held. Otherwise doing ok. Some drop in GFR. No NSAIDs. On bactrim.       Past Medical History:        Diagnosis Date    Antral ulcer 01/14/2015    DR Hawa Nolen    Asthma     \"cured by beestings\"    Coronary artery disease     Coronary artery disease involving native coronary artery of native heart without angina pectoris 7/10/2018    has x 4 cardiac stents / Dr. Jamie Lyle    Diverticulosis of colon (without mention of hemorrhage) 01/14/2015    DR Hawa Nolen    Essential hypertension 12/10/2013    meds > 20 yrs    History of blood transfusion 1990s    with hysterectomy    History of normal resting EKG 1996    normal    History of ST elevation myocardial infarction (STEMI) 7/11/2017    History of transfusion of whole blood 1996    Excessive bleeding before hysterectomy    Hyperlipidemia     meds > 2 yrs    Hypertension     Hypothyroidism     past trx years ago then stopped / recent restart    Kidney disease     Low back pain     Morbid obesity due to excess calories ([address] 5/13/2017    Morbid obesity due to excess calories ([address] 6/6/2017    Osteoarthritis     Pneumonia     RBBB (right bundle branch block) 3/17/2022    Shoulder dislocation     ST elevation myocardial infarction involving left circumflex coronary artery ([address] 5/13/2017    Unspecified gastritis and gastroduodenitis without mention of hemorrhage 05/06/2015     ARIANA       Past Surgical History:        Procedure Laterality Date    CARDIAC SURGERY  2017    has x 4 cardiac stents    COLONOSCOPY  01/14/2015    DR LANE - DIVERTICULOSIS    COLONOSCOPY N/A 1/2/2021    COLONOSCOPY DIAGNOSTIC performed by Mohan Lane MD at [facility] OR    CORONARY ANGIOPLASTY WITH STENT PLACEMENT  05/17/2017    x2 stents    CYST REMOVAL Left 2/7/2019    RESECTION OF LEFT PINNA LESION WITH GRAFT performed by Anton Renteria MD at [facility]    ENDOSCOPY, COLON, DIAGNOSTIC      FINGER SURGERY  12/9/14    middle finger right hand due to infection    HYSTERECTOMY (CERVIX STATUS UNKNOWN)  1996    LASIK  [id number]    SHOULDER SURGERY  2008    shoulder dislocated - popped  back in Right shoulder    TOTAL KNEE ARTHROPLASTY Right 8/30/2022    RIGHT KNEE TOTAL KNEE ARTHROPLASTY performed by Vincenzo Wadsworth MD at [facility]    UPPER GASTROINTESTINAL ENDOSCOPY  01/14/2015    DR LANE - ULCER IN THE ANTRUM    UPPER GASTROINTESTINAL ENDOSCOPY  05/06/2015    DR LANE - GASTRITIS, PREVIOUS ULCER HEALED    UPPER GASTROINTESTINAL ENDOSCOPY N/A 1/2/2021    EGD DIAGNOSTIC ONLY performed by Mohan Lane MD at [facility] OR       Home Medications:    No current facility-administered medications on file prior to encounter.     Current Outpatient Medications on File Prior to Encounter   Medication Sig Dispense Refill    oxybutynin (DITROPAN-XL) 10 MG extended release tablet Take 1 tablet by mouth daily 30 tablet 5    metoprolol tartrate (LOPRESSOR) 25 MG tablet TAKE 1 TABLET BY MOUTH TWICE DAILY 180 tablet 3    losartan-hydroCHLOROthiazide (HYZAAR) 100-25 MG per tablet TAKE 1 TABLET BY MOUTH EVERY DAY 90 tablet 3    dilTIAZem (CARDIZEM CD) 240 MG extended release capsule TAKE 1 CAPSULE BY MOUTH DAILY 90 capsule 3    FEROSUL 325 (65 Fe) MG tablet Take 1 tablet by mouth 2 times daily (Patient taking differently: Take 325 mg by mouth daily (with breakfast)) 60 tablet 5    nitroGLYCERIN (NITROSTAT) 0.4 MG SL tablet Place 1  tablet under the tongue every 5 minutes as needed for Chest pain 25 tablet 3       Allergies:  Lisinopril and Tramadol    Social History:    Social History     Socioeconomic History    Marital status:      Spouse name: Not on file    Number of children: Not on file    Years of education: Not on file    Highest education level: Not on file   Occupational History    Not on file   Tobacco Use    Smoking status: Never    Smokeless tobacco: Never   Vaping Use    Vaping Use: Never used   Substance and Sexual Activity    Alcohol use: Yes     Comment: glass of wine once a year    Drug use: No    Sexual activity: Yes     Partners: Male   Other Topics Concern    Not on file   Social History Narrative    Not on file     Social Determinants of Health     Financial Resource Strain: Low Risk     Difficulty of Paying Living Expenses: Not hard at all   Food Insecurity: No Food Insecurity    Worried About Running Out of Food in the Last Year: Never true    [address] in the Last Year: Never true   Transportation Needs: Unknown    Lack of Transportation (Medical): Not on file    Lack of Transportation (Non-Medical): No   Physical Activity: Not on file   Stress: Not on file   Social Connections: Not on file   Intimate Partner Violence: Not on file   Housing Stability: Unknown    Unable to Pay for Housing in the Last Year: Not on file    Number of Places Lived in the Last Year: Not on file    Unstable Housing in the Last Year: No       Family History:   Family History   Problem Relation Age of Onset    Heart Disease Mother 61    Cancer Father 79        kidney    No Known Problems Daughter        Review of Systems:   Review of Systems   Constitutional:  Negative for activity change. HENT:  Negative for congestion. Eyes:  Negative for discharge. Respiratory:  Negative for shortness of breath. Cardiovascular:  Positive for leg swelling. Gastrointestinal:  Negative for abdominal distention.    Endocrine: Negative for cold intolerance. Genitourinary:  Negative for difficulty urinating. Musculoskeletal:  Positive for arthralgias. Allergic/Immunologic: Negative for environmental allergies. Neurological:  Negative for dizziness. Hematological:  Negative for adenopathy. Psychiatric/Behavioral:  Negative for agitation. Physical exam:   Constitutional:  VITALS:  BP (!) 115/52   Pulse 72   Temp 98.5 °F (36.9 °C) (Oral)   Resp 18   Ht 5' 0.5\" (1.537 m)   Wt 224 lb 12.8 oz (102 kg)   LMP 09/17/1996   SpO2 96%   BMI 43.18 kg/m²   Gen: alert, awake, nad  Skin: no rash, turgor wnl  Heent:  eomi, mmm  Neck: no bruits or jvd noted, thyroid normal  Lungs:  Normal expansion. Clear to auscultation. No rales, rhonchi, or wheezing. Heart:  Heart sounds are normal.  Regular rate and rhythm without murmur, gallop or rub. Abdomen:  +bs, soft, nt, nd, no hepatosplenomegaly   Extremities: Extremities warm to touch, pink, with no edema.   Psychiatric: mood and affect appropriate; judgement and insight intact  Musculoskeletal:  Rom, muscular strength intact; digits, nails normal    Data/  CBC:   Lab Results   Component Value Date/Time    WBC 5.6 03/08/2023 06:05 AM    RBC 2.87 03/08/2023 06:05 AM    HGB 8.7 03/08/2023 06:05 AM    HCT 27.1 03/08/2023 06:05 AM    MCV 94.4 03/08/2023 06:05 AM    MCH 30.4 03/08/2023 06:05 AM    MCHC 32.2 03/08/2023 06:05 AM    RDW 14.0 03/08/2023 06:05 AM     03/08/2023 06:05 AM    MPV 8.6 06/29/2015 07:21 PM     BMP:    Lab Results   Component Value Date/Time     03/08/2023 11:00 AM    K 5.4 03/08/2023 11:00 AM    K 5.3 03/08/2023 06:05 AM     03/08/2023 11:00 AM    CO2 21 03/08/2023 11:00 AM    BUN 34 03/08/2023 11:00 AM    LABALBU 4.0 02/28/2023 10:08 AM    CREATININE 1.80 03/08/2023 11:00 AM    CALCIUM 8.7 03/08/2023 11:00 AM    GFRAA 34.9 08/31/2022 05:48 AM    LABGLOM 28.7 03/08/2023 11:00 AM    GLUCOSE 123 03/08/2023 11:00 AM     XR KNEE LEFT (1-2 VIEWS)    Result Date: 3/7/2023  EXAMINATION: TWO XRAY VIEWS OF THE LEFT KNEE 3/7/2023 2:06 pm COMPARISON: None. HISTORY: ORDERING SYSTEM PROVIDED HISTORY: status post left total knee arthroplasty TECHNOLOGIST PROVIDED HISTORY: Of operative side while in recovery room. Reason for exam:->status post left total knee arthroplasty What reading provider will be dictating this exam?->CRC     Bipolar non cemented left knee arthroplasty. No fracture. No abnormal lucency bone prosthetic interface. RECOMMENDATION: Negative postoperative left knee. XR Knee Bilateral 1 or 2 VW    Result Date: 3/3/2023  MRN: [id number] Patient Name: Dang Staples: KNEE; 1 OR 2 VIEWS;  Left;  3/3/2023 9:08 am  INDICATION: PAIN  M17.12: Left knee DJD. ACCESSION NUMBER(S): [id number]  ORDERING CLINICIAN: Samy Wiseman  FINDINGS: AP lateral views of the left knee show end-stage DJD with severe valgus deformity and bone-on-bone articulation laterally. No acute fractures identified. Electronically signed by: Kaia Villatoro MD      Assessment/  70 yo lady with CKD stage 3. Risk factors of HTN, CAD. B/l cr recently in mid 1's. Admitted for left TKR. Has mild JP and high k.  Bp ok to low. Plan/  1- hold losartan/hct for now  2- agree with ivf  3- retacrit to help with anemia  4. Would be ok with dc today or tomorrow. Outpt labs ordered for next week  5. Would keep off losartan until seen as oupt. .  I asked pt to check bp at home and call me if high    Thank you for the consultation. Please do not hesitate to call with questions.     Charlie Frost MD

## 2023-03-08 NOTE — PROGRESS NOTES
Physical Therapy Missed Treatment   Facility/Department: [facility] MED SURG O014/Z093-15    NAME: Wenceslao Sarabia    : 1945 (69 y.o.)  MRN: [id number]    Account: [de-identified]  Gender: female    Chart reviewed, attempted PT at 66. Patient unavailable 2° to:    [] Hold per nsg request    [x] Pt declined pt unwilling to work with therapy at this time states confidence in mobility, car transfer, ramp navigation, and HEP. Pt wants to rest in preparation of possible dc this date. [x] Nsg notified   [] Other notified    [] Pt. . off floor for test/procedure. [] Pt. Unavailable       Will attempt PT treatment again at earliest convenience.       Electronically signed by Viridiana August PTA on 3/8/23 at 3:16 PM EST

## 2023-03-08 NOTE — CONSULTS
Physician Progress Note    3/8/2023   3:41 PM    Name:  Anita Church  MRN:    [id number]      Day: 0     Admit Date: 3/7/2023  9:31 AM  PCP: Jl Shin MD    Code Status:  Full Code    Subjective:     She is doing well after surgery. Denies complaints. She reports history of CAD, CKD 3.     Current Facility-Administered Medications   Medication Dose Route Frequency Provider Last Rate Last Admin    lactated ringers IV soln infusion   IntraVENous Continuous Yovannyi Ashley Hernandez APRN -  mL/hr at 03/08/23 0825 New Bag at 03/08/23 0825    lactated ringers IV soln infusion   IntraVENous Continuous Eagleondi Ashley Hernandez APRN - CNP 50 mL/hr at 03/07/23 1837 New Bag at 03/07/23 1837    sodium chloride flush 0.9 % injection 5-40 mL  5-40 mL IntraVENous 2 times per day Boogie Mora, APRN - CNP   10 mL at 03/08/23 0827    sodium chloride flush 0.9 % injection 5-40 mL  5-40 mL IntraVENous PRN Nicolas Hernandez APRN - CNP        0.9 % sodium chloride infusion   IntraVENous PRN Nicolas Hernandez APRN - CNP        acetaminophen (TYLENOL) tablet 650 mg  650 mg Oral Q6H Nicolas Hernandez APRN - CNP   650 mg at 03/08/23 1521    oxyCODONE (ROXICODONE) immediate release tablet 5 mg  5 mg Oral Q4H PRN Nicolas Hernandez APRN - CNP   5 mg at 03/08/23 1728    Or    oxyCODONE (ROXICODONE) immediate release tablet 10 mg  10 mg Oral Q4H PRN Nicolas Hernandez APRN - CNP   10 mg at 03/08/23 1520    ondansetron (ZOFRAN-ODT) disintegrating tablet 4 mg  4 mg Oral Q8H PRN BEN Viera - JACKSON        Or    ondansetron (ZOFRAN) injection 4 mg  4 mg IntraVENous Q6H PRN Nicolas Hernandez APRN - JACKSON        HYDROmorphone (DILAUDID) injection 0.25 mg  0.25 mg IntraVENous Q3H PRN BEN Viera CNP        Or    HYDROmorphone (DILAUDID) injection 0.5 mg  0.5 mg IntraVENous Q3H PRN BEN Viera CNP        sennosides-docusate sodium (SENOKOT-S) 8.6-50 MG tablet 1 tablet  1 tablet Oral BID Kwabena Mora, APRN - CNP   1 tablet at 23 0810    polyethylene glycol (GLYCOLAX) packet 17 g  17 g Oral Daily PRN Adjondi Dejuan Flynnmiki APRN - CNP        aspirin EC tablet 81 mg  81 mg Oral BID Adjondi Dejuan Michelle, APRN - CNP   81 mg at 23 0810    sulfamethoxazole-trimethoprim (BACTRIM DS;SEPTRA DS) 800-160 MG per tablet 1 tablet  1 tablet Oral 2 times per day Adjdavid Dejuan FlynnDELBERT livingstonN - CNP   1 tablet at 23 0820    0.9 % sodium chloride infusion   IntraVENous Continuous Gretel Ulrich MD   Stopped at 23 0829    dilTIAZem (CARDIZEM CD) extended release capsule 240 mg  240 mg Oral Daily Laci Fleet, DO   240 mg at 23 0820    metoprolol tartrate (LOPRESSOR) tablet 25 mg  25 mg Oral BID Laci Fleet, DO   25 mg at 23 0810    [Held by provider] losartan (COZAAR) tablet 100 mg  100 mg Oral Daily Laci Fleet, DO   100 mg at 23 7740    And    [Held by provider] hydroCHLOROthiazide (HYDRODIURIL) tablet 25 mg  25 mg Oral Daily Laci Fleet, DO           Physical Examination:      Vitals:  BP (!) (P) 143/50   Pulse (P) 80   Temp (P) 98.7 °F (37.1 °C)   Resp (P) 18   Ht 5' 0.5\" (1.537 m)   Wt 224 lb 12.8 oz (102 kg)   LMP 1996   SpO2 96%   BMI 43.18 kg/m²   Temp (24hrs), Av °F (36.7 °C), Min:97.2 °F (36.2 °C), Max:98.7 °F (37.1 °C)      General appearance: alert, cooperative and no distress. Obese  Mental Status: oriented to person, place and time and normal affect  Lungs: clear to auscultation bilaterally, normal effort  Heart: regular rate and rhythm, no murmur  Abdomen: soft, nontender, nondistended, bowel sounds present, no masses  Extremities: no edema, redness, tenderness in the calves.  Cap refill <2s  Skin: no gross lesions, rashes    Data:     Labs:  Recent Labs     23  06   WBC 5.6   HGB 8.7*        Recent Labs     23  0605 23  1100    140   K 5.3* 5.4*   * 110*   CO2 19* 21   BUN 36* 34*   CREATININE 1.68* 1.80*   GLUCOSE 112* 123*     No results for input(s): AST, ALT, ALB, BILITOT, ALKPHOS in the last 72 hours. Assessment and Plan:        S/p left TKA: Postoperative antibiotics, pain control, DVT prophylaxis per surgeon    CKD 3: Holding diuretic and losartan for now. Agree with holding losartan at discharge due to mild hyperkalemia.     CAD  Gastritis  Chronic low back pain with sciatica  Class III obesity     Okay for discharge    30 minutes in total care time    Electronically signed by Era Sharif DO on 3/8/2023 at 3:41 PM

## 2023-03-09 ENCOUNTER — APPOINTMENT (OUTPATIENT)
Dept: CT IMAGING | Age: 78
DRG: 981 | End: 2023-03-09
Payer: MEDICARE

## 2023-03-09 ENCOUNTER — HOSPITAL ENCOUNTER (INPATIENT)
Age: 78
LOS: 5 days | Discharge: HOME HEALTH | DRG: 981 | End: 2023-03-15
Attending: EMERGENCY MEDICINE | Admitting: INTERNAL MEDICINE
Payer: MEDICARE

## 2023-03-09 DIAGNOSIS — J18.9 PNEUMONIA DUE TO INFECTIOUS ORGANISM, UNSPECIFIED LATERALITY, UNSPECIFIED PART OF LUNG: Primary | ICD-10-CM

## 2023-03-09 DIAGNOSIS — R09.02 HYPOXIA: ICD-10-CM

## 2023-03-09 LAB
ALBUMIN SERPL-MCNC: 4 G/DL (ref 3.5–4.6)
ALP BLD-CCNC: 96 U/L (ref 40–130)
ALT SERPL-CCNC: 6 U/L (ref 0–33)
ANION GAP SERPL CALCULATED.3IONS-SCNC: 14 MEQ/L (ref 9–15)
AST SERPL-CCNC: 18 U/L (ref 0–35)
BASOPHILS ABSOLUTE: 0 K/UL (ref 0–0.2)
BASOPHILS RELATIVE PERCENT: 0.4 %
BILIRUB SERPL-MCNC: 0.3 MG/DL (ref 0.2–0.7)
BUN BLDV-MCNC: 38 MG/DL (ref 8–23)
CALCIUM SERPL-MCNC: 9.5 MG/DL (ref 8.5–9.9)
CHLORIDE BLD-SCNC: 105 MEQ/L (ref 95–107)
CO2: 18 MEQ/L (ref 20–31)
CREAT SERPL-MCNC: 2 MG/DL (ref 0.5–0.9)
EOSINOPHILS ABSOLUTE: 0.3 K/UL (ref 0–0.7)
EOSINOPHILS RELATIVE PERCENT: 3.4 %
GFR SERPL CREATININE-BSD FRML MDRD: 25.3 ML/MIN/{1.73_M2}
GLOBULIN: 2.7 G/DL (ref 2.3–3.5)
GLUCOSE BLD-MCNC: 113 MG/DL (ref 70–99)
HCT VFR BLD CALC: 28.7 % (ref 37–47)
HEMOGLOBIN: 9.2 G/DL (ref 12–16)
LYMPHOCYTES ABSOLUTE: 0.8 K/UL (ref 1–4.8)
LYMPHOCYTES RELATIVE PERCENT: 9.5 %
MCH RBC QN AUTO: 30.5 PG (ref 27–31.3)
MCHC RBC AUTO-ENTMCNC: 31.9 % (ref 33–37)
MCV RBC AUTO: 95.5 FL (ref 79.4–94.8)
MONOCYTES ABSOLUTE: 0.4 K/UL (ref 0.2–0.8)
MONOCYTES RELATIVE PERCENT: 4.8 %
NEUTROPHILS ABSOLUTE: 6.8 K/UL (ref 1.4–6.5)
NEUTROPHILS RELATIVE PERCENT: 81.9 %
PDW BLD-RTO: 14.5 % (ref 11.5–14.5)
PLATELET # BLD: 153 K/UL (ref 130–400)
POTASSIUM SERPL-SCNC: 4.7 MEQ/L (ref 3.4–4.9)
RBC # BLD: 3.01 M/UL (ref 4.2–5.4)
SODIUM BLD-SCNC: 137 MEQ/L (ref 135–144)
TOTAL PROTEIN: 6.7 G/DL (ref 6.3–8)
TROPONIN: <0.01 NG/ML (ref 0–0.01)
WBC # BLD: 8.3 K/UL (ref 4.8–10.8)

## 2023-03-09 PROCEDURE — 36415 COLL VENOUS BLD VENIPUNCTURE: CPT

## 2023-03-09 PROCEDURE — 99285 EMERGENCY DEPT VISIT HI MDM: CPT

## 2023-03-09 PROCEDURE — 6360000004 HC RX CONTRAST MEDICATION: Performed by: EMERGENCY MEDICINE

## 2023-03-09 PROCEDURE — 84484 ASSAY OF TROPONIN QUANT: CPT

## 2023-03-09 PROCEDURE — 96375 TX/PRO/DX INJ NEW DRUG ADDON: CPT

## 2023-03-09 PROCEDURE — 96361 HYDRATE IV INFUSION ADD-ON: CPT

## 2023-03-09 PROCEDURE — 93005 ELECTROCARDIOGRAM TRACING: CPT | Performed by: EMERGENCY MEDICINE

## 2023-03-09 PROCEDURE — 6360000002 HC RX W HCPCS: Performed by: EMERGENCY MEDICINE

## 2023-03-09 PROCEDURE — 96374 THER/PROPH/DIAG INJ IV PUSH: CPT

## 2023-03-09 PROCEDURE — 2580000003 HC RX 258: Performed by: EMERGENCY MEDICINE

## 2023-03-09 PROCEDURE — 80053 COMPREHEN METABOLIC PANEL: CPT

## 2023-03-09 PROCEDURE — 6370000000 HC RX 637 (ALT 250 FOR IP): Performed by: EMERGENCY MEDICINE

## 2023-03-09 PROCEDURE — 71275 CT ANGIOGRAPHY CHEST: CPT

## 2023-03-09 PROCEDURE — 85025 COMPLETE CBC W/AUTO DIFF WBC: CPT

## 2023-03-09 PROCEDURE — 93005 ELECTROCARDIOGRAM TRACING: CPT | Performed by: NURSE PRACTITIONER

## 2023-03-09 RX ORDER — NITROGLYCERIN 0.4 MG/1
0.4 TABLET SUBLINGUAL EVERY 5 MIN PRN
Status: DISCONTINUED | OUTPATIENT
Start: 2023-03-09 | End: 2023-03-15 | Stop reason: HOSPADM

## 2023-03-09 RX ORDER — ONDANSETRON 2 MG/ML
4 INJECTION INTRAMUSCULAR; INTRAVENOUS ONCE
Status: COMPLETED | OUTPATIENT
Start: 2023-03-09 | End: 2023-03-09

## 2023-03-09 RX ORDER — 0.9 % SODIUM CHLORIDE 0.9 %
1000 INTRAVENOUS SOLUTION INTRAVENOUS ONCE
Status: COMPLETED | OUTPATIENT
Start: 2023-03-09 | End: 2023-03-09

## 2023-03-09 RX ORDER — MORPHINE SULFATE 4 MG/ML
4 INJECTION, SOLUTION INTRAMUSCULAR; INTRAVENOUS ONCE
Status: COMPLETED | OUTPATIENT
Start: 2023-03-09 | End: 2023-03-09

## 2023-03-09 RX ORDER — 0.9 % SODIUM CHLORIDE 0.9 %
1000 INTRAVENOUS SOLUTION INTRAVENOUS ONCE
Status: COMPLETED | OUTPATIENT
Start: 2023-03-09 | End: 2023-03-10

## 2023-03-09 RX ADMIN — NITROGLYCERIN 0.4 MG: 0.4 TABLET SUBLINGUAL at 22:04

## 2023-03-09 RX ADMIN — ONDANSETRON 4 MG: 2 INJECTION INTRAMUSCULAR; INTRAVENOUS at 21:12

## 2023-03-09 RX ADMIN — HYDROMORPHONE HYDROCHLORIDE 1 MG: 1 INJECTION, SOLUTION INTRAMUSCULAR; INTRAVENOUS; SUBCUTANEOUS at 22:33

## 2023-03-09 RX ADMIN — MORPHINE SULFATE 4 MG: 4 INJECTION, SOLUTION INTRAMUSCULAR; INTRAVENOUS at 21:13

## 2023-03-09 RX ADMIN — IOPAMIDOL 75 ML: 612 INJECTION, SOLUTION INTRAVENOUS at 23:14

## 2023-03-09 RX ADMIN — SODIUM CHLORIDE 1000 ML: 9 INJECTION, SOLUTION INTRAVENOUS at 21:11

## 2023-03-09 RX ADMIN — NITROGLYCERIN 0.4 MG: 0.4 TABLET SUBLINGUAL at 22:12

## 2023-03-09 RX ADMIN — SODIUM CHLORIDE 1000 ML: 9 INJECTION, SOLUTION INTRAVENOUS at 22:01

## 2023-03-09 ASSESSMENT — ENCOUNTER SYMPTOMS
ABDOMINAL DISTENTION: 0
PHOTOPHOBIA: 0
WHEEZING: 0
SORE THROAT: 0
SHORTNESS OF BREATH: 0
VOMITING: 0
COUGH: 0
ABDOMINAL PAIN: 0
CHEST TIGHTNESS: 0
EYE DISCHARGE: 0

## 2023-03-09 ASSESSMENT — PAIN DESCRIPTION - PAIN TYPE
TYPE: ACUTE PAIN
TYPE: ACUTE PAIN

## 2023-03-09 ASSESSMENT — PAIN SCALES - GENERAL
PAINLEVEL_OUTOF10: 9
PAINLEVEL_OUTOF10: 5

## 2023-03-09 ASSESSMENT — PAIN - FUNCTIONAL ASSESSMENT: PAIN_FUNCTIONAL_ASSESSMENT: 0-10

## 2023-03-09 ASSESSMENT — PAIN DESCRIPTION - LOCATION: LOCATION: CHEST

## 2023-03-09 NOTE — PROGRESS NOTES
Physical Therapy  Facility/Department: [facility] MED SURG Q176/G702-07  Physical Therapy Discharge      NAME: Crystal Partida    : 1945 (55 y.o.)  MRN: [id number]    Account: [de-identified]  Gender: female      Patient has been discharged from acute care hospital. DC patient from current PT program.      Electronically signed by Tay Wang PT on 3/9/23 at 3:02 PM EST

## 2023-03-09 NOTE — DISCHARGE SUMMARY
Discharge summary  This patient Kaylene Navas was admitted to the hospital on 3/7/2023  after undergoing Procedure(s) (LRB):  LEFT KNEE TOTAL KNEE ARTHROPLASTY (Left) without complications that morning. During the postoperative period,while in hospital, patient was medically managed by the hospitalist. Please see medial notes and H&P for patients additional diagnoses. Ortho agrees with all medical diagnoses and treatments while patient in hospital.  No significant or unexpected findings or abnormal ortho imaging were noted during the hospital stay    Hospital course      Patient tolerated surgical procedure well and there was no complications. Patient progressed adequately through their recovery during hospital stay including PT and rehabilitation. Patient was then D/C on 3/8/2023 to Home  in stable condition. Patient was instructed on the use of pain medications, the signs and symptoms of infection, and was given our number to call should they have any questions or concerns following discharge. Patient may WBAT to operative extremity with use of walker for assistance with ambulation   Aquacel dressing to be removed pod7 and incision left open to air  ASA 81 mg BID for DVT prophylaxis started on 3/6/23 and to be taken for 30 days  Follow up with surgeon in 2 weeks    Radiology images     EXAMINATION:   TWO XRAY VIEWS OF THE LEFT KNEE       3/7/2023 2:06 pm       COMPARISON:   None. HISTORY:   ORDERING SYSTEM PROVIDED HISTORY: status post left total knee arthroplasty   TECHNOLOGIST PROVIDED HISTORY:   Of operative side while in recovery room. Reason for exam:->status post left total knee arthroplasty   What reading provider will be dictating this exam?->CRC           Impression   Bipolar non cemented left knee arthroplasty. No fracture. No abnormal   lucency bone prosthetic interface. RECOMMENDATION:   Negative postoperative left knee.        Secondary diagnoses:   Acute postoperative pain: stable  Acute blood loss anemia secondary to expected surgical blood loss: stable  Acute on chronic renal insufficiency: hold all nephrotoxic agents

## 2023-03-10 ENCOUNTER — APPOINTMENT (OUTPATIENT)
Dept: ULTRASOUND IMAGING | Age: 78
DRG: 981 | End: 2023-03-10
Payer: MEDICARE

## 2023-03-10 PROBLEM — J18.9 ACUTE PNEUMONIA: Status: ACTIVE | Noted: 2023-03-10

## 2023-03-10 PROBLEM — R79.89 ELEVATED TROPONIN: Status: ACTIVE | Noted: 2023-03-10

## 2023-03-10 PROBLEM — R77.8 ELEVATED TROPONIN: Status: ACTIVE | Noted: 2023-03-10

## 2023-03-10 LAB
ADENOVIRUS BY PCR: NOT DETECTED
ALBUMIN SERPL-MCNC: 3.3 G/DL (ref 3.5–4.6)
ALP BLD-CCNC: 84 U/L (ref 40–130)
ALT SERPL-CCNC: <5 U/L (ref 0–33)
ANION GAP SERPL CALCULATED.3IONS-SCNC: 9 MEQ/L (ref 9–15)
AST SERPL-CCNC: 23 U/L (ref 0–35)
BASOPHILS ABSOLUTE: 0 K/UL (ref 0–0.2)
BASOPHILS RELATIVE PERCENT: 0.2 %
BILIRUB SERPL-MCNC: <0.2 MG/DL (ref 0.2–0.7)
BORDETELLA PARAPERTUSSIS BY PCR: NOT DETECTED
BORDETELLA PERTUSSIS BY PCR: NOT DETECTED
BUN BLDV-MCNC: 35 MG/DL (ref 8–23)
CALCIUM SERPL-MCNC: 8.8 MG/DL (ref 8.5–9.9)
CHLAMYDOPHILIA PNEUMONIAE BY PCR: NOT DETECTED
CHLORIDE BLD-SCNC: 113 MEQ/L (ref 95–107)
CO2: 19 MEQ/L (ref 20–31)
CORONAVIRUS 229E BY PCR: NOT DETECTED
CORONAVIRUS HKU1 BY PCR: NOT DETECTED
CORONAVIRUS NL63 BY PCR: NOT DETECTED
CORONAVIRUS OC43 BY PCR: NOT DETECTED
CREAT SERPL-MCNC: 1.84 MG/DL (ref 0.5–0.9)
EKG ATRIAL RATE: 101 BPM
EKG ATRIAL RATE: 104 BPM
EKG P AXIS: 40 DEGREES
EKG P AXIS: 63 DEGREES
EKG P-R INTERVAL: 148 MS
EKG P-R INTERVAL: 152 MS
EKG Q-T INTERVAL: 400 MS
EKG Q-T INTERVAL: 400 MS
EKG QRS DURATION: 166 MS
EKG QRS DURATION: 166 MS
EKG QTC CALCULATION (BAZETT): 518 MS
EKG QTC CALCULATION (BAZETT): 526 MS
EKG R AXIS: -12 DEGREES
EKG R AXIS: -19 DEGREES
EKG T AXIS: 21 DEGREES
EKG T AXIS: 41 DEGREES
EKG VENTRICULAR RATE: 101 BPM
EKG VENTRICULAR RATE: 104 BPM
EOSINOPHILS ABSOLUTE: 0.1 K/UL (ref 0–0.7)
EOSINOPHILS RELATIVE PERCENT: 1.4 %
GFR SERPL CREATININE-BSD FRML MDRD: 23 ML/MIN/{1.73_M2}
GFR SERPL CREATININE-BSD FRML MDRD: 27 ML/MIN/{1.73_M2}
GFR SERPL CREATININE-BSD FRML MDRD: 28 ML/MIN/{1.73_M2}
GLOBULIN: 2.2 G/DL (ref 2.3–3.5)
GLUCOSE BLD-MCNC: 100 MG/DL (ref 70–99)
HCT VFR BLD CALC: 23.2 % (ref 37–47)
HEMOGLOBIN: 7.4 G/DL (ref 12–16)
HUMAN METAPNEUMOVIRUS BY PCR: NOT DETECTED
HUMAN RHINOVIRUS/ENTEROVIRUS BY PCR: NOT DETECTED
INFLUENZA A BY PCR: NOT DETECTED
INFLUENZA B BY PCR: NOT DETECTED
LYMPHOCYTES ABSOLUTE: 0.5 K/UL (ref 1–4.8)
LYMPHOCYTES RELATIVE PERCENT: 8.3 %
MAGNESIUM: 2.1 MG/DL (ref 1.7–2.4)
MCH RBC QN AUTO: 30.9 PG (ref 27–31.3)
MCHC RBC AUTO-ENTMCNC: 32 % (ref 33–37)
MCV RBC AUTO: 96.4 FL (ref 79.4–94.8)
MONOCYTES ABSOLUTE: 0.4 K/UL (ref 0.2–0.8)
MONOCYTES RELATIVE PERCENT: 6.1 %
MYCOPLASMA PNEUMONIAE BY PCR: NOT DETECTED
NEUTROPHILS ABSOLUTE: 5.1 K/UL (ref 1.4–6.5)
NEUTROPHILS RELATIVE PERCENT: 84 %
PARAINFLUENZA VIRUS 1 BY PCR: NOT DETECTED
PARAINFLUENZA VIRUS 2 BY PCR: NOT DETECTED
PARAINFLUENZA VIRUS 3 BY PCR: NOT DETECTED
PARAINFLUENZA VIRUS 4 BY PCR: NOT DETECTED
PDW BLD-RTO: 14.5 % (ref 11.5–14.5)
PERFORMED ON: ABNORMAL
PERFORMED ON: ABNORMAL
PLATELET # BLD: 103 K/UL (ref 130–400)
POC CREATININE: 1.9 MG/DL (ref 0.6–1.2)
POC CREATININE: 2.2 MG/DL (ref 0.6–1.2)
POC SAMPLE TYPE: ABNORMAL
POC SAMPLE TYPE: ABNORMAL
POTASSIUM SERPL-SCNC: 5.6 MEQ/L (ref 3.4–4.9)
RBC # BLD: 2.41 M/UL (ref 4.2–5.4)
RESPIRATORY SYNCYTIAL VIRUS BY PCR: NOT DETECTED
SARS-COV-2, NAAT: NOT DETECTED
SARS-COV-2, PCR: NOT DETECTED
SODIUM BLD-SCNC: 141 MEQ/L (ref 135–144)
TOTAL PROTEIN: 5.5 G/DL (ref 6.3–8)
TROPONIN: 0.05 NG/ML (ref 0–0.01)
TROPONIN: 0.24 NG/ML (ref 0–0.01)
TROPONIN: 0.44 NG/ML (ref 0–0.01)
TROPONIN: 0.54 NG/ML (ref 0–0.01)
WBC # BLD: 6 K/UL (ref 4.8–10.8)

## 2023-03-10 PROCEDURE — 6370000000 HC RX 637 (ALT 250 FOR IP): Performed by: INTERNAL MEDICINE

## 2023-03-10 PROCEDURE — 87635 SARS-COV-2 COVID-19 AMP PRB: CPT

## 2023-03-10 PROCEDURE — 93010 ELECTROCARDIOGRAM REPORT: CPT | Performed by: INTERNAL MEDICINE

## 2023-03-10 PROCEDURE — 97166 OT EVAL MOD COMPLEX 45 MIN: CPT

## 2023-03-10 PROCEDURE — 2580000003 HC RX 258: Performed by: EMERGENCY MEDICINE

## 2023-03-10 PROCEDURE — 87040 BLOOD CULTURE FOR BACTERIA: CPT

## 2023-03-10 PROCEDURE — 6370000000 HC RX 637 (ALT 250 FOR IP): Performed by: NURSE PRACTITIONER

## 2023-03-10 PROCEDURE — 0202U NFCT DS 22 TRGT SARS-COV-2: CPT

## 2023-03-10 PROCEDURE — 36415 COLL VENOUS BLD VENIPUNCTURE: CPT

## 2023-03-10 PROCEDURE — 99223 1ST HOSP IP/OBS HIGH 75: CPT | Performed by: INTERNAL MEDICINE

## 2023-03-10 PROCEDURE — APPSS45 APP SPLIT SHARED TIME 31-45 MINUTES: Performed by: PHYSICIAN ASSISTANT

## 2023-03-10 PROCEDURE — 97162 PT EVAL MOD COMPLEX 30 MIN: CPT

## 2023-03-10 PROCEDURE — 83735 ASSAY OF MAGNESIUM: CPT

## 2023-03-10 PROCEDURE — 96365 THER/PROPH/DIAG IV INF INIT: CPT

## 2023-03-10 PROCEDURE — 6360000002 HC RX W HCPCS: Performed by: NURSE PRACTITIONER

## 2023-03-10 PROCEDURE — 84484 ASSAY OF TROPONIN QUANT: CPT

## 2023-03-10 PROCEDURE — 85025 COMPLETE CBC W/AUTO DIFF WBC: CPT

## 2023-03-10 PROCEDURE — 2580000003 HC RX 258: Performed by: NURSE PRACTITIONER

## 2023-03-10 PROCEDURE — 6370000000 HC RX 637 (ALT 250 FOR IP): Performed by: PHYSICIAN ASSISTANT

## 2023-03-10 PROCEDURE — 1210000000 HC MED SURG R&B

## 2023-03-10 PROCEDURE — 80053 COMPREHEN METABOLIC PANEL: CPT

## 2023-03-10 PROCEDURE — 93971 EXTREMITY STUDY: CPT

## 2023-03-10 PROCEDURE — 6360000002 HC RX W HCPCS: Performed by: EMERGENCY MEDICINE

## 2023-03-10 RX ORDER — ENOXAPARIN SODIUM 100 MG/ML
30 INJECTION SUBCUTANEOUS DAILY
Status: DISCONTINUED | OUTPATIENT
Start: 2023-03-10 | End: 2023-03-11

## 2023-03-10 RX ORDER — ACETAMINOPHEN 325 MG/1
650 TABLET ORAL EVERY 6 HOURS PRN
Status: DISCONTINUED | OUTPATIENT
Start: 2023-03-10 | End: 2023-03-15 | Stop reason: HOSPADM

## 2023-03-10 RX ORDER — SODIUM CHLORIDE 0.9 % (FLUSH) 0.9 %
5-40 SYRINGE (ML) INJECTION EVERY 12 HOURS SCHEDULED
Status: DISCONTINUED | OUTPATIENT
Start: 2023-03-10 | End: 2023-03-15 | Stop reason: HOSPADM

## 2023-03-10 RX ORDER — SODIUM CHLORIDE 0.9 % (FLUSH) 0.9 %
5-40 SYRINGE (ML) INJECTION PRN
Status: DISCONTINUED | OUTPATIENT
Start: 2023-03-10 | End: 2023-03-15 | Stop reason: HOSPADM

## 2023-03-10 RX ORDER — DILTIAZEM HYDROCHLORIDE 240 MG/1
240 CAPSULE, COATED, EXTENDED RELEASE ORAL DAILY
Status: DISCONTINUED | OUTPATIENT
Start: 2023-03-10 | End: 2023-03-15 | Stop reason: HOSPADM

## 2023-03-10 RX ORDER — POLYETHYLENE GLYCOL 3350 17 G/17G
17 POWDER, FOR SOLUTION ORAL DAILY PRN
Status: DISCONTINUED | OUTPATIENT
Start: 2023-03-10 | End: 2023-03-15 | Stop reason: HOSPADM

## 2023-03-10 RX ORDER — SODIUM CHLORIDE 9 MG/ML
INJECTION, SOLUTION INTRAVENOUS PRN
Status: DISCONTINUED | OUTPATIENT
Start: 2023-03-10 | End: 2023-03-15 | Stop reason: HOSPADM

## 2023-03-10 RX ORDER — ACETAMINOPHEN 650 MG/1
650 SUPPOSITORY RECTAL EVERY 6 HOURS PRN
Status: DISCONTINUED | OUTPATIENT
Start: 2023-03-10 | End: 2023-03-15 | Stop reason: HOSPADM

## 2023-03-10 RX ORDER — ATORVASTATIN CALCIUM 40 MG/1
40 TABLET, FILM COATED ORAL NIGHTLY
Status: DISCONTINUED | OUTPATIENT
Start: 2023-03-10 | End: 2023-03-15 | Stop reason: HOSPADM

## 2023-03-10 RX ORDER — GUAIFENESIN 600 MG/1
600 TABLET, EXTENDED RELEASE ORAL 2 TIMES DAILY
Status: DISCONTINUED | OUTPATIENT
Start: 2023-03-10 | End: 2023-03-15 | Stop reason: HOSPADM

## 2023-03-10 RX ORDER — ONDANSETRON 2 MG/ML
4 INJECTION INTRAMUSCULAR; INTRAVENOUS EVERY 6 HOURS PRN
Status: DISCONTINUED | OUTPATIENT
Start: 2023-03-10 | End: 2023-03-10

## 2023-03-10 RX ORDER — OXYCODONE HYDROCHLORIDE AND ACETAMINOPHEN 5; 325 MG/1; MG/1
1 TABLET ORAL EVERY 4 HOURS PRN
Status: DISCONTINUED | OUTPATIENT
Start: 2023-03-10 | End: 2023-03-15 | Stop reason: HOSPADM

## 2023-03-10 RX ORDER — SODIUM CHLORIDE 9 MG/ML
INJECTION, SOLUTION INTRAVENOUS CONTINUOUS
Status: DISCONTINUED | OUTPATIENT
Start: 2023-03-10 | End: 2023-03-10

## 2023-03-10 RX ORDER — OXYBUTYNIN CHLORIDE 5 MG/1
10 TABLET, EXTENDED RELEASE ORAL DAILY
Status: DISCONTINUED | OUTPATIENT
Start: 2023-03-10 | End: 2023-03-15 | Stop reason: HOSPADM

## 2023-03-10 RX ORDER — SODIUM POLYSTYRENE SULFONATE 15 G/60ML
30 SUSPENSION ORAL; RECTAL ONCE
Status: COMPLETED | OUTPATIENT
Start: 2023-03-10 | End: 2023-03-10

## 2023-03-10 RX ORDER — ASPIRIN 81 MG/1
81 TABLET ORAL 2 TIMES DAILY
Status: DISCONTINUED | OUTPATIENT
Start: 2023-03-10 | End: 2023-03-15 | Stop reason: HOSPADM

## 2023-03-10 RX ORDER — ONDANSETRON 4 MG/1
4 TABLET, ORALLY DISINTEGRATING ORAL EVERY 8 HOURS PRN
Status: DISCONTINUED | OUTPATIENT
Start: 2023-03-10 | End: 2023-03-10

## 2023-03-10 RX ADMIN — OXYBUTYNIN CHLORIDE 10 MG: 5 TABLET, EXTENDED RELEASE ORAL at 10:44

## 2023-03-10 RX ADMIN — DILTIAZEM HYDROCHLORIDE 240 MG: 240 CAPSULE, COATED, EXTENDED RELEASE ORAL at 10:44

## 2023-03-10 RX ADMIN — GUAIFENESIN 600 MG: 600 TABLET, EXTENDED RELEASE ORAL at 10:44

## 2023-03-10 RX ADMIN — OXYCODONE AND ACETAMINOPHEN 1 TABLET: 5; 325 TABLET ORAL at 21:21

## 2023-03-10 RX ADMIN — ASPIRIN 81 MG: 81 TABLET, COATED ORAL at 21:21

## 2023-03-10 RX ADMIN — ASPIRIN 81 MG: 81 TABLET, COATED ORAL at 10:44

## 2023-03-10 RX ADMIN — Medication 10 ML: at 10:45

## 2023-03-10 RX ADMIN — ATORVASTATIN CALCIUM 40 MG: 40 TABLET, FILM COATED ORAL at 21:21

## 2023-03-10 RX ADMIN — GUAIFENESIN 600 MG: 600 TABLET, EXTENDED RELEASE ORAL at 21:21

## 2023-03-10 RX ADMIN — Medication 10 ML: at 21:25

## 2023-03-10 RX ADMIN — METOPROLOL TARTRATE 25 MG: 25 TABLET, FILM COATED ORAL at 21:21

## 2023-03-10 RX ADMIN — CEFTRIAXONE SODIUM 1000 MG: 1 INJECTION, POWDER, FOR SOLUTION INTRAMUSCULAR; INTRAVENOUS at 00:14

## 2023-03-10 RX ADMIN — METOPROLOL TARTRATE 25 MG: 25 TABLET, FILM COATED ORAL at 10:44

## 2023-03-10 RX ADMIN — SODIUM POLYSTYRENE SULFONATE 30 G: 15 SUSPENSION ORAL; RECTAL at 10:45

## 2023-03-10 RX ADMIN — ENOXAPARIN SODIUM 30 MG: 100 INJECTION SUBCUTANEOUS at 10:44

## 2023-03-10 RX ADMIN — SODIUM CHLORIDE: 9 INJECTION, SOLUTION INTRAVENOUS at 03:59

## 2023-03-10 RX ADMIN — OXYCODONE AND ACETAMINOPHEN 1 TABLET: 5; 325 TABLET ORAL at 15:20

## 2023-03-10 ASSESSMENT — ENCOUNTER SYMPTOMS
SHORTNESS OF BREATH: 0
SORE THROAT: 0
ABDOMINAL DISTENTION: 0
COUGH: 0
VOMITING: 0
BACK PAIN: 0
NAUSEA: 0
COLOR CHANGE: 0
EYES NEGATIVE: 1
EYE DISCHARGE: 0
CHEST TIGHTNESS: 0
ALLERGIC/IMMUNOLOGIC NEGATIVE: 1
ABDOMINAL PAIN: 0
RHINORRHEA: 1
WHEEZING: 0
PHOTOPHOBIA: 0
SHORTNESS OF BREATH: 1
COUGH: 1

## 2023-03-10 ASSESSMENT — PAIN DESCRIPTION - LOCATION
LOCATION: LEG;KNEE
LOCATION: KNEE

## 2023-03-10 ASSESSMENT — PAIN DESCRIPTION - ORIENTATION
ORIENTATION: LEFT
ORIENTATION: LEFT

## 2023-03-10 ASSESSMENT — PAIN DESCRIPTION - FREQUENCY: FREQUENCY: CONTINUOUS

## 2023-03-10 ASSESSMENT — PAIN DESCRIPTION - DESCRIPTORS: DESCRIPTORS: ACHING;TENDER

## 2023-03-10 ASSESSMENT — PAIN DESCRIPTION - ONSET: ONSET: ON-GOING

## 2023-03-10 ASSESSMENT — PAIN SCALES - GENERAL
PAINLEVEL_OUTOF10: 8
PAINLEVEL_OUTOF10: 0
PAINLEVEL_OUTOF10: 0
PAINLEVEL_OUTOF10: 5
PAINLEVEL_OUTOF10: 0
PAINLEVEL_OUTOF10: 3

## 2023-03-10 ASSESSMENT — PAIN DESCRIPTION - PAIN TYPE: TYPE: ACUTE PAIN;SURGICAL PAIN

## 2023-03-10 NOTE — ED PROVIDER NOTES
[address] ED  eMERGENCY dEPARTMENT eNCOUnter      Pt Name: Mary Alice Frazier  MRN: [id number]  Armsglen[place]freva 1945  Date of evaluation: 3/9/2023  Provider: Art Teixeira MD    CHIEF COMPLAINT       Chief Complaint   Patient presents with    Chest Pain     For a couple hours         HISTORY OF PRESENT ILLNESS   (Location/Symptom, Timing/Onset,Context/Setting, Quality, Duration, Modifying Factors, Severity)  Note limiting factors. Mary Alice Frazier is a 69 y.o. female who presents to the emergency department for evaluation of chest pain that began approximately 24 hours ago. Is been intermittent throughout the day as a midsternal pain that is nonradiating with no associated shortness of breath. Patient has cardiac history with 4 stents last placed in 2008. She states when she feels this way they typically \"find her blood count is low and she needs a blood transfusion\". Significance the patient is 2 days postop from a total knee replacement here at this hospital and just started physical therapy today. Pain level of her left knee is moderate pain in her chest is moderate. She denies fever or chills. HPI    NursingNotes were reviewed. REVIEW OF SYSTEMS    (2-9 systems for level 4, 10 or more for level 5)     Review of Systems   Constitutional:  Negative for chills and diaphoresis. HENT:  Negative for congestion, ear pain, mouth sores and sore throat. Eyes:  Negative for photophobia and discharge. Respiratory:  Negative for cough, chest tightness, shortness of breath and wheezing. Cardiovascular:  Positive for chest pain. Negative for palpitations. Gastrointestinal:  Negative for abdominal distention, abdominal pain and vomiting. Endocrine: Negative for cold intolerance. Genitourinary:  Negative for difficulty urinating. Musculoskeletal:  Negative for arthralgias. Skin:  Negative for pallor and rash. Allergic/Immunologic: Negative for immunocompromised state. Neurological:  Negative for dizziness and syncope. Hematological:  Negative for adenopathy. Psychiatric/Behavioral:  Negative for agitation and hallucinations. The patient is not nervous/anxious. All other systems reviewed and are negative. Except as noted above the remainder of the review of systems was reviewed and negative.        PAST MEDICAL HISTORY     Past Medical History:   Diagnosis Date    Antral ulcer 01/14/2015    DR Jhoana He    Asthma     \"cured by beestings\"    Coronary artery disease     Coronary artery disease involving native coronary artery of native heart without angina pectoris 7/10/2018    has x 4 cardiac stents / Dr. Arpit Layne    Diverticulosis of colon (without mention of hemorrhage) 01/14/2015    DR Jhoana He    Essential hypertension 12/10/2013    meds > 20 yrs    History of blood transfusion 1990s    with hysterectomy    History of normal resting EKG 1996    normal    History of ST elevation myocardial infarction (STEMI) 7/11/2017    History of transfusion of whole blood 1996    Excessive bleeding before hysterectomy    Hyperlipidemia     meds > 2 yrs    Hypertension     Hypothyroidism     past trx years ago then stopped / recent restart    Kidney disease     Low back pain     Morbid obesity due to excess calories ([address] 5/13/2017    Morbid obesity due to excess calories ([address] 6/6/2017    Osteoarthritis     Pneumonia     RBBB (right bundle branch block) 3/17/2022    Shoulder dislocation     ST elevation myocardial infarction involving left circumflex coronary artery ([address] 5/13/2017    Unspecified gastritis and gastroduodenitis without mention of hemorrhage 05/06/2015    DR Jhoana He         SURGICALHISTORY       Past Surgical History:   Procedure Laterality Date    CARDIAC SURGERY  2017    has x 4 cardiac stents    COLONOSCOPY  01/14/2015    DR Jhoana He - DIVERTICULOSIS    COLONOSCOPY N/A 1/2/2021    COLONOSCOPY DIAGNOSTIC performed by Mei Colby MD at [address] [place] [address] WITH STENT PLACEMENT  05/17/2017    x2 stents    CYST REMOVAL Left 2/7/2019    RESECTION OF LEFT PINNA LESION WITH GRAFT performed by Natividad Macdonald MD at [address] [facility], COLON, DIAGNOSTIC      FINGER SURGERY  12/9/14    middle finger right hand due to infection    HYSTERECTOMY ([facility])  [address]  [id number]    SHOULDER SURGERY  2008    shoulder dislocated - popped  back in Right shoulder    TOTAL KNEE ARTHROPLASTY Right 8/30/2022    RIGHT KNEE TOTAL KNEE ARTHROPLASTY performed by Ilana Stapleton MD at [address] [place] [address] Left 3/7/2023    LEFT KNEE TOTAL KNEE ARTHROPLASTY performed by Ilana Stapleton MD at [address] [place] [address]  01/14/2015    DR L[address] - ULCER IN THE ANTRUM    UPPER GASTROINTESTINAL ENDOSCOPY  05/06/2015     [address] [place] [address] - GASTRITIS, PREVIOUS ULCER HEALED    UPPER GASTROINTESTINAL ENDOSCOPY N/A 1/2/2021    EGD DIAGNOSTIC ONLY performed by Ramy De La Torre MD at [facility]       Previous Medications    ASPIRIN 81 MG EC TABLET    Take 1 tablet by mouth 2 times daily    DILTIAZEM (CARDIZEM CD) 240 MG EXTENDED RELEASE CAPSULE    TAKE 1 CAPSULE BY MOUTH DAILY    FEROSUL 325 (65 FE) MG TABLET    Take 1 tablet by mouth 2 times daily    METOPROLOL TARTRATE (LOPRESSOR) 25 MG TABLET    TAKE 1 TABLET BY MOUTH TWICE DAILY    NITROGLYCERIN (NITROSTAT) 0.4 MG SL TABLET    Place 1 tablet under the tongue every 5 minutes as needed for Chest pain    OXYBUTYNIN (DITROPAN-XL) 10 MG EXTENDED RELEASE TABLET    Take 1 tablet by mouth daily    SENNOSIDES-DOCUSATE SODIUM (SENOKOT-S) 8.6-50 MG TABLET    Take 1 tablet by mouth 2 times daily for 10 days    SULFAMETHOXAZOLE-TRIMETHOPRIM (BACTRIM DS;SEPTRA DS) 800-160 MG PER TABLET    Take 1 tablet by mouth every 12 hours for 3 doses       ALLERGIES     Lisinopril and Tramadol    FAMILY HISTORY       Family History   Problem Relation Age of Onset    Heart Disease Mother 61    Cancer Father 79 kidney    No Known Problems Daughter           SOCIAL HISTORY       Social History     Socioeconomic History    Marital status:      Spouse name: None    Number of children: None    Years of education: None    Highest education level: None   Tobacco Use    Smoking status: Never    Smokeless tobacco: Never   Vaping Use    Vaping Use: Never used   Substance and Sexual Activity    Alcohol use: Yes     Comment: glass of wine once a year    Drug use: No    Sexual activity: Yes     Partners: Male     Social Determinants of Health     Financial Resource Strain: Low Risk     Difficulty of Paying Living Expenses: Not hard at all   Food Insecurity: No Food Insecurity    Worried About Running Out of Food in the Last Year: Never true    [address] in the Last Year: Never true   Transportation Needs: Unknown    Lack of Transportation (Non-Medical): No   Housing Stability: Unknown    Unstable Housing in the Last Year: No       SCREENINGS    Kelli Coma Scale  Eye Opening: Spontaneous  Best Verbal Response: Oriented  Best Motor Response: Obeys commands  [place] Coma Scale Score: 15 @FLOW([id number])@      PHYSICAL EXAM    (up to 7 for level 4, 8 or more for level 5)     ED Triage Vitals   BP Temp Temp Source Heart Rate Resp SpO2 Height Weight   03/09/23 2004 03/09/23 2004 03/09/23 2004 03/09/23 2004 03/09/23 2006 03/09/23 2004 -- --   (!) 149/67 98.4 °F (36.9 °C) Oral (!) 102 20 96 %         Physical Exam  Vitals and nursing note reviewed. Constitutional:       Appearance: She is well-developed. HENT:      Head: Normocephalic. Nose: Nose normal.   Eyes:      Conjunctiva/sclera: Conjunctivae normal.      Pupils: Pupils are equal, round, and reactive to light. Cardiovascular:      Rate and Rhythm: Normal rate and regular rhythm. Heart sounds: Normal heart sounds. Pulmonary:      Effort: Pulmonary effort is normal.      Breath sounds: Normal breath sounds.    Abdominal:      General: Bowel sounds are normal.      Palpations: Abdomen is soft. Tenderness: There is no abdominal tenderness. There is no guarding. Musculoskeletal:         General: Normal range of motion. Cervical back: Normal range of motion and neck supple. Legs:    Skin:     General: Skin is warm and dry. Capillary Refill: Capillary refill takes less than 2 seconds. Neurological:      General: No focal deficit present. Mental Status: She is alert and oriented to person, place, and time. Psychiatric:         Mood and Affect: Mood normal.       DIAGNOSTIC RESULTS     EKG: All EKG's are interpreted by the Emergency Department Physician who either signs or Co-signsthis chart in the absence of a cardiologist.    EKG shows sinus tachycardia rate of 103. There is a right bundle branch block pattern. No acute ischemic changes. Normal axis. Abnormal EKG. No Changes from prior EKG    EKG #2 sinus tachycardia rate of 104 with right bundle branch block pattern. No ischemic changes. Normal axis. Abnormal EKG. Unchanged from EKG #1.     RADIOLOGY:   Non-plain filmimages such as CT, Ultrasound and MRI are read by the radiologist. Plain radiographic images are visualized and preliminarily interpreted by the emergency physician with the below findings:      Interpretation per the Radiologist below, if available at the time ofthis note:    CTA CHEST [address]    (Results Pending)         ED BEDSIDE ULTRASOUND:   Performed by ED Physician - none    LABS:  Labs Reviewed   CBC WITH AUTO DIFFERENTIAL - Abnormal; Notable for the following components:       Result Value    RBC 3.01 (*)     Hemoglobin 9.2 (*)     Hematocrit 28.7 (*)     MCV 95.5 (*)     MCHC 31.9 (*)     Neutrophils Absolute 6.8 (*)     Lymphocytes Absolute 0.8 (*)     All other components within normal limits   COMPREHENSIVE METABOLIC PANEL - Abnormal; Notable for the following components:    CO2 18 (*)     Glucose 113 (*)     BUN 38 (*)     Creatinine 2.00 (*)     Est, Glom Filt Rate 25.3 (*)     All other components within normal limits   CULTURE, BLOOD 1   CULTURE, BLOOD 1   TROPONIN   TROPONIN       All other labs were within normal range or not returned as of this dictation. EMERGENCY DEPARTMENT COURSE and DIFFERENTIAL DIAGNOSIS/MDM:   Vitals:    Vitals:    03/09/23 2200 03/09/23 2204 03/09/23 2212 03/09/23 2230   BP: (!) 156/83 (!) 156/83 (!) 145/66 132/61   Pulse: (!) 103 (!) 105 (!) 104 (!) 102   Resp: 23 23 21   Temp:       TempSrc:       SpO2: 91%  (!) 86% 96%       MDM    Patient was high risk for pulmonary embolism so CTA of the chest was ordered and essentially did not show any large pulmonary embolism. There is noted bilateral pulmonary infiltrates. She has had a minor cough but attributes that to \"her sinuses\". No fever and no elevated white blood count. However, she has been persistently tachycardic with a heart rate over 100 and she has had 2 episodes of hypoxia going down to 86% on room air at rest requiring supplemental oxygen. Based on her hypoxia and bilateral infiltrates she is admitted for pneumonia with secondary hypoxia      CONSULTS:  None    PROCEDURES:  Unless otherwise noted below, none     Procedures    FINAL IMPRESSION      1. Pneumonia due to infectious organism, unspecified laterality, unspecified part of lung    2. Hypoxia          DISPOSITION/PLAN   DISPOSITION Decision To Admit 03/09/2023 11:56:23 PM      PATIENT REFERRED TO:  No follow-up provider specified.     DISCHARGE MEDICATIONS:  New Prescriptions    No medications on file          (Please note that portions of this note were completed with a voice recognition program.  Efforts were made to edit the dictations but occasionally words are mis-transcribed.)    Nini Davenport MD (electronically signed)  Attending Emergency Physician         Nini Davenport MD  03/09/23 2389       Nini Davenport MD  03/10/23 2972

## 2023-03-10 NOTE — ED TRIAGE NOTES
Pt to ED due to chest pain starting a few hours ago. Pt states she has a history of MI. Pt states she had some chest pain earlier this morning but the pain went away. Pt is alert and oriented x4. Skin is warm, dry, intact.  Resp are regular and equal.

## 2023-03-10 NOTE — ED NOTES
Report given to RN taking over care of pt. No acute distress noted at this time. Resps even non labored. Skin p/w/d. Telepack applied.      Sherie Suarez RN  03/10/23 2740

## 2023-03-10 NOTE — PROGRESS NOTES
[facility]CC[facility] EVALUATION - ACUTE     NAME: Palmer Denver  : 1945 (69 y.o.)  MRN: [id number]  CODE STATUS: Full Code  [id number]    Date of Service: 3/10/2023    Patient Diagnosis(es): Hypoxia [R09.02]  Pneumonia due to infectious organism, unspecified laterality, unspecified part of lung [J18.9]  Acute pneumonia [J18.9]   Patient Active Problem List    Diagnosis Date Noted    Morbid obesity due to excess calories ([address] 2017    Acute pneumonia 03/10/2023    Status post total knee replacement, left 2023    Status post total knee replacement, right 2022    RBBB (right bundle branch block) 2022    Gastrointestinal hemorrhage     Anemia 2021    Adenomatous polyp of descending colon     Adenomatous polyp of ascending colon     Adenomatous polyp of colon     Chronic gastritis without bleeding     CKD (chronic kidney disease) stage 3, GFR 30-59 ml/min ([facility]) 2019    Asthma     Spinal stenosis of lumbar region with neurogenic claudication 2019    Degenerative spondylolisthesis 2019    Chronic bilateral low back pain with bilateral sciatica 10/30/2018    Primary osteoarthritis of both knees 10/30/2018    Coronary artery disease involving native coronary artery of native heart without angina pectoris 07/10/2018    History of ST elevation myocardial infarction (STEMI) 2017    Hypothyroid 12/10/2013    Essential hypertension 12/10/2013    Hyperlipidemia 12/10/2013        Past Medical History:   Diagnosis Date    Antral ulcer 2015    DR LANE    Asthma     \"cured by beestings\"    Coronary artery disease     Coronary artery disease involving native coronary artery of native heart without angina pectoris 7/10/2018    has x 4 cardiac stents / Dr. Gracie Walker    Diverticulosis of colon (without mention of hemorrhage) 2015    DR Darling Majano    Essential hypertension 12/10/2013    meds > 20 yrs    History of blood transfusion 1990s    with hysterectomy    History of normal resting EKG 1996    normal    History of ST elevation myocardial infarction (STEMI) 7/11/2017    History of transfusion of whole blood 1996    Excessive bleeding before hysterectomy    Hyperlipidemia     meds > 2 yrs    Hypertension     Hypothyroidism     past trx years ago then stopped / recent restart    Kidney disease     Low back pain     Morbid obesity due to excess calories ([address] 5/13/2017    Morbid obesity due to excess calories ([address] 6/6/2017    Osteoarthritis     Pneumonia     RBBB (right bundle branch block) 3/17/2022    Shoulder dislocation     ST elevation myocardial infarction involving left circumflex coronary artery ([address] 5/13/2017    Unspecified gastritis and gastroduodenitis without mention of hemorrhage 05/06/2015    DR Abram Dillon     Past Surgical History:   Procedure Laterality Date    CARDIAC SURGERY  2017    has x 4 cardiac stents    COLONOSCOPY  01/14/2015    DR Abram Dillon - DIVERTICULOSIS    COLONOSCOPY N/A 1/2/2021    COLONOSCOPY DIAGNOSTIC performed by Dl Noonan MD at [facility]  05/17/2017    x2 stents    CYST REMOVAL Left 2/7/2019    RESECTION OF LEFT PINNA LESION WITH GRAFT performed by Margareth Smith MD at [address] [facility], COLON, DIAGNOSTIC      FINGER SURGERY  12/9/14    middle finger right hand due to infection    HYSTERECTOMY ([address])  1996    LASIK  [id number]    SHOULDER SURGERY  2008    shoulder dislocated - popped  back in Right shoulder    TOTAL KNEE ARTHROPLASTY Right 8/30/2022    RIGHT KNEE TOTAL KNEE ARTHROPLASTY performed by Mya Shannon MD at [address] [place] [address] Left 3/7/2023    LEFT KNEE TOTAL KNEE ARTHROPLASTY performed by Mya Shannon MD at [address] [facility]  01/14/2015    DR LANE - ULCER IN THE ANTRUM    UPPER GASTROINTESTINAL ENDOSCOPY  05/06/2015    DR LANE - GASTRITIS, PREVIOUS ULCER HEALED    UPPER GASTROINTESTINAL ENDOSCOPY N/A 1/2/2021    EGD DIAGNOSTIC ONLY performed by Bao Rodrigues MD at [place] [address]  Restrictions/Precautions: Fall Risk, Up as Tolerated, Contact Precautions (MRSA)       Safety Devices: Safety Devices  Type of Devices: All fall risk precautions in place; Bed alarm in place; Left in bed;Call light within reach;Nurse notified     Patient's date of birth confirmed: Yes    General:  Chart Reviewed: Yes  Patient assessed for rehabilitation services?: Yes    Subjective  Subjective: \"I just want some sleep\"       Pain at start of treatment: Yes: 8/10    Pain at end of treatment: Yes: 8/10    Location: L knee  Description: Aching  Nursing notified: Yes  RN: Joey Fleming  Intervention: Cold applied Repositioned Other: RN reaching out to physician for orders    Prior Level of Function:  Social/Functional History  Lives With: Spouse  Type of Home: House  Home Layout: One level  Home Access: Ramped entrance  Bathroom Shower/Tub: Tub/Shower unit  Bathroom Equipment: Grab bars in shower, Toilet raiser  Home Equipment: Nicholette Hopes, rolling, Long-handled shoehorn  ADL Assistance: Independent  Homemaking Assistance: Independent  Ambulation Assistance: Independent ([address] since sx Tuesday, cane prior to sx)  Transfer Assistance: Independent  Active : Yes    OBJECTIVE:     Orientation Status:  Orientation  Overall Orientation Status: Within Functional Limits    Observation:  Observation/Palpation  Posture: Good  Observation: No acute distress noted. L knee post op dressing present. Pleasant and motivated.     Cognition Status:  Cognition  Overall Cognitive Status: WFL    Perception Status:  Perception  Overall Perceptual Status: WFL    Vision and Hearing Status:  Vision  Vision Exceptions: Wears glasses at all times  Hearing  Hearing: Within functional limits   Vision - Basic Assessment  Prior Vision: Wears glasses all the time  Visual History: No significant visual history  Patient Visual Report: No visual complaint reported. Visual Field Cut: No  Oculo Motor Control: WNL    GROSS ASSESSMENT AROM/PROM:  AROM: Generally decreased, functional (Significant baseline shoulder ROM deficits)     ROM:   LUE AROM (degrees)  LUE AROM : WFL  Left Hand AROM (degrees)  Left Hand AROM: WFL  RUE AROM (degrees)  RUE AROM : WFL  Right Hand AROM (degrees)  Right Hand AROM: WFL    UE STRENGTH:  Strength: Generally decreased, functional    UE COORDINATION:  Coordination: Generally decreased, functional    UE TONE:  Tone: Normal    UE SENSATION:  Sensation: Intact    Hand Dominance:  Hand Dominance  Hand Dominance: Right    ADL Status:  ADL  Feeding: Independent  Grooming: Setup  UE Bathing: Setup  LE Bathing: Moderate assistance  UE Dressing: Setup  LE Dressing: Maximum assistance  Toileting: Moderate assistance  Additional Comments: Simulated ADLs as above, limited d/t SOB and weakness  Toilet Transfers  Toilet Transfer: Unable to assess  Toilet Transfers Comments: Anticipate CGA    Functional Mobility:    Transfers  Sit to stand: Minimal assistance  Stand to sit: Contact guard assistance    Patient ambulated to head of bed with [address] at [facility] level. Limited d/t fatigue, SOB and pain with all mobility. Bed Mobility  Bed mobility  Supine to Sit: Moderate assistance  Sit to Supine:  Moderate assistance  Bed Mobility Comments: Significant difficulty initiating mobility, unable to utilize UEs for mobility d/t shoulder ROM deficits    Seated and Standing Balance:  Balance  Sitting: Intact  Standing: Impaired  Standing - Static: Good  Standing - Dynamic: Fair    Functional Endurance:  Activity Tolerance  Activity Tolerance: Patient limited by fatigue;Patient limited by pain    D/C Recommendations:  OT D/C RECOMMENDATIONS  REQUIRES OT FOLLOW-UP: Yes    Equipment Recommendations:  OT Equipment Recommendations  Other: Continue to assess    OT Education:   Patient Education  Education Given To: Patient  Education Provided: Role of Therapy;Plan of Care  Education Method: Verbal  Barriers to Learning: None  Education Outcome: Verbalized understanding    OT Follow Up:   OT D/C RECOMMENDATIONS  REQUIRES OT FOLLOW-UP: Yes       Assessment/Discharge Disposition:  Assessment: Pt is a 69year old woman from home who presents to [facility] with the above deficits which impact her ability to perform ADLs and IADLs. Pt limited d/t fatigue, SOB and weakness. Pt. would benefit from continued OT to maximize independence and safety with ADL tasks.   Performance deficits / Impairments: Decreased functional mobility , Decreased strength, Decreased endurance, Decreased ADL status, Decreased balance, Decreased high-level IADLs  Prognosis: Good  Discharge Recommendations: Continue to assess pending progress  Decision Making: Medium Complexity  History: Pt's medical history is moderately complex  Exam: Pt. has 6 performance deficits  Assistance / Modification: Pt requires mod A    AMPAC (Six Click) Self care Score   How much help is needed for putting on and taking off regular lower body clothing?: A Lot  How much help is needed for bathing (which includes washing, rinsing, drying)?: A Lot  How much help is needed for toileting (which includes using toilet, bedpan, or urinal)?: A Lot  How much help is needed for putting on and taking off regular upper body clothing?: A Little  How much help is needed for taking care of personal grooming?: A Little  How much help for eating meals?: None  AM-[facility] Inpatient Daily Activity Raw Score: 16  AM-[facility] Inpatient ADL T-Scale Score : 35.96  ADL Inpatient CMS 0-100% Score: 53.32    Therapy key for assistance levels -   Independent/Mod I = Pt. is able to perform task with no assistance but may require a device   Stand by assistance = Pt. does not perform task at an independent level but does not need physical assistance, requires verbal cues  Minimal, Moderate, Maximal Assistance = Pt. requires physical assistance (25%, 50%, 75% assist from helper) for task but is able to actively participate in task   Dependent = Pt. requires total assistance with task and is not able to actively participate with task completion     Plan:  Occupational Therapy Plan  Times Per Week: 1-3x  Therapy Duration:  (Length of acute stay)  Current Treatment Recommendations: Strengthening, Balance training, Functional mobility training, Endurance training, Pain management, Safety education & training, Patient/Caregiver education & training, Equipment evaluation, education, & procurement, Self-Care / ADL, Home management training    Goals:   Patient will:    - Improve functional endurance to tolerate/complete 30 mins of ADL's  - Be Mod I in UB ADLs   - Be Mod I in LB ADLs  - Be Mod I in ADL transfers without LOB  - Be Mod I in toileting tasks  - Improve B UE strength and endurance to 4/5 in order to participate in self-care activities as projected. - Access appropriate D/C site with as few architectural barriers as possible. - Sequence self-care tasks with no verbal cues for safety    Patient Goal: Patient goals : \"I want to get home\"     Discussed and agreed upon: Yes Comments:       Therapy Time:   Individual   Time In 1457   Time Out 1517   Minutes 20     Eval: 20 minutes     Electronically signed by:     MALICK Martin,   3/10/2023, 4:02 PM

## 2023-03-10 NOTE — H&P
[place] [address] MEDICINE    HISTORY AND PHYSICAL EXAM    PATIENT NAME:  Olinda Sinclair    MRN:  [id number]  SERVICE DATE:  3/10/2023   SERVICE TIME:  1:45 AM    Primary Care Physician: Shelly Chapman MD         SUBJECTIVE  CHIEF COMPLAINT:  Chest Pain        HPI: Patient presented to the ED after experiencing midsternal chest pain with shortness of breath. Patient is a pleasant, alert and oriented x1 69-year-old  female. Patient states that when she was ambulating to go use the restroom she felt shortness of breath with midsternal chest pain that she described an 8 out of 10 and described as a squeezing. Patient denies having any numbness or tingling of extremities or radiation of pain. Patient denies any diaphoreses. Patient reports that she has had mild rhinorrhea and congestion with a nonproductive cough for the past couple weeks. Otherwise, patient denies any abdominal pain, nausea, vomiting, or diarrhea. Patient states that she did have some dysuria and was treated for a UTI preoperatively with Bactrim. Patient's past medical history includes CAD with stents, HF, HTN, anemia, and CKD3.       PAST MEDICAL HISTORY:    Past Medical History:   Diagnosis Date    Antral ulcer 01/14/2015    DR Fawn Aguilar    Asthma     \"cured by beestings\"    Coronary artery disease     Coronary artery disease involving native coronary artery of native heart without angina pectoris 7/10/2018    has x 4 cardiac stents / Dr. Romero Cueto    Diverticulosis of colon (without mention of hemorrhage) 01/14/2015    DR Fawn Aguilar    Essential hypertension 12/10/2013    meds > 20 yrs    History of blood transfusion 1990s    with hysterectomy    History of normal resting EKG 1996    normal    History of ST elevation myocardial infarction (STEMI) 7/11/2017    History of transfusion of whole blood 1996    Excessive bleeding before hysterectomy    Hyperlipidemia     meds > 2 yrs    Hypertension     Hypothyroidism     past trx years ago then stopped / recent restart    Kidney disease     Low back pain     Morbid obesity due to excess calories ([address] 5/13/2017    Morbid obesity due to excess calories ([address] 6/6/2017    Osteoarthritis     Pneumonia     RBBB (right bundle branch block) 3/17/2022    Shoulder dislocation     ST elevation myocardial infarction involving left circumflex coronary artery ([address] 5/13/2017    Unspecified gastritis and gastroduodenitis without mention of hemorrhage 05/06/2015    DR LANE     PAST SURGICAL HISTORY:    Past Surgical History:   Procedure Laterality Date    CARDIAC SURGERY  2017    has x 4 cardiac stents    COLONOSCOPY  01/14/2015    DR Cesilia Martinez - DIVERTICULOSIS    COLONOSCOPY N/A 1/2/2021    COLONOSCOPY DIAGNOSTIC performed by Binta North MD at [facility]  05/17/2017    x2 stents    CYST REMOVAL Left 2/7/2019    RESECTION OF LEFT PINNA LESION WITH GRAFT performed by Yi Smith MD at [address] [place] [address], COLON, DIAGNOSTIC      FINGER SURGERY  12/9/14    middle finger right hand due to infection    HYSTERECTOMY ([facility])  1996    LASIK  [id number]    SHOULDER SURGERY  2008    shoulder dislocated - popped  back in Right shoulder    TOTAL KNEE ARTHROPLASTY Right 8/30/2022    RIGHT KNEE TOTAL KNEE ARTHROPLASTY performed by Pablo Wilcox MD at [address] [place] [address] Left 3/7/2023    LEFT KNEE TOTAL KNEE ARTHROPLASTY performed by Pablo Wilcox MD at [address] [place] [address]  01/14/2015    DR Cesilia Martinez - ULCER IN THE ANTRUM    UPPER GASTROINTESTINAL ENDOSCOPY  05/06/2015    DR Cesilia Martinez - GASTRITIS, PREVIOUS ULCER HEALED    UPPER GASTROINTESTINAL ENDOSCOPY N/A 1/2/2021    EGD DIAGNOSTIC ONLY performed by Binta North MD at [facility] OR     FAMILY HISTORY:    Family History   Problem Relation Age of Onset    Heart Disease Mother 61    Cancer Father 79        kidney    No Known Problems Daughter      SOCIAL HISTORY:    Social History     Socioeconomic History    Marital status:      Spouse name: Not on file    Number of children: Not on file    Years of education: Not on file    Highest education level: Not on file   Occupational History    Not on file   Tobacco Use    Smoking status: Never    Smokeless tobacco: Never   Vaping Use    Vaping Use: Never used   Substance and Sexual Activity    Alcohol use: Yes     Comment: glass of wine once a year    Drug use: No    Sexual activity: Yes     Partners: Male   Other Topics Concern    Not on file   Social History Narrative    Not on file     Social Determinants of Health     Financial Resource Strain: Low Risk     Difficulty of Paying Living Expenses: Not hard at all   Food Insecurity: No Food Insecurity    Worried About [address] [employer] in the Last Year: Never true    [employer] in the Last Year: Never true   Transportation Needs: Unknown    Lack of Transportation (Medical): Not on file    Lack of Transportation (Non-Medical): No   Physical Activity: Not on file   Stress: Not on file   Social Connections: Not on file   Intimate Partner Violence: Not on file   Housing Stability: Unknown    Unable to Pay for Housing in the Last Year: Not on file    Number of Places Lived in the Last Year: Not on file    Unstable Housing in the Last Year: No     MEDICATIONS:   Prior to Admission medications    Medication Sig Start Date End Date Taking?  Authorizing Provider   aspirin 81 MG EC tablet Take 1 tablet by mouth 2 times daily 3/8/23 4/7/23  BEN Coon CNP   sennosides-docusate sodium (SENOKOT-S) 8.6-50 MG tablet Take 1 tablet by mouth 2 times daily for 10 days 3/8/23 3/18/23  BEN Coon CNP   sulfamethoxazole-trimethoprim (BACTRIM DS;SEPTRA DS) 800-160 MG per tablet Take 1 tablet by mouth every 12 hours for 3 doses 3/8/23 3/10/23  BEN Coon CNP   oxybutynin (DITROPAN-XL) 10 MG extended release tablet Take 1 tablet by mouth daily 1/11/23 Justino Smallwood MD   metoprolol tartrate (LOPRESSOR) 25 MG tablet TAKE 1 TABLET BY MOUTH TWICE DAILY 11/8/22   BEN Holguin CNP   dilTIAZem (CARDIZEM CD) 240 MG extended release capsule TAKE 1 CAPSULE BY MOUTH DAILY 3/30/22   Justino Smallwood MD   FEROSUL 325 (65 Fe) MG tablet Take 1 tablet by mouth 2 times daily  Patient taking differently: Take 325 mg by mouth daily (with breakfast) 3/30/22   Justino Smallwood MD   nitroGLYCERIN (NITROSTAT) 0.4 MG SL tablet Place 1 tablet under the tongue every 5 minutes as needed for Chest pain 5/15/17   BEN Baltazar CNP       ALLERGIES: Lisinopril and Tramadol    REVIEW OF SYSTEM:   Review of Systems   Constitutional:  Negative for appetite change, fatigue, fever and unexpected weight change. HENT:  Positive for congestion and rhinorrhea. Negative for sore throat. Eyes: Negative. Negative for photophobia and visual disturbance. Respiratory:  Positive for cough (Mild) and shortness of breath. Negative for wheezing. Cardiovascular:  Positive for chest pain (Midsternal). Gastrointestinal:  Negative for abdominal pain, nausea and vomiting. Endocrine: Negative. Negative for polydipsia, polyphagia and polyuria. Genitourinary:  Negative for difficulty urinating, dysuria and pelvic pain. Musculoskeletal:  Negative for back pain. Skin: Negative. Negative for rash. Allergic/Immunologic: Negative. Neurological: Negative. Negative for dizziness, speech difficulty and weakness. Hematological: Negative. Psychiatric/Behavioral: Negative. Negative for behavioral problems and confusion. OBJECTIVE  PHYSICAL EXAM: /63   Pulse 90   Temp 98.4 °F (36.9 °C) (Oral)   Resp 14   LMP 09/17/1996   SpO2 96%     Physical Exam  Vitals and nursing note reviewed. Constitutional:       General: She is not in acute distress. Appearance: She is well-developed. She is not ill-appearing or toxic-appearing.    HENT:      Head: Normocephalic and atraumatic. Nose: Nose normal.      Mouth/Throat:      Mouth: Mucous membranes are moist.   Eyes:      Pupils: Pupils are equal, round, and reactive to light. Cardiovascular:      Rate and Rhythm: Normal rate and regular rhythm. Pulses: Normal pulses. Heart sounds: Murmur heard. Pulmonary:      Effort: Pulmonary effort is normal. No respiratory distress. Breath sounds: Wheezing (Bilateral expiratory) present. No rales. Abdominal:      General: Bowel sounds are normal. There is no distension. Palpations: Abdomen is soft. There is no mass. Tenderness: There is no abdominal tenderness. There is no guarding or rebound. Hernia: No hernia is present. Musculoskeletal:         General: Normal range of motion. Cervical back: Normal range of motion. Comments: Left knee surgical bandages dry and intact. Mild edema. Skin:     General: Skin is warm and dry. Capillary Refill: Capillary refill takes less than 2 seconds. Neurological:      Mental Status: She is alert and oriented to person, place, and time. Psychiatric:         Mood and Affect: Mood normal.       DATA:     Diagnostic tests reviewed for today's visit:    Most recent labs and imaging results reviewed.      LABS:    Recent Results (from the past 24 hour(s))   CBC with Auto Differential    Collection Time: 03/09/23  8:15 PM   Result Value Ref Range    WBC 8.3 4.8 - 10.8 K/uL    RBC 3.01 (L) 4.20 - 5.40 M/uL    Hemoglobin 9.2 (L) 12.0 - 16.0 g/dL    Hematocrit 28.7 (L) 37.0 - 47.0 %    MCV 95.5 (H) 79.4 - 94.8 fL    MCH 30.5 27.0 - 31.3 pg    MCHC 31.9 (L) 33.0 - 37.0 %    RDW 14.5 11.5 - 14.5 %    Platelets 752 636 - 986 K/uL    Neutrophils % 81.9 %    Lymphocytes % 9.5 %    Monocytes % 4.8 %    Eosinophils % 3.4 %    Basophils % 0.4 %    Neutrophils Absolute 6.8 (H) 1.4 - 6.5 K/uL    Lymphocytes Absolute 0.8 (L) 1.0 - 4.8 K/uL    Monocytes Absolute 0.4 0.2 - 0.8 K/uL    Eosinophils Absolute 0.3 0.0 - 0.7 K/uL    Basophils Absolute 0.0 0.0 - 0.2 K/uL   CMP    Collection Time: 03/09/23  8:15 PM   Result Value Ref Range    Sodium 137 135 - 144 mEq/L    Potassium 4.7 3.4 - 4.9 mEq/L    Chloride 105 95 - 107 mEq/L    CO2 18 (L) 20 - 31 mEq/L    Anion Gap 14 9 - 15 mEq/L    Glucose 113 (H) 70 - 99 mg/dL    BUN 38 (H) 8 - 23 mg/dL    Creatinine 2.00 (H) 0.50 - 0.90 mg/dL    Est, Glom Filt Rate 25.3 (L) >60    Calcium 9.5 8.5 - 9.9 mg/dL    Total Protein 6.7 6.3 - 8.0 g/dL    Albumin 4.0 3.5 - 4.6 g/dL    Total Bilirubin 0.3 0.2 - 0.7 mg/dL    Alkaline Phosphatase 96 40 - 130 U/L    ALT 6 0 - 33 U/L    AST 18 0 - 35 U/L    Globulin 2.7 2.3 - 3.5 g/dL   Troponin    Collection Time: 03/09/23  8:15 PM   Result Value Ref Range    Troponin <0.010 0.000 - 0.010 ng/mL   EKG 12 Lead    Collection Time: 03/09/23 10:18 PM   Result Value Ref Range    Ventricular Rate 104 BPM    Atrial Rate 104 BPM    P-R Interval 152 ms    QRS Duration 166 ms    Q-T Interval 400 ms    QTc Calculation (Bazett) 526 ms    P Axis 40 degrees    R Axis -19 degrees    T Axis 21 degrees   Troponin    Collection Time: 03/09/23 11:30 PM   Result Value Ref Range    Troponin 0.054 (HH) 0.000 - 0.010 ng/mL   COVID-19, Rapid    Collection Time: 03/10/23 12:45 AM    Specimen: Nasopharyngeal Swab   Result Value Ref Range    SARS-CoV-2, NAAT Not Detected Not Detected       IMAGING:   No results found.      VTE Prophylaxis: low molecular weight heparin -  start     Urine culture and patient was started on Bactrim since 3/8/23        Component 2/28/23 1027   Urine Culture, Routine  Abnormal   Performed at [address] [place] [address]   [phone]     Organism Escherichia coli Abnormal     Urine Culture, Routine >[id number] CFU/ML    Resulting Agency [address] [facility]        Susceptibility    Escherichia coli (1)    Antibiotic Interpretation Microscan  Method Status    ampicillin Sensitive 4 mcg/mL BACTERIAL SUSCEPTIBILITY PANEL BY MATTHIAS   ceFAZolin Sensitive <=4 mcg/mL BACTERIAL SUSCEPTIBILITY PANEL BY MATTHIAS      Cefazolin sensitivity results can be used to predict the   effectiveness of oral cephalosporins (eg. Cephalexin) in   uncomplicated Urinary Tract Infections due to E. coli, K. pneumoniae,   and P. mirabilis        cefTRIAXone Sensitive <=0.25 mcg/mL BACTERIAL SUSCEPTIBILITY PANEL BY MATTHIAS     Confirmatory Extended Spectrum Beta-Lactamase Sensitive NEGATIVE mcg/mL BACTERIAL SUSCEPTIBILITY PANEL BY MATTHIAS     gentamicin Sensitive <=1 mcg/mL BACTERIAL SUSCEPTIBILITY PANEL BY MATTHIAS     levofloxacin Sensitive <=0.12 mcg/mL BACTERIAL SUSCEPTIBILITY PANEL BY MATTHIAS     nitrofurantoin Sensitive <=16 mcg/mL BACTERIAL SUSCEPTIBILITY PANEL BY MATTHIAS     piperacillin-tazobactam Sensitive <=4 mcg/mL BACTERIAL SUSCEPTIBILITY PANEL BY MATTHIAS     trimethoprim-sulfamethoxazole Sensitive <=20 mcg/mL BACTERIAL SUSCEPTIBILITY PANEL BY MATTHIAS                    ASSESSMENT AND PLAN    Principal Problem:  Respiratory failure with hypoxia: Mild cough.  SPO2 86% on room air requiring O2.  Chest CTA negative for PE but shows bilateral pulmonary infiltrates.  WBC 8.3.  Patient given Rocephin IV in ED.  COVID-negative.  We will continue Rocephin IV which will cover urine culture as well as possible bacterial pneumonia.  We will get complete respiratory panel.  We will monitor CBC daily.  We will administer Mucinex and utilize incentive spirometer.  DuoNebs as needed if heart rate allows since patient was tachycardic in ED.    Elevated troponin: Troponin 0.010.  Repeat 3hrs was 0.054.  History of STEMI.  EKG sinus tach RBBB at 104 without ST elevation.  EKG #2 unchanged.  We will cycle troponin, consult cardiology, keep patient on telemetry monitoring.  We will keep patient n.p.o. pending cardiology recommendations for any possible interventions.    JP on CKD3: Creatinine 2.00.  Baseline around 1.2 but varied over the past year.  Patient given 2 L NS in ED.  We will monitor CMP daily.   We will avoid nephrotoxic agents whenever possible. Left knee replacement: Patient is status post left knee replacement on 3/7 and DC'd on 3/8. Patient started physical therapy today. We will consult PT OT. Pain control as needed    UTI: Patient currently on Bactrim for UTI. Cultures shows E. coli. Sensitivity to Rocephin. Patient was given IV Rocephin in ED for possible pneumonia. We will continue Rocephin to cover PNA and UTI and hold Bactrim    Active problems:  CAD: History of STEMI and stents. Patient on aspirin. We will resume home meds  HF: Echo in November 2022 showed EF of 55 to 60% with mild MR. Patient on beta-blocker. We will resume home meds. We will keep patient on telemetry monitoring and closely monitor fluid status. HTN: PT on home meds to control. Will resume home meds, as tolerated. Anemia: Hemoglobin 9.2. Near baseline. Patient on iron supplement. We will resume home meds and monitor with CBC daily.     Plan of care discussed with: patient    SIGNATURE: BEN Maynard CNP  DATE: March 10, 2023  TIME: 1:45 AM     Carrie Barahona DO - supervising physician

## 2023-03-10 NOTE — PLAN OF CARE
See OT evaluation for all goals and OT POC.  Electronically signed by MIRTA Stinson/L on 3/10/2023 at 4:04 PM

## 2023-03-10 NOTE — PROGRESS NOTES
Physical Therapy Med Surg Initial Assessment  Facility/Department: [facility]  Room: X274/Y635-78       NAME: Dmitry Hollins  : 1945 (69 y.o.)  MRN: [id number]  CODE STATUS: Full Code    Date of Service: 3/10/2023    Patient Diagnosis(es): Hypoxia [R09.02]  Pneumonia due to infectious organism, unspecified laterality, unspecified part of lung [J18.9]  Acute pneumonia [J18.9]   Chief Complaint   Patient presents with    Chest Pain     For a couple hours     Patient Active Problem List    Diagnosis Date Noted    Morbid obesity due to excess calories ([facility] 2017    Acute pneumonia 03/10/2023    Status post total knee replacement, left 2023    Status post total knee replacement, right 2022    RBBB (right bundle branch block) 2022    Gastrointestinal hemorrhage     Anemia 2021    Adenomatous polyp of descending colon     Adenomatous polyp of ascending colon     Adenomatous polyp of colon     Chronic gastritis without bleeding     CKD (chronic kidney disease) stage 3, GFR 30-59 ml/min ([facility]) 2019    Asthma     Spinal stenosis of lumbar region with neurogenic claudication 2019    Degenerative spondylolisthesis 2019    Chronic bilateral low back pain with bilateral sciatica 10/30/2018    Primary osteoarthritis of both knees 10/30/2018    Coronary artery disease involving native coronary artery of native heart without angina pectoris 07/10/2018    History of ST elevation myocardial infarction (STEMI) 2017    Hypothyroid 12/10/2013    Essential hypertension 12/10/2013    Hyperlipidemia 12/10/2013        Past Medical History:   Diagnosis Date    Antral ulcer 2015    DR LANE    Asthma     \"cured by beestings\"    Coronary artery disease     Coronary artery disease involving native coronary artery of native heart without angina pectoris 7/10/2018    has x 4 cardiac stents / Dr. Selvin Dial    Diverticulosis of colon (without mention of hemorrhage) 01/14/2015    DR Cale Zuniga    Essential hypertension 12/10/2013    meds > 20 yrs    History of blood transfusion 1990s    with hysterectomy    History of normal resting EKG 1996    normal    History of ST elevation myocardial infarction (STEMI) 7/11/2017    History of transfusion of whole blood 1996    Excessive bleeding before hysterectomy    Hyperlipidemia     meds > 2 yrs    Hypertension     Hypothyroidism     past trx years ago then stopped / recent restart    Kidney disease     Low back pain     Morbid obesity due to excess calories ([address] 5/13/2017    Morbid obesity due to excess calories ([address] 6/6/2017    Osteoarthritis     Pneumonia     RBBB (right bundle branch block) 3/17/2022    Shoulder dislocation     ST elevation myocardial infarction involving left circumflex coronary artery ([address] 5/13/2017    Unspecified gastritis and gastroduodenitis without mention of hemorrhage 05/06/2015    DR Cale Zuniga     Past Surgical History:   Procedure Laterality Date    CARDIAC SURGERY  2017    has x 4 cardiac stents    COLONOSCOPY  01/14/2015    DR Cale Zuniga - DIVERTICULOSIS    COLONOSCOPY N/A 1/2/2021    COLONOSCOPY DIAGNOSTIC performed by Martina Partida MD at [facility]  05/17/2017    x2 stents    CYST REMOVAL Left 2/7/2019    RESECTION OF LEFT PINNA LESION WITH GRAFT performed by Maty Goode MD at [address] [place] [address], COLON, DIAGNOSTIC      FINGER SURGERY  12/9/14    middle finger right hand due to infection    HYSTERECTOMY ([facility])  1996    LASIK  [id number]    SHOULDER SURGERY  2008    shoulder dislocated - popped  back in Right shoulder    TOTAL KNEE ARTHROPLASTY Right 8/30/2022    RIGHT KNEE TOTAL KNEE ARTHROPLASTY performed by Danny Arita MD at [address] [place] [address] Left 3/7/2023    LEFT KNEE TOTAL KNEE ARTHROPLASTY performed by Danny Arita MD at [address] [place] [address]  01/14/2015    DR LANE - ULCER IN THE ANTRUM UPPER GASTROINTESTINAL ENDOSCOPY  05/06/2015    DR Dereck Magaña - GASTRITIS, PREVIOUS ULCER HEALED    UPPER GASTROINTESTINAL ENDOSCOPY N/A 1/2/2021    EGD DIAGNOSTIC ONLY performed by Leslie Ramos MD at [facility] OR       Chart Reviewed: Yes  Patient assessed for rehabilitation services?: Yes  Additional Pertinent Hx: s/p L TKA 3/7/23  Family / Caregiver Present: No  Diagnosis: Acute pneumonia  General Comment  Comments: Pt resting in bed - agreeable to PT evaluation    Restrictions:  Restrictions/Precautions: Fall Risk, Up as Tolerated, Contact Precautions (MRSA)     SUBJECTIVE:   Subjective: \"When I went home I was walking fine! \"    Pain  Pain: No pain at rest, however pt does c/o pain L knee with movement 7/10. RN notified. Repositioned for comfort. Prior Level of Function:  Social/Functional History  Lives With: Spouse  Type of Home: House  Home Layout: One level  Home Access: Ramped entrance  Bathroom Shower/Tub: Tub/Shower unit  Bathroom Equipment: Grab bars in shower, Toilet raiser  Home Equipment: Elby Yfn, rolling, Long-handled shoehorn  ADL Assistance: Independent  Homemaking Assistance: Independent  Ambulation Assistance: Independent ([employer] since sx Tuesday, cane prior to sx)  Transfer Assistance: Independent  Active : Yes    OBJECTIVE:   Vision  Vision: Within Functional Limits  Hearing: Within functional limits    Cognition:  Overall Orientation Status: Within Functional Limits  Follows Commands: Within Functional Limits  Overall Cognitive Status: WFL    Observation/Palpation  Observation: No acute distress noted. L knee post op dressing present. Pleasant and motivated. ROM:  RLE PROM: WFL  LLE PROM: WFL  LLE General PROM: except knee flexion to 45degrees    Strength:  Strength RLE  Strength RLE: WFL  Strength LLE  Strength LLE: WFL  Comment: Unable to complete SLR. Was discharged POD1 per chart.     Neuro:  Balance  Sitting - Static: Good  Sitting - Dynamic: Fair  Standing - Static: Fair  Standing - Dynamic: Poor; Fair                      Tone: Normal    Sensation: Intact    Bed mobility  Supine to Sit: Moderate assistance  Sit to Supine: Moderate assistance  Bed Mobility Comments: Increased time to complete. Limited by pain. Transfers  Sit to Stand: Minimal Assistance  Stand to Sit: Contact guard assistance  Bed to Chair: Contact guard assistance  Comment: Slow to complete. Increased pain noted with weight bearing. Ambulation  Surface: Level tile  Device: Rolling Walker  Assistance: Contact guard assistance  Quality of Gait: Heavy bracing on [employer]. Distance: 2 steps                   Activity Tolerance  Activity Tolerance: Patient tolerated treatment well    Patient Education  Education Given To: Patient  Education Provided: Role of Therapy;Plan of Care  Education Method: Demonstration;Verbal  Education Outcome: Verbalized understanding;Continued education needed       ASSESSMENT:   Body Structures, Functions, Activity Limitations Requiring Skilled Therapeutic Intervention: Decreased functional mobility ; Decreased ADL status; Decreased strength;Decreased safe awareness;Decreased endurance;Decreased sensation;Decreased balance;Decreased coordination; Increased pain  Decision Making: Medium Complexity  History: High  Exam: Med  Clinical Presentation: Med    Therapy Prognosis: Good  Barriers to Learning: none         DISCHARGE RECOMMENDATIONS:  Discharge Recommendations: Continue to assess pending progress, Patient would benefit from continued therapy after discharge    Assessment: Continued PT indicated to progress mobility and facilitate DC at highest level of indep and safety. Rec therapy stay prior to DC home.   Requires PT Follow-Up: Yes       PLAN OF CARE:  Physcial Therapy Plan  General Plan: 2 times a day 7 days a week  Current Treatment Recommendations: Strengthening, ROM, Balance training, Functional mobility training, Transfer training, ADL/Self-care training, Endurance training, Gait training, Stair training, Neuromuscular re-education, Manual, Pain management, Return to work related activity, Home exercise program, Safety education & training, Patient/Caregiver education & training, Equipment evaluation, education, & procurement, Modalities, Positioning    Safety Devices  Type of Devices: All fall risk precautions in place    Goals:  Long Term Goals  Long Term Goal 1: Pt to complete bed mobility with indep  Long Term Goal 2: Pt to complete transfers with indep  Long Term Goal 3: Pt ambulate 50-150ft with LRD and indep  Long Term Goal 4: Pt to manage ramp indep with LRD to enter home  Long Term Goal 5: Pt to complete HEP with indep    AMPA (6 CLICK) BASIC MOBILITY  AM-PAC Inpatient Mobility Raw Score : 13     Therapy Time:   Individual   Time In [facility] 52   Time Out 1517   Minutes [address] [place] [address], 03/10/23 at 4:15 PM         Definitions for assistance levels  Independent = pt does not require any physical supervision or assistance from another person for activity completion. Device may be needed.   Stand by assistance = pt requires verbal cues or instructions from another person, close to but not touching, to perform the activity  Minimal assistance= pt performs 75% or more of the activity; assistance is required to complete the activity  Moderate assistance= pt performs 50% of the activity; assistance is required to complete the activity  Maximal assistance = pt performs 25% of the activity; assistance is required to complete the activity  Dependent = pt requires total physical assistance to accomplish the task

## 2023-03-10 NOTE — PROGRESS NOTES
[facility] Occupational Therapy      Date: 3/10/2023  Patient Name: Ange Benitez        MRN: [id number]  Account: [de-identified]   : 1945  (69 y.o.)  Room: [place] [address]    Chart reviewed, attempted OT at 2206 for eval. Patient not seen 2° to: Other: Per order, therapy if ok with cardiology. Will hold pending cardiology consult. Will attempt again when able.     Electronically signed by MALICK Stinson on 3/10/2023 at 9:38 AM

## 2023-03-10 NOTE — CONSULTS
[facility]  Consult [facility]           Reason for Consult:  shonda, ckd stage 3, hyperkalemia  Requesting Physician:  Dr. Litzy Godfrey    Chief Complaint:  chest pain, sob  History Obtained From:  patient, electronic medical record    History of Present Ilness:    69y.o. year old female admitted with chest pain and some shortness of breath. She just was admitted for left knee total knee replacement. Has risk factors for chronic kidney disease including hypertension and coronary artery disease. At first seen her in 2016 when she had acute renal failure that resolved with stopping her NSAIDs. She recently had urinary tract infection was on Bactrim. Had some mild acute renal failure after her knee replacement and potassium was a little bit high. I held her losartan. Asked her to check her blood pressure and call me if her blood pressure is running high. She has several stents in. Came in with chest pain. Cardiology seeing her. Had a CT scan with contrast to rule out pulmonary embolism. Was negative for pulmonary embolism but did show possible pneumonia pleural effusions versus fluid overload.   Had hemoglobin drop          Past Medical History:        Diagnosis Date    Antral ulcer 01/14/2015    DR Brunilda Stringer    Asthma     \"cured by beestings\"    Coronary artery disease     Coronary artery disease involving native coronary artery of native heart without angina pectoris 7/10/2018    has x 4 cardiac stents / Dr. Tyrel Nair    Diverticulosis of colon (without mention of hemorrhage) 01/14/2015    DR Brunilda Stringer    Essential hypertension 12/10/2013    meds > 20 yrs    History of blood transfusion 1990s    with hysterectomy    History of normal resting EKG 1996    normal    History of ST elevation myocardial infarction (STEMI) 7/11/2017    History of transfusion of whole blood 1996    Excessive bleeding before hysterectomy    Hyperlipidemia     meds > 2 yrs    Hypertension     Hypothyroidism     past trx years ago then stopped / recent restart    Kidney disease     Low back pain     Morbid obesity due to excess calories ([address] 5/13/2017    Morbid obesity due to excess calories ([address] 6/6/2017    Osteoarthritis     Pneumonia     RBBB (right bundle branch block) 3/17/2022    Shoulder dislocation     ST elevation myocardial infarction involving left circumflex coronary artery ([address] 5/13/2017    Unspecified gastritis and gastroduodenitis without mention of hemorrhage 05/06/2015    DR Blaze Barney       Past Surgical History:        Procedure Laterality Date    CARDIAC SURGERY  2017    has x 4 cardiac stents    COLONOSCOPY  01/14/2015    DR Blaze Barney - DIVERTICULOSIS    COLONOSCOPY N/A 1/2/2021    COLONOSCOPY DIAGNOSTIC performed by Leila Browning MD at [facility]  05/17/2017    x2 stents    CYST REMOVAL Left 2/7/2019    RESECTION OF LEFT PINNA LESION WITH GRAFT performed by Nicole Navarro MD at [address] [place] [address], COLON, DIAGNOSTIC      FINGER SURGERY  12/9/14    middle finger right hand due to infection    HYSTERECTOMY ([address] [facility] [address])  1996    LASIK  [id number]    SHOULDER SURGERY  2008    shoulder dislocated - popped  back in Right shoulder    TOTAL KNEE ARTHROPLASTY Right 8/30/2022    RIGHT KNEE TOTAL KNEE ARTHROPLASTY performed by Dariel Ponce MD at [place] [address] Left 3/7/2023    LEFT KNEE TOTAL KNEE ARTHROPLASTY performed by Dariel Ponce MD at [address] [place] [address]  01/14/2015    DR Blaze Barney - ULCER IN THE ANTRUM    UPPER GASTROINTESTINAL ENDOSCOPY  05/06/2015    DR Blaze Barney - GASTRITIS, PREVIOUS ULCER HEALED    UPPER GASTROINTESTINAL ENDOSCOPY N/A 1/2/2021    EGD DIAGNOSTIC ONLY performed by Leila Browning MD at [address] [place] [address] Medications:    No current facility-administered medications on file prior to encounter.      Current Outpatient Medications on File Prior to Encounter   Medication Sig Dispense Refill    aspirin 81 MG EC tablet Take 1 tablet by mouth 2 times daily (Patient taking differently: Take 81 mg by mouth 2 times daily Per pt she takes once a day) 60 tablet 0    sennosides-docusate sodium (SENOKOT-S) 8.6-50 MG tablet Take 1 tablet by mouth 2 times daily for 10 days 20 tablet 0    oxybutynin (DITROPAN-XL) 10 MG extended release tablet Take 1 tablet by mouth daily 30 tablet 5    metoprolol tartrate (LOPRESSOR) 25 MG tablet TAKE 1 TABLET BY MOUTH TWICE DAILY 180 tablet 3    dilTIAZem (CARDIZEM CD) 240 MG extended release capsule TAKE 1 CAPSULE BY MOUTH DAILY 90 capsule 3    FEROSUL 325 (65 Fe) MG tablet Take 1 tablet by mouth 2 times daily (Patient taking differently: Take 325 mg by mouth daily (with breakfast)) 60 tablet 5    nitroGLYCERIN (NITROSTAT) 0.4 MG SL tablet Place 1 tablet under the tongue every 5 minutes as needed for Chest pain 25 tablet 3       Allergies:  Lisinopril and Tramadol    Social History:    Social History     Socioeconomic History    Marital status:      Spouse name: Not on file    Number of children: Not on file    Years of education: Not on file    Highest education level: Not on file   Occupational History    Not on file   Tobacco Use    Smoking status: Never    Smokeless tobacco: Never   Vaping Use    Vaping Use: Never used   Substance and Sexual Activity    Alcohol use: Yes     Comment: glass of wine once a year    Drug use: No    Sexual activity: Yes     Partners: Male   Other Topics Concern    Not on file   Social History Narrative    Not on file     Social Determinants of Health     Financial Resource Strain: Low Risk     Difficulty of Paying Living Expenses: Not hard at all   Food Insecurity: No Food Insecurity    Worried About Running Out of Food in the Last Year: Never true    Ran Out of Food in the Last Year: Never true   Transportation Needs: Unknown    Lack of Transportation (Medical): Not on file    Lack of Transportation (Non-Medical):  No   Physical Activity: Not on file   Stress: Not on file   Social Connections: Not on file   Intimate Partner Violence: Not on file   Housing Stability: Unknown    Unable to Pay for Housing in the Last Year: Not on file    Number of Places Lived in the Last Year: Not on file    Unstable Housing in the Last Year: No       Family History:   Family History   Problem Relation Age of Onset    Heart Disease Mother 61    Cancer Father 79        kidney    No Known Problems Daughter        Review of Systems:   Review of Systems   Constitutional:  Negative for activity change. HENT:  Negative for congestion. Eyes:  Negative for discharge. Respiratory:  Positive for shortness of breath. Cardiovascular:  Positive for leg swelling. Gastrointestinal:  Negative for abdominal distention. Endocrine: Negative for cold intolerance. Genitourinary:  Negative for difficulty urinating. Musculoskeletal:  Negative for arthralgias. Allergic/Immunologic: Negative for environmental allergies. Neurological:  Positive for weakness. Negative for dizziness. Hematological:  Negative for adenopathy. Psychiatric/Behavioral:  Negative for agitation. Physical exam:   Constitutional:  VITALS:  BP (!) 130/53   Pulse 92   Temp 98.5 °F (36.9 °C) (Oral)   Resp 20   Ht 5' 1\" (1.549 m)   Wt 247 lb 1.6 oz (112.1 kg)   LMP 09/17/1996   SpO2 98%   BMI 46.69 kg/m²   Gen: alert, awake, nad  Skin: no rash, turgor wnl  Heent:  eomi, mmm  Neck: no bruits or jvd noted, thyroid normal  Lungs:  Normal expansion. Clear to auscultation. No rales, rhonchi, or wheezing. Heart:  Heart sounds are normal.  Regular rate and rhythm without murmur, gallop or rub.   Abdomen:  +bs, soft, nt, nd, no hepatosplenomegaly   Extremities: trace pedal edema  Psychiatric: mood and affect appropriate; judgement and insight intact  Musculoskeletal:  Rom, muscular strength intact; digits, nails normal    Data/  CBC:   Lab Results   Component Value Date/Time    WBC 6.0 03/10/2023 05:12 AM    RBC 2.41 03/10/2023 05:12 AM    HGB 7.4 03/10/2023 05:12 AM    HCT 23.2 03/10/2023 05:12 AM    MCV 96.4 03/10/2023 05:12 AM    MCH 30.9 03/10/2023 05:12 AM    MCHC 32.0 03/10/2023 05:12 AM    RDW 14.5 03/10/2023 05:12 AM     03/10/2023 05:12 AM    MPV 8.6 06/29/2015 07:21 PM     BMP:    Lab Results   Component Value Date/Time     03/10/2023 05:12 AM    K 5.6 03/10/2023 05:12 AM    K 5.3 03/08/2023 06:05 AM     03/10/2023 05:12 AM    CO2 19 03/10/2023 05:12 AM    BUN 35 03/10/2023 05:12 AM    LABALBU 3.3 03/10/2023 05:12 AM    CREATININE 1.84 03/10/2023 05:12 AM    CALCIUM 8.8 03/10/2023 05:12 AM    GFRAA 34.9 08/31/2022 05:48 AM    LABGLOM 28.0 03/10/2023 05:12 AM    GLUCOSE 100 03/10/2023 05:12 AM     XR KNEE LEFT (1-2 VIEWS)    Result Date: 3/7/2023  EXAMINATION: TWO XRAY VIEWS OF THE LEFT KNEE 3/7/2023 2:06 pm COMPARISON: None. HISTORY: ORDERING SYSTEM PROVIDED HISTORY: status post left total knee arthroplasty TECHNOLOGIST PROVIDED HISTORY: Of operative side while in recovery room. Reason for exam:->status post left total knee arthroplasty What reading provider will be dictating this exam?->CRC     Bipolar non cemented left knee arthroplasty. No fracture. No abnormal lucency bone prosthetic interface. RECOMMENDATION: Negative postoperative left knee. CTA CHEST W WO CONTRAST    Result Date: 3/10/2023  EXAMINATION: CTA OF THE CHEST WITH AND WITHOUT CONTRAST 3/9/2023 11:14 pm TECHNIQUE: CTA of the chest was performed before and after the administration of intravenous contrast.  Multiplanar reformatted images are provided for review. MIP images are provided for review. Automated exposure control, iterative reconstruction, and/or weight based adjustment of the mA/kV was utilized to reduce the radiation dose to as low as reasonably achievable. COMPARISON: None.  HISTORY: ORDERING SYSTEM PROVIDED HISTORY: 2 days post knee replacement with chest pain TECHNOLOGIST PROVIDED HISTORY: Reason for exam:->2 days post knee replacement with chest pain Decision Support Exception - unselect if not a suspected or confirmed emergency medical condition->Emergency Medical Condition (MA) What reading provider will be dictating this exam?->CRC FINDINGS: Pulmonary Arteries: Streak artifact from patient's body habitus and arm placement by there signs degrades image quality. Pulmonary arteries are adequately opacified for evaluation. No evidence of intraluminal filling defect to suggest pulmonary embolism. Main pulmonary artery is normal in caliber. Mediastinum: No evidence of mediastinal lymphadenopathy. Thyroid is homogeneous in appearance. Cardiac chambers are enlarged. No pericardial effusion. Coronary artery calcifications identified. Reactive lymph nodes seen in the hilar regions bilaterally. The heart and pericardium demonstrate no acute abnormality. There is no acute abnormality of the thoracic aorta. Lungs/pleura: Small bilateral pleural effusions with patchy ground-glass opacity identified inferiorly within the upper lung fields and lung bases. Airspace disease such as pneumonia or superimposed edema cannot be excluded. No pulmonary mass or nodule. Upper Abdomen: Limited images of the upper abdomen are unremarkable. Soft Tissues/Bones: No acute bone or soft tissue abnormality. Multilevel degenerative changes seen within the spine. No acute chest wall abnormality. No evidence of gross pulmonary embolism. Small bilateral pleural effusions with patchy ground-glass opacity within the lung fields to suggest either superimposed edema or multi lobar pneumonia. XR Knee Bilateral 1 or 2 VW    Result Date: 3/3/2023  MRN: [id number] Patient Name: Agustin Pair: KNEE; 1 OR 2 VIEWS;  Left;  3/3/2023 9:08 am  INDICATION: PAIN  M17.12: Left knee DJD.   ACCESSION NUMBER(S): [id number]  ORDERING CLINICIAN: ANJANA AZEVEDO  FINDINGS: AP lateral views of the left knee show end-stage DJD with severe valgus deformity and bone-on-bone articulation laterally. No acute fractures identified. Electronically signed by: Debora Jiang MD    US DUP LOWER EXTREMITY LEFT LILIANA    Result Date: 3/10/2023  EXAMINATION: DUPLEX VENOUS ULTRASOUND OF THE LEFT LOWER EXTREMITY 3/10/2023 12:45 pm TECHNIQUE: Duplex ultrasound using B-mode/gray scaled imaging and Doppler spectral analysis and color flow was obtained of the deep venous structures of the left lower extremity. COMPARISON: None. HISTORY: ORDERING SYSTEM PROVIDED HISTORY: edema, recent surgery TECHNOLOGIST PROVIDED HISTORY: Reason for exam:->edema, recent surgery What reading provider will be dictating this exam?->CRC FINDINGS: The visualized veins of the left lower extremity are patent and free of echogenic thrombus. The veins demonstrate good compressibility with normal color flow study and spectral analysis. 4.1 x 3.8 x 1.4 cm popliteal fossa cyst is seen. No evidence of DVT in the left lower extremity. Assessment/  45-year-old lady with CKD stage III with risk factors of hypertension coronary disease with stents. Baseline creatinine recently in mid ones. Recent total knee replacement. Had some mild acute kidney injury after that. Losartan held. Readmitted with some chest pain. Had a CT scan with contrast.  Negative for pulmonary embolism. Has some hyperkalemia. Possible fluid overload based on imaging    Plan/  1-hold IV fluids despite the acute renal failure. I would consider fluid status to be either euvolemic or slightly hypervolemic  2-keep losartan on hold  3-agree with Kayexalate  4-she got Retacrit 2 days ago. We will hold off on redosing      Thank you for the consultation. Please do not hesitate to call with questions.     Dani Paris MD

## 2023-03-10 NOTE — CONSULTS
Consult Note  Patient: Marianna Forman  Unit/Bed: F820/H396-20  YOB: 1945  MRN: [id number]  Acct: [de-identified]   Admitting Diagnosis: Hypoxia [R09.02]  Pneumonia due to infectious organism, unspecified laterality, unspecified part of lung [J18.9]  Acute pneumonia [J18.9]  Date:  3/9/2023  Hospital Day: 0      Chief Complaint:  Chest pain    History of Present Illness: This is a pleasant 46-year-old  female with past medical history significant for recent left total knee replacement on 3/8/2023, recently diagnosed UTI, CKD, hypertension, dyslipidemia, history of coronary artery disease status post PCI of the RCA and circumflex in 2017 and obesity who presented to [facility] ER yesterday with chief complaint of chest pain. Patient states she developed chest pain while walking to the bathroom yesterday afternoon. She described the pain as a midsternal squeezing tightness which was precipitated with exertion and alleviated after a period of rest.  She reports that this pain continued to recur intermittently yesterday. She denied any radiation of the pain or any associated symptoms. She denied nausea, vomiting, shortness of breath, diaphoresis, dizziness, lightheadedness, syncope or fever. Due to this intermittent chest pain she presented to ER for further evaluation. On presentation to the emergency room, blood pressure 149/67, heart rate tachycardic at 102, respiratory rate 20, pulse ox 96%, temperature 98.4 °F.  Sodium 137, potassium 4.7, chloride 105, total CO2 18, BUN elevated at 38, creatinine elevated 2.0, GFR low at 25.3, glucose 113. Initial troponin negative less than 0.010. WBC 8.3, hemoglobin 9.2, hematocrit 28.7, platelets 100. EKG showed sinus tachycardia with ventricular rate of 104 bpm, right bundle branch block, ST depression with T wave inversion in V1 and V2 and ST depression in V3 through V6, QTc 526 ms.   CTA of the chest revealed no evidence of pulmonary embolism, small bilateral pleural effusions with patchy groundglass opacity in the lung fields to suggest either superimposed edema or multilobar pneumonia. While in the ER, patient with 2 episodes of hypoxia with SPO2 declining to around 86% on room air she was placed on supplemental O2. She was subsequently admitted with diagnosis of pneumonia and hypoxia. Cardiology has been consulted regarding elevated troponin and chest pain. At time my evaluation today, patient is resting comfortably and in no acute distress. She is on 2 L O2 per nasal cannula with SPO2 of 94%. She is receiving Rocephin for presumed pneumonia. Initial troponin was negative in ER however repeat troponins have been increasingly elevated at 0.054 followed by 0.244 this morning. She is currently chest pain-free. She reports mild cough which she has noticed over the past few days but attributes it to her sinuses/allergies. She is currently receiving IV fluids at 75 mL/h.  A.m. labs reviewed. She is hyperkalemic this morning with potassium of 5.6. Mild improvement in renal function with creatinine 1.84, BUN of 35 and GFR of 28.0. Baseline creatinine appears to be between 1.1-1.4 on review of prior labs. Patient denies any recent coronary evaluation. Echocardiogram on 11/15/2022 revealed normal LV systolic function with EF of 55 to 60%, trivial AR, mild MR, mild TR and mild AS.     Allergies   Allergen Reactions    Lisinopril Nausea Only and Other (See Comments)     cough    Tramadol Nausea Only       Current Facility-Administered Medications   Medication Dose Route Frequency Provider Last Rate Last Admin    guaiFENesin (MUCINEX) extended release tablet 600 mg  600 mg Oral BID Nicoletto Bolzan-Roche, APRN - CNP   600 mg at 03/10/23 1044    sodium chloride flush 0.9 % injection 5-40 mL  5-40 mL IntraVENous 2 times per day Nicoletto Bolzan-Roche, APRN - CNP   10 mL at 03/10/23 1045    sodium chloride flush 0.9 % injection 5-40 mL  5-40 mL IntraVENous PRN Lyle Cadena-Roche, APRN - CNP        0.9 % sodium chloride infusion   IntraVENous PRN Lyle Mengn-Roche, APRN - CNP        enoxaparin Sodium (LOVENOX) injection 30 mg  30 mg SubCUTAneous Daily Lyle Cadena-Roche, APRN - CNP   30 mg at 03/10/23 1044    ondansetron (ZOFRAN-ODT) disintegrating tablet 4 mg  4 mg Oral Q8H PRN Lyle Cadena-Roche, APRN - CNP        Or    ondansetron (ZOFRAN) injection 4 mg  4 mg IntraVENous Q6H PRN Lyle Cadena-Roche, APRN - CNP        polyethylene glycol (GLYCOLAX) packet 17 g  17 g Oral Daily PRN Lyle Cadena-Roche, APRN - CNP        acetaminophen (TYLENOL) tablet 650 mg  650 mg Oral Q6H PRN Lyle Cadena-Roche, APRN - CNP        Or    acetaminophen (TYLENOL) suppository 650 mg  650 mg Rectal Q6H PRN Lyle Cadena-Roche, APRN - CNP        [START ON 3/11/2023] cefTRIAXone (ROCEPHIN) 1,000 mg in sodium chloride 0.9 % 50 mL IVPB (mini-bag)  1,000 mg IntraVENous Q24H Lyle Cadena-Roche, APRN - CNP        0.9 % sodium chloride infusion   IntraVENous Continuous Lyle Cadena-Roche, APRN - CNP 75 mL/hr at 03/10/23 0643 Rate Verify at 03/10/23 7045    aspirin EC tablet 81 mg  81 mg Oral BID Pitney Cas, DO   81 mg at 03/10/23 1044    dilTIAZem (CARDIZEM CD) extended release capsule 240 mg  240 mg Oral Daily Pitney Cas, DO   240 mg at 03/10/23 1044    metoprolol tartrate (LOPRESSOR) tablet 25 mg  25 mg Oral BID Pitney Cas, DO   25 mg at 03/10/23 1044    oxybutynin (DITROPAN-XL) extended release tablet 10 mg  10 mg Oral Daily Pitney Cas, DO   10 mg at 03/10/23 1044    atorvastatin (LIPITOR) tablet 40 mg  40 mg Oral Nightly Garrett Vargas        nitroGLYCERIN (NITROSTAT) SL tablet 0.4 mg  0.4 mg SubLINGual Q5 Min PRN Benjie Swanson MD   0.4 mg at 03/09/23 2212       PMHx:  Past Medical History:   Diagnosis Date    Antral ulcer 01/14/2015    DR Kip Curry    Asthma     \"cured by beestings\"    Coronary artery disease Coronary artery disease involving native coronary artery of native heart without angina pectoris 7/10/2018    has x 4 cardiac stents / Dr. Shayy Davison    Diverticulosis of colon (without mention of hemorrhage) 01/14/2015    DR Amber Hammond    Essential hypertension 12/10/2013    meds > 20 yrs    History of blood transfusion 1990s    with hysterectomy    History of normal resting EKG 1996    normal    History of ST elevation myocardial infarction (STEMI) 7/11/2017    History of transfusion of whole blood 1996    Excessive bleeding before hysterectomy    Hyperlipidemia     meds > 2 yrs    Hypertension     Hypothyroidism     past trx years ago then stopped / recent restart    Kidney disease     Low back pain     Morbid obesity due to excess calories ([address] 5/13/2017    Morbid obesity due to excess calories ([address] 6/6/2017    Osteoarthritis     Pneumonia     RBBB (right bundle branch block) 3/17/2022    Shoulder dislocation     ST elevation myocardial infarction involving left circumflex coronary artery ([address] 5/13/2017    Unspecified gastritis and gastroduodenitis without mention of hemorrhage 05/06/2015    DR Amber Hammond       PSHx:  Past Surgical History:   Procedure Laterality Date    CARDIAC SURGERY  2017    has x 4 cardiac stents    COLONOSCOPY  01/14/2015    DR Amber Hammond - DIVERTICULOSIS    COLONOSCOPY N/A 1/2/2021    COLONOSCOPY DIAGNOSTIC performed by Jaz Tejada MD at [facility]  05/17/2017    x2 stents    CYST REMOVAL Left 2/7/2019    RESECTION OF LEFT PINNA LESION WITH GRAFT performed by Deb Lamb MD at [address] [place] [address], COLON, DIAGNOSTIC      FINGER SURGERY  12/9/14    middle finger right hand due to infection    HYSTERECTOMY ([address])  1996    LASIK  [id number]    SHOULDER SURGERY  2008    shoulder dislocated - popped  back in Right shoulder    TOTAL KNEE ARTHROPLASTY Right 8/30/2022    RIGHT KNEE TOTAL KNEE ARTHROPLASTY performed by Louana Babinski, MD at [address] Left 3/7/2023    LEFT KNEE TOTAL KNEE ARTHROPLASTY performed by Anum Astorga MD at [address]  01/14/2015    DR Sariah Donovan - ULCER IN THE ANTRUM    UPPER GASTROINTESTINAL ENDOSCOPY  05/06/2015    DR Sariah Donovan - GASTRITIS, PREVIOUS ULCER HEALED    UPPER GASTROINTESTINAL ENDOSCOPY N/A 1/2/2021    EGD DIAGNOSTIC ONLY performed by Darcy Squires MD at [address] Hx:  Social History     Socioeconomic History    Marital status:      Spouse name: None    Number of children: None    Years of education: None    Highest education level: None   Tobacco Use    Smoking status: Never    Smokeless tobacco: Never   Vaping Use    Vaping Use: Never used   Substance and Sexual Activity    Alcohol use: Yes     Comment: glass of wine once a year    Drug use: No    Sexual activity: Yes     Partners: Male     Social Determinants of Health     Financial Resource Strain: Low Risk     Difficulty of Paying Living Expenses: Not hard at all   Food Insecurity: No Food Insecurity    Worried About Running Out of Food in the Last Year: Never true    Ran Out of Food in the Last Year: Never true   Transportation Needs: Unknown    Lack of Transportation (Non-Medical): No   Housing Stability: Unknown    Unstable Housing in the Last Year: No       Family Hx:  Family History   Problem Relation Age of Onset    Heart Disease Mother 61    Cancer Father 79        kidney    No Known Problems Daughter        Review of Systems:   Review of Systems   Constitutional:  Negative for activity change. HENT:  Negative for congestion. Respiratory:  Positive for cough. Negative for shortness of breath. Cardiovascular:  Positive for chest pain (intermittent yesterday). Negative for palpitations and leg swelling. Gastrointestinal:  Negative for abdominal pain, nausea and vomiting. Genitourinary:  Negative for difficulty urinating.    Musculoskeletal:  Negative for arthralgias. Skin:  Negative for color change. Neurological:  Negative for dizziness, syncope and light-headedness. Psychiatric/Behavioral:  Negative for agitation. Physical Examination:    BP (!) 130/53   Pulse 92   Temp 98.5 °F (36.9 °C) (Oral)   Resp 20   Ht 5' 1\" (1.549 m)   Wt 247 lb 1.6 oz (112.1 kg)   LMP 09/17/1996   SpO2 98%   BMI 46.69 kg/m²    Physical Exam  Constitutional:       General: She is not in acute distress. Appearance: She is obese. HENT:      Head: Normocephalic and atraumatic. Cardiovascular:      Rate and Rhythm: Normal rate and regular rhythm. Heart sounds: Murmur heard. Pulmonary:      Effort: Pulmonary effort is normal. No respiratory distress. Breath sounds: No wheezing, rhonchi or rales. Abdominal:      Palpations: Abdomen is soft. Comments: Obese abdomen   Musculoskeletal:         General: Normal range of motion. Cervical back: Normal range of motion and neck supple. Right lower leg: No edema. Left lower leg: Edema (trace) present. Comments: Dressing intact over left knee incision   Skin:     General: Skin is warm and dry. Neurological:      General: No focal deficit present. Mental Status: She is alert and oriented to person, place, and time. Cranial Nerves: No cranial nerve deficit.    Psychiatric:         Mood and Affect: Mood normal.       LABS:  CBC:  Lab Results   Component Value Date/Time    WBC 6.0 03/10/2023 05:12 AM    RBC 2.41 03/10/2023 05:12 AM    HGB 7.4 03/10/2023 05:12 AM    HCT 23.2 03/10/2023 05:12 AM    MCV 96.4 03/10/2023 05:12 AM    MCH 30.9 03/10/2023 05:12 AM    MCHC 32.0 03/10/2023 05:12 AM    RDW 14.5 03/10/2023 05:12 AM     03/10/2023 05:12 AM    MPV 8.6 06/29/2015 07:21 PM     CBC with Differential:   Lab Results   Component Value Date/Time    WBC 6.0 03/10/2023 05:12 AM    RBC 2.41 03/10/2023 05:12 AM    HGB 7.4 03/10/2023 05:12 AM    HCT 23.2 03/10/2023 05:12 AM     03/10/2023 05:12 AM    MCV 96.4 03/10/2023 05:12 AM    MCH 30.9 03/10/2023 05:12 AM    MCHC 32.0 03/10/2023 05:12 AM    RDW 14.5 03/10/2023 05:12 AM    LYMPHOPCT 8.3 03/10/2023 05:12 AM    MONOPCT 6.1 03/10/2023 05:12 AM    BASOPCT 0.2 03/10/2023 05:12 AM    MONOSABS 0.4 03/10/2023 05:12 AM    LYMPHSABS 0.5 03/10/2023 05:12 AM    EOSABS 0.1 03/10/2023 05:12 AM    BASOSABS 0.0 03/10/2023 05:12 AM     CMP:    Lab Results   Component Value Date/Time     03/10/2023 05:12 AM    K 5.6 03/10/2023 05:12 AM    K 5.3 03/08/2023 06:05 AM     03/10/2023 05:12 AM    CO2 19 03/10/2023 05:12 AM    BUN 35 03/10/2023 05:12 AM    CREATININE 1.84 03/10/2023 05:12 AM    GFRAA 34.9 08/31/2022 05:48 AM    LABGLOM 28.0 03/10/2023 05:12 AM    GLUCOSE 100 03/10/2023 05:12 AM    PROT 5.5 03/10/2023 05:12 AM    LABALBU 3.3 03/10/2023 05:12 AM    CALCIUM 8.8 03/10/2023 05:12 AM    BILITOT <0.2 03/10/2023 05:12 AM    ALKPHOS 84 03/10/2023 05:12 AM    AST 23 03/10/2023 05:12 AM    ALT <5 03/10/2023 05:12 AM     BMP:    Lab Results   Component Value Date/Time     03/10/2023 05:12 AM    K 5.6 03/10/2023 05:12 AM    K 5.3 03/08/2023 06:05 AM     03/10/2023 05:12 AM    CO2 19 03/10/2023 05:12 AM    BUN 35 03/10/2023 05:12 AM    LABALBU 3.3 03/10/2023 05:12 AM    CREATININE 1.84 03/10/2023 05:12 AM    CALCIUM 8.8 03/10/2023 05:12 AM    GFRAA 34.9 08/31/2022 05:48 AM    LABGLOM 28.0 03/10/2023 05:12 AM    GLUCOSE 100 03/10/2023 05:12 AM     Magnesium:    Lab Results   Component Value Date/Time    MG 2.1 03/10/2023 05:12 AM     Troponin:    Lab Results   Component Value Date/Time    TROPONINI 0.244 03/10/2023 05:12 AM       Radiology:  CTA CHEST W WO CONTRAST    Result Date: 3/10/2023  EXAMINATION: CTA OF THE CHEST WITH AND WITHOUT CONTRAST 3/9/2023 11:14 pm TECHNIQUE: CTA of the chest was performed before and after the administration of intravenous contrast.  Multiplanar reformatted images are provided for review.   MIP images are provided for review. Automated exposure control, iterative reconstruction, and/or weight based adjustment of the mA/kV was utilized to reduce the radiation dose to as low as reasonably achievable. COMPARISON: None. HISTORY: ORDERING SYSTEM PROVIDED HISTORY: 2 days post knee replacement with chest pain TECHNOLOGIST PROVIDED HISTORY: Reason for exam:->2 days post knee replacement with chest pain Decision Support Exception - unselect if not a suspected or confirmed emergency medical condition->Emergency Medical Condition (MA) What reading provider will be dictating this exam?->CRC FINDINGS: Pulmonary Arteries: Streak artifact from patient's body habitus and arm placement by there signs degrades image quality. Pulmonary arteries are adequately opacified for evaluation. No evidence of intraluminal filling defect to suggest pulmonary embolism. Main pulmonary artery is normal in caliber. Mediastinum: No evidence of mediastinal lymphadenopathy. Thyroid is homogeneous in appearance. Cardiac chambers are enlarged. No pericardial effusion. Coronary artery calcifications identified. Reactive lymph nodes seen in the hilar regions bilaterally. The heart and pericardium demonstrate no acute abnormality. There is no acute abnormality of the thoracic aorta. Lungs/pleura: Small bilateral pleural effusions with patchy ground-glass opacity identified inferiorly within the upper lung fields and lung bases. Airspace disease such as pneumonia or superimposed edema cannot be excluded. No pulmonary mass or nodule. Upper Abdomen: Limited images of the upper abdomen are unremarkable. Soft Tissues/Bones: No acute bone or soft tissue abnormality. Multilevel degenerative changes seen within the spine. No acute chest wall abnormality. No evidence of gross pulmonary embolism. Small bilateral pleural effusions with patchy ground-glass opacity within the lung fields to suggest either superimposed edema or multi lobar pneumonia. Echocardiogram 11/15/22:  Conclusions   Summary   Left ventricular ejection fraction is visually estimated at 55-60%. Normal left ventricular size and function. Mild aortic stenosis is noted. Trivial aortic regurgitation is noted. Mild mitral regurgitation is present. Mild tricuspid regurgitation is present. Signature   ----------------------------------------------------------------   Electronically signed by Pita Polanco(Interpreting   physician) on 11/15/2022 04:17 PM    EKG 3/9/23: , RBBB, ST depression with T wave inversion in V1-V2, ST depression in V3-V6, QTc 526ms    Telemetry 3/10/23: SR 80s-90s      Assessment:    Active Hospital Problems    Diagnosis Date Noted    Acute pneumonia [J18.9] 03/10/2023     Priority: Medium     Chest pain--now resolved. r/o cardiac ischemia  NSTEMI--?  Type I vs type II  JP on CKD  Hyperkalemia  Acute on chronic anemia--Hgb 7.4 today from 9.2 yesterday and 11.7 pre-op on 2/28/23  Abnormal EKG/chronic RBBB/prolonged QTc  S/p left total knee replacement on 3/8/23  Hx CAD s/p PCI of Circ/RCA in 2017  Valvular heart disease (Mild MR/TR/AS) per echo 11/15/22  Normal LVF EF 55-60% per echo 11/15/22  HTN  Dyslipidemia    Plan:  Continue current medications-aspirin 81 mg p.o. twice daily, Cardizem  mg p.o. daily, Lopressor 25 mg p.o. twice daily, Rocephin 1000 mg IV every 24 hours, Lovenox 30 mg subcu daily  Resume Lipitor at 40 mg p.o. daily-patient states that she was previously taking this dose but occasionally has to stop for a while due to muscle aches  Kayexalate 30 g p.o. x1 today for hyperkalemia  Avoid any nephrotoxic agents including ACE inhibitor/ARB secondary to JP on CKD as well as hyperkalemia  Cardiac diet recommended  Monitor on telemetry for any tachycardia or bradycardia arrhythmias  Maintain potassium greater than 4, magnesium greater than 2  Monitor H&H closely given acute on chronic postoperative anemia  GI/DVT prophylaxis  Coronary evaluation per Dr. Adrianna Tim  Hospitalist recommendations  Further recommendations to follow            Electronically signed by STACIA Apple on 3/10/2023 at 10:49 AM

## 2023-03-10 NOTE — CARE COORDINATION
Met with patient. Definition of pneumonia discussed. Causes of different types of pneumonia reviewed. Symptoms discussed and pt does understand that they may have some or all of these symptoms. Testing to diagnose pneumonia reviewed as well as possible treatments. Importance of avoiding infections discussed including: taking medication as directed, washing hands, disposing of used tissues, getting the pneumonia and flu vaccines, and avoiding others who are ill. Importance of not smoking and to avoid others who may be smoking around patient stressed. Pneumonia Zones also reviewed. \"Green\" zone is the goal, \"Yellow\" zone means to call the doctor, and \"Red\" zone means to call the doctor ASAP or call 911. Copies of Pneumonia booklet and Zone Pamphlet given to patient. Offer for patient to express any concerns or questions. Pt denies having further questions at this time. She also has elevated troponins. No definitive cardiac diagnosis at this time. Awaiting Dr. Leander Collado to evaluate patient. I told her that I would mail her information if a cardiac issue is diagnosed and she is discharged prior to Monday. If she is still here, I wwill follow up at that time.  Electronically signed by Irma Gallego RN on 3/10/2023 at 10:48 AM

## 2023-03-10 NOTE — FLOWSHEET NOTE
0825- Assessment complete. Vitals stable. Dr. Felipe Fox notified patient's home medications need re-ordered. 1045- BP stable. Medications given as ordered. Gamal Morgan  PA in to see patient. 46- Dr. Weiner Early notified of a critical troponin 0.440. Electronically signed by Dolly De La Garza on 3/10/2023 at 1:35 PM  1520- Patient medicated with percocet for pain 8/10 in left knee after working with therapy. 1630- Patient up to the bedside commode with assist and noted to have a large bowel movement. Electronically signed by Dolly De La Garza on 3/10/2023 at 5:03 PM

## 2023-03-10 NOTE — HOME CARE
Patient current with [facility] for Physical Therapy as of 3/9/23, through end of cert 0/2/82

## 2023-03-10 NOTE — CONSULTS
Spiritual Care Services     Summary of Visit:    Pt was consulted concerning HCPOA, pt not interested in reviewing the information at this time, she will look at it at a later date, pt spouse was present during the visit, pt appreciated the visit,     Encounter Summary  Encounter Overview/Reason : Advance Care Planning  Service Provided For:: Patient  Referral/Consult From:: Physician, Nurse  Support System: Spouse, Children, Family members  Complexity of Encounter: Moderate  Begin Time: 1320  End Time : 1340  Total Time Calculated: 20 min      Advance Care Planning  Type: Refused ACP conversation    Spiritual Assessment/Intervention/Outcomes:    Assessment: Calm, Concerns with suffering    Intervention: Active listening, Sustaining Presence/Ministry of presence, Discussed meaning/purpose    Outcome: Acceptance, Comfort, Receptive      Care Plan:    Plan and Referrals  Plan/Referrals: No future visits requested          Spiritual Care Services   Electronically signed by Chaplain Jane on 3/10/2023 at 1:43 PM.    To reach a  for emotional and spiritual support, place an EPIC consult request.   If a  is needed immediately, dial “0” and ask to page the on-call .

## 2023-03-10 NOTE — PROGRESS NOTES
Internal Medicine   Hospitalist   Progress Note    3/10/2023   2:09 PM    Name:  Ramona Parra  MRN:    [id number]     IP Day: 0     Admit Date: 3/9/2023  8:02 PM  PCP: Ashtyn White MD    Code Status:  Full Code    Assessment and Plan: Active Problems/ diagnosis:     Acute respiratory insufficiency  Pneumonia  Elevated troponin-rule out NSTEMI-history of CAD status post previous PCI  JP on CKD 3  Recent knee replacement  Left lower extremity edema-DVT studies negative    Plan  Resume antibiotics, monitor respiratory status, follow-up blood and respiratory cultures  Discussed with cardiologist, will obtain an echocardiogram and check for wall motion abnormalities  Appreciate nephrologist input regarding fluid status  Home medication reviewed  DVT PPx     7 pm- 7 am, please contact on call Hospitalist for any needs     Subjective:      no new events. No new symptoms. No chest pain this morning.     Physical Examination:      Vitals:  BP (!) 130/53   Pulse 92   Temp 98.5 °F (36.9 °C) (Oral)   Resp 20   Ht 5' 1\" (1.549 m)   Wt 247 lb 1.6 oz (112.1 kg)   LMP 1996   SpO2 98%   BMI 46.69 kg/m²   Temp (24hrs), Av.1 °F (37.3 °C), Min:98.4 °F (36.9 °C), Max:99.9 °F (37.7 °C)      General appearance: alert, cooperative and no distress  Mental Status: oriented to person, place and time and normal affect  Lungs: clear to auscultation bilaterally, normal effort  Heart: regular rate and rhythm, no murmur  Abdomen: soft, nontender, nondistended, bowel sounds present, no masses  Extremities: Bilateral lower extremity edema worse on the left edema, no redness, tenderness in the calves  Skin: no gross lesions, rashes    Data:     Labs:  Recent Labs     03/09/23  2015 03/10/23  0512   WBC 8.3 6.0   HGB 9.2* 7.4*    103*     Recent Labs     23  2247 03/10/23  0512     --   --  141   K 4.7  --   --  5.6*     --   --  113*   CO2 18*  --   --  19* BUN 38*  --   --  35*   CREATININE 2.00*   < > 1.9* 1.84*   GLUCOSE 113*  --   --  100*    < > = values in this interval not displayed.      Recent Labs     03/09/23  2015 03/10/23  0512   AST 18 23   ALT 6 <5   BILITOT 0.3 <0.2   ALKPHOS 96 84       Current Facility-Administered Medications   Medication Dose Route Frequency Provider Last Rate Last Admin    guaiFENesin (MUCINEX) extended release tablet 600 mg  600 mg Oral BID Nicoletto Bolprakashn-Roche, APRN - CNP   600 mg at 03/10/23 1044    sodium chloride flush 0.9 % injection 5-40 mL  5-40 mL IntraVENous 2 times per day Nicoletto Bolzan-Roche, APRN - CNP   10 mL at 03/10/23 1045    sodium chloride flush 0.9 % injection 5-40 mL  5-40 mL IntraVENous PRN Nicowillowo Alexandrazan-Roche, APRN - CNP        0.9 % sodium chloride infusion   IntraVENous PRN Jorgitoo Yusran-Roche, APRN - CNP        enoxaparin Sodium (LOVENOX) injection 30 mg  30 mg SubCUTAneous Daily Nicoletto Bolprakashn-Roche, APRN - CNP   30 mg at 03/10/23 1044    polyethylene glycol (GLYCOLAX) packet 17 g  17 g Oral Daily PRN Jorgitoo Alexandrazan-Roche, APRN - CNP        acetaminophen (TYLENOL) tablet 650 mg  650 mg Oral Q6H PRN Nicoletto Bolzan-Roche, APRN - CNP        Or    acetaminophen (TYLENOL) suppository 650 mg  650 mg Rectal Q6H PRN Jorgitoo Alexandrazan-Roche, APRN - CNP        [START ON 3/11/2023] cefTRIAXone (ROCEPHIN) 1,000 mg in sodium chloride 0.9 % 50 mL IVPB (mini-bag)  1,000 mg IntraVENous Q24H Jorgitoo Yusran-Roche, APRN - CNP        aspirin EC tablet 81 mg  81 mg Oral BID Pitney Csa, DO   81 mg at 03/10/23 1044    dilTIAZem (CARDIZEM CD) extended release capsule 240 mg  240 mg Oral Daily Pitney Cas, DO   240 mg at 03/10/23 1044    metoprolol tartrate (LOPRESSOR) tablet 25 mg  25 mg Oral BID Pitney Cas, DO   25 mg at 03/10/23 1044    oxybutynin (DITROPAN-XL) extended release tablet 10 mg  10 mg Oral Daily Fermin Cardozo DO   10 mg at 03/10/23 1044    atorvastatin (LIPITOR) tablet 40 mg  40 mg Oral Nightly Pat Garrett Sheppard        nitroGLYCERIN (NITROSTAT) SL tablet 0.4 mg  0.4 mg SubLINGual Q5 Min PRN Jama Morse MD   0.4 mg at 03/09/23 2212       Additional work up or/and treatment plan may be added today or then after based on clinical progression. I am managing a portion of pt care. Some medical issues are handled by other specialists. Additional work up and treatment should be done in out pt setting by pt PCP and other out pt providers. In addition to examining and evaluating pt, I spent additional time explaining care, normaland abnormal findings, and treatment plan. All of pt questions were answered. Counseling, diet and education were provided. Case will be discussed with nursing staff when appropriate. Family will be updated if and when appropriate.        Electronically signed by Monica Camara DO on 3/10/2023 at 2:09 PM

## 2023-03-10 NOTE — CARE COORDINATION
Case Management Assessment  Initial Evaluation    Date/Time of Evaluation: 3/10/2023 11:20 AM  Assessment Completed by: Harika Caruso    If patient is discharged prior to next notation, then this note serves as note for discharge by case management. Patient Name: Sarah Bowers                   YOB: 1945  Diagnosis: Hypoxia [R09.02]  Pneumonia due to infectious organism, unspecified laterality, unspecified part of lung [J18.9]  Acute pneumonia [J18.9]                   Date / Time: 3/9/2023  8:02 PM    Patient Admission Status: Inpatient   Readmission Risk (Low < 19, Mod (19-27), High > 27): Readmission Risk Score: 16.5    Current PCP: Juanito Campbell MD  PCP verified by CM? Yes    Chart Reviewed: Yes      History Provided by: Patient  Patient Orientation: Alert and Oriented    Patient Cognition: Alert    Hospitalization in the last 30 days (Readmission):  No    If yes, Readmission Assessment in CM Navigator will be completed. Advance Directives:      Code Status: Full Code   Patient's Primary Decision Maker is:      Primary Decision MakeRachelprecious Irene Spouse - [phone]    Secondary Decision Maker: Yamilaroslyn Moultongracy - Child - [phone]    Supplemental (Other) Decision Maker: Jayson Romo - Child - [phone]    Discharge Planning:    Patient lives with: Spouse/Significant Other Type of Home: House  Primary Care Giver: Self  Patient Support Systems include: Spouse/Significant Other, Children   Current Financial resources:    Current community resources:    Current services prior to admission: Durable Medical Equipment, Home Care (Active with [facility])            Current DME: Sebastian Lynn (Rolling walker)            Type of Home Care services:  OT, PT    ADLS  Prior functional level: Independent in ADLs/IADLs, Assistance with the following:, Mobility  Current functional level:  Independent in ADLs/IADLs, Assistance with the following:, Mobility    PT AM-PAC:   /24  OT AM-PAC:     Family can provide assistance at DC: Yes  Would you like Case Management to discuss the discharge plan with any other family members/significant others, and if so, who? No  Plans to Return to Present Housing: Yes  Other Identified Issues/Barriers to RETURNING to current housing: medical management  Potential Assistance needed at discharge: Home Care            Potential DME:    Patient expects to discharge to: [address] [place] [address] for transportation at discharge:      Financial    Payor: Sadie Nj / Plan: [employer] HMO / Product Type: Medicare /     Does insurance require precert for SNF: Yes    Potential assistance Purchasing Medications: No  Meds-to-Beds request:        [address] [place] [address] [address] [phone] - F [phone]  [address] [place] [address]  Phone: [phone] Fax: [phone]      Notes:    Factors facilitating achievement of predicted outcomes: Family support, Motivated, Cooperative, and Pleasant    Barriers to discharge: Medical management     Additional Case Management Notes: Patient from home with spouse Apolonia Barrientos, independent with ADLs, uses a walker post TKA, active with [facility] for therapy and wishes to resume services upon discharge. Spouse currently providing transportation while patient completes therapy. Patient currently on oxygen, does not use at home. Plan for wean prior to discharge, CM team to assist with home O2 set up if needed. PCP verified, patient denies issues with obtaining medications, takes as directed, understands usage and side effects to watch for. Call placed to [address] to verify resumption, will need orders upon discharge. Discussed Primary decision maker, Patient prefers children to make decisions over spouse, referral placed for possible POA documentation.          The Plan for Transition of Care is related to the following treatment goals of Hypoxia [R09.02]  Pneumonia due to infectious organism, unspecified laterality, unspecified part of lung [J18.9]  Acute pneumonia [S16.6]    IF APPLICABLE: The Patient and/or patient representative Luis Alberto Deleon and her family were provided with a choice of provider and agrees with the discharge plan. Freedom of choice list with basic dialogue that supports the patient's individualized plan of care/goals and shares the quality data associated with the providers was provided to: Patient    Patient Representative Name:       The Patient and/or Patient Representative Agree with the Discharge Plan? Yes, patient wants to resume [address] [facility] [place].      Romy Espinosa  Case Management Department

## 2023-03-10 NOTE — PROGRESS NOTES
Physical Therapy Missed Treatment   Facility/Department: [facility] MED SURG -81    NAME: Qian Query    : 1945 (69 y.o.)  MRN: [id number]    Account: [de-identified]  Gender: female      PT evaluation and treatment orders received. Chart reviewed. Per chart review, pt admitted for elevated trops. Cardiology on consult, however have not rounded at this time. Therapy order states \"Therapy if OK per cardiology. \" Will hold PT evaluation until cardiology input. Nursing staff notified. Will follow and attempt PT evaluation again at earliest availability.        Sahil Leigh, PT, 03/10/23 at 9:32 AM

## 2023-03-11 LAB
ABO/RH: NORMAL
ALBUMIN SERPL-MCNC: 3.1 G/DL (ref 3.5–4.6)
ALP BLD-CCNC: 85 U/L (ref 40–130)
ALT SERPL-CCNC: 7 U/L (ref 0–33)
ANION GAP SERPL CALCULATED.3IONS-SCNC: 10 MEQ/L (ref 9–15)
ANTIBODY SCREEN: NORMAL
AST SERPL-CCNC: 31 U/L (ref 0–35)
BASOPHILS ABSOLUTE: 0 K/UL (ref 0–0.2)
BASOPHILS RELATIVE PERCENT: 0.2 %
BILIRUB SERPL-MCNC: 0.3 MG/DL (ref 0.2–0.7)
BUN BLDV-MCNC: 32 MG/DL (ref 8–23)
CALCIUM SERPL-MCNC: 8.5 MG/DL (ref 8.5–9.9)
CHLORIDE BLD-SCNC: 113 MEQ/L (ref 95–107)
CO2: 19 MEQ/L (ref 20–31)
CREAT SERPL-MCNC: 1.47 MG/DL (ref 0.5–0.9)
EOSINOPHILS ABSOLUTE: 0.2 K/UL (ref 0–0.7)
EOSINOPHILS RELATIVE PERCENT: 2.6 %
GFR SERPL CREATININE-BSD FRML MDRD: 36.6 ML/MIN/{1.73_M2}
GLOBULIN: 2.2 G/DL (ref 2.3–3.5)
GLUCOSE BLD-MCNC: 105 MG/DL (ref 70–99)
HCT VFR BLD CALC: 22.1 % (ref 37–47)
HCT VFR BLD CALC: 25.4 % (ref 37–47)
HEMOGLOBIN: 7.1 G/DL (ref 12–16)
HEMOGLOBIN: 8.2 G/DL (ref 12–16)
LV EF: 55 %
LVEF MODALITY: NORMAL
LYMPHOCYTES ABSOLUTE: 0.4 K/UL (ref 1–4.8)
LYMPHOCYTES RELATIVE PERCENT: 6.9 %
MAGNESIUM: 2 MG/DL (ref 1.7–2.4)
MCH RBC QN AUTO: 30.6 PG (ref 27–31.3)
MCHC RBC AUTO-ENTMCNC: 32.2 % (ref 33–37)
MCV RBC AUTO: 95.1 FL (ref 79.4–94.8)
MONOCYTES ABSOLUTE: 0.4 K/UL (ref 0.2–0.8)
MONOCYTES RELATIVE PERCENT: 5.8 %
NEUTROPHILS ABSOLUTE: 5.4 K/UL (ref 1.4–6.5)
NEUTROPHILS RELATIVE PERCENT: 84.5 %
PDW BLD-RTO: 14.5 % (ref 11.5–14.5)
PLATELET # BLD: 129 K/UL (ref 130–400)
POTASSIUM SERPL-SCNC: 4.8 MEQ/L (ref 3.4–4.9)
RBC # BLD: 2.32 M/UL (ref 4.2–5.4)
SODIUM BLD-SCNC: 142 MEQ/L (ref 135–144)
TOTAL PROTEIN: 5.3 G/DL (ref 6.3–8)
TROPONIN: 0.49 NG/ML (ref 0–0.01)
TROPONIN: 0.54 NG/ML (ref 0–0.01)
TROPONIN: 0.62 NG/ML (ref 0–0.01)
WBC # BLD: 6.4 K/UL (ref 4.8–10.8)

## 2023-03-11 PROCEDURE — 6370000000 HC RX 637 (ALT 250 FOR IP): Performed by: INTERNAL MEDICINE

## 2023-03-11 PROCEDURE — 85025 COMPLETE CBC W/AUTO DIFF WBC: CPT

## 2023-03-11 PROCEDURE — 36415 COLL VENOUS BLD VENIPUNCTURE: CPT

## 2023-03-11 PROCEDURE — 6370000000 HC RX 637 (ALT 250 FOR IP): Performed by: NURSE PRACTITIONER

## 2023-03-11 PROCEDURE — 86900 BLOOD TYPING SEROLOGIC ABO: CPT

## 2023-03-11 PROCEDURE — 85018 HEMOGLOBIN: CPT

## 2023-03-11 PROCEDURE — 6370000000 HC RX 637 (ALT 250 FOR IP): Performed by: PHYSICIAN ASSISTANT

## 2023-03-11 PROCEDURE — 83735 ASSAY OF MAGNESIUM: CPT

## 2023-03-11 PROCEDURE — 1210000000 HC MED SURG R&B

## 2023-03-11 PROCEDURE — 94760 N-INVAS EAR/PLS OXIMETRY 1: CPT

## 2023-03-11 PROCEDURE — 86901 BLOOD TYPING SEROLOGIC RH(D): CPT

## 2023-03-11 PROCEDURE — 97110 THERAPEUTIC EXERCISES: CPT

## 2023-03-11 PROCEDURE — 6360000002 HC RX W HCPCS: Performed by: NURSE PRACTITIONER

## 2023-03-11 PROCEDURE — 93306 TTE W/DOPPLER COMPLETE: CPT

## 2023-03-11 PROCEDURE — 2700000000 HC OXYGEN THERAPY PER DAY

## 2023-03-11 PROCEDURE — 85014 HEMATOCRIT: CPT

## 2023-03-11 PROCEDURE — 80053 COMPREHEN METABOLIC PANEL: CPT

## 2023-03-11 PROCEDURE — 94664 DEMO&/EVAL PT USE INHALER: CPT

## 2023-03-11 PROCEDURE — 36430 TRANSFUSION BLD/BLD COMPNT: CPT

## 2023-03-11 PROCEDURE — 97535 SELF CARE MNGMENT TRAINING: CPT

## 2023-03-11 PROCEDURE — P9016 RBC LEUKOCYTES REDUCED: HCPCS

## 2023-03-11 PROCEDURE — 94640 AIRWAY INHALATION TREATMENT: CPT

## 2023-03-11 PROCEDURE — 99233 SBSQ HOSP IP/OBS HIGH 50: CPT | Performed by: INTERNAL MEDICINE

## 2023-03-11 PROCEDURE — 2580000003 HC RX 258: Performed by: NURSE PRACTITIONER

## 2023-03-11 PROCEDURE — 84484 ASSAY OF TROPONIN QUANT: CPT

## 2023-03-11 PROCEDURE — 86850 RBC ANTIBODY SCREEN: CPT

## 2023-03-11 PROCEDURE — 86923 COMPATIBILITY TEST ELECTRIC: CPT

## 2023-03-11 RX ORDER — ENOXAPARIN SODIUM 100 MG/ML
30 INJECTION SUBCUTANEOUS 2 TIMES DAILY
Status: DISCONTINUED | OUTPATIENT
Start: 2023-03-11 | End: 2023-03-12

## 2023-03-11 RX ORDER — IPRATROPIUM BROMIDE AND ALBUTEROL SULFATE 2.5; .5 MG/3ML; MG/3ML
1 SOLUTION RESPIRATORY (INHALATION) EVERY 4 HOURS PRN
Status: DISCONTINUED | OUTPATIENT
Start: 2023-03-11 | End: 2023-03-15 | Stop reason: HOSPADM

## 2023-03-11 RX ORDER — SODIUM CHLORIDE 9 MG/ML
INJECTION, SOLUTION INTRAVENOUS PRN
Status: DISCONTINUED | OUTPATIENT
Start: 2023-03-11 | End: 2023-03-15 | Stop reason: HOSPADM

## 2023-03-11 RX ADMIN — ENOXAPARIN SODIUM 30 MG: 100 INJECTION SUBCUTANEOUS at 21:45

## 2023-03-11 RX ADMIN — IPRATROPIUM BROMIDE AND ALBUTEROL SULFATE 1 AMPULE: .5; 2.5 SOLUTION RESPIRATORY (INHALATION) at 19:53

## 2023-03-11 RX ADMIN — GUAIFENESIN 600 MG: 600 TABLET, EXTENDED RELEASE ORAL at 10:00

## 2023-03-11 RX ADMIN — METOPROLOL TARTRATE 25 MG: 25 TABLET, FILM COATED ORAL at 21:45

## 2023-03-11 RX ADMIN — Medication 10 ML: at 10:00

## 2023-03-11 RX ADMIN — ASPIRIN 81 MG: 81 TABLET, COATED ORAL at 21:45

## 2023-03-11 RX ADMIN — OXYCODONE AND ACETAMINOPHEN 1 TABLET: 5; 325 TABLET ORAL at 21:45

## 2023-03-11 RX ADMIN — ENOXAPARIN SODIUM 30 MG: 100 INJECTION SUBCUTANEOUS at 10:00

## 2023-03-11 RX ADMIN — CEFTRIAXONE SODIUM 1000 MG: 1 INJECTION, POWDER, FOR SOLUTION INTRAMUSCULAR; INTRAVENOUS at 00:59

## 2023-03-11 RX ADMIN — OXYBUTYNIN CHLORIDE 10 MG: 5 TABLET, EXTENDED RELEASE ORAL at 09:59

## 2023-03-11 RX ADMIN — OXYCODONE AND ACETAMINOPHEN 1 TABLET: 5; 325 TABLET ORAL at 02:48

## 2023-03-11 RX ADMIN — GUAIFENESIN 600 MG: 600 TABLET, EXTENDED RELEASE ORAL at 21:44

## 2023-03-11 RX ADMIN — Medication 10 ML: at 22:00

## 2023-03-11 RX ADMIN — ASPIRIN 81 MG: 81 TABLET, COATED ORAL at 10:00

## 2023-03-11 RX ADMIN — DILTIAZEM HYDROCHLORIDE 240 MG: 240 CAPSULE, COATED, EXTENDED RELEASE ORAL at 09:59

## 2023-03-11 RX ADMIN — ATORVASTATIN CALCIUM 40 MG: 40 TABLET, FILM COATED ORAL at 21:44

## 2023-03-11 RX ADMIN — METOPROLOL TARTRATE 25 MG: 25 TABLET, FILM COATED ORAL at 09:59

## 2023-03-11 ASSESSMENT — PAIN DESCRIPTION - DESCRIPTORS
DESCRIPTORS: ACHING;BURNING;TENDER
DESCRIPTORS: ACHING

## 2023-03-11 ASSESSMENT — PAIN SCALES - GENERAL
PAINLEVEL_OUTOF10: 4
PAINLEVEL_OUTOF10: 0
PAINLEVEL_OUTOF10: 5

## 2023-03-11 ASSESSMENT — PAIN DESCRIPTION - ONSET
ONSET: ON-GOING
ONSET: ON-GOING

## 2023-03-11 ASSESSMENT — PAIN DESCRIPTION - PAIN TYPE
TYPE: ACUTE PAIN;SURGICAL PAIN
TYPE: ACUTE PAIN;SURGICAL PAIN

## 2023-03-11 ASSESSMENT — PAIN DESCRIPTION - LOCATION
LOCATION: LEG;KNEE
LOCATION: LEG;KNEE

## 2023-03-11 ASSESSMENT — PAIN DESCRIPTION - FREQUENCY
FREQUENCY: CONTINUOUS
FREQUENCY: CONTINUOUS

## 2023-03-11 ASSESSMENT — PAIN DESCRIPTION - ORIENTATION
ORIENTATION: LEFT
ORIENTATION: LEFT

## 2023-03-11 NOTE — PROGRESS NOTES
Renal Progress Note    Assessment and Plan:      76-year-old lady with CKD stage III with risk factors of hypertension coronary disease with stents. Baseline creatinine recently in mid ones. Recent total knee replacement. Had some mild acute kidney injury after that. Losartan held. Readmitted with some chest pain. Had a CT scan with contrast.  Negative for pulmonary embolism. Has some hyperkalemia. Possible fluid overload based on imaging     Plan/  1-held ivf.    2-k better  3-she got Retacrit few  days ago. We will hold off on redosing    Patient Active Problem List:     Hypothyroid     Essential hypertension     Hyperlipidemia     Morbid obesity due to excess calories ([facility])     History of ST elevation myocardial infarction (STEMI)     Coronary artery disease involving native coronary artery of native heart without angina pectoris     Chronic bilateral low back pain with bilateral sciatica     Primary osteoarthritis of both knees     Spinal stenosis of lumbar region with neurogenic claudication     Degenerative spondylolisthesis     Asthma     CKD (chronic kidney disease) stage 3, GFR 30-59 ml/min ([facility])     Adenomatous polyp of descending colon     Adenomatous polyp of ascending colon     Adenomatous polyp of colon     Chronic gastritis without bleeding     Anemia     Gastrointestinal hemorrhage     RBBB (right bundle branch block)     Status post total knee replacement, right     Status post total knee replacement, left     Acute pneumonia     Elevated troponin      Subjective:   Admit Date: 3/9/2023    Interval History: gfr better. No cp. No sob.         Medications:   Scheduled Meds:   enoxaparin  30 mg SubCUTAneous BID    guaiFENesin  600 mg Oral BID    sodium chloride flush  5-40 mL IntraVENous 2 times per day    cefTRIAXone (ROCEPHIN) IV  1,000 mg IntraVENous Q24H    aspirin  81 mg Oral BID    dilTIAZem  240 mg Oral Daily    metoprolol tartrate  25 mg Oral BID    oxybutynin  10 mg Oral Daily atorvastatin  40 mg Oral Nightly     Continuous Infusions:   sodium chloride      sodium chloride         CBC:   Recent Labs     03/10/23  0512 03/11/23  0445   WBC 6.0 6.4   HGB 7.4* 7.1*   * 129*     CMP:    Recent Labs     03/09/23 2015 03/09/23 2121 03/09/23 2247 03/10/23  0512 03/11/23  0445     --   --  141 142   K 4.7  --   --  5.6* 4.8     --   --  113* 113*   CO2 18*  --   --  19* 19*   BUN 38*  --   --  35* 32*   CREATININE 2.00*   < > 1.9* 1.84* 1.47*   GLUCOSE 113*  --   --  100* 105*   CALCIUM 9.5  --   --  8.8 8.5   LABGLOM 25.3*   < > 27* 28.0* 36.6*    < > = values in this interval not displayed. Troponin:   Recent Labs     03/11/23  1037   TROPONINI 0.494*     BNP: No results for input(s): BNP in the last 72 hours. INR: No results for input(s): INR in the last 72 hours. Lipids: No results for input(s): CHOL, LDLDIRECT, TRIG, HDL, AMYLASE, LIPASE in the last 72 hours. Liver:   Recent Labs     03/11/23 0445   AST 31   ALT 7   ALKPHOS 85   PROT 5.3*   LABALBU 3.1*   BILITOT 0.3     Iron:  No results for input(s): IRONS, FERRITIN in the last 72 hours. Invalid input(s): LABIRONS  Urinalysis: No results for input(s): UA in the last 72 hours. Objective:   Vitals: BP (!) 128/54   Pulse 76   Temp 99.2 °F (37.3 °C) (Oral)   Resp 20   Ht 5' 1\" (1.549 m)   Wt 244 lb 3.2 oz (110.8 kg)   LMP 09/17/1996   SpO2 95%   BMI 46.14 kg/m²    Wt Readings from Last 3 Encounters:   03/11/23 244 lb 3.2 oz (110.8 kg)   03/07/23 224 lb 12.8 oz (102 kg)   02/28/23 224 lb 12.8 oz (102 kg)      24HR INTAKE/OUTPUT:    Intake/Output Summary (Last 24 hours) at 3/11/2023 1259  Last data filed at 3/11/2023 7087  Gross per 24 hour   Intake 300 ml   Output 600 ml   Net -300 ml       Constitutional:  Alert, awake, no apparent distress   Skin:normal, no rash  HEENT:sclera anicteric.   Head atraumatic normocephalic  Neck:supple with no thyromegally  Cardiovascular:  S1, S2 without m/r/g   Respiratory:  CTA B without w/r/r   Abdomen: +bs, soft, nt  Ext: no LE edema  Musculoskeletal:Intact  Neuro:Alert and oriented with no deficit      Electronically signed by Wellington Guerra MD on 3/11/2023 at 12:59 PM

## 2023-03-11 NOTE — PROGRESS NOTES
Physical Therapy Med Surg Daily Treatment Note  Facility/Department: [address] [place] [address]  Room: [id number]       NAME: Alicja Aguilar  : 1945 (69 y.o.)  MRN: [id number]  CODE STATUS: Full Code    Date of Service: 3/11/2023    Patient Diagnosis(es): Hypoxia [R09.02]  Pneumonia due to infectious organism, unspecified laterality, unspecified part of lung [J18.9]  Acute pneumonia [J18.9]   Chief Complaint   Patient presents with    Chest Pain     For a couple hours     Patient Active Problem List    Diagnosis Date Noted    Elevated troponin 03/10/2023    Morbid obesity due to excess calories ([facility] 2017    Acute pneumonia 03/10/2023    Status post total knee replacement, left 2023    Status post total knee replacement, right 2022    RBBB (right bundle branch block) 2022    Gastrointestinal hemorrhage     Anemia 2021    Adenomatous polyp of descending colon     Adenomatous polyp of ascending colon     Adenomatous polyp of colon     Chronic gastritis without bleeding     CKD (chronic kidney disease) stage 3, GFR 30-59 ml/min ([facility]) 2019    Asthma     Spinal stenosis of lumbar region with neurogenic claudication 2019    Degenerative spondylolisthesis 2019    Chronic bilateral low back pain with bilateral sciatica 10/30/2018    Primary osteoarthritis of both knees 10/30/2018    Coronary artery disease involving native coronary artery of native heart without angina pectoris 07/10/2018    History of ST elevation myocardial infarction (STEMI) 2017    Hypothyroid 12/10/2013    Essential hypertension 12/10/2013    Hyperlipidemia 12/10/2013        Past Medical History:   Diagnosis Date    Antral ulcer 2015    DR LANE    Asthma     \"cured by beestings\"    Coronary artery disease     Coronary artery disease involving native coronary artery of native heart without angina pectoris 7/10/2018    has x 4 cardiac stents / Dr. Shayy Davison    Diverticulosis of colon (without mention of hemorrhage) 01/14/2015    DR Cale Zuniga    Essential hypertension 12/10/2013    meds > 20 yrs    History of blood transfusion 1990s    with hysterectomy    History of normal resting EKG 1996    normal    History of ST elevation myocardial infarction (STEMI) 7/11/2017    History of transfusion of whole blood 1996    Excessive bleeding before hysterectomy    Hyperlipidemia     meds > 2 yrs    Hypertension     Hypothyroidism     past trx years ago then stopped / recent restart    Kidney disease     Low back pain     Morbid obesity due to excess calories ([address] 5/13/2017    Morbid obesity due to excess calories ([address] 6/6/2017    Osteoarthritis     Pneumonia     RBBB (right bundle branch block) 3/17/2022    Shoulder dislocation     ST elevation myocardial infarction involving left circumflex coronary artery ([address] 5/13/2017    Unspecified gastritis and gastroduodenitis without mention of hemorrhage 05/06/2015    DR Cale Zuniga     Past Surgical History:   Procedure Laterality Date    CARDIAC SURGERY  2017    has x 4 cardiac stents    COLONOSCOPY  01/14/2015    DR Cale Zuniga - DIVERTICULOSIS    COLONOSCOPY N/A 1/2/2021    COLONOSCOPY DIAGNOSTIC performed by Martina Partida MD at [facility]  05/17/2017    x2 stents    CYST REMOVAL Left 2/7/2019    RESECTION OF LEFT PINNA LESION WITH GRAFT performed by Maty Goode MD at [facility], COLON, DIAGNOSTIC      FINGER SURGERY  12/9/14    middle finger right hand due to infection    HYSTERECTOMY ([address])  1996    LASIK  [id number]    SHOULDER SURGERY  2008    shoulder dislocated - popped  back in Right shoulder    TOTAL KNEE ARTHROPLASTY Right 8/30/2022    RIGHT KNEE TOTAL KNEE ARTHROPLASTY performed by Danny Arita MD at [id number] [place] [address] Left 3/7/2023    LEFT KNEE TOTAL KNEE ARTHROPLASTY performed by Danny Arita MD at [address]  01/14/2015     ARIANA - ULCER IN THE ANTRUM    UPPER GASTROINTESTINAL ENDOSCOPY  05/06/2015    DR LANE - GASTRITIS, PREVIOUS ULCER HEALED    UPPER GASTROINTESTINAL ENDOSCOPY N/A 1/2/2021    EGD DIAGNOSTIC ONLY performed by Elio Sanchez MD at [facility]       Chart Reviewed: Yes  Additional Pertinent Hx: s/p L TKA 3/7/23  Family / Caregiver Present: No  Diagnosis: Acute pneumonia  General Comment  Comments: Pt resting in bed - agreeable to PT evaluation    Restrictions:  Restrictions/Precautions: Fall Risk;Up as Tolerated;Contact Precautions (MRSA)    SUBJECTIVE:   Subjective: \"My chair is much easier than this. \"    Pain  Pain: Denies pain pre/post reports increased fatigue     OBJECTIVE:        Bed mobility  Supine to Sit: Contact guard assistance;Minimal assistance  Sit to Supine: Moderate assistance  Bed Mobility Comments: Increased time to complete. reports increased shoulder pain due to RTC injury    Transfers  Sit to Stand: Minimal Assistance  Stand to Sit: Contact guard assistance  Comment: Slow to complete. Pt requires cuing for upright posture. Pt gets temporarily stuck in hip flexion during stand but correct with time. PT Exercises  A/AROM Exercises: supine A/P QS/GS x 10          Activity Tolerance  Activity Tolerance: Patient tolerated treatment well  Activity Tolerance Comments: Pt dealt with shortness of breath throughout requiring increased rest periods          ASSESSMENT   Assessment: Pt dealing with increased difficulty with bed mobility this date. but manages with walking feet over to edge and pushing up from elbows and hands, educated on log roll technique Pt reports increased difficulty with this due to an old shoulder injury. Pt requires less assistance going from supine to sit but still requires previous level of assistance going from sit to supine. Pt would require increased periods between activity due to shortness of breath caused by pneumonia. Continue to progress as able.      Discharge Recommendations:  Continue to assess pending progress, Patient would benefit from continued therapy after discharge         Goals  Long Term Goals  Long Term Goal 1: Pt to complete bed mobility with indep  Long Term Goal 2: Pt to complete transfers with indep  Long Term Goal 3: Pt ambulate 50-150ft with LRD and indep  Long Term Goal 4: Pt to manage ramp indep with LRD to enter home  Long Term Goal 5: Pt to complete HEP with indep    PLAN    General Plan: 2 times a day 7 days a week  Safety Devices  Type of Devices: All fall risk precautions in place     AMPAC (6 CLICK) BASIC MOBILITY  AM-PAC Inpatient Mobility Raw Score : 14      Therapy Time   Individual   Time In 804   Time Out 0843   Minutes 39     Timed Code Treatment Minutes: 39 Minutes    Transfer trainin  Therex : Live Denney PTA, 23 at 8:53 AM         Definitions for assistance levels  Independent = pt does not require any physical supervision or assistance from another person for activity completion. Device may be needed.   Stand by assistance = pt requires verbal cues or instructions from another person, close to but not touching, to perform the activity  Minimal assistance= pt performs 75% or more of the activity; assistance is required to complete the activity  Moderate assistance= pt performs 50% of the activity; assistance is required to complete the activity  Maximal assistance = pt performs 25% of the activity; assistance is required to complete the activity  Dependent = pt requires total physical assistance to accomplish the task

## 2023-03-11 NOTE — PROGRESS NOTES
Patient: Jeffrey Puente  Unit/Bed: D519/Z937-62  YOB: 1945  MRN: [id number]  Acct: [de-identified]   Admitting Diagnosis: Hypoxia [R09.02]  Pneumonia due to infectious organism, unspecified laterality, unspecified part of lung [J18.9]  Acute pneumonia [J18.9]  Date:  3/9/2023  Hospital Day: 0        Chief Complaint:  Chest pain     History of Present Illness: This is a pleasant 73-year-old  female with past medical history significant for recent left total knee replacement on 3/8/2023, recently diagnosed UTI, CKD, hypertension, dyslipidemia, history of coronary artery disease status post PCI of the RCA and circumflex in 2017 and obesity who presented to [facility] ER yesterday with chief complaint of chest pain. Patient states she developed chest pain while walking to the bathroom yesterday afternoon. She described the pain as a midsternal squeezing tightness which was precipitated with exertion and alleviated after a period of rest.  She reports that this pain continued to recur intermittently yesterday. She denied any radiation of the pain or any associated symptoms. She denied nausea, vomiting, shortness of breath, diaphoresis, dizziness, lightheadedness, syncope or fever. Due to this intermittent chest pain she presented to ER for further evaluation. On presentation to the emergency room, blood pressure 149/67, heart rate tachycardic at 102, respiratory rate 20, pulse ox 96%, temperature 98.4 °F.  Sodium 137, potassium 4.7, chloride 105, total CO2 18, BUN elevated at 38, creatinine elevated 2.0, GFR low at 25.3, glucose 113. Initial troponin negative less than 0.010. WBC 8.3, hemoglobin 9.2, hematocrit 28.7, platelets 769. EKG showed sinus tachycardia with ventricular rate of 104 bpm, right bundle branch block, ST depression with T wave inversion in V1 and V2 and ST depression in V3 through V6, QTc 526 ms.   CTA of the chest revealed no evidence of pulmonary embolism, small bilateral pleural effusions with patchy groundglass opacity in the lung fields to suggest either superimposed edema or multilobar pneumonia. While in the ER, patient with 2 episodes of hypoxia with SPO2 declining to around 86% on room air she was placed on supplemental O2. She was subsequently admitted with diagnosis of pneumonia and hypoxia. Cardiology has been consulted regarding elevated troponin and chest pain. At time my evaluation today, patient is resting comfortably and in no acute distress. She is on 2 L O2 per nasal cannula with SPO2 of 94%. She is receiving Rocephin for presumed pneumonia. Initial troponin was negative in ER however repeat troponins have been increasingly elevated at 0.054 followed by 0.244 this morning. She is currently chest pain-free. She reports mild cough which she has noticed over the past few days but attributes it to her sinuses/allergies. She is currently receiving IV fluids at 75 mL/h.  A.m. labs reviewed. She is hyperkalemic this morning with potassium of 5.6. Mild improvement in renal function with creatinine 1.84, BUN of 35 and GFR of 28.0. Baseline creatinine appears to be between 1.1-1.4 on review of prior labs. Patient denies any recent coronary evaluation. Echocardiogram on 11/15/2022 revealed normal LV systolic function with EF of 55 to 60%, trivial AR, mild MR, mild TR and mild AS.            Allergies   Allergen Reactions    Lisinopril Nausea Only and Other (See Comments)       cough    Tramadol Nausea Only         Current Facility-Administered Medications             Current Facility-Administered Medications   Medication Dose Route Frequency Provider Last Rate Last Admin    guaiFENesin (MUCINEX) extended release tablet 600 mg  600 mg Oral BID Nicoletto Bolzan-Roche, APRN - CNP   600 mg at 03/10/23 1044    sodium chloride flush 0.9 % injection 5-40 mL  5-40 mL IntraVENous 2 times per day Nicoletto Bolzan-Roche, APRN - CNP   10 mL at 03/10/23 1045 sodium chloride flush 0.9 % injection 5-40 mL  5-40 mL IntraVENous PRN Jorgitoo Bolzan-Roche, APRN - CNP        0.9 % sodium chloride infusion   IntraVENous PRN Jorgitoo Yusran-Roche, APRN - CNP        enoxaparin Sodium (LOVENOX) injection 30 mg  30 mg SubCUTAneous Daily Nicoletto Surinder-Roche, APRN - CNP   30 mg at 03/10/23 1044    ondansetron (ZOFRAN-ODT) disintegrating tablet 4 mg  4 mg Oral Q8H PRN Jorgitoo Alexandrazan-Roche, APRN - CNP         Or    ondansetron (ZOFRAN) injection 4 mg  4 mg IntraVENous Q6H PRN Jorgitoo Alexandrazan-Roche, APRN - CNP        polyethylene glycol (GLYCOLAX) packet 17 g  17 g Oral Daily PRN Jorgitoo Yusran-Roche, APRN - CNP        acetaminophen (TYLENOL) tablet 650 mg  650 mg Oral Q6H PRN Jorgitoo Alexandrazan-Roche, APRN - CNP         Or    acetaminophen (TYLENOL) suppository 650 mg  650 mg Rectal Q6H PRN Jorgitoo Yusran-Roche, APRN - CNP        [START ON 3/11/2023] cefTRIAXone (ROCEPHIN) 1,000 mg in sodium chloride 0.9 % 50 mL IVPB (mini-bag)  1,000 mg IntraVENous Q24H Lyle Mengn-Roche, APRN - CNP        0.9 % sodium chloride infusion   IntraVENous Continuous Lyle Cadena-Roche, APRN - CNP 75 mL/hr at 03/10/23 0643 Rate Verify at 03/10/23 6110    aspirin EC tablet 81 mg  81 mg Oral BID Pitney Cas, DO   81 mg at 03/10/23 1044    dilTIAZem (CARDIZEM CD) extended release capsule 240 mg  240 mg Oral Daily Pitney Cas, DO   240 mg at 03/10/23 1044    metoprolol tartrate (LOPRESSOR) tablet 25 mg  25 mg Oral BID Pitney Cas, DO   25 mg at 03/10/23 1044    oxybutynin (DITROPAN-XL) extended release tablet 10 mg  10 mg Oral Daily Pitney Cas, DO   10 mg at 03/10/23 1044    atorvastatin (LIPITOR) tablet 40 mg  40 mg Oral Nightly [facility], [address]        nitroGLYCERIN (NITROSTAT) SL tablet 0.4 mg  0.4 mg SubLINGual Q5 Min PRN Linda Cobb MD   0.4 mg at 03/09/23 2212            PMHx:  Past Medical History        Past Medical History:   Diagnosis Date    Antral ulcer 01/14/2015     DR LANE    Asthma       \"cured by beestings\"    Coronary artery disease      Coronary artery disease involving native coronary artery of native heart without angina pectoris 7/10/2018     has x 4 cardiac stents / Dr. Unknown Cordial    Diverticulosis of colon (without mention of hemorrhage) 01/14/2015     DR Osman Bai    Essential hypertension 12/10/2013     meds > 20 yrs    History of blood transfusion 1990s     with hysterectomy    History of normal resting EKG 1996     normal    History of ST elevation myocardial infarction (STEMI) 7/11/2017    History of transfusion of whole blood 1996     Excessive bleeding before hysterectomy    Hyperlipidemia       meds > 2 yrs    Hypertension      Hypothyroidism       past trx years ago then stopped / recent restart    Kidney disease      Low back pain      Morbid obesity due to excess calories ([address] 5/13/2017    Morbid obesity due to excess calories ([address] 6/6/2017    Osteoarthritis      Pneumonia      RBBB (right bundle branch block) 3/17/2022    Shoulder dislocation      ST elevation myocardial infarction involving left circumflex coronary artery ([address] 5/13/2017    Unspecified gastritis and gastroduodenitis without mention of hemorrhage 05/06/2015     DR LANE            PSHx:  Past Surgical History         Past Surgical History:   Procedure Laterality Date    CARDIAC SURGERY   2017     has x 4 cardiac stents    COLONOSCOPY   01/14/2015     DR Osman Bai - DIVERTICULOSIS    COLONOSCOPY N/A 1/2/2021     COLONOSCOPY DIAGNOSTIC performed by Dionne Bernheim, MD at [facility]   05/17/2017     x2 stents    CYST REMOVAL Left 2/7/2019     RESECTION OF LEFT PINNA LESION WITH GRAFT performed by Leo Guillermo MD at [address] [place] [address], COLON, DIAGNOSTIC        FINGER SURGERY   12/9/14     middle finger right hand due to infection    HYSTERECTOMY ([facility])   [address]   [id number]    SHOULDER SURGERY   2008 shoulder dislocated - popped  back in Right shoulder    TOTAL KNEE ARTHROPLASTY Right 8/30/2022     RIGHT KNEE TOTAL KNEE ARTHROPLASTY performed by Luz Reyes MD at [address] Left 3/7/2023     LEFT KNEE TOTAL KNEE ARTHROPLASTY performed by Luz Reyes MD at [address]   01/14/2015     DR Sariah Donovan - ULCER IN THE ANTRUM    UPPER GASTROINTESTINAL ENDOSCOPY   05/06/2015     DR Sariah Donovan - GASTRITIS, PREVIOUS ULCER HEALED    UPPER GASTROINTESTINAL ENDOSCOPY N/A 1/2/2021     EGD DIAGNOSTIC ONLY performed by Dionne Bernheim, MD at [address] [place] [address]  Hx:  Social History   Social History            Socioeconomic History    Marital status:        Spouse name: None    Number of children: None    Years of education: None    Highest education level: None   Tobacco Use    Smoking status: Never    Smokeless tobacco: Never   Vaping Use    Vaping Use: Never used   Substance and Sexual Activity    Alcohol use: Yes       Comment: glass of wine once a year    Drug use: No    Sexual activity: Yes       Partners: Male      Social Determinants of Health          Financial Resource Strain: Low Risk     Difficulty of Paying Living Expenses: Not hard at all   Food Insecurity: No Food Insecurity    Worried About Running Out of Food in the Last Year: Never true    Ran Out of Food in the Last Year: Never true   Transportation Needs: Unknown    Lack of Transportation (Non-Medical): No   Housing Stability: Unknown    Unstable Housing in the Last Year: No            Family Hx:  Family History         Family History   Problem Relation Age of Onset    Heart Disease Mother 61    Cancer Father 79         kidney    No Known Problems Daughter              Review of Systems:   Review of Systems   Constitutional:  Negative for activity change. HENT:  Negative for congestion. Respiratory:  Positive for cough. Negative for shortness of breath.     Cardiovascular:  Positive for chest pain (intermittent yesterday). Negative for palpitations and leg swelling. Gastrointestinal:  Negative for abdominal pain, nausea and vomiting. Genitourinary:  Negative for difficulty urinating. Musculoskeletal:  Negative for arthralgias. Skin:  Negative for color change. Neurological:  Negative for dizziness, syncope and light-headedness. Psychiatric/Behavioral:  Negative for agitation. Physical Examination:    BP (!) 130/53   Pulse 92   Temp 98.5 °F (36.9 °C) (Oral)   Resp 20   Ht 5' 1\" (1.549 m)   Wt 247 lb 1.6 oz (112.1 kg)   LMP 09/17/1996   SpO2 98%   BMI 46.69 kg/m²    Physical Exam  Constitutional:       General: She is not in acute distress. Appearance: She is obese. HENT:      Head: Normocephalic and atraumatic. Cardiovascular:      Rate and Rhythm: Normal rate and regular rhythm. Heart sounds: Murmur heard. Pulmonary:      Effort: Pulmonary effort is normal. No respiratory distress. Breath sounds: No wheezing, rhonchi or rales. Abdominal:      Palpations: Abdomen is soft. Comments: Obese abdomen   Musculoskeletal:         General: Normal range of motion. Cervical back: Normal range of motion and neck supple. Right lower leg: No edema. Left lower leg: Edema (trace) present. Comments: Dressing intact over left knee incision   Skin:     General: Skin is warm and dry. Neurological:      General: No focal deficit present. Mental Status: She is alert and oriented to person, place, and time. Cranial Nerves: No cranial nerve deficit.    Psychiatric:         Mood and Affect: Mood normal.         LABS:  CBC:        Lab Results   Component Value Date/Time     WBC 6.0 03/10/2023 05:12 AM     RBC 2.41 03/10/2023 05:12 AM     HGB 7.4 03/10/2023 05:12 AM     HCT 23.2 03/10/2023 05:12 AM     MCV 96.4 03/10/2023 05:12 AM     MCH 30.9 03/10/2023 05:12 AM     MCHC 32.0 03/10/2023 05:12 AM     RDW 14.5 03/10/2023 05:12 AM     PLT 103 03/10/2023 05:12 AM     MPV 8.6 06/29/2015 07:21 PM      CBC with Differential:         Lab Results   Component Value Date/Time     WBC 6.0 03/10/2023 05:12 AM     RBC 2.41 03/10/2023 05:12 AM     HGB 7.4 03/10/2023 05:12 AM     HCT 23.2 03/10/2023 05:12 AM      03/10/2023 05:12 AM     MCV 96.4 03/10/2023 05:12 AM     MCH 30.9 03/10/2023 05:12 AM     MCHC 32.0 03/10/2023 05:12 AM     RDW 14.5 03/10/2023 05:12 AM     LYMPHOPCT 8.3 03/10/2023 05:12 AM     MONOPCT 6.1 03/10/2023 05:12 AM     BASOPCT 0.2 03/10/2023 05:12 AM     MONOSABS 0.4 03/10/2023 05:12 AM     LYMPHSABS 0.5 03/10/2023 05:12 AM     EOSABS 0.1 03/10/2023 05:12 AM     BASOSABS 0.0 03/10/2023 05:12 AM      CMP:          Lab Results   Component Value Date/Time      03/10/2023 05:12 AM     K 5.6 03/10/2023 05:12 AM     K 5.3 03/08/2023 06:05 AM      03/10/2023 05:12 AM     CO2 19 03/10/2023 05:12 AM     BUN 35 03/10/2023 05:12 AM     CREATININE 1.84 03/10/2023 05:12 AM     GFRAA 34.9 08/31/2022 05:48 AM     LABGLOM 28.0 03/10/2023 05:12 AM     GLUCOSE 100 03/10/2023 05:12 AM     PROT 5.5 03/10/2023 05:12 AM     LABALBU 3.3 03/10/2023 05:12 AM     CALCIUM 8.8 03/10/2023 05:12 AM     BILITOT <0.2 03/10/2023 05:12 AM     ALKPHOS 84 03/10/2023 05:12 AM     AST 23 03/10/2023 05:12 AM     ALT <5 03/10/2023 05:12 AM      BMP:          Lab Results   Component Value Date/Time      03/10/2023 05:12 AM     K 5.6 03/10/2023 05:12 AM     K 5.3 03/08/2023 06:05 AM      03/10/2023 05:12 AM     CO2 19 03/10/2023 05:12 AM     BUN 35 03/10/2023 05:12 AM     LABALBU 3.3 03/10/2023 05:12 AM     CREATININE 1.84 03/10/2023 05:12 AM     CALCIUM 8.8 03/10/2023 05:12 AM     GFRAA 34.9 08/31/2022 05:48 AM     LABGLOM 28.0 03/10/2023 05:12 AM     GLUCOSE 100 03/10/2023 05:12 AM      Magnesium:          Lab Results   Component Value Date/Time     MG 2.1 03/10/2023 05:12 AM      Troponin:          Lab Results   Component Value Date/Time TROPONINI 0.244 03/10/2023 05:12 AM         Radiology:  CTA CHEST W WO CONTRAST     Result Date: 3/10/2023  EXAMINATION: CTA OF THE CHEST WITH AND WITHOUT CONTRAST 3/9/2023 11:14 pm TECHNIQUE: CTA of the chest was performed before and after the administration of intravenous contrast.  Multiplanar reformatted images are provided for review. MIP images are provided for review. Automated exposure control, iterative reconstruction, and/or weight based adjustment of the mA/kV was utilized to reduce the radiation dose to as low as reasonably achievable. COMPARISON: None. HISTORY: ORDERING SYSTEM PROVIDED HISTORY: 2 days post knee replacement with chest pain TECHNOLOGIST PROVIDED HISTORY: Reason for exam:->2 days post knee replacement with chest pain Decision Support Exception - unselect if not a suspected or confirmed emergency medical condition->Emergency Medical Condition (MA) What reading provider will be dictating this exam?->CRC FINDINGS: Pulmonary Arteries: Streak artifact from patient's body habitus and arm placement by there signs degrades image quality. Pulmonary arteries are adequately opacified for evaluation. No evidence of intraluminal filling defect to suggest pulmonary embolism. Main pulmonary artery is normal in caliber. Mediastinum: No evidence of mediastinal lymphadenopathy. Thyroid is homogeneous in appearance. Cardiac chambers are enlarged. No pericardial effusion. Coronary artery calcifications identified. Reactive lymph nodes seen in the hilar regions bilaterally. The heart and pericardium demonstrate no acute abnormality. There is no acute abnormality of the thoracic aorta. Lungs/pleura: Small bilateral pleural effusions with patchy ground-glass opacity identified inferiorly within the upper lung fields and lung bases. Airspace disease such as pneumonia or superimposed edema cannot be excluded. No pulmonary mass or nodule.  Upper Abdomen: Limited images of the upper abdomen are unremarkable. Soft Tissues/Bones: No acute bone or soft tissue abnormality. Multilevel degenerative changes seen within the spine. No acute chest wall abnormality. No evidence of gross pulmonary embolism. Small bilateral pleural effusions with patchy ground-glass opacity within the lung fields to suggest either superimposed edema or multi lobar pneumonia. Echocardiogram 11/15/22:  Conclusions   Summary   Left ventricular ejection fraction is visually estimated at 55-60%. Normal left ventricular size and function. Mild aortic stenosis is noted. Trivial aortic regurgitation is noted. Mild mitral regurgitation is present. Mild tricuspid regurgitation is present. Signature   ----------------------------------------------------------------   Electronically signed by Husam Polanco(Interpreting   physician) on 11/15/2022 04:17 PM     EKG 3/9/23: , RBBB, ST depression with T wave inversion in V1-V2, ST depression in V3-V6, QTc 526ms     Telemetry 3/10/23: SR 80s-90s        Assessment:          Active Hospital Problems     Diagnosis Date Noted    Acute pneumonia [J18.9] 03/10/2023       Priority: Medium      Chest pain--now resolved. r/o cardiac ischemia. Elevated cardiac enzyme-?  Type I vs type II  JP on CKD  Hyperkalemia  Acute on chronic anemia--Hgb 7.4 today from 9.2 yesterday and 11.7 pre-op on 2/28/23  Abnormal EKG/chronic RBBB/prolonged QTc  S/p left total knee replacement on 3/8/23  Hx CAD s/p PCI of Circ/RCA in 2017  Valvular heart disease (Mild MR/TR/AS) per echo 11/15/22  Normal LVF EF 55-60% per echo 11/15/22  HTN  Dyslipidemia     Plan:  Continue current medications-aspirin 81 mg p.o. twice daily, Cardizem  mg p.o. daily, Lopressor 25 mg p.o. twice daily, Lovenox 30 mg subcu daily  Lipitor at 40 mg p.o. daily-patient states that she was previously taking this dose but occasionally has to stop for a while due to muscle aches  Avoid any nephrotoxic agents including ACE inhibitor/ARB secondary to JP on CKD as well as hyperkalemia  Cardiac diet recommended  Monitor on telemetry for any tachycardia or bradycardia arrhythmias  Maintain potassium greater than 4, magnesium greater than 2  Monitor H&H closely given acute on chronic postoperative anemia  GI/DVT prophylaxis  No plans for any invasive work-up from cardiology standpoint at this time. Not ideal PCI candidate secondary to hemoglobin drop. Maintain hemoglobin greater than 7-8.

## 2023-03-11 NOTE — PROGRESS NOTES
Physical Therapy Missed Treatment   Facility/Department: [facility] MED SURG K082/W263-51    NAME: Qian Query    : 1945 (69 y.o.)  MRN: [id number]    Account: [de-identified]  Gender: female    Chart reviewed, attempted PT at 13:00. Patient unavailable 2° to:      [x] Pt declined after multiple attempts. Pt started a blood transfusion at 12:50. Offered the pt supine exercises while receiving transfusion. Pt continued to decline stating she is too sore and fatigued from the AM session. [x] Nsg notified           Will attempt PT treatment again at earliest convenience.       Electronically signed by Charline Escobar PTA on 3/11/23 at 1:04 PM EST

## 2023-03-11 NOTE — PROGRESS NOTES
Physician Progress Note      PATIENT:               Shelly Palm  CSN #:                  [id number]  :                       1945  ADMIT DATE:       3/9/2023 8:02 PM  [address] [place] DATE:  RESPONDING  PROVIDER #:        Phylicia Olea DO          QUERY TEXT:    Pt admitted with pneumonia and has respiratory failure documented. If   possible, please document in progress notes and discharge summary further   specificity regarding the type and acuity of respiratory failure: The medical record reflects the following:  Risk Factors: pneumonia, JP, UTI  Clinical Indicators: CT chest ?Small bilateral pleural effusions with patchy   ground-glass opacity within the lung fields to suggest either superimposed   edema or multi lobar pneumonia. 86% on RA  Treatment: [email or web address], NS [email or web address] [email or web address], [email or web address], Levy Harman, RN, CDS  [phone]  Options provided:  -- Acute respiratory failure with hypoxia  -- Acute respiratory failure with hypercapnia  -- acute respiratory failure with hypoxia and hypercapnia  -- Other - I will add my own diagnosis  -- Disagree - Not applicable / Not valid  -- Disagree - Clinically unable to determine / Unknown  -- Refer to Clinical Documentation Reviewer    PROVIDER RESPONSE TEXT:    This patient is in acute respiratory failure with hypoxia.     Query created by: Santa Moran on 3/10/2023 9:09 AM      Electronically signed by:  Brittany Valente DO 3/11/2023 11:22 AM

## 2023-03-11 NOTE — CONSENT
Informed Consent for Blood Component Transfusion Note    I have discussed with the patient the rationale for blood component transfusion; its benefits in treating or preventing fatigue, organ damage, or death; and its risk which includes mild transfusion reactions, rare risk of blood borne infection, or more serious but rare reactions. I have discussed the alternatives to transfusion, including the risk and consequences of not receiving transfusion. The patient had an opportunity to ask questions and had agreed to proceed with transfusion of blood components.     Electronically signed by Navin Deng DO on 3/11/23 at 11:22 AM EST

## 2023-03-11 NOTE — PROGRESS NOTES
Internal Medicine   Hospitalist   Progress Note    3/11/2023   11:24 AM    Name:  Mary Alice Frazier  MRN:    [id number]     IP Day: 1     Admit Date: 3/9/2023  8:02 PM  PCP: Romelia Schaeffer MD    Code Status:  Full Code    Assessment and Plan: Active Problems/ diagnosis:     Acute respiratory insufficiency  Pneumonia  Elevated troponin-rule out NSTEMI-history of CAD status post previous PCI  JP on CKD 3  Recent knee replacement  Left lower extremity edema-DVT studies negative    Plan  Hemoglobin 7.1, will transfuse 1 unit packed RBC given concern for ACS  Resume antibiotics, monitor respiratory status, follow-up blood and respiratory cultures  Discussed with cardiologist, follow-up echocardiogram  Appreciate nephrologist input regarding fluid status  Home medication reviewed  DVT PPx     7 pm- 7 am, please contact on call Hospitalist for any needs     Dispo-likely DC  versus Monday    Subjective:      no new events. No new symptoms. No chest pain this morning.     Physical Examination:      Vitals:  BP (!) 144/44   Pulse 96   Temp 99.1 °F (37.3 °C)   Resp 18   Ht 5' 1\" (1.549 m)   Wt 244 lb 3.2 oz (110.8 kg)   LMP 1996   SpO2 94%   BMI 46.14 kg/m²   Temp (24hrs), Av.2 °F (37.3 °C), Min:99.1 °F (37.3 °C), Max:99.2 °F (37.3 °C)      General appearance: alert, cooperative and no distress  Mental Status: oriented to person, place and time and normal affect  Lungs: clear to auscultation bilaterally, normal effort  Heart: regular rate and rhythm, no murmur  Abdomen: soft, nontender, nondistended, bowel sounds present, no masses  Extremities: Bilateral lower extremity edema worse on the left edema, no redness, tenderness in the calves  Skin: no gross lesions, rashes    Data:     Labs:  Recent Labs     03/10/23  0512 23  0445   WBC 6.0 6.4   HGB 7.4* 7.1*   * 129*     Recent Labs     03/10/23  0523  0445    142   K 5.6* 4.8   * 113*   CO2 19* 19*   BUN 35* 32*   CREATININE 1.84* 1.47*   GLUCOSE 100* 105*     Recent Labs     03/10/23  0512 03/11/23  0445   AST 23 31   ALT <5 7   BILITOT <0.2 0.3   ALKPHOS 84 85       Current Facility-Administered Medications   Medication Dose Route Frequency Provider Last Rate Last Admin    0.9 % sodium chloride infusion   IntraVENous PRN Aurora Olea,         enoxaparin Sodium (LOVENOX) injection 30 mg  30 mg SubCUTAneous BID Nicoletto Bolprakashn-Roche, APRN - CNP   30 mg at 03/11/23 1000    guaiFENesin (MUCINEX) extended release tablet 600 mg  600 mg Oral BID Nicoletto Bolzan-Roche, APRN - CNP   600 mg at 03/11/23 1000    sodium chloride flush 0.9 % injection 5-40 mL  5-40 mL IntraVENous 2 times per day Nicoletto Bolzan-Roche, APRN - CNP   10 mL at 03/11/23 1000    sodium chloride flush 0.9 % injection 5-40 mL  5-40 mL IntraVENous PRN Nicoletto Bolzan-Roche, APRN - CNP        0.9 % sodium chloride infusion   IntraVENous PRN Nicoletto Bolzan-Roche, APRN - CNP        polyethylene glycol (GLYCOLAX) packet 17 g  17 g Oral Daily PRN Nicoletto Alexandrazan-Roche, APRN - CNP        acetaminophen (TYLENOL) tablet 650 mg  650 mg Oral Q6H PRN Nicoletto Bolzan-Roche, APRN - CNP        Or    acetaminophen (TYLENOL) suppository 650 mg  650 mg Rectal Q6H PRN Nicoletto Bolzan-Roche, APRN - CNP        cefTRIAXone (ROCEPHIN) 1,000 mg in sodium chloride 0.9 % 50 mL IVPB (mini-bag)  1,000 mg IntraVENous Q24H Nicoletto Bolzan-Roche, APRN - CNP   Stopped at 03/11/23 0133    aspirin EC tablet 81 mg  81 mg Oral BID Pitney Cas, DO   81 mg at 03/11/23 1000    dilTIAZem (CARDIZEM CD) extended release capsule 240 mg  240 mg Oral Daily Pitney Cas, DO   240 mg at 03/11/23 0959    metoprolol tartrate (LOPRESSOR) tablet 25 mg  25 mg Oral BID Pitney Cas, DO   25 mg at 03/11/23 0959    oxybutynin (DITROPAN-XL) extended release tablet 10 mg  10 mg Oral Daily Fermin Cardozo DO   10 mg at 03/11/23 0959    atorvastatin (LIPITOR) tablet 40 mg  40 mg Oral Nightly Alyssa Floodma   40 mg at 03/10/23 2121    oxyCODONE-acetaminophen (PERCOCET) 5-325 MG per tablet 1 tablet  1 tablet Oral Q4H PRN Gina Mao DO   1 tablet at 03/11/23 0248    nitroGLYCERIN (NITROSTAT) SL tablet 0.4 mg  0.4 mg SubLINGual Q5 Min PRN Clem Polo MD   0.4 mg at 03/09/23 2212       Additional work up or/and treatment plan may be added today or then after based on clinical progression. I am managing a portion of pt care. Some medical issues are handled by other specialists. Additional work up and treatment should be done in out pt setting by pt PCP and other out pt providers. In addition to examining and evaluating pt, I spent additional time explaining care, normaland abnormal findings, and treatment plan. All of pt questions were answered. Counseling, diet and education were provided. Case will be discussed with nursing staff when appropriate. Family will be updated if and when appropriate.        Electronically signed by Gina Mao DO on 3/11/2023 at 11:24 AM

## 2023-03-12 LAB
ALBUMIN SERPL-MCNC: 2.8 G/DL (ref 3.5–4.6)
ALP BLD-CCNC: 89 U/L (ref 40–130)
ALT SERPL-CCNC: 8 U/L (ref 0–33)
ANION GAP SERPL CALCULATED.3IONS-SCNC: 10 MEQ/L (ref 9–15)
AST SERPL-CCNC: 23 U/L (ref 0–35)
BASOPHILS ABSOLUTE: 0 K/UL (ref 0–0.2)
BASOPHILS RELATIVE PERCENT: 0.2 %
BILIRUB SERPL-MCNC: 0.4 MG/DL (ref 0.2–0.7)
BLOOD BANK DISPENSE STATUS: NORMAL
BLOOD BANK DISPENSE STATUS: NORMAL
BLOOD BANK PRODUCT CODE: NORMAL
BLOOD BANK PRODUCT CODE: NORMAL
BPU ID: NORMAL
BPU ID: NORMAL
BUN BLDV-MCNC: 32 MG/DL (ref 8–23)
CALCIUM SERPL-MCNC: 8.9 MG/DL (ref 8.5–9.9)
CHLORIDE BLD-SCNC: 113 MEQ/L (ref 95–107)
CO2: 19 MEQ/L (ref 20–31)
CREAT SERPL-MCNC: 1.37 MG/DL (ref 0.5–0.9)
DESCRIPTION BLOOD BANK: NORMAL
DESCRIPTION BLOOD BANK: NORMAL
EOSINOPHILS ABSOLUTE: 0.2 K/UL (ref 0–0.7)
EOSINOPHILS RELATIVE PERCENT: 3.3 %
GFR SERPL CREATININE-BSD FRML MDRD: 39.9 ML/MIN/{1.73_M2}
GLOBULIN: 2.6 G/DL (ref 2.3–3.5)
GLUCOSE BLD-MCNC: 107 MG/DL (ref 70–99)
HCT VFR BLD CALC: 23.2 % (ref 37–47)
HCT VFR BLD CALC: 28.1 % (ref 37–47)
HEMOGLOBIN: 7.6 G/DL (ref 12–16)
HEMOGLOBIN: 9.2 G/DL (ref 12–16)
LYMPHOCYTES ABSOLUTE: 0.7 K/UL (ref 1–4.8)
LYMPHOCYTES RELATIVE PERCENT: 10.3 %
MAGNESIUM: 2 MG/DL (ref 1.7–2.4)
MCH RBC QN AUTO: 30.9 PG (ref 27–31.3)
MCHC RBC AUTO-ENTMCNC: 32.7 % (ref 33–37)
MCV RBC AUTO: 94.5 FL (ref 79.4–94.8)
MONOCYTES ABSOLUTE: 0.4 K/UL (ref 0.2–0.8)
MONOCYTES RELATIVE PERCENT: 6.5 %
NEUTROPHILS ABSOLUTE: 5.2 K/UL (ref 1.4–6.5)
NEUTROPHILS RELATIVE PERCENT: 79.7 %
PDW BLD-RTO: 14.5 % (ref 11.5–14.5)
PLATELET # BLD: 150 K/UL (ref 130–400)
POTASSIUM SERPL-SCNC: 4.9 MEQ/L (ref 3.4–4.9)
RBC # BLD: 2.45 M/UL (ref 4.2–5.4)
SODIUM BLD-SCNC: 142 MEQ/L (ref 135–144)
TOTAL PROTEIN: 5.4 G/DL (ref 6.3–8)
TROPONIN: 0.67 NG/ML (ref 0–0.01)
TROPONIN: 0.73 NG/ML (ref 0–0.01)
WBC # BLD: 6.5 K/UL (ref 4.8–10.8)

## 2023-03-12 PROCEDURE — 83735 ASSAY OF MAGNESIUM: CPT

## 2023-03-12 PROCEDURE — 94640 AIRWAY INHALATION TREATMENT: CPT

## 2023-03-12 PROCEDURE — 84484 ASSAY OF TROPONIN QUANT: CPT

## 2023-03-12 PROCEDURE — 6370000000 HC RX 637 (ALT 250 FOR IP): Performed by: INTERNAL MEDICINE

## 2023-03-12 PROCEDURE — 99233 SBSQ HOSP IP/OBS HIGH 50: CPT | Performed by: INTERNAL MEDICINE

## 2023-03-12 PROCEDURE — 97535 SELF CARE MNGMENT TRAINING: CPT

## 2023-03-12 PROCEDURE — 2580000003 HC RX 258: Performed by: NURSE PRACTITIONER

## 2023-03-12 PROCEDURE — 85014 HEMATOCRIT: CPT

## 2023-03-12 PROCEDURE — 94761 N-INVAS EAR/PLS OXIMETRY MLT: CPT

## 2023-03-12 PROCEDURE — 80053 COMPREHEN METABOLIC PANEL: CPT

## 2023-03-12 PROCEDURE — 36415 COLL VENOUS BLD VENIPUNCTURE: CPT

## 2023-03-12 PROCEDURE — 36430 TRANSFUSION BLD/BLD COMPNT: CPT

## 2023-03-12 PROCEDURE — 1210000000 HC MED SURG R&B

## 2023-03-12 PROCEDURE — 97110 THERAPEUTIC EXERCISES: CPT

## 2023-03-12 PROCEDURE — 6360000002 HC RX W HCPCS: Performed by: INTERNAL MEDICINE

## 2023-03-12 PROCEDURE — 6370000000 HC RX 637 (ALT 250 FOR IP): Performed by: PHYSICIAN ASSISTANT

## 2023-03-12 PROCEDURE — 6360000002 HC RX W HCPCS: Performed by: NURSE PRACTITIONER

## 2023-03-12 PROCEDURE — 6370000000 HC RX 637 (ALT 250 FOR IP): Performed by: NURSE PRACTITIONER

## 2023-03-12 PROCEDURE — 85025 COMPLETE CBC W/AUTO DIFF WBC: CPT

## 2023-03-12 PROCEDURE — 85018 HEMOGLOBIN: CPT

## 2023-03-12 PROCEDURE — 2700000000 HC OXYGEN THERAPY PER DAY

## 2023-03-12 RX ORDER — FUROSEMIDE 10 MG/ML
40 INJECTION INTRAMUSCULAR; INTRAVENOUS ONCE
Status: COMPLETED | OUTPATIENT
Start: 2023-03-12 | End: 2023-03-12

## 2023-03-12 RX ORDER — LANOLIN ALCOHOL/MO/W.PET/CERES
400 CREAM (GRAM) TOPICAL 2 TIMES DAILY
Status: DISCONTINUED | OUTPATIENT
Start: 2023-03-12 | End: 2023-03-15 | Stop reason: HOSPADM

## 2023-03-12 RX ORDER — ENOXAPARIN SODIUM 100 MG/ML
30 INJECTION SUBCUTANEOUS DAILY
Status: DISCONTINUED | OUTPATIENT
Start: 2023-03-13 | End: 2023-03-15 | Stop reason: HOSPADM

## 2023-03-12 RX ORDER — SODIUM CHLORIDE 9 MG/ML
INJECTION, SOLUTION INTRAVENOUS PRN
Status: DISCONTINUED | OUTPATIENT
Start: 2023-03-12 | End: 2023-03-15 | Stop reason: HOSPADM

## 2023-03-12 RX ORDER — METOPROLOL TARTRATE 50 MG/1
50 TABLET, FILM COATED ORAL 2 TIMES DAILY
Status: DISCONTINUED | OUTPATIENT
Start: 2023-03-12 | End: 2023-03-15 | Stop reason: HOSPADM

## 2023-03-12 RX ADMIN — METOPROLOL TARTRATE 25 MG: 25 TABLET, FILM COATED ORAL at 09:36

## 2023-03-12 RX ADMIN — OXYBUTYNIN CHLORIDE 10 MG: 5 TABLET, EXTENDED RELEASE ORAL at 09:35

## 2023-03-12 RX ADMIN — OXYCODONE AND ACETAMINOPHEN 1 TABLET: 5; 325 TABLET ORAL at 21:16

## 2023-03-12 RX ADMIN — Medication 400 MG: at 12:24

## 2023-03-12 RX ADMIN — OXYCODONE AND ACETAMINOPHEN 1 TABLET: 5; 325 TABLET ORAL at 12:24

## 2023-03-12 RX ADMIN — GUAIFENESIN 600 MG: 600 TABLET, EXTENDED RELEASE ORAL at 09:36

## 2023-03-12 RX ADMIN — IPRATROPIUM BROMIDE AND ALBUTEROL SULFATE 1 AMPULE: .5; 2.5 SOLUTION RESPIRATORY (INHALATION) at 21:40

## 2023-03-12 RX ADMIN — DILTIAZEM HYDROCHLORIDE 240 MG: 240 CAPSULE, COATED, EXTENDED RELEASE ORAL at 09:35

## 2023-03-12 RX ADMIN — Medication 10 ML: at 09:36

## 2023-03-12 RX ADMIN — METOPROLOL TARTRATE 50 MG: 50 TABLET, FILM COATED ORAL at 21:16

## 2023-03-12 RX ADMIN — IPRATROPIUM BROMIDE AND ALBUTEROL SULFATE 1 AMPULE: .5; 2.5 SOLUTION RESPIRATORY (INHALATION) at 10:49

## 2023-03-12 RX ADMIN — FUROSEMIDE 40 MG: 10 INJECTION, SOLUTION INTRAMUSCULAR; INTRAVENOUS at 17:32

## 2023-03-12 RX ADMIN — GUAIFENESIN 600 MG: 600 TABLET, EXTENDED RELEASE ORAL at 21:16

## 2023-03-12 RX ADMIN — Medication 400 MG: at 21:17

## 2023-03-12 RX ADMIN — OXYCODONE AND ACETAMINOPHEN 1 TABLET: 5; 325 TABLET ORAL at 01:23

## 2023-03-12 RX ADMIN — ASPIRIN 81 MG: 81 TABLET, COATED ORAL at 21:16

## 2023-03-12 RX ADMIN — ASPIRIN 81 MG: 81 TABLET, COATED ORAL at 09:36

## 2023-03-12 RX ADMIN — ENOXAPARIN SODIUM 30 MG: 100 INJECTION SUBCUTANEOUS at 09:34

## 2023-03-12 RX ADMIN — ATORVASTATIN CALCIUM 40 MG: 40 TABLET, FILM COATED ORAL at 21:15

## 2023-03-12 RX ADMIN — CEFTRIAXONE SODIUM 1000 MG: 1 INJECTION, POWDER, FOR SOLUTION INTRAMUSCULAR; INTRAVENOUS at 00:54

## 2023-03-12 ASSESSMENT — PAIN SCALES - GENERAL
PAINLEVEL_OUTOF10: 6
PAINLEVEL_OUTOF10: 6
PAINLEVEL_OUTOF10: 7

## 2023-03-12 ASSESSMENT — PAIN - FUNCTIONAL ASSESSMENT: PAIN_FUNCTIONAL_ASSESSMENT: PREVENTS OR INTERFERES SOME ACTIVE ACTIVITIES AND ADLS

## 2023-03-12 ASSESSMENT — PAIN DESCRIPTION - ORIENTATION
ORIENTATION: LEFT
ORIENTATION: LEFT

## 2023-03-12 ASSESSMENT — PAIN DESCRIPTION - LOCATION
LOCATION: KNEE
LOCATION: KNEE

## 2023-03-12 ASSESSMENT — PAIN DESCRIPTION - PAIN TYPE: TYPE: SURGICAL PAIN

## 2023-03-12 ASSESSMENT — PAIN DESCRIPTION - FREQUENCY: FREQUENCY: CONTINUOUS

## 2023-03-12 ASSESSMENT — PAIN DESCRIPTION - DESCRIPTORS
DESCRIPTORS: ACHING
DESCRIPTORS: ACHING;SORE;TENDER

## 2023-03-12 ASSESSMENT — PAIN DESCRIPTION - ONSET: ONSET: ON-GOING

## 2023-03-12 NOTE — PROGRESS NOTES
Providers aware of troponin level and H/H levels. No new orders placed. Will continue to monitor. Electronically signed by Hattie Pedroza RN on 3/11/2023 at 7:42 PM

## 2023-03-12 NOTE — PROGRESS NOTES
Physical Therapy Missed Treatment   Facility/Department: [facility] MED SURG [id number]    NAME: Chasity Hoang    : 1945 (69 y.o.)  MRN: [id number]    Account: [de-identified]  Gender: female    Chart reviewed, attempted PT at 12. Patient unavailable 2° to:    [x] Patient receiving blood transfusion this afternoon. Declined to participate in supine exercises, reports increased Lt knee soreness and was just medicated. Will attempt PT treatment again at earliest convenience.       Electronically signed by Sissy Reynoso PTA on 3/12/23 at 1:05 PM EDT

## 2023-03-12 NOTE — PROGRESS NOTES
Physical Therapy Med Surg Daily Treatment Note  Facility/Department: [address]  Room: [place] [id number]       NAME: Melissa Gonzalez  : 1945 (69 y.o.)  MRN: [id number]  CODE STATUS: Full Code    Date of Service: 3/12/2023    Patient Diagnosis(es): Hypoxia [R09.02]  Pneumonia due to infectious organism, unspecified laterality, unspecified part of lung [J18.9]  Acute pneumonia [J18.9]   Chief Complaint   Patient presents with    Chest Pain     For a couple hours     Patient Active Problem List    Diagnosis Date Noted    Elevated troponin 03/10/2023    Morbid obesity due to excess calories ([facility] 2017    Acute pneumonia 03/10/2023    Status post total knee replacement, left 2023    Status post total knee replacement, right 2022    RBBB (right bundle branch block) 2022    Gastrointestinal hemorrhage     Anemia 2021    Adenomatous polyp of descending colon     Adenomatous polyp of ascending colon     Adenomatous polyp of colon     Chronic gastritis without bleeding     CKD (chronic kidney disease) stage 3, GFR 30-59 ml/min ([facility]) 2019    Asthma     Spinal stenosis of lumbar region with neurogenic claudication 2019    Degenerative spondylolisthesis 2019    Chronic bilateral low back pain with bilateral sciatica 10/30/2018    Primary osteoarthritis of both knees 10/30/2018    Coronary artery disease involving native coronary artery of native heart without angina pectoris 07/10/2018    History of ST elevation myocardial infarction (STEMI) 2017    Hypothyroid 12/10/2013    Essential hypertension 12/10/2013    Hyperlipidemia 12/10/2013        Past Medical History:   Diagnosis Date    Antral ulcer 2015    DR LANE    Asthma     \"cured by beestings\"    Coronary artery disease     Coronary artery disease involving native coronary artery of native heart without angina pectoris 7/10/2018    has x 4 cardiac stents / Dr. Katie Butler    Diverticulosis of colon (without mention of hemorrhage) 01/14/2015    DR Ishmael Camara    Essential hypertension 12/10/2013    meds > 20 yrs    History of blood transfusion 1990s    with hysterectomy    History of normal resting EKG 1996    normal    History of ST elevation myocardial infarction (STEMI) 7/11/2017    History of transfusion of whole blood 1996    Excessive bleeding before hysterectomy    Hyperlipidemia     meds > 2 yrs    Hypertension     Hypothyroidism     past trx years ago then stopped / recent restart    Kidney disease     Low back pain     Morbid obesity due to excess calories ([address] 5/13/2017    Morbid obesity due to excess calories ([address] 6/6/2017    Osteoarthritis     Pneumonia     RBBB (right bundle branch block) 3/17/2022    Shoulder dislocation     ST elevation myocardial infarction involving left circumflex coronary artery ([address] 5/13/2017    Unspecified gastritis and gastroduodenitis without mention of hemorrhage 05/06/2015    DR Ishmael Camara     Past Surgical History:   Procedure Laterality Date    CARDIAC SURGERY  2017    has x 4 cardiac stents    COLONOSCOPY  01/14/2015    DR Ishmael Camara - DIVERTICULOSIS    COLONOSCOPY N/A 1/2/2021    COLONOSCOPY DIAGNOSTIC performed by Ketty Hollingsworth MD at [facility]  05/17/2017    x2 stents    CYST REMOVAL Left 2/7/2019    RESECTION OF LEFT PINNA LESION WITH GRAFT performed by Sapna Ivan MD at [address] [place] [address], COLON, DIAGNOSTIC      FINGER SURGERY  12/9/14    middle finger right hand due to infection    HYSTERECTOMY ([address] [facility] [address])  1996    LASIK  [id number]    SHOULDER SURGERY  2008    shoulder dislocated - popped  back in Right shoulder    TOTAL KNEE ARTHROPLASTY Right 8/30/2022    RIGHT KNEE TOTAL KNEE ARTHROPLASTY performed by Ann Marques MD at [id number] [place] [address] Left 3/7/2023    LEFT KNEE TOTAL KNEE ARTHROPLASTY performed by Ann Marques MD at [address]  01/14/2015     ARIANA - ULCER IN THE ANTRUM    UPPER GASTROINTESTINAL ENDOSCOPY  05/06/2015    DR LANE - GASTRITIS, PREVIOUS ULCER HEALED    UPPER GASTROINTESTINAL ENDOSCOPY N/A 1/2/2021    EGD DIAGNOSTIC ONLY performed by Aleks Shepard MD at [facility]       Chart Reviewed: Yes  Additional Pertinent Hx: s/p L TKA 3/7/23    Restrictions:  Restrictions/Precautions: Fall Risk;Up as Tolerated;Contact Precautions (MRSA)    SUBJECTIVE:   Subjective: \"I'll be getting more blood today\" Nursing reports patient can participate in PT as tolerated. Pain  Pain: Denies pain pre/post reports increased fatigue  OBJECTIVE:        Bed mobility  Supine to Sit: Contact guard assistance  Sit to Supine: Minimal assistance  Bed Mobility Comments: Increased time and effort; Min A to assist Lt LE into bed    Transfers  Sit to Stand: Contact guard assistance  Stand to Sit: Contact guard assistance    Ambulation  Surface: Level tile  Device: Rolling Walker  Assistance: Contact guard assistance  Quality of Gait: Flexed posture, short flat steps  Distance: 4 lateral steps towards [facility]                   PT Exercises  A/AROM Exercises: Lt SLR x5, Hip IR/ER, heel slides, LAQs and marching x10  Static Standing Balance Exercises: Standing weightshifts at [address] [place] [address], Functions, Activity Limitations Requiring Skilled Therapeutic Intervention: Decreased functional mobility ; Decreased ADL status; Decreased strength;Decreased safe awareness;Decreased endurance;Decreased sensation;Decreased balance;Decreased coordination; Increased pain  Assessment: Patient with good tolerance to session, maintains O2 WFLs with mild reports of fatigue. Able to complete standing activities, ambulation limited to decreased Hgb with patient to receive blood transfusion today.      Discharge Recommendations:  Continue to assess pending progress, Patient would benefit from continued therapy after discharge         Goals  [facility] Goal 1: Pt to complete bed mobility with indep  Long Term Goal 2: Pt to complete transfers with indep  Long Term Goal 3: Pt ambulate 50-150ft with LRD and indep  Long Term Goal 4: Pt to manage ramp indep with LRD to enter home  Long Term Goal 5: Pt to complete HEP with indep    PLAN    General Plan: 2 times a day 7 days a week  Safety Devices  Type of Devices: All fall risk precautions in place     AMPAC (6 CLICK) BASIC MOBILITY  AM-PAC Inpatient Mobility Raw Score : 17     Therapy Time   Individual   Time In 0820   Time Out 0848   Minutes 28     Timed Code Treatment Minutes: 28 Minutes   Trans: 8  There ex: 15  Gait: [id number] [address], PTA, 03/12/23 at 8:55 AM     Electronically signed by Nilay Anderson PTA on 3/12/2023 at 8:56 AM      Definitions for assistance levels  Independent = pt does not require any physical supervision or assistance from another person for activity completion. Device may be needed.   Stand by assistance = pt requires verbal cues or instructions from another person, close to but not touching, to perform the activity  Minimal assistance= pt performs 75% or more of the activity; assistance is required to complete the activity  Moderate assistance= pt performs 50% of the activity; assistance is required to complete the activity  Maximal assistance = pt performs 25% of the activity; assistance is required to complete the activity  Dependent = pt requires total physical assistance to accomplish the task

## 2023-03-12 NOTE — PROGRESS NOTES
Internal Medicine   Hospitalist   Progress Note    3/12/2023   12:43 PM    Name:  Olinda Sinclair  MRN:    [id number]     IP Day: 2     Admit Date: 3/9/2023  8:02 PM  PCP: Shelly Chapman MD    Code Status:  Full Code    Assessment and Plan: Active Problems/ diagnosis:     Acute respiratory insufficiency  Pneumonia  Elevated troponin-rule out NSTEMI-history of CAD status post previous PCI  JP on CKD 3  Recent knee replacement  Left lower extremity edema-DVT studies negative    Plan  We will transfuse 1 more unit today to keep her hemoglobin above 8 given concern for ACS. Discussed with cardiologist.  Geeta Presley help. Respiratory status seems stable. We will continue antibiotics. Continue monitor.  echocardiogram noted. Appreciate nephrologist input regarding fluid status-renal function is improving. Home medication reviewed  DVT PPx     7 pm- 7 am, please contact on call Hospitalist for any needs     Dispo-pending cardiac work-up and stable hemoglobin. Subjective:      no new events. No new symptoms. Denies melena, hematochezia, hematemesis, abdominal pain. Per RN, no signs of bleeding. I asked RN to monitor for any signs of bleeding or dark stools.     Physical Examination:      Vitals:  BP (!) 117/54   Pulse 75   Temp 98.5 °F (36.9 °C) (Oral)   Resp 18   Ht 5' 1\" (1.549 m)   Wt 244 lb 3.2 oz (110.8 kg)   LMP 1996   SpO2 96%   BMI 46.14 kg/m²   Temp (24hrs), Av.8 °F (37.1 °C), Min:98.1 °F (36.7 °C), Max:99.2 °F (37.3 °C)      General appearance: alert, cooperative and no distress  Mental Status: oriented to person, place and time and normal affect  Lungs: clear to auscultation bilaterally, normal effort  Heart: regular rate and rhythm, no murmur  Abdomen: soft, nontender, nondistended, bowel sounds present, no masses  Extremities: Bilateral lower extremity edema worse on the left edema, no redness, tenderness in the calves  Skin: no gross lesions, jose luis    Data:     Labs:  Recent Labs     03/11/23  0445 03/11/23  1755 03/12/23  0438   WBC 6.4  --  6.5   HGB 7.1* 8.2* 7.6*   *  --  150     Recent Labs     03/11/23  0445 03/12/23  0439    142   K 4.8 4.9   * 113*   CO2 19* 19*   BUN 32* 32*   CREATININE 1.47* 1.37*   GLUCOSE 105* 107*     Recent Labs     03/11/23  0445 03/12/23  0439   AST 31 23   ALT 7 8   BILITOT 0.3 0.4   ALKPHOS 85 89       Current Facility-Administered Medications   Medication Dose Route Frequency Provider Last Rate Last Admin    0.9 % sodium chloride infusion   IntraVENous PRN Julio Elizalde DO        [START ON 3/13/2023] enoxaparin Sodium (LOVENOX) injection 30 mg  30 mg SubCUTAneous Daily Jordan Boland DO        magnesium oxide (MAG-OX) tablet 400 mg  400 mg Oral BID Jordan Boland DO   400 mg at 03/12/23 1224    metoprolol tartrate (LOPRESSOR) tablet 50 mg  50 mg Oral BID Jordan Boland DO        0.9 % sodium chloride infusion   IntraVENous PRN Julio Elizalde DO        ipratropium-albuterol (DUONEB) nebulizer solution 1 ampule  1 ampule Inhalation Q4H PRN Julio Elizalde DO   1 ampule at 03/12/23 1049    guaiFENesin (MUCINEX) extended release tablet 600 mg  600 mg Oral BID Lyle Bolzan-Roche, APRN - CNP   600 mg at 03/12/23 0936    sodium chloride flush 0.9 % injection 5-40 mL  5-40 mL IntraVENous 2 times per day Nicoletto Bolzan-Roche, APRN - CNP   10 mL at 03/12/23 0936    sodium chloride flush 0.9 % injection 5-40 mL  5-40 mL IntraVENous PRN Nicoletto Bolzan-Roche, APRN - CNP        0.9 % sodium chloride infusion   IntraVENous PRN Nicoletto Bolzan-Roche, APRN - CNP        polyethylene glycol (GLYCOLAX) packet 17 g  17 g Oral Daily PRN Nicowillowo Bolzan-Roche, APRN - CNP        acetaminophen (TYLENOL) tablet 650 mg  650 mg Oral Q6H PRN BEN Thomas - CNP        Or    acetaminophen (TYLENOL) suppository 650 mg  650 mg Rectal Q6H PRN BEN Thomas - CNP        cefTRIAXone (ROCEPHIN) 1,000 mg in sodium chloride 0.9 % 50 mL IVPB (mini-bag)  1,000 mg IntraVENous Q24H Lyle Cano, APRN - CNP   Stopped at 03/12/23 0130    aspirin EC tablet 81 mg  81 mg Oral BID Chicho Shepherd DO   81 mg at 03/12/23 0936    dilTIAZem (CARDIZEM CD) extended release capsule 240 mg  240 mg Oral Daily Chicho Shepherd, DO   240 mg at 03/12/23 0935    oxybutynin (DITROPAN-XL) extended release tablet 10 mg  10 mg Oral Daily Chicho Douse, DO   10 mg at 03/12/23 0935    atorvastatin (LIPITOR) tablet 40 mg  40 mg Oral Nightly Kelsi Golden, [address]   40 mg at 03/11/23 2144    oxyCODONE-acetaminophen (PERCOCET) 5-325 MG per tablet 1 tablet  1 tablet Oral Q4H PRN Chicho Shepherd DO   1 tablet at 03/12/23 1224    nitroGLYCERIN (NITROSTAT) SL tablet 0.4 mg  0.4 mg SubLINGual Q5 Min PRN Luisito Floyd MD   0.4 mg at 03/09/23 2212       Additional work up or/and treatment plan may be added today or then after based on clinical progression. I am managing a portion of pt care. Some medical issues are handled by other specialists. Additional work up and treatment should be done in out pt setting by pt PCP and other out pt providers. In addition to examining and evaluating pt, I spent additional time explaining care, normaland abnormal findings, and treatment plan. All of pt questions were answered. Counseling, diet and education were provided. Case will be discussed with nursing staff when appropriate. Family will be updated if and when appropriate.        Electronically signed by Chicho Shepherd DO on 3/12/2023 at 12:43 PM

## 2023-03-12 NOTE — PROGRESS NOTES
Renal Progress Note    Assessment and Plan:      71-year-old lady with CKD stage III with risk factors of hypertension coronary disease with stents. Baseline creatinine recently in mid ones. Recent total knee replacement. Had some mild acute kidney injury after that. Losartan held. Readmitted with some chest pain. Had a CT scan with contrast.  Negative for pulmonary embolism. Has some hyperkalemia. Possible fluid overload based on imaging     Plan/  1-will give 40 mg iv lasix after blood  2-k better  3-she got Retacrit few  days ago. We will hold off on redosing    Patient Active Problem List:     Hypothyroid     Essential hypertension     Hyperlipidemia     Morbid obesity due to excess calories ([facility])     History of ST elevation myocardial infarction (STEMI)     Coronary artery disease involving native coronary artery of native heart without angina pectoris     Chronic bilateral low back pain with bilateral sciatica     Primary osteoarthritis of both knees     Spinal stenosis of lumbar region with neurogenic claudication     Degenerative spondylolisthesis     Asthma     CKD (chronic kidney disease) stage 3, GFR 30-59 ml/min ([facility])     Adenomatous polyp of descending colon     Adenomatous polyp of ascending colon     Adenomatous polyp of colon     Chronic gastritis without bleeding     Anemia     Gastrointestinal hemorrhage     RBBB (right bundle branch block)     Status post total knee replacement, right     Status post total knee replacement, left     Acute pneumonia     Elevated troponin      Subjective:   Admit Date: 3/9/2023    Interval History: gfr better. No cp. No sob. Trop a little elevated.   Getting blood       Medications:   Scheduled Meds:   [START ON 3/13/2023] enoxaparin  30 mg SubCUTAneous Daily    magnesium oxide  400 mg Oral BID    metoprolol tartrate  50 mg Oral BID    guaiFENesin  600 mg Oral BID    sodium chloride flush  5-40 mL IntraVENous 2 times per day    cefTRIAXone (ROCEPHIN) IV 1,000 mg IntraVENous Q24H    aspirin  81 mg Oral BID    dilTIAZem  240 mg Oral Daily    oxybutynin  10 mg Oral Daily    atorvastatin  40 mg Oral Nightly     Continuous Infusions:   sodium chloride      sodium chloride      sodium chloride         CBC:   Recent Labs     03/11/23  0445 03/11/23  1755 03/12/23 0438   WBC 6.4  --  6.5   HGB 7.1* 8.2* 7.6*   *  --  150       CMP:    Recent Labs     03/10/23  0512 03/11/23  0445 03/12/23 0439    142 142   K 5.6* 4.8 4.9   * 113* 113*   CO2 19* 19* 19*   BUN 35* 32* 32*   CREATININE 1.84* 1.47* 1.37*   GLUCOSE 100* 105* 107*   CALCIUM 8.8 8.5 8.9   LABGLOM 28.0* 36.6* 39.9*       Troponin:   Recent Labs     03/12/23 0438   TROPONINI 0.734*       BNP: No results for input(s): BNP in the last 72 hours. INR: No results for input(s): INR in the last 72 hours. Lipids: No results for input(s): CHOL, LDLDIRECT, TRIG, HDL, AMYLASE, LIPASE in the last 72 hours. Liver:   Recent Labs     03/12/23 0439   AST 23   ALT 8   ALKPHOS 89   PROT 5.4*   LABALBU 2.8*   BILITOT 0.4       Iron:  No results for input(s): IRONS, FERRITIN in the last 72 hours. Invalid input(s): LABIRONS  Urinalysis: No results for input(s): UA in the last 72 hours. Objective:   Vitals: BP (!) 117/54   Pulse 72   Temp 98.4 °F (36.9 °C)   Resp 18   Ht 5' 1\" (1.549 m)   Wt 244 lb 3.2 oz (110.8 kg)   LMP 09/17/1996   SpO2 96%   BMI 46.14 kg/m²    Wt Readings from Last 3 Encounters:   03/11/23 244 lb 3.2 oz (110.8 kg)   03/07/23 224 lb 12.8 oz (102 kg)   02/28/23 224 lb 12.8 oz (102 kg)      24HR INTAKE/OUTPUT:    Intake/Output Summary (Last 24 hours) at 3/12/2023 1336  Last data filed at 3/11/2023 1508  Gross per 24 hour   Intake 253.33 ml   Output 750 ml   Net -496.67 ml         Constitutional:  Alert, awake, no apparent distress   Skin:normal, no rash  HEENT:sclera anicteric.   Head atraumatic normocephalic  Neck:supple with no thyromegally  Cardiovascular:  S1, S2 without m/r/g Respiratory:  CTA B without w/r/r   Abdomen: +bs, soft, nt  Ext: no LE edema  Musculoskeletal:Intact  Neuro:Alert and oriented with no deficit      Electronically signed by Dinah Babinski, MD on 3/12/2023 at 1:36 PM

## 2023-03-12 NOTE — PROGRESS NOTES
Patient: Jeffrey Puente  Unit/Bed: A371/R274-77  YOB: 1945  MRN: [id number]  Acct: [de-identified]   Admitting Diagnosis: Hypoxia [R09.02]  Pneumonia due to infectious organism, unspecified laterality, unspecified part of lung [J18.9]  Acute pneumonia [J18.9]  Date:  3/9/2023  Hospital Day: 0        Chief Complaint:  Chest pain     History of Present Illness: This is a pleasant 15-year-old  female with past medical history significant for recent left total knee replacement on 3/8/2023, recently diagnosed UTI, CKD, hypertension, dyslipidemia, history of coronary artery disease status post PCI of the RCA and circumflex in 2017 and obesity who presented to [facility] ER yesterday with chief complaint of chest pain. Patient states she developed chest pain while walking to the bathroom yesterday afternoon. She described the pain as a midsternal squeezing tightness which was precipitated with exertion and alleviated after a period of rest.  She reports that this pain continued to recur intermittently yesterday. She denied any radiation of the pain or any associated symptoms. She denied nausea, vomiting, shortness of breath, diaphoresis, dizziness, lightheadedness, syncope or fever. Due to this intermittent chest pain she presented to ER for further evaluation. On presentation to the emergency room, blood pressure 149/67, heart rate tachycardic at 102, respiratory rate 20, pulse ox 96%, temperature 98.4 °F.  Sodium 137, potassium 4.7, chloride 105, total CO2 18, BUN elevated at 38, creatinine elevated 2.0, GFR low at 25.3, glucose 113. Initial troponin negative less than 0.010. WBC 8.3, hemoglobin 9.2, hematocrit 28.7, platelets 920. EKG showed sinus tachycardia with ventricular rate of 104 bpm, right bundle branch block, ST depression with T wave inversion in V1 and V2 and ST depression in V3 through V6, QTc 526 ms.   CTA of the chest revealed no evidence of pulmonary embolism, small bilateral pleural effusions with patchy groundglass opacity in the lung fields to suggest either superimposed edema or multilobar pneumonia. While in the ER, patient with 2 episodes of hypoxia with SPO2 declining to around 86% on room air she was placed on supplemental O2. She was subsequently admitted with diagnosis of pneumonia and hypoxia. Cardiology has been consulted regarding elevated troponin and chest pain. At time my evaluation today, patient is resting comfortably and in no acute distress. She is on 2 L O2 per nasal cannula with SPO2 of 94%. She is receiving Rocephin for presumed pneumonia. Initial troponin was negative in ER however repeat troponins have been increasingly elevated at 0.054 followed by 0.244 this morning. She is currently chest pain-free. She reports mild cough which she has noticed over the past few days but attributes it to her sinuses/allergies. She is currently receiving IV fluids at 75 mL/h.  A.m. labs reviewed. She is hyperkalemic this morning with potassium of 5.6. Mild improvement in renal function with creatinine 1.84, BUN of 35 and GFR of 28.0. Baseline creatinine appears to be between 1.1-1.4 on review of prior labs. Patient denies any recent coronary evaluation. Echocardiogram on 11/15/2022 revealed normal LV systolic function with EF of 55 to 60%, trivial AR, mild MR, mild TR and mild AS.    3/12/2023: Has uptrending troponin to 0.7. Patient denies angina type symptoms. She is short of breath with rehab performed today at bedside. Hemodynamically stable. Status post packed red cell transfusion x2, hemoglobin is 7.6. Short NSVT of 6 beats ovenight.              Allergies   Allergen Reactions    Lisinopril Nausea Only and Other (See Comments)       cough    Tramadol Nausea Only         Current Facility-Administered Medications             Current Facility-Administered Medications   Medication Dose Route Frequency Provider Last Rate Last Admin guaiFENesin (MUCINEX) extended release tablet 600 mg  600 mg Oral BID Nicoletto Yusran-Roche, APRN - CNP   600 mg at 03/10/23 1044    sodium chloride flush 0.9 % injection 5-40 mL  5-40 mL IntraVENous 2 times per day Nicoletto Bolzan-Roche, APRN - CNP   10 mL at 03/10/23 1045    sodium chloride flush 0.9 % injection 5-40 mL  5-40 mL IntraVENous PRN Nicoletto Bolzan-Roche, APRN - CNP        0.9 % sodium chloride infusion   IntraVENous PRN Nicoletto Bolzan-Roche, APRN - CNP        enoxaparin Sodium (LOVENOX) injection 30 mg  30 mg SubCUTAneous Daily Nicoletto Bolzan-Roche, APRN - CNP   30 mg at 03/10/23 1044    ondansetron (ZOFRAN-ODT) disintegrating tablet 4 mg  4 mg Oral Q8H PRN Nicoletto Bolzan-Roche, APRN - CNP         Or    ondansetron (ZOFRAN) injection 4 mg  4 mg IntraVENous Q6H PRN Nicoletto Alexandrazan-Roche, APRN - CNP        polyethylene glycol (GLYCOLAX) packet 17 g  17 g Oral Daily PRN Nicoletto Alexandrazan-Roche, APRN - CNP        acetaminophen (TYLENOL) tablet 650 mg  650 mg Oral Q6H PRN Nicoletto Bolzan-Roche, APRN - CNP         Or    acetaminophen (TYLENOL) suppository 650 mg  650 mg Rectal Q6H PRN Nicoletto Bolzan-Roche, APRN - CNP        [START ON 3/11/2023] cefTRIAXone (ROCEPHIN) 1,000 mg in sodium chloride 0.9 % 50 mL IVPB (mini-bag)  1,000 mg IntraVENous Q24H Nicoletto Bolzan-Roche, APRN - CNP        0.9 % sodium chloride infusion   IntraVENous Continuous Nicoletto Bolzan-Roche, APRN - CNP 75 mL/hr at 03/10/23 0643 Rate Verify at 03/10/23 3146    aspirin EC tablet 81 mg  81 mg Oral BID Syd Rincon, DO   81 mg at 03/10/23 1044    dilTIAZem (CARDIZEM CD) extended release capsule 240 mg  240 mg Oral Daily Syd Rincon, DO   240 mg at 03/10/23 1044    metoprolol tartrate (LOPRESSOR) tablet 25 mg  25 mg Oral BID Syd Rincon DO   25 mg at 03/10/23 1044    oxybutynin (DITROPAN-XL) extended release tablet 10 mg  10 mg Oral Daily Syd Rincon DO   10 mg at 03/10/23 1044    atorvastatin (LIPITOR) tablet 40 mg  40 mg Oral Nightly Alyssa Knowlesma        nitroGLYCERIN (NITROSTAT) SL tablet 0.4 mg  0.4 mg SubLINGual Q5 Min PRN Cady Estevez MD   0.4 mg at 03/09/23 2212            PMHx:  Past Medical History        Past Medical History:   Diagnosis Date    Antral ulcer 01/14/2015     DR Blake Bell    Asthma       \"cured by beestings\"    Coronary artery disease      Coronary artery disease involving native coronary artery of native heart without angina pectoris 7/10/2018     has x 4 cardiac stents / Dr. Mckeon Reasons    Diverticulosis of colon (without mention of hemorrhage) 01/14/2015     DR Blake Bell    Essential hypertension 12/10/2013     meds > 20 yrs    History of blood transfusion 1990s     with hysterectomy    History of normal resting EKG 1996     normal    History of ST elevation myocardial infarction (STEMI) 7/11/2017    History of transfusion of whole blood 1996     Excessive bleeding before hysterectomy    Hyperlipidemia       meds > 2 yrs    Hypertension      Hypothyroidism       past trx years ago then stopped / recent restart    Kidney disease      Low back pain      Morbid obesity due to excess calories ([address] 5/13/2017    Morbid obesity due to excess calories ([address] 6/6/2017    Osteoarthritis      Pneumonia      RBBB (right bundle branch block) 3/17/2022    Shoulder dislocation      ST elevation myocardial infarction involving left circumflex coronary artery ([address] 5/13/2017    Unspecified gastritis and gastroduodenitis without mention of hemorrhage 05/06/2015     DR LANE            PSHx:  Past Surgical History         Past Surgical History:   Procedure Laterality Date    CARDIAC SURGERY   2017     has x 4 cardiac stents    COLONOSCOPY   01/14/2015     DR Blake Bell - DIVERTICULOSIS    COLONOSCOPY N/A 1/2/2021     COLONOSCOPY DIAGNOSTIC performed by Eli Gautam MD at [facility]   05/17/2017     x2 stents    CYST REMOVAL Left 2/7/2019     RESECTION OF LEFT PINNA LESION WITH GRAFT performed by Kim Jaramillo MD at [address], COLON, DIAGNOSTIC        FINGER SURGERY   12/9/14     middle finger right hand due to infection    HYSTERECTOMY ([facility])   [address]   [phone]    SHOULDER SURGERY   2008     shoulder dislocated - popped  back in Right shoulder    TOTAL KNEE ARTHROPLASTY Right 8/30/2022     RIGHT KNEE TOTAL KNEE ARTHROPLASTY performed by Marine Barlow MD at [address] Left 3/7/2023     LEFT KNEE TOTAL KNEE ARTHROPLASTY performed by Marine Barlow MD at [address]   01/14/2015     DR Sariah Donovan - ULCER IN THE ANTRUM    UPPER GASTROINTESTINAL ENDOSCOPY   05/06/2015     DR Sariah Donovan - GASTRITIS, PREVIOUS ULCER HEALED    UPPER GASTROINTESTINAL ENDOSCOPY N/A 1/2/2021     EGD DIAGNOSTIC ONLY performed by Beckey Gaucher, MD at [address] [place] [address] Hx:  Social History   Social History            Socioeconomic History    Marital status:        Spouse name: None    Number of children: None    Years of education: None    Highest education level: None   Tobacco Use    Smoking status: Never    Smokeless tobacco: Never   Vaping Use    Vaping Use: Never used   Substance and Sexual Activity    Alcohol use: Yes       Comment: glass of wine once a year    Drug use: No    Sexual activity: Yes       Partners: Male      Social Determinants of Health          Financial Resource Strain: Low Risk     Difficulty of Paying Living Expenses: Not hard at all   Food Insecurity: No Food Insecurity    Worried About Running Out of Food in the Last Year: Never true    Ran Out of Food in the Last Year: Never true   Transportation Needs: Unknown    Lack of Transportation (Non-Medical): No   Housing Stability: Unknown    Unstable Housing in the Last Year: No            Family Hx:  Family History         Family History   Problem Relation Age of Onset    Heart Disease Mother 61    Cancer Father 79 kidney    No Known Problems Daughter              Review of Systems:   Review of Systems   Constitutional:  Negative for activity change. HENT:  Negative for congestion. Respiratory:  Positive for cough. Negative for shortness of breath. Cardiovascular:  Positive for chest pain (intermittent yesterday). Negative for palpitations and leg swelling. Gastrointestinal:  Negative for abdominal pain, nausea and vomiting. Genitourinary:  Negative for difficulty urinating. Musculoskeletal:  Negative for arthralgias. Skin:  Negative for color change. Neurological:  Negative for dizziness, syncope and light-headedness. Psychiatric/Behavioral:  Negative for agitation. Physical Examination:    BP (!) 130/53   Pulse 92   Temp 98.5 °F (36.9 °C) (Oral)   Resp 20   Ht 5' 1\" (1.549 m)   Wt 247 lb 1.6 oz (112.1 kg)   LMP 09/17/1996   SpO2 98%   BMI 46.69 kg/m²    Physical Exam  Constitutional:       General: She is not in acute distress. Appearance: She is obese. HENT:      Head: Normocephalic and atraumatic. Cardiovascular:      Rate and Rhythm: Normal rate and regular rhythm. Heart sounds: Murmur heard. Pulmonary:      Effort: Pulmonary effort is normal. No respiratory distress. Breath sounds: No wheezing, rhonchi or rales. Abdominal:      Palpations: Abdomen is soft. Comments: Obese abdomen   Musculoskeletal:         General: Normal range of motion. Cervical back: Normal range of motion and neck supple. Right lower leg: No edema. Left lower leg: Edema (trace) present. Comments: Dressing intact over left knee incision   Skin:     General: Skin is warm and dry. Neurological:      General: No focal deficit present. Mental Status: She is alert and oriented to person, place, and time. Cranial Nerves: No cranial nerve deficit.    Psychiatric:         Mood and Affect: Mood normal.         LABS:  CBC:        Lab Results   Component Value Date/Time     WBC 6.0 03/10/2023 05:12 AM     RBC 2.41 03/10/2023 05:12 AM     HGB 7.4 03/10/2023 05:12 AM     HCT 23.2 03/10/2023 05:12 AM     MCV 96.4 03/10/2023 05:12 AM     MCH 30.9 03/10/2023 05:12 AM     MCHC 32.0 03/10/2023 05:12 AM     RDW 14.5 03/10/2023 05:12 AM      03/10/2023 05:12 AM     MPV 8.6 06/29/2015 07:21 PM      CBC with Differential:         Lab Results   Component Value Date/Time     WBC 6.0 03/10/2023 05:12 AM     RBC 2.41 03/10/2023 05:12 AM     HGB 7.4 03/10/2023 05:12 AM     HCT 23.2 03/10/2023 05:12 AM      03/10/2023 05:12 AM     MCV 96.4 03/10/2023 05:12 AM     MCH 30.9 03/10/2023 05:12 AM     MCHC 32.0 03/10/2023 05:12 AM     RDW 14.5 03/10/2023 05:12 AM     LYMPHOPCT 8.3 03/10/2023 05:12 AM     MONOPCT 6.1 03/10/2023 05:12 AM     BASOPCT 0.2 03/10/2023 05:12 AM     MONOSABS 0.4 03/10/2023 05:12 AM     LYMPHSABS 0.5 03/10/2023 05:12 AM     EOSABS 0.1 03/10/2023 05:12 AM     BASOSABS 0.0 03/10/2023 05:12 AM      CMP:          Lab Results   Component Value Date/Time      03/10/2023 05:12 AM     K 5.6 03/10/2023 05:12 AM     K 5.3 03/08/2023 06:05 AM      03/10/2023 05:12 AM     CO2 19 03/10/2023 05:12 AM     BUN 35 03/10/2023 05:12 AM     CREATININE 1.84 03/10/2023 05:12 AM     GFRAA 34.9 08/31/2022 05:48 AM     LABGLOM 28.0 03/10/2023 05:12 AM     GLUCOSE 100 03/10/2023 05:12 AM     PROT 5.5 03/10/2023 05:12 AM     LABALBU 3.3 03/10/2023 05:12 AM     CALCIUM 8.8 03/10/2023 05:12 AM     BILITOT <0.2 03/10/2023 05:12 AM     ALKPHOS 84 03/10/2023 05:12 AM     AST 23 03/10/2023 05:12 AM     ALT <5 03/10/2023 05:12 AM      BMP:          Lab Results   Component Value Date/Time      03/10/2023 05:12 AM     K 5.6 03/10/2023 05:12 AM     K 5.3 03/08/2023 06:05 AM      03/10/2023 05:12 AM     CO2 19 03/10/2023 05:12 AM     BUN 35 03/10/2023 05:12 AM     LABALBU 3.3 03/10/2023 05:12 AM     CREATININE 1.84 03/10/2023 05:12 AM     CALCIUM 8.8 03/10/2023 05:12 AM GFRAA 34.9 08/31/2022 05:48 AM     LABGLOM 28.0 03/10/2023 05:12 AM     GLUCOSE 100 03/10/2023 05:12 AM      Magnesium:          Lab Results   Component Value Date/Time     MG 2.1 03/10/2023 05:12 AM      Troponin:          Lab Results   Component Value Date/Time     TROPONINI 0.244 03/10/2023 05:12 AM         Radiology:  CTA CHEST W WO CONTRAST     Result Date: 3/10/2023  EXAMINATION: CTA OF THE CHEST WITH AND WITHOUT CONTRAST 3/9/2023 11:14 pm TECHNIQUE: CTA of the chest was performed before and after the administration of intravenous contrast.  Multiplanar reformatted images are provided for review. MIP images are provided for review. Automated exposure control, iterative reconstruction, and/or weight based adjustment of the mA/kV was utilized to reduce the radiation dose to as low as reasonably achievable. COMPARISON: None. HISTORY: ORDERING SYSTEM PROVIDED HISTORY: 2 days post knee replacement with chest pain TECHNOLOGIST PROVIDED HISTORY: Reason for exam:->2 days post knee replacement with chest pain Decision Support Exception - unselect if not a suspected or confirmed emergency medical condition->Emergency Medical Condition (MA) What reading provider will be dictating this exam?->CRC FINDINGS: Pulmonary Arteries: Streak artifact from patient's body habitus and arm placement by there signs degrades image quality. Pulmonary arteries are adequately opacified for evaluation. No evidence of intraluminal filling defect to suggest pulmonary embolism. Main pulmonary artery is normal in caliber. Mediastinum: No evidence of mediastinal lymphadenopathy. Thyroid is homogeneous in appearance. Cardiac chambers are enlarged. No pericardial effusion. Coronary artery calcifications identified. Reactive lymph nodes seen in the hilar regions bilaterally. The heart and pericardium demonstrate no acute abnormality. There is no acute abnormality of the thoracic aorta.  Lungs/pleura: Small bilateral pleural effusions with patchy ground-glass opacity identified inferiorly within the upper lung fields and lung bases. Airspace disease such as pneumonia or superimposed edema cannot be excluded. No pulmonary mass or nodule. Upper Abdomen: Limited images of the upper abdomen are unremarkable. Soft Tissues/Bones: No acute bone or soft tissue abnormality. Multilevel degenerative changes seen within the spine. No acute chest wall abnormality. No evidence of gross pulmonary embolism. Small bilateral pleural effusions with patchy ground-glass opacity within the lung fields to suggest either superimposed edema or multi lobar pneumonia. Echocardiogram 11/15/22:  Conclusions   Summary   Left ventricular ejection fraction is visually estimated at 55-60%. Normal left ventricular size and function. Mild aortic stenosis is noted. Trivial aortic regurgitation is noted. Mild mitral regurgitation is present. Mild tricuspid regurgitation is present. Signature   ----------------------------------------------------------------   Electronically signed by Elinor Polanco(Interpreting   physician) on 11/15/2022 04:17 PM     EKG 3/9/23: , RBBB, ST depression with T wave inversion in V1-V2, ST depression in V3-V6, QTc 526ms     Telemetry 3/10/23: SR 80s-90s        Assessment:          Active Hospital Problems     Diagnosis Date Noted    Acute pneumonia [J18.9] 03/10/2023       Priority: Medium      Chest pain--now resolved. r/o cardiac ischemia. Elevated cardiac enzyme-? Type I vs type II.   Rule out cardiac ischemia  JP on CKD  Hyperkalemia  Acute on chronic anemia--Hgb 7.4 today from 9.2 yesterday and 11.7 pre-op on 2/28/23  Abnormal EKG/chronic RBBB/prolonged QTc  S/p left total knee replacement on 3/8/23  Hx CAD s/p PCI of Circ/RCA in 2017  Valvular heart disease (Mild MR/TR/AS) per echo 11/15/22  Normal LVF EF 55-60% per echo 11/15/22  HTN  Dyslipidemia     Plan:  Cardiac cath prior to discharge given rise in trops, symptoms, risk factors, NSVT and hx of CAD/pCI. Not quite ready due to  hemoglobin drop requiring transfusion as well as renal dysfunction. If develops any significant EKG changes or angina then will reconsider sooner. Continue current medications-aspirin 81 mg p.o. twice daily, Cardizem  mg p.o. daily,  Increase Lopressor to 50 mg p.o. twice daily  Change Lovenox to 30 mg daily. Lipitor at 40 mg p.o. daily  Add Mag oxide 400mg BID  Avoid any nephrotoxic agents including ACE inhibitor/ARB secondary to JP on CKD as well as hyperkalemia  Monitor on telemetry for any tachycardia or bradycardia arrhythmias  Maintain potassium greater than 4, magnesium greater than 2  Monitor H&H closely given acute on chronic postoperative anemia  GI/DVT prophylaxis  Maintain hemoglobin greater than 7-8. Monitor on telemetry    Thank you for allowing me to participate in the care of your patient, please don't hesitate to contact me if you have any further questions.     Electronically signed by Lety Ponce DO on 3/12/2023 at 10:20 AM

## 2023-03-12 NOTE — PROGRESS NOTES
[facility] EMERGENCY [facility] AT [place] Respiratory Therapy Evaluation   Current Order:  Aubrey Montez Q4PRN      Home Regimen: none      Ordering Physician: gayathri  Re-evaluation Date:  3/14     Diagnosis: elevated troponin/pnuemonia   Patient Status: Stable / Unstable + Physician notified    The following MDI Criteria must be met in order to convert aerosol to MDI with spacer. If unable to meet, MDI will be converted to aerosol:  []  Patient able to demonstrate the ability to use MDI effectively  []  Patient alert and cooperative  []  Patient able to take deep breath with 5-10 second hold  []  Medication(s) available in this delivery method   []  Peak flow greater than or equal to 200 ml/min            Current Order Substituted To  (same drug, same frequency)   Aerosol to MDI [] Albuterol Sulfate 0.083% unit dose by aerosol Albuterol Sulfate MDI 2 puffs by inhalation with spacer    [] Levalbuterol 1.25 mg unit dose by aerosol Levalbuterol MDI 2 puffs by inhalation with spacer    [] Levalbuterol 0.63 mg unit dose by aerosol Levalbuterol MDI 2 puffs by inhalation with spacer    [] Ipratropium Bromide 0.02% unit dose by aerosol Ipratropium Bromide MDI 2 puffs by inhalation with spacer    [] Duoneb (Ipratropium + Albuterol) unit dose by aerosol Ipratropium MDI + Albuterol MDI 2 puffs by inhalation w/spacer   MDI to Aerosol [] Albuterol Sulfate MDI Albuterol Sulfate 0.083% unit dose by aerosol    [] Levalbuterol MDI 2 puffs by inhalation Levalbuterol 1.25 mg unit dose by aerosol    [] Ipratropium Bromide MDI by inhalation Ipratropium Bromide 0.02% unit dose by aerosol    [] Combivent (Ipratropium + Albuterol) MDI by inhalation Duoneb (Ipratropium + Albuterol) unit dose by aerosol       Treatment Assessment [Frequency/Schedule]:  Change frequency to: __________no changes________________________________________per Protocol, P&T, MEC      Points 0 1 2 3 4   Pulmonary Status  Non-Smoker  []   Smoking history   < 20 pack years  []   Smoking history  ?  [address] [facility] [place] pack years  []   Pulmonary Disorder  (acute or chronic)  [x]   Severe or Chronic w/ Exacerbation  []     Surgical Status No [x]   Surgeries     General []   Surgery Lower []   Abdominal Thoracic or []   Upper Abdominal Thoracic with  PulmonaryDisorder  []     Chest X-ray Clear/Not  Ordered     []  Chronic Changes  Results Pending  []  Infiltrates, atelectasis, pleural effusion, or edema  [x]  Infiltrates in more than one lobe []  Infiltrate + Atelectasis, &/or pleural effusion  []    Respiratory Pattern Regular,  RR = 12-20 [x]  Increased,  RR = 21-25 []  CHU, irregular,  or RR = 26-30 []  Decreased FEV1  or RR = 31-35 []  Severe SOB, use  of accessory muscles, or RR ? 35  []    Mental Status Alert, oriented,  Cooperative [x]  Confused but Follows commands []  Lethargic or unable to follow commands []  Obtunded  []  Comatose  []    Breath Sounds Clear to  auscultation  [x]  Decreased unilaterally or  in bases only []  Decreased  bilaterally  []  Crackles or intermittent wheezes []  Wheezes []    Cough Strong, Spontan., & nonproductive [x]  Strong,  spontaneous, &  productive []  Weak,  Nonproductive []  Weak, productive or  with wheezes []  No spontaneous  cough or may require suctioning []    Level of Activity Ambulatory [x]  Ambulatory w/ Assist  []  Non-ambulatory []  Paraplegic []  Quadriplegic []    Total    Score:___5____     Triage Score:__5______      Tri       Triage:     1. (>20) Freq: Q3    2. (16-20) Freq: Q4   3. (11-15) Freq: QID & Albuterol Q2 PRN    4. (6-10) Freq: TID & Albuterol Q2 PRN    5. (0-5) Freq Q4prn

## 2023-03-13 LAB
ANION GAP SERPL CALCULATED.3IONS-SCNC: 13 MEQ/L (ref 9–15)
BASOPHILS ABSOLUTE: 0 K/UL (ref 0–0.2)
BASOPHILS RELATIVE PERCENT: 0.4 %
BUN BLDV-MCNC: 38 MG/DL (ref 8–23)
CALCIUM SERPL-MCNC: 9.3 MG/DL (ref 8.5–9.9)
CHLORIDE BLD-SCNC: 110 MEQ/L (ref 95–107)
CO2: 21 MEQ/L (ref 20–31)
CREAT SERPL-MCNC: 1.33 MG/DL (ref 0.5–0.9)
EOSINOPHILS ABSOLUTE: 0.4 K/UL (ref 0–0.7)
EOSINOPHILS RELATIVE PERCENT: 6.5 %
GFR SERPL CREATININE-BSD FRML MDRD: 41.3 ML/MIN/{1.73_M2}
GLUCOSE BLD-MCNC: 109 MG/DL (ref 70–99)
HCT VFR BLD CALC: 27.5 % (ref 37–47)
HEMOGLOBIN: 9 G/DL (ref 12–16)
LYMPHOCYTES ABSOLUTE: 0.9 K/UL (ref 1–4.8)
LYMPHOCYTES RELATIVE PERCENT: 13.5 %
MCH RBC QN AUTO: 30.3 PG (ref 27–31.3)
MCHC RBC AUTO-ENTMCNC: 32.7 % (ref 33–37)
MCV RBC AUTO: 92.8 FL (ref 79.4–94.8)
MONOCYTES ABSOLUTE: 0.5 K/UL (ref 0.2–0.8)
MONOCYTES RELATIVE PERCENT: 8.6 %
NEUTROPHILS ABSOLUTE: 4.5 K/UL (ref 1.4–6.5)
NEUTROPHILS RELATIVE PERCENT: 71 %
PDW BLD-RTO: 14.5 % (ref 11.5–14.5)
PLATELET # BLD: 182 K/UL (ref 130–400)
POTASSIUM SERPL-SCNC: 4.9 MEQ/L (ref 3.4–4.9)
RBC # BLD: 2.97 M/UL (ref 4.2–5.4)
SODIUM BLD-SCNC: 144 MEQ/L (ref 135–144)
TROPONIN: 1.08 NG/ML (ref 0–0.01)
WBC # BLD: 6.4 K/UL (ref 4.8–10.8)

## 2023-03-13 PROCEDURE — 36415 COLL VENOUS BLD VENIPUNCTURE: CPT

## 2023-03-13 PROCEDURE — 2700000000 HC OXYGEN THERAPY PER DAY

## 2023-03-13 PROCEDURE — 6360000002 HC RX W HCPCS: Performed by: INTERNAL MEDICINE

## 2023-03-13 PROCEDURE — 94640 AIRWAY INHALATION TREATMENT: CPT

## 2023-03-13 PROCEDURE — 84484 ASSAY OF TROPONIN QUANT: CPT

## 2023-03-13 PROCEDURE — 85025 COMPLETE CBC W/AUTO DIFF WBC: CPT

## 2023-03-13 PROCEDURE — 6360000002 HC RX W HCPCS: Performed by: NURSE PRACTITIONER

## 2023-03-13 PROCEDURE — 94761 N-INVAS EAR/PLS OXIMETRY MLT: CPT

## 2023-03-13 PROCEDURE — 6370000000 HC RX 637 (ALT 250 FOR IP): Performed by: PHYSICIAN ASSISTANT

## 2023-03-13 PROCEDURE — 6370000000 HC RX 637 (ALT 250 FOR IP): Performed by: NURSE PRACTITIONER

## 2023-03-13 PROCEDURE — 94150 VITAL CAPACITY TEST: CPT

## 2023-03-13 PROCEDURE — 6370000000 HC RX 637 (ALT 250 FOR IP): Performed by: INTERNAL MEDICINE

## 2023-03-13 PROCEDURE — 99232 SBSQ HOSP IP/OBS MODERATE 35: CPT | Performed by: INTERNAL MEDICINE

## 2023-03-13 PROCEDURE — 1210000000 HC MED SURG R&B

## 2023-03-13 PROCEDURE — 80048 BASIC METABOLIC PNL TOTAL CA: CPT

## 2023-03-13 PROCEDURE — 2580000003 HC RX 258: Performed by: NURSE PRACTITIONER

## 2023-03-13 RX ORDER — FUROSEMIDE 10 MG/ML
40 INJECTION INTRAMUSCULAR; INTRAVENOUS DAILY
Status: DISCONTINUED | OUTPATIENT
Start: 2023-03-13 | End: 2023-03-14

## 2023-03-13 RX ADMIN — DILTIAZEM HYDROCHLORIDE 240 MG: 240 CAPSULE, COATED, EXTENDED RELEASE ORAL at 09:20

## 2023-03-13 RX ADMIN — METOPROLOL TARTRATE 50 MG: 50 TABLET, FILM COATED ORAL at 20:33

## 2023-03-13 RX ADMIN — Medication 10 ML: at 09:22

## 2023-03-13 RX ADMIN — METOPROLOL TARTRATE 50 MG: 50 TABLET, FILM COATED ORAL at 09:20

## 2023-03-13 RX ADMIN — IPRATROPIUM BROMIDE AND ALBUTEROL SULFATE 1 AMPULE: .5; 2.5 SOLUTION RESPIRATORY (INHALATION) at 08:11

## 2023-03-13 RX ADMIN — Medication 400 MG: at 09:20

## 2023-03-13 RX ADMIN — Medication 400 MG: at 20:33

## 2023-03-13 RX ADMIN — OXYBUTYNIN CHLORIDE 10 MG: 5 TABLET, EXTENDED RELEASE ORAL at 09:20

## 2023-03-13 RX ADMIN — ENOXAPARIN SODIUM 30 MG: 100 INJECTION SUBCUTANEOUS at 09:22

## 2023-03-13 RX ADMIN — ASPIRIN 81 MG: 81 TABLET, COATED ORAL at 09:20

## 2023-03-13 RX ADMIN — GUAIFENESIN 600 MG: 600 TABLET, EXTENDED RELEASE ORAL at 20:33

## 2023-03-13 RX ADMIN — CEFTRIAXONE SODIUM 1000 MG: 1 INJECTION, POWDER, FOR SOLUTION INTRAMUSCULAR; INTRAVENOUS at 00:00

## 2023-03-13 RX ADMIN — OXYCODONE AND ACETAMINOPHEN 1 TABLET: 5; 325 TABLET ORAL at 21:04

## 2023-03-13 RX ADMIN — GUAIFENESIN 600 MG: 600 TABLET, EXTENDED RELEASE ORAL at 09:20

## 2023-03-13 RX ADMIN — ASPIRIN 81 MG: 81 TABLET, COATED ORAL at 20:33

## 2023-03-13 RX ADMIN — ATORVASTATIN CALCIUM 40 MG: 40 TABLET, FILM COATED ORAL at 20:33

## 2023-03-13 RX ADMIN — Medication 10 ML: at 20:35

## 2023-03-13 RX ADMIN — FUROSEMIDE 40 MG: 10 INJECTION, SOLUTION INTRAMUSCULAR; INTRAVENOUS at 12:36

## 2023-03-13 ASSESSMENT — PAIN SCALES - GENERAL: PAINLEVEL_OUTOF10: 7

## 2023-03-13 NOTE — PROGRESS NOTES
Nursing report received from first shift nurse. Pt in bed resting. Pt Currently denies pain. Wearing O2 via nasal canula. Bed in lowest position. Side rails x2, call light in reach.  Nurse will monitor Pt throughout the night     Pt given PRN Percocet for 7/10 pain

## 2023-03-13 NOTE — PROGRESS NOTES
Physical Therapy Missed Treatment   Facility/Department: [facility] MED SURG W985/D431-32    NAME: Jose Hastings    : 1945 (69 y.o.)  MRN: [id number]    Account: [de-identified]  Gender: female    Chart reviewed, attempted PT at 2750. Patient unavailable 2° to:    [] Hold per nsg request    [] Pt declined    [x] Nsg notified   [] Other notified    [] Pt. . off floor for test/procedure. [x] Pt. Unavailable due to critical lab levels. Will attempt PT treatment again at earliest convenience time and condition permitting.       Electronically signed by Marianne Elena PTA on 3/13/23 at 8:31 AM EDT

## 2023-03-13 NOTE — PROGRESS NOTES
Physical Therapy Missed Treatment   Facility/Department: [facility] MED SURG K768/G829-07    NAME: Leslie Tenorio    : 1945 (69 y.o.)  MRN: [id number]    Account: [de-identified]  Gender: female    Chart reviewed, attempted PT at 11:44. Patient unavailable 2° to:    [] Hold per nsg request    [x] Pt declined d/t having a bad night. \"I was up all night coughing and I just want to try and rest.\" Asked pt to take a good nap and I will be back in the afternoon. [] Nsg notified   [] Other notified    [] Pt. . off floor for test/procedure. [] Pt. Unavailable        Will attempt PT treatment again at earliest convenience.       Electronically signed by Cecelia Fischer PTA on 3/13/23 at 11:44 AM EDT

## 2023-03-13 NOTE — PROGRESS NOTES
Renal Progress Note    Assessment and Plan:      67-year-old lady with CKD stage III with risk factors of hypertension coronary disease with stents. Baseline creatinine recently in mid ones. Recent total knee replacement. Had some mild acute kidney injury after that. Losartan held. Readmitted with some chest pain. Had a CT scan with contrast.  Negative for pulmonary embolism. Has some hyperkalemia. Possible fluid overload based on imaging     Plan/  1-will give 40 mg iv lasix again for wheezing  2-k better  3-she got Retacrit few  days ago. We will hold off on redosing  4. Krissy Phoenix for cath if needed    Patient Active Problem List:     Hypothyroid     Essential hypertension     Hyperlipidemia     Morbid obesity due to excess calories ([facility] [address]     History of ST elevation myocardial infarction (STEMI)     Coronary artery disease involving native coronary artery of native heart without angina pectoris     Chronic bilateral low back pain with bilateral sciatica     Primary osteoarthritis of both knees     Spinal stenosis of lumbar region with neurogenic claudication     Degenerative spondylolisthesis     Asthma     CKD (chronic kidney disease) stage 3, GFR 30-59 ml/min ([facility])     Adenomatous polyp of descending colon     Adenomatous polyp of ascending colon     Adenomatous polyp of colon     Chronic gastritis without bleeding     Anemia     Gastrointestinal hemorrhage     RBBB (right bundle branch block)     Status post total knee replacement, right     Status post total knee replacement, left     Acute pneumonia     Elevated troponin      Subjective:   Admit Date: 3/9/2023    Interval History: gfr stable. Trop 1. Got blood. Got iv lasix.   Has some sob      Medications:   Scheduled Meds:   enoxaparin  30 mg SubCUTAneous Daily    magnesium oxide  400 mg Oral BID    metoprolol tartrate  50 mg Oral BID    guaiFENesin  600 mg Oral BID    sodium chloride flush  5-40 mL IntraVENous 2 times per day    cefTRIAXone (ROCEPHIN) IV  1,000 mg IntraVENous Q24H    aspirin  81 mg Oral BID    dilTIAZem  240 mg Oral Daily    oxybutynin  10 mg Oral Daily    atorvastatin  40 mg Oral Nightly     Continuous Infusions:   sodium chloride      sodium chloride      sodium chloride         CBC:   Recent Labs     03/12/23  0438 03/12/23  1847 03/13/23  0528   WBC 6.5  --  6.4   HGB 7.6* 9.2* 9.0*     --  182       CMP:    Recent Labs     03/11/23  0445 03/12/23  0439 03/13/23  0528    142 144   K 4.8 4.9 4.9   * 113* 110*   CO2 19* 19* 21   BUN 32* 32* 38*   CREATININE 1.47* 1.37* 1.33*   GLUCOSE 105* 107* 109*   CALCIUM 8.5 8.9 9.3   LABGLOM 36.6* 39.9* 41.3*       Troponin:   Recent Labs     03/13/23 0528   TROPONINI 1.080*       BNP: No results for input(s): BNP in the last 72 hours. INR: No results for input(s): INR in the last 72 hours. Lipids: No results for input(s): CHOL, LDLDIRECT, TRIG, HDL, AMYLASE, LIPASE in the last 72 hours. Liver:   Recent Labs     03/12/23 0439   AST 23   ALT 8   ALKPHOS 89   PROT 5.4*   LABALBU 2.8*   BILITOT 0.4       Iron:  No results for input(s): IRONS, FERRITIN in the last 72 hours. Invalid input(s): LABIRONS  Urinalysis: No results for input(s): UA in the last 72 hours. Objective:   Vitals: BP (!) 147/69   Pulse 89   Temp 98.8 °F (37.1 °C) (Oral)   Resp 18   Ht 5' 1\" (1.549 m)   Wt 244 lb 3.2 oz (110.8 kg)   LMP 09/17/1996   SpO2 97%   BMI 46.14 kg/m²    Wt Readings from Last 3 Encounters:   03/11/23 244 lb 3.2 oz (110.8 kg)   03/07/23 224 lb 12.8 oz (102 kg)   02/28/23 224 lb 12.8 oz (102 kg)      24HR INTAKE/OUTPUT:    Intake/Output Summary (Last 24 hours) at 3/13/2023 0959  Last data filed at 3/13/2023 0464  Gross per 24 hour   Intake 283 ml   Output 1700 ml   Net -1417 ml         Constitutional:  Alert, awake, no apparent distress   Skin:normal, no rash  HEENT:sclera anicteric.   Head atraumatic normocephalic  Neck:supple with no thyromegally  Cardiovascular:  S1, S2 without m/r/g   Respiratory:  wheezing  Abdomen: +bs, soft, nt  Ext: no LE edema  Musculoskeletal:Intact  Neuro:Alert and oriented with no deficit      Electronically signed by Reji Holland MD on 3/13/2023 at 9:59 AM

## 2023-03-13 NOTE — PROGRESS NOTES
Internal Medicine   Hospitalist   Progress Note    3/13/2023   1:41 PM    Name:  Nancy Alex  MRN:    [id number]     [address] [facility] Day: 3     Admit Date: 3/9/2023  8:02 PM  PCP: Geronimo Gutiérrez MD    Code Status:  Full Code    Assessment and Plan: Active Problems/ diagnosis:     Acute respiratory insufficiency  Pneumonia  Elevated troponin-rule out NSTEMI-history of CAD status post previous PCI  JP on CKD 3  Recent knee replacement  Left lower extremity edema-DVT studies negative    Plan  Hemoglobin is stable, continue to monitor. Discussed with cardiologist.  Phillip Shah help. May need catheterization. Respiratory status seems stable. We will continue antibiotics. Continue monitor.  echocardiogram noted. Appreciate nephrologist input regarding fluid status-renal function is improving. Home medication reviewed  DVT PPx     7 pm- 7 am, please contact on call Hospitalist for any needs     Dispo-pending cardiac work-up and stable hemoglobin. Subjective:     No chest pain today.       Physical Examination:      Vitals:  BP (!) 147/64   Pulse 79   Temp 98.4 °F (36.9 °C) (Oral)   Resp 18   Ht 5' 1\" (1.549 m)   Wt 244 lb 3.2 oz (110.8 kg)   LMP 1996   SpO2 95%   BMI 46.14 kg/m²   Temp (24hrs), Av.8 °F (37.1 °C), Min:98.4 °F (36.9 °C), Max:99.3 °F (37.4 °C)      General appearance: alert, cooperative and no distress  Mental Status: oriented to person, place and time and normal affect  Lungs: clear to auscultation bilaterally, normal effort  Heart: regular rate and rhythm, no murmur  Abdomen: soft, nontender, nondistended, bowel sounds present, no masses  Extremities: no redness, tenderness in the calves  Skin: no gross lesions, rashes    Data:     Labs:  Recent Labs     23  04323  1847 23  0528   WBC 6.5  --  6.4   HGB 7.6* 9.2* 9.0*     --  182     Recent Labs     23  0439 23  0528    144   K 4.9 4.9   * 110*   CO2 19* 21   BUN 32* 38*   CREATININE 1.37* 1.33*   GLUCOSE 107* 109*     Recent Labs     03/11/23  0445 03/12/23  0439   AST 31 23   ALT 7 8   BILITOT 0.3 0.4   ALKPHOS 85 89       Current Facility-Administered Medications   Medication Dose Route Frequency Provider Last Rate Last Admin    furosemide (LASIX) injection 40 mg  40 mg IntraVENous Daily Marky Red MD   40 mg at 03/13/23 1236    0.9 % sodium chloride infusion   IntraVENous PRN Barbara Olea,         enoxaparin Sodium (LOVENOX) injection 30 mg  30 mg SubCUTAneous Daily Jordan J Holiday, DO   30 mg at 03/13/23 4811    magnesium oxide (MAG-OX) tablet 400 mg  400 mg Oral BID Jordan J Holiday, DO   400 mg at 03/13/23 0920    metoprolol tartrate (LOPRESSOR) tablet 50 mg  50 mg Oral BID Jordan J Holiday, DO   50 mg at 03/13/23 0920    0.9 % sodium chloride infusion   IntraVENous PRN Ana Miramontes,         ipratropium-albuterol (DUONEB) nebulizer solution 1 ampule  1 ampule Inhalation Q4H PRN Ana Miramontes, DO   1 ampule at 03/13/23 0811    guaiFENesin (MUCINEX) extended release tablet 600 mg  600 mg Oral BID Nicoletto Bolzan-Roche, APRN - CNP   600 mg at 03/13/23 0920    sodium chloride flush 0.9 % injection 5-40 mL  5-40 mL IntraVENous 2 times per day Nicoletto Bolzan-Roche, APRN - CNP   10 mL at 03/13/23 0922    sodium chloride flush 0.9 % injection 5-40 mL  5-40 mL IntraVENous PRN Nicoletto Bolzan-Roche, APRN - CNP        0.9 % sodium chloride infusion   IntraVENous PRN Nicoletto Bolzan-Roche, APRN - CNP        polyethylene glycol (GLYCOLAX) packet 17 g  17 g Oral Daily PRN Nicoletto Bolzan-Roche, APRN - CNP        acetaminophen (TYLENOL) tablet 650 mg  650 mg Oral Q6H PRN Nicoletto Bolzan-Roche, APRN - CNP        Or    acetaminophen (TYLENOL) suppository 650 mg  650 mg Rectal Q6H PRN Nicoletto Bolzan-Roche, APRN - CNP        cefTRIAXone (ROCEPHIN) 1,000 mg in sodium chloride 0.9 % 50 mL IVPB (mini-bag)  1,000 mg IntraVENous Q24H Lyle Cano, APRN - CNP Stopped at 03/13/23 0035    aspirin EC tablet 81 mg  81 mg Oral BID Fermin Cardozo, DO   81 mg at 03/13/23 0920    dilTIAZem (CARDIZEM CD) extended release capsule 240 mg  240 mg Oral Daily Fermin Cardozo, DO   240 mg at 03/13/23 0920    oxybutynin (DITROPAN-XL) extended release tablet 10 mg  10 mg Oral Daily Fermin Cardozo, DO   10 mg at 03/13/23 0920    atorvastatin (LIPITOR) tablet 40 mg  40 mg Oral Nightly Garrett Brewer   40 mg at 03/12/23 2115    oxyCODONE-acetaminophen (PERCOCET) 5-325 MG per tablet 1 tablet  1 tablet Oral Q4H PRN Fermin Cardozo DO   1 tablet at 03/12/23 2116    nitroGLYCERIN (NITROSTAT) SL tablet 0.4 mg  0.4 mg SubLINGual Q5 Min PRN Adonay Duran MD   0.4 mg at 03/09/23 2212       Additional work up or/and treatment plan may be added today or then after based on clinical progression. I am managing a portion of pt care. Some medical issues are handled by other specialists. Additional work up and treatment should be done in out pt setting by pt PCP and other out pt providers. In addition to examining and evaluating pt, I spent additional time explaining care, normaland abnormal findings, and treatment plan. All of pt questions were answered. Counseling, diet and education were provided. Case will be discussed with nursing staff when appropriate. Family will be updated if and when appropriate.        Electronically signed by Fermin Cardozo DO on 3/13/2023 at 1:41 PM

## 2023-03-13 NOTE — PROGRESS NOTES
Physical Therapy Missed Treatment   Facility/Department: [facility] MED SURG X468/W630-43    NAME: Brock Puri    : 1945 (69 y.o.)  MRN: [id number]    Account: [de-identified]  Gender: female    Chart reviewed, attempted PT at 1520. Patient unavailable 2° to:    [] Hold per nsg request    [x] Pt declined- pt resting in bed upon arrival with spouse in room. Pt shakes head as therapy enters and states, \"they gave me lasix and I can't stop going, I can't get up in these conditions. \" Pt given extensive education and encouragement and while in agreement, continues to decline. [] Pt. . off floor for test/procedure. [] Pt. Unavailable       Will attempt PT treatment again at earliest convenience.       Electronically signed by Garima Alejandre PTA on 3/13/23 at 3:23 PM EDT

## 2023-03-13 NOTE — PROGRESS NOTES
1u PRBC's given per orders. Followed policy protocol and cross checked with another RN, Jose Funk. Vitals taken per protocol. Staying in room for first 15 minutes, vital signs taken. No adverse reactions noted. Pt tolerating blood well.  Follow up H/H scheduled for post transfusion    Electronically signed by Maxim Mackey RN on 3/12/2023 at 9:06 PM

## 2023-03-13 NOTE — PROGRESS NOTES
Cardiology Follow up Note    Subjective:   69 y.o. old female patient with history of CAD, prior PCI/stents to the RCA and circumflex (2017), CKD, hypertension, dyslipidemia, and recent left total knee replacement 3/8/2023 who presents with chest pain, pneumonia, acute on chronic CKD, and acute on chronic anemia. 3/11-3/12/23: Weekend cardiology notes reviewed. 3/13/23: No chest pain overnight. She complains of a cough. Hemoglobin has been stable creatinine improving. Troponin up to 1.08    Review of Systems:   General: Fatigued. Cardiovascular: See HPI. No orthopnea or PND. Respiratory: Dyspnea is present with mild degrees of activity. Gastrointestinal: No melena or hematochezia. Genitourinary: No hematuria. Hematological: No easy bruising or bleeding. Hemoglobin stable. Vascular: + Left lower extremity edema. No claudication. Neurological: No TIA or CVA symptoms. No paresthesias. Musculoskeletal: No chest wall pain. Psychiatric: No anxiety. *All other systems were reviewed and found to be negative unless otherwise noted in the HPI*    Objective:  BP (!) 147/69   Pulse 89   Temp 98.8 °F (37.1 °C) (Oral)   Resp 18   Ht 5' 1\" (1.549 m)   Wt 244 lb 3.2 oz (110.8 kg)   LMP 09/17/1996   SpO2 97%   BMI 46.14 kg/m²   Eyes: Conjunctiva clear; no scleral icterus. PERRLA   Respiratory: clear to auscultation bilaterally  Musculoskeletal: No chest wall tenderness. No clubbing or cyanosis. Cardiovascular: regular rate and rhythm, S1, S2 normal, grade 2 out of 6 crescendo decrescendo systolic murmur. No click, rub or gallop. No carotid bruit. No JVD. Pulses 2+ and symmetric.  + Left greater than right leg edema. GI: soft, non-tender, non-distended. Bowel sounds normal. No masses,  no organomegaly. Obese  MSK: extremities normal, atraumatic, no clubbing. No chest wall pain. Neurologic: Grossly normal  Skin: No rashes or lesions. No cyanosis.        Intake/Output Summary (Last 24 hours) at 3/13/2023 1134  Last data filed at 3/13/2023 0648  Gross per 24 hour   Intake 283 ml   Output 1700 ml   Net -1417 ml       Medications:  furosemide, 40 mg, Daily  enoxaparin, 30 mg, Daily  magnesium oxide, 400 mg, BID  metoprolol tartrate, 50 mg, BID  guaiFENesin, 600 mg, BID  sodium chloride flush, 5-40 mL, 2 times per day  cefTRIAXone (ROCEPHIN) IV, 1,000 mg, Q24H  aspirin, 81 mg, BID  dilTIAZem, 240 mg, Daily  oxybutynin, 10 mg, Daily  atorvastatin, 40 mg, Nightly      sodium chloride  sodium chloride  sodium chloride      Recent Labs     03/10/23  1213 03/10/23  2306 03/11/23  0445 03/11/23  1037 03/11/23  1755 03/11/23  2327 03/12/23  0438 03/12/23  0439 03/12/23  1847 03/13/23  0528   HGB  --   --  7.1*  --  8.2*  --  7.6*  --  9.2* 9.0*   WBC  --   --  6.4  --   --   --  6.5  --   --  6.4   PLT  --   --  129*  --   --   --  150  --   --  182   NA  --   --  142  --   --   --   --  142  --  144   K  --   --  4.8  --   --   --   --  4.9  --  4.9   CO2  --   --  19*  --   --   --   --  19*  --  21   BUN  --   --  32*  --   --   --   --  32*  --  38*   MG  --   --  2.0  --   --   --   --  2.0  --   --    TROPONINI 0.440* 0.540* 0.540* 0.494* 0.620* 0.667* 0.734*  --   --  1.080*         EKG: My independent review of EKG reveals sinus rhythm with RBBB and nonspecific ST and T wave changes. Telemetry: My independent review reveals sinus rhythm    Echocardiogram:  Normal left ventricular systolic function, no regional wall motion   abnormalities, estimated ejection fraction of 55%. Normal left ventricular size and function. Mild concentric left ventricular hypertrophy. Impaired relaxation compatible with diastolic dysfunction. ( reversed E/A   ratio)   Normal right ventricular chamber size and function. The left atrium is Mildly dilated. Mild (1+) mitral regurgitation is present. mild to mod MS by gradient. however MV appears to be opening adequately. No evidence of aortic valve regurgitation . No evidence of aortic valve stenosis. There is mild tricuspid regurgitation with estimated RVSP of 64 mm Hg. Right ventricular systolic pressure of 64 mm Hg consistent with moderate   pulmonary hypertension. Assessment:  Chest pain with typical and atypical features  NSTEMI--Type I vs type II  JP on CKD improving  Acute on chronic anemia--Hgb stable. Abnormal EKG  Known CAD s/p PCI of Circ/RCA in 2017  Mild aortic stenosis/mitral regurgitation  Normal LV systolic function, EF 33%  Moderate pulm hypertension. HTN  Dyslipidemia  Recent left total knee replacement on 3/8/23  Pneumonia    Recommendations:  Continue aspirin, Cardizem, Lopressor, Lipitor. No ACE/ARB secondary to CKD. Monitor H&H, stable. Cardiac catheterization prior to discharge as long as kidney function and hemoglobin remained stable. Thank you for allowing me to participate in your patient's cardiac care. Should you have questions, please do not hesitate to contact me.      Yoandy Hairston DO, [facility] [address] [facility]

## 2023-03-13 NOTE — DISCHARGE INSTR - COC
Continuity of Care Form    Patient Name: Phani Kumar   :  1945  MRN:  [id number]    Admit date:  3/9/2023  Discharge date:  ***    Code Status Order: Full Code   Advance Directives:     Admitting Physician:  Adair Silva DO  PCP: Jacinto Joya MD    Discharging Nurse: [facility] Unit/Room#: N741/I608-43  Discharging Unit Phone Number: ***    Emergency Contact:   Extended Emergency Contact Information  Primary Emergency Contact: [address] of [address] [place] [address] Phone: [phone]  Mobile Phone: [phone]  Relation: Child  Secondary Emergency Contact: Orlin Misaelva States of [address] [place] [address] Phone: [phone]  Mobile Phone: [phone]  Relation: Child    Past Surgical History:  Past Surgical History:   Procedure Laterality Date    CARDIAC SURGERY  2017    has x 4 cardiac stents    COLONOSCOPY  2015    DR Sariah Donovan - DIVERTICULOSIS    COLONOSCOPY N/A 2021    COLONOSCOPY DIAGNOSTIC performed by Kristy Levine MD at [address] [place] [address]  05/17/2017    x2 stents    CYST REMOVAL Left 2019    RESECTION OF LEFT PINNA LESION WITH GRAFT performed by Rene Escalante MD at [address] [facility], COLON, DIAGNOSTIC      FINGER SURGERY  14    middle finger right hand due to infection    HYSTERECTOMY ([address] [place] [address])  [address]  [id number]    SHOULDER SURGERY      shoulder dislocated - popped  back in Right shoulder    TOTAL KNEE ARTHROPLASTY Right 2022    RIGHT KNEE TOTAL KNEE ARTHROPLASTY performed by Lori Small MD at [address] [place] [address] Left 3/7/2023    LEFT KNEE TOTAL KNEE ARTHROPLASTY performed by Lori Small MD at [address] [place] [address]  2015    DR Sariah Donovan - ULCER IN THE ANTRUM    UPPER GASTROINTESTINAL ENDOSCOPY  2015    DR LANE - GASTRITIS, PREVIOUS ULCER HEALED    UPPER GASTROINTESTINAL ENDOSCOPY N/A 2021    EGD DIAGNOSTIC ONLY performed by Aleks Shepard MD at [facility] OR       Immunization History:   Immunization History   Administered Date(s) Administered    COVID-19, PFIZER PURPLE top, DILUTE for use, (age 15 y+), 30mcg/0.3mL 03/16/2021, 04/06/2021    Pneumococcal Conjugate 13-valent (Pplhlqm61) 10/30/2018    Pneumococcal Polysaccharide (Qwiczkgum01) 10/02/2012    Tdap (Boostrix, Adacel) 12/05/2014       Active Problems:  Patient Active Problem List   Diagnosis Code    Hypothyroid E03.9    Essential hypertension I10    Hyperlipidemia E78.5    Morbid obesity due to excess calories ([facility]) E66.01    History of ST elevation myocardial infarction (STEMI) I25.2    Coronary artery disease involving native coronary artery of native heart without angina pectoris I25.10    Chronic bilateral low back pain with bilateral sciatica M54.42, M54.41, G89.29    Primary osteoarthritis of both knees M17.0    Spinal stenosis of lumbar region with neurogenic claudication M48.062    Degenerative spondylolisthesis M43.10    Asthma J45.909    CKD (chronic kidney disease) stage 3, GFR 30-59 ml/min ([facility]) N18.30    Adenomatous polyp of descending colon D12.4    Adenomatous polyp of ascending colon D12.2    Adenomatous polyp of colon D12.6    Chronic gastritis without bleeding K29.50    Anemia D64.9    Gastrointestinal hemorrhage K92.2    RBBB (right bundle branch block) I45.10    Status post total knee replacement, right Z96.651    Status post total knee replacement, left Z96.652    Acute pneumonia J18.9    Elevated troponin R77.8       Isolation/Infection:   Isolation            Contact          Patient Infection Status       Infection Onset Added Last Indicated Last Indicated By Review Planned Expiration Resolved Resolved By    MRSA 08/16/22 08/17/22 08/16/22 MRSA DNA Probe, Nasal        Resolved    COVID-19 (Rule Out) 03/10/23 03/10/23 03/10/23 Respiratory Panel, Molecular, with COVID-19 (Restricted: peds pts or suitable admitted adults) (Ordered)   03/10/23 Rule-Out Test Resulted    COVID-19 (Rule Out) 03/10/23 03/10/23 03/10/23 COVID-19, Rapid (Ordered)   03/10/23 Rule-Out Test Resulted    COVID-19 (Rule Out) 21 COVID-19 (Ordered)   21 Rule-Out Test Resulted    COVID-19 (Rule Out) 20 COVID-19 (Ordered)   20 Rule-Out Test Resulted            Nurse Assessment:  Last Vital Signs: BP (!) 147/64   Pulse 79   Temp 98.4 °F (36.9 °C) (Oral)   Resp 18   Ht 5' 1\" (1.549 m)   Wt 244 lb 3.2 oz (110.8 kg)   LMP 1996   SpO2 95%   BMI 46.14 kg/m²     Last documented pain score (0-10 scale): Pain Level: 6  Last Weight:   Wt Readings from Last 1 Encounters:   23 244 lb 3.2 oz (110.8 kg)     Mental Status:  {IP PT MENTAL STATUS:}    IV Access:  { ULYSSES IV ACCESS:094222651}    Nursing Mobility/ADLs:  Walking   {CHP DME SJOP:189954541}  Transfer  {CHP DME JSEL:087497242}  Bathing  {CHP DME JEYK:112930579}  Dressing  {CHP DME PVAF:616765082}  Toileting  {CHP DME LNDO:924802889}  Feeding  {CHP DME XZSO:215974346}  Med Admin  {P DME CTHA:577750786}  Med Delivery   { ULYSSES MED Delivery:262111947}    Wound Care Documentation and Therapy:  Incision 23 Knee Anterior; Left (Active)   Dressing Status Clean;Dry; Intact 23 0732   Dressing Change Due 23 0732   Incision Cleansed Not Cleansed 23 0732   Closure Other (Comment) 23 0156   Margins Approximated 23 1247   Incision Assessment Dry 23 0156   Drainage Amount None 23 0732   Odor None 23 0156   Pati-incision Assessment Warm; Intact 23 0830   Number of days: 6        Elimination:  Continence:    Bowel: {YES / MK:34693}  Bladder: {YES / QS:71382}  Urinary Catheter: {Urinary Catheter:164187039}   Colostomy/Ileostomy/Ileal Conduit: {YES / F}       Date of Last BM: ***    Intake/Output Summary (Last 24 hours) at 3/13/2023 1422  Last data filed at 3/13/2023 0648  Gross per 24 hour   Intake 283 ml Output 1700 ml   Net -1417 ml     I/O last 3 completed shifts:   In: 283 [Blood:283]  Out: 65 [Urine:1700]    Safety Concerns:     [facility] Safety Concerns:511025396}    Impairments/Disabilities:      [facility] Impairments/Disabilities:698429810}    Nutrition Therapy:  Current Nutrition Therapy:   [address] [place] [address] Diet List:527736575}    Routes of Feeding: {CHP DME Other Feedings:41946}  Liquids: {Slp liquid thickness:52952}  Daily Fluid Restriction: {CHP DME Yes amt example:230646495}  Last Modified Barium Swallow with Video (Video Swallowing Test): {Done Not Done XRKP:188713692}    Treatments at the Time of Hospital Discharge:   Respiratory Treatments: ***  Oxygen Therapy:  {Therapy; copd oxygen:14504}  Ventilator:    { CC Vent XBTW:288677995}    Rehab Therapies: {THERAPEUTIC INTERVENTION:9735412876}  Weight Bearing Status/Restrictions: [address] [place] [address] Weight Bearin}  Other Medical Equipment (for information only, NOT a DME order):  {EQUIPMENT:033259838}  Other Treatments: ***    Patient's personal belongings (please select all that are sent with patient):  {P DME Belongings:414900734}    RN SIGNATURE:  {Esignature:832686252}    CASE MANAGEMENT/SOCIAL WORK SECTION    Inpatient Status Date: ***    Readmission Risk Assessment Score:  Readmission Risk              Risk of Unplanned Readmission:  14           Discharging to Facility/ Agency   Name: [facility]  Address:  Phone:  [phone]  Fax:      / signature: {Esignature:194136284}    PHYSICIAN SECTION    Prognosis: {Prognosis:5878686917}    Condition at Discharge: [address] Patient Condition:905342326}    Rehab Potential (if transferring to Rehab): {Prognosis:2319316030}    Recommended Labs or Other Treatments After Discharge: ***    Physician Certification: I certify the above information and transfer of Ange Benitez  is necessary for the continuing treatment of the diagnosis listed and that she requires {Admit to Appropriate Level of Care:76733} for {GREATER/LESS:322341267} 30 days.      Update Admission H&P: {CHP DME Changes in QPVOJ:645255811}    PHYSICIAN SIGNATURE:  {Esignature:514096755}

## 2023-03-14 ENCOUNTER — APPOINTMENT (OUTPATIENT)
Dept: CARDIAC CATH/INVASIVE PROCEDURES | Age: 78
DRG: 981 | End: 2023-03-14
Payer: MEDICARE

## 2023-03-14 LAB
ANION GAP SERPL CALCULATED.3IONS-SCNC: 11 MEQ/L (ref 9–15)
BASOPHILS ABSOLUTE: 0 K/UL (ref 0–0.2)
BASOPHILS RELATIVE PERCENT: 0.7 %
BUN BLDV-MCNC: 35 MG/DL (ref 8–23)
CALCIUM SERPL-MCNC: 9.3 MG/DL (ref 8.5–9.9)
CHLORIDE BLD-SCNC: 107 MEQ/L (ref 95–107)
CO2: 22 MEQ/L (ref 20–31)
CREAT SERPL-MCNC: 1.21 MG/DL (ref 0.5–0.9)
EOSINOPHILS ABSOLUTE: 0.4 K/UL (ref 0–0.7)
EOSINOPHILS RELATIVE PERCENT: 7.1 %
GFR SERPL CREATININE-BSD FRML MDRD: 46.3 ML/MIN/{1.73_M2}
GLUCOSE BLD-MCNC: 90 MG/DL (ref 70–99)
HCT VFR BLD CALC: 27.7 % (ref 37–47)
HEMOGLOBIN: 9 G/DL (ref 12–16)
LYMPHOCYTES ABSOLUTE: 0.9 K/UL (ref 1–4.8)
LYMPHOCYTES RELATIVE PERCENT: 15.3 %
MCH RBC QN AUTO: 30.3 PG (ref 27–31.3)
MCHC RBC AUTO-ENTMCNC: 32.4 % (ref 33–37)
MCV RBC AUTO: 93.5 FL (ref 79.4–94.8)
MONOCYTES ABSOLUTE: 0.6 K/UL (ref 0.2–0.8)
MONOCYTES RELATIVE PERCENT: 9.6 %
NEUTROPHILS ABSOLUTE: 4.1 K/UL (ref 1.4–6.5)
NEUTROPHILS RELATIVE PERCENT: 67.3 %
PDW BLD-RTO: 14.3 % (ref 11.5–14.5)
PLATELET # BLD: 215 K/UL (ref 130–400)
POTASSIUM SERPL-SCNC: 4.9 MEQ/L (ref 3.4–4.9)
RBC # BLD: 2.96 M/UL (ref 4.2–5.4)
SODIUM BLD-SCNC: 140 MEQ/L (ref 135–144)
WBC # BLD: 6.1 K/UL (ref 4.8–10.8)

## 2023-03-14 PROCEDURE — B2111ZZ FLUOROSCOPY OF MULTIPLE CORONARY ARTERIES USING LOW OSMOLAR CONTRAST: ICD-10-PCS | Performed by: INTERNAL MEDICINE

## 2023-03-14 PROCEDURE — 6360000002 HC RX W HCPCS: Performed by: INTERNAL MEDICINE

## 2023-03-14 PROCEDURE — 97116 GAIT TRAINING THERAPY: CPT

## 2023-03-14 PROCEDURE — 2700000000 HC OXYGEN THERAPY PER DAY

## 2023-03-14 PROCEDURE — 93458 L HRT ARTERY/VENTRICLE ANGIO: CPT | Performed by: INTERNAL MEDICINE

## 2023-03-14 PROCEDURE — C1769 GUIDE WIRE: HCPCS

## 2023-03-14 PROCEDURE — 6360000002 HC RX W HCPCS

## 2023-03-14 PROCEDURE — 2500000003 HC RX 250 WO HCPCS

## 2023-03-14 PROCEDURE — 36415 COLL VENOUS BLD VENIPUNCTURE: CPT

## 2023-03-14 PROCEDURE — C1894 INTRO/SHEATH, NON-LASER: HCPCS

## 2023-03-14 PROCEDURE — 6370000000 HC RX 637 (ALT 250 FOR IP): Performed by: PHYSICIAN ASSISTANT

## 2023-03-14 PROCEDURE — 2580000003 HC RX 258: Performed by: NURSE PRACTITIONER

## 2023-03-14 PROCEDURE — 6370000000 HC RX 637 (ALT 250 FOR IP): Performed by: INTERNAL MEDICINE

## 2023-03-14 PROCEDURE — 1210000000 HC MED SURG R&B

## 2023-03-14 PROCEDURE — 6360000002 HC RX W HCPCS: Performed by: NURSE PRACTITIONER

## 2023-03-14 PROCEDURE — 99152 MOD SED SAME PHYS/QHP 5/>YRS: CPT | Performed by: INTERNAL MEDICINE

## 2023-03-14 PROCEDURE — APPSS30 APP SPLIT SHARED TIME 16-30 MINUTES: Performed by: PHYSICIAN ASSISTANT

## 2023-03-14 PROCEDURE — 6370000000 HC RX 637 (ALT 250 FOR IP): Performed by: NURSE PRACTITIONER

## 2023-03-14 PROCEDURE — 2580000003 HC RX 258: Performed by: PHYSICIAN ASSISTANT

## 2023-03-14 PROCEDURE — 97535 SELF CARE MNGMENT TRAINING: CPT

## 2023-03-14 PROCEDURE — 4A023N7 MEASUREMENT OF CARDIAC SAMPLING AND PRESSURE, LEFT HEART, PERCUTANEOUS APPROACH: ICD-10-PCS | Performed by: INTERNAL MEDICINE

## 2023-03-14 PROCEDURE — 2580000003 HC RX 258: Performed by: INTERNAL MEDICINE

## 2023-03-14 PROCEDURE — 6360000004 HC RX CONTRAST MEDICATION: Performed by: INTERNAL MEDICINE

## 2023-03-14 PROCEDURE — 85025 COMPLETE CBC W/AUTO DIFF WBC: CPT

## 2023-03-14 PROCEDURE — 2580000003 HC RX 258

## 2023-03-14 PROCEDURE — 93458 L HRT ARTERY/VENTRICLE ANGIO: CPT

## 2023-03-14 PROCEDURE — 80048 BASIC METABOLIC PNL TOTAL CA: CPT

## 2023-03-14 PROCEDURE — 2709999900 HC NON-CHARGEABLE SUPPLY

## 2023-03-14 RX ORDER — SODIUM CHLORIDE 9 MG/ML
INJECTION, SOLUTION INTRAVENOUS PRN
Status: DISCONTINUED | OUTPATIENT
Start: 2023-03-14 | End: 2023-03-15 | Stop reason: HOSPADM

## 2023-03-14 RX ORDER — SODIUM CHLORIDE 9 MG/ML
INJECTION, SOLUTION INTRAVENOUS CONTINUOUS
Status: ACTIVE | OUTPATIENT
Start: 2023-03-14 | End: 2023-03-14

## 2023-03-14 RX ORDER — ACETAMINOPHEN 325 MG/1
650 TABLET ORAL EVERY 4 HOURS PRN
Status: DISCONTINUED | OUTPATIENT
Start: 2023-03-14 | End: 2023-03-15 | Stop reason: HOSPADM

## 2023-03-14 RX ORDER — SODIUM CHLORIDE 0.9 % (FLUSH) 0.9 %
5-40 SYRINGE (ML) INJECTION EVERY 12 HOURS SCHEDULED
Status: DISCONTINUED | OUTPATIENT
Start: 2023-03-14 | End: 2023-03-15 | Stop reason: HOSPADM

## 2023-03-14 RX ORDER — FENTANYL CITRATE 0.05 MG/ML
25 INJECTION, SOLUTION INTRAMUSCULAR; INTRAVENOUS
Status: DISCONTINUED | OUTPATIENT
Start: 2023-03-14 | End: 2023-03-15 | Stop reason: HOSPADM

## 2023-03-14 RX ORDER — SODIUM CHLORIDE 0.9 % (FLUSH) 0.9 %
5-40 SYRINGE (ML) INJECTION PRN
Status: DISCONTINUED | OUTPATIENT
Start: 2023-03-14 | End: 2023-03-15 | Stop reason: HOSPADM

## 2023-03-14 RX ORDER — MIDAZOLAM HYDROCHLORIDE 1 MG/ML
2 INJECTION INTRAMUSCULAR; INTRAVENOUS
Status: DISCONTINUED | OUTPATIENT
Start: 2023-03-14 | End: 2023-03-15 | Stop reason: HOSPADM

## 2023-03-14 RX ORDER — MORPHINE SULFATE 2 MG/ML
2 INJECTION, SOLUTION INTRAMUSCULAR; INTRAVENOUS
Status: DISCONTINUED | OUTPATIENT
Start: 2023-03-14 | End: 2023-03-15 | Stop reason: HOSPADM

## 2023-03-14 RX ORDER — ONDANSETRON 2 MG/ML
4 INJECTION INTRAMUSCULAR; INTRAVENOUS EVERY 6 HOURS PRN
Status: DISCONTINUED | OUTPATIENT
Start: 2023-03-14 | End: 2023-03-15 | Stop reason: HOSPADM

## 2023-03-14 RX ORDER — LABETALOL HYDROCHLORIDE 5 MG/ML
10 INJECTION, SOLUTION INTRAVENOUS EVERY 30 MIN PRN
Status: DISCONTINUED | OUTPATIENT
Start: 2023-03-14 | End: 2023-03-15 | Stop reason: HOSPADM

## 2023-03-14 RX ORDER — FUROSEMIDE 10 MG/ML
40 INJECTION INTRAMUSCULAR; INTRAVENOUS 2 TIMES DAILY
Status: DISCONTINUED | OUTPATIENT
Start: 2023-03-14 | End: 2023-03-15 | Stop reason: HOSPADM

## 2023-03-14 RX ADMIN — ATORVASTATIN CALCIUM 40 MG: 40 TABLET, FILM COATED ORAL at 20:46

## 2023-03-14 RX ADMIN — METOPROLOL TARTRATE 50 MG: 50 TABLET, FILM COATED ORAL at 08:21

## 2023-03-14 RX ADMIN — Medication 400 MG: at 08:21

## 2023-03-14 RX ADMIN — DILTIAZEM HYDROCHLORIDE 240 MG: 240 CAPSULE, COATED, EXTENDED RELEASE ORAL at 08:20

## 2023-03-14 RX ADMIN — Medication 400 MG: at 20:47

## 2023-03-14 RX ADMIN — Medication 10 ML: at 08:24

## 2023-03-14 RX ADMIN — METOPROLOL TARTRATE 50 MG: 50 TABLET, FILM COATED ORAL at 20:48

## 2023-03-14 RX ADMIN — OXYCODONE AND ACETAMINOPHEN 1 TABLET: 5; 325 TABLET ORAL at 21:04

## 2023-03-14 RX ADMIN — ASPIRIN 81 MG: 81 TABLET, COATED ORAL at 08:21

## 2023-03-14 RX ADMIN — CHLOROTHIAZIDE SODIUM 250 MG: 500 INJECTION, POWDER, LYOPHILIZED, FOR SOLUTION INTRAVENOUS at 20:46

## 2023-03-14 RX ADMIN — FUROSEMIDE 40 MG: 10 INJECTION, SOLUTION INTRAMUSCULAR; INTRAVENOUS at 18:19

## 2023-03-14 RX ADMIN — OXYBUTYNIN CHLORIDE 10 MG: 5 TABLET, EXTENDED RELEASE ORAL at 08:21

## 2023-03-14 RX ADMIN — GUAIFENESIN 600 MG: 600 TABLET, EXTENDED RELEASE ORAL at 08:21

## 2023-03-14 RX ADMIN — IOPAMIDOL 70 ML: 612 INJECTION, SOLUTION INTRAVENOUS at 16:09

## 2023-03-14 RX ADMIN — GUAIFENESIN 600 MG: 600 TABLET, EXTENDED RELEASE ORAL at 20:46

## 2023-03-14 RX ADMIN — ASPIRIN 81 MG: 81 TABLET, COATED ORAL at 20:46

## 2023-03-14 RX ADMIN — CEFTRIAXONE SODIUM 1000 MG: 1 INJECTION, POWDER, FOR SOLUTION INTRAMUSCULAR; INTRAVENOUS at 00:00

## 2023-03-14 RX ADMIN — ENOXAPARIN SODIUM 30 MG: 100 INJECTION SUBCUTANEOUS at 08:23

## 2023-03-14 RX ADMIN — Medication 10 ML: at 20:52

## 2023-03-14 RX ADMIN — FUROSEMIDE 40 MG: 10 INJECTION, SOLUTION INTRAMUSCULAR; INTRAVENOUS at 08:23

## 2023-03-14 ASSESSMENT — ENCOUNTER SYMPTOMS
COUGH: 1
COLOR CHANGE: 0
SHORTNESS OF BREATH: 0
VOMITING: 0
CHEST TIGHTNESS: 0
ABDOMINAL PAIN: 0
NAUSEA: 0

## 2023-03-14 ASSESSMENT — PAIN SCALES - GENERAL: PAINLEVEL_OUTOF10: 7

## 2023-03-14 NOTE — PROGRESS NOTES
Outer band of quikclot removed and no bleeding or hematoma.   Report called to [employer] on one [address]  Monitor attached and transport notified

## 2023-03-14 NOTE — PROGRESS NOTES
Physical Therapy Med Surg Daily Treatment Note  Facility/Department: [address]  [place] [facility] [place]       NAME: Howard Medina  : 1945 (69 y.o.)  MRN: [id number]  CODE STATUS: Full Code    Date of Service: 3/14/2023    Patient Diagnosis(es): Hypoxia [R09.02]  Pneumonia due to infectious organism, unspecified laterality, unspecified part of lung [J18.9]  Acute pneumonia [J18.9]   Chief Complaint   Patient presents with    Chest Pain     For a couple hours     Patient Active Problem List    Diagnosis Date Noted    Elevated troponin 03/10/2023    Morbid obesity due to excess calories ([facility] 2017    Acute pneumonia 03/10/2023    Status post total knee replacement, left 2023    Status post total knee replacement, right 2022    RBBB (right bundle branch block) 2022    Gastrointestinal hemorrhage     Anemia 2021    Adenomatous polyp of descending colon     Adenomatous polyp of ascending colon     Adenomatous polyp of colon     Chronic gastritis without bleeding     CKD (chronic kidney disease) stage 3, GFR 30-59 ml/min ([facility]) 2019    Asthma     Spinal stenosis of lumbar region with neurogenic claudication 2019    Degenerative spondylolisthesis 2019    Chronic bilateral low back pain with bilateral sciatica 10/30/2018    Primary osteoarthritis of both knees 10/30/2018    Coronary artery disease involving native coronary artery of native heart without angina pectoris 07/10/2018    History of ST elevation myocardial infarction (STEMI) 2017    Hypothyroid 12/10/2013    Essential hypertension 12/10/2013    Hyperlipidemia 12/10/2013        Past Medical History:   Diagnosis Date    Antral ulcer 2015    DR LANE    Asthma     \"cured by beestings\"    Coronary artery disease     Coronary artery disease involving native coronary artery of native heart without angina pectoris 7/10/2018    has x 4 cardiac stents / Dr. Geeta Stanford    Diverticulosis of colon (without mention of hemorrhage) 01/14/2015    DR Luisa Alicia    Essential hypertension 12/10/2013    meds > 20 yrs    History of blood transfusion 1990s    with hysterectomy    History of normal resting EKG 1996    normal    History of ST elevation myocardial infarction (STEMI) 7/11/2017    History of transfusion of whole blood 1996    Excessive bleeding before hysterectomy    Hyperlipidemia     meds > 2 yrs    Hypertension     Hypothyroidism     past trx years ago then stopped / recent restart    Kidney disease     Low back pain     Morbid obesity due to excess calories ([address] 5/13/2017    Morbid obesity due to excess calories ([address] 6/6/2017    Osteoarthritis     Pneumonia     RBBB (right bundle branch block) 3/17/2022    Shoulder dislocation     ST elevation myocardial infarction involving left circumflex coronary artery ([address] 5/13/2017    Unspecified gastritis and gastroduodenitis without mention of hemorrhage 05/06/2015    DR Luisa Alicia     Past Surgical History:   Procedure Laterality Date    CARDIAC SURGERY  2017    has x 4 cardiac stents    COLONOSCOPY  01/14/2015    DR Luisa Alicia - DIVERTICULOSIS    COLONOSCOPY N/A 1/2/2021    COLONOSCOPY DIAGNOSTIC performed by Lotus Astorga MD at [facility]  05/17/2017    x2 stents    CYST REMOVAL Left 2/7/2019    RESECTION OF LEFT PINNA LESION WITH GRAFT performed by Mitzy Martinez MD at [address] [facility], COLON, DIAGNOSTIC      FINGER SURGERY  12/9/14    middle finger right hand due to infection    HYSTERECTOMY ([address])  1996    LASIK  [id number]    SHOULDER SURGERY  2008    shoulder dislocated - popped  back in Right shoulder    TOTAL KNEE ARTHROPLASTY Right 8/30/2022    RIGHT KNEE TOTAL KNEE ARTHROPLASTY performed by Merary Burdick MD at [address] Left 3/7/2023    LEFT KNEE TOTAL KNEE ARTHROPLASTY performed by Merary Burdick MD at [address]  01/14/2015     ARIANA - ULCER IN THE ANTRUM    UPPER GASTROINTESTINAL ENDOSCOPY  05/06/2015    DR LANE - GASTRITIS, PREVIOUS ULCER HEALED    UPPER GASTROINTESTINAL ENDOSCOPY N/A 1/2/2021    EGD DIAGNOSTIC ONLY performed by Mohan Lane MD at [facility] OR       Chart Reviewed: Yes  Additional Pertinent Hx: s/p L TKA 3/7/23  Family / Caregiver Present: No  Diagnosis: Acute pneumonia    Restrictions:  Restrictions/Precautions: Fall Risk;Up as Tolerated;Contact Precautions (MRSA)    SUBJECTIVE:   Subjective: When do I get to go home?    Pain  Pain: 5/10 L knee pain. post session. Pt reports no pain with ambulation     OBJECTIVE:   Orientation  Overall Orientation Status: Within Functional Limits    Bed mobility  Rolling to Right: Stand by assistance  Supine to Sit: Stand by assistance  Bed Mobility Comments: Bed flat with use of hand rails required for mobility. increased time and effort to complete; Pt able to get own LLE out of bed. SOB sitting EOB, SpO2 - 91% on room air. NC donned and recovered to 96%    Transfers  Sit to Stand: Contact guard assistance;Stand by assistance  Stand to Sit: Contact guard assistance;Stand by assistance  Comment: Slow to complete. Pt requires cuing for upright posture. Pt gets temporarily stuck in hip flexion during stand but correct with time.    Ambulation  Surface: Level tile  Device: Rolling Walker  Assistance: Contact guard assistance;Stand by assistance  Quality of Gait: FF posture, slow lilly, WBOS and shortened step length; vc's for posture and to improve reciprocal pattern.  Gait Deviations: Slow Lilly;Increased RUBEN;Decreased step length;Decreased step height  Distance: 10'x2                   PT Exercises  Exercise Treatment: AP/LAQ/Marching/GS - x20          Activity Tolerance  Activity Tolerance: Patient tolerated treatment well  Activity Tolerance Comments: SpO2 dropped to 91% on RA; NC donned and recovered to 96%          ASSESSMENT   Assessment: Pt able to increased ambulatory  distance without complaint. FF posture d/t prior back issues. Pt doing exercises throughout the day. Discharge Recommendations:  Continue to assess pending progress, Patient would benefit from continued therapy after discharge         Goals  Long Term Goals  Long Term Goal 1: Pt to complete bed mobility with indep  Long Term Goal 2: Pt to complete transfers with indep  Long Term Goal 3: Pt ambulate 50-150ft with LRD and indep  Long Term Goal 4: Pt to manage ramp indep with LRD to enter home  Long Term Goal 5: Pt to complete HEP with indep    PLAN    General Plan: 2 times a day 7 days a week  Safety Devices  Type of Devices: All fall risk precautions in place, Call light within reach, Chair alarm in place, Left in chair, Nurse notified     [facility] (6 CLICK) [address] Mobility Raw Score : 17      Therapy Time   Individual   Time In 0824   Time Out 0850   Minutes 26      Bm/trsf - 10 mins  Gait - 10 mins  Therex 6 mins       Marino Wolfe PTA, 03/14/23 at 8:59 AM         Definitions for assistance levels  Independent = pt does not require any physical supervision or assistance from another person for activity completion. Device may be needed.   Stand by assistance = pt requires verbal cues or instructions from another person, close to but not touching, to perform the activity  Minimal assistance= pt performs 75% or more of the activity; assistance is required to complete the activity  Moderate assistance= pt performs 50% of the activity; assistance is required to complete the activity  Maximal assistance = pt performs 25% of the activity; assistance is required to complete the activity  Dependent = pt requires total physical assistance to accomplish the task

## 2023-03-14 NOTE — PROGRESS NOTES
Progress Note  Patient: Olinda Sinclair  Unit/Bed: J735/I136-51  YOB: 1945  MRN: [id number]  Acct: [de-identified]   Admitting Diagnosis: Hypoxia [R09.02]  Pneumonia due to infectious organism, unspecified laterality, unspecified part of lung [J18.9]  Acute pneumonia [J18.9]  Date:  3/9/2023  Hospital Day: 4    Chief Complaint:  Chest pain    Subjective    3/14/23: Sitting in chair and in no acute distress. Complaining of persistent mildly productive cough. Denies chest pain, shortness of breath or orthopnea. Nephrology following and patient on IV Lasix which was increased to twice daily today. She is hemodynamically stable. On telemetry, maintaining sinus rhythm with heart rate 80s to 90s. A.m. labs reviewed. Renal function continues to improve with creatinine of 1.21 today. Hemoglobin stable at 9.0. Will plan for cardiac catheterization this afternoon with Dr. Romero Cueto. 3/13/23: No chest pain overnight. She complains of a cough. Hemoglobin has been stable creatinine improving. Troponin up to 1.08    3/12/23: Has uptrending troponin to 0.7. Patient denies angina type symptoms. She is short of breath with rehab performed today at bedside. Hemodynamically stable. Status post packed red cell transfusion x2, hemoglobin is 7.6. Short NSVT of 6 beats ovenight    3/10/23: This is a pleasant 60-year-old  female with past medical history significant for recent left total knee replacement on 3/8/2023, recently diagnosed UTI, CKD, hypertension, dyslipidemia, history of coronary artery disease status post PCI of the RCA and circumflex in 2017 and obesity who presented to [facility] ER yesterday with chief complaint of chest pain. Patient states she developed chest pain while walking to the bathroom yesterday afternoon.   She described the pain as a midsternal squeezing tightness which was precipitated with exertion and alleviated after a period of rest.  She reports that this pain continued to recur intermittently yesterday. She denied any radiation of the pain or any associated symptoms. She denied nausea, vomiting, shortness of breath, diaphoresis, dizziness, lightheadedness, syncope or fever. Due to this intermittent chest pain she presented to ER for further evaluation. On presentation to the emergency room, blood pressure 149/67, heart rate tachycardic at 102, respiratory rate 20, pulse ox 96%, temperature 98.4 °F.  Sodium 137, potassium 4.7, chloride 105, total CO2 18, BUN elevated at 38, creatinine elevated 2.0, GFR low at 25.3, glucose 113. Initial troponin negative less than 0.010. WBC 8.3, hemoglobin 9.2, hematocrit 28.7, platelets 338. EKG showed sinus tachycardia with ventricular rate of 104 bpm, right bundle branch block, ST depression with T wave inversion in V1 and V2 and ST depression in V3 through V6, QTc 526 ms. CTA of the chest revealed no evidence of pulmonary embolism, small bilateral pleural effusions with patchy groundglass opacity in the lung fields to suggest either superimposed edema or multilobar pneumonia. While in the ER, patient with 2 episodes of hypoxia with SPO2 declining to around 86% on room air she was placed on supplemental O2. She was subsequently admitted with diagnosis of pneumonia and hypoxia. Cardiology has been consulted regarding elevated troponin and chest pain. At time my evaluation today, patient is resting comfortably and in no acute distress. She is on 2 L O2 per nasal cannula with SPO2 of 94%. She is receiving Rocephin for presumed pneumonia. Initial troponin was negative in ER however repeat troponins have been increasingly elevated at 0.054 followed by 0.244 this morning. She is currently chest pain-free. She reports mild cough which she has noticed over the past few days but attributes it to her sinuses/allergies. She is currently receiving IV fluids at 75 mL/h.  A.m. labs reviewed.   She is hyperkalemic this morning with potassium of 5.6. Mild improvement in renal function with creatinine 1.84, BUN of 35 and GFR of 28.0. Baseline creatinine appears to be between 1.1-1.4 on review of prior labs. Patient denies any recent coronary evaluation. Echocardiogram on 11/15/2022 revealed normal LV systolic function with EF of 55 to 60%, trivial AR, mild MR, mild TR and mild AS. Review of Systems:   Review of Systems   Constitutional:  Negative for fatigue and fever. HENT:  Negative for congestion. Respiratory:  Positive for cough. Negative for chest tightness and shortness of breath. Cardiovascular:  Positive for leg swelling (left LE). Negative for chest pain and palpitations. Gastrointestinal:  Negative for abdominal pain, nausea and vomiting. Genitourinary:  Negative for difficulty urinating. Musculoskeletal:  Negative for arthralgias. Skin:  Negative for color change. Neurological:  Negative for dizziness, syncope and light-headedness. Psychiatric/Behavioral:  Negative for agitation. Physical Examination:    BP (!) 159/58   Pulse 68   Temp 98.4 °F (36.9 °C) (Oral)   Resp 18   Ht 5' 1\" (1.549 m)   Wt 237 lb 11.2 oz (107.8 kg)   LMP 09/17/1996   SpO2 96%   BMI 44.91 kg/m²    Physical Exam  Constitutional:       General: She is not in acute distress. Appearance: She is obese. HENT:      Head: Normocephalic and atraumatic. Cardiovascular:      Rate and Rhythm: Normal rate and regular rhythm. Heart sounds: Murmur heard. Pulmonary:      Effort: Pulmonary effort is normal. No respiratory distress. Breath sounds: No wheezing, rhonchi or rales. Comments: Decreased breath sounds bilateral bases    Abdominal:      Palpations: Abdomen is soft. Tenderness: There is no abdominal tenderness. Musculoskeletal:      Cervical back: Normal range of motion and neck supple. Right lower leg: No edema. Left lower leg: Edema (trace) present.    Skin:     General: Skin is warm and dry.   Neurological:      General: No focal deficit present.      Mental Status: She is alert and oriented to person, place, and time.      Cranial Nerves: No cranial nerve deficit.   Psychiatric:         Mood and Affect: Mood normal.         Behavior: Behavior normal.       LABS:  CBC:   Lab Results   Component Value Date/Time    WBC 6.1 03/14/2023 05:01 AM    RBC 2.96 03/14/2023 05:01 AM    HGB 9.0 03/14/2023 05:01 AM    HCT 27.7 03/14/2023 05:01 AM    MCV 93.5 03/14/2023 05:01 AM    MCH 30.3 03/14/2023 05:01 AM    MCHC 32.4 03/14/2023 05:01 AM    RDW 14.3 03/14/2023 05:01 AM     03/14/2023 05:01 AM    MPV 8.6 06/29/2015 07:21 PM     CBC with Differential:   Lab Results   Component Value Date/Time    WBC 6.1 03/14/2023 05:01 AM    RBC 2.96 03/14/2023 05:01 AM    HGB 9.0 03/14/2023 05:01 AM    HCT 27.7 03/14/2023 05:01 AM     03/14/2023 05:01 AM    MCV 93.5 03/14/2023 05:01 AM    MCH 30.3 03/14/2023 05:01 AM    MCHC 32.4 03/14/2023 05:01 AM    RDW 14.3 03/14/2023 05:01 AM    LYMPHOPCT 15.3 03/14/2023 05:01 AM    MONOPCT 9.6 03/14/2023 05:01 AM    BASOPCT 0.7 03/14/2023 05:01 AM    MONOSABS 0.6 03/14/2023 05:01 AM    LYMPHSABS 0.9 03/14/2023 05:01 AM    EOSABS 0.4 03/14/2023 05:01 AM    BASOSABS 0.0 03/14/2023 05:01 AM     CMP:    Lab Results   Component Value Date/Time     03/14/2023 05:01 AM    K 4.9 03/14/2023 05:01 AM    K 5.3 03/08/2023 06:05 AM     03/14/2023 05:01 AM    CO2 22 03/14/2023 05:01 AM    BUN 35 03/14/2023 05:01 AM    CREATININE 1.21 03/14/2023 05:01 AM    GFRAA 34.9 08/31/2022 05:48 AM    LABGLOM 46.3 03/14/2023 05:01 AM    GLUCOSE 90 03/14/2023 05:01 AM    PROT 5.4 03/12/2023 04:39 AM    LABALBU 2.8 03/12/2023 04:39 AM    CALCIUM 9.3 03/14/2023 05:01 AM    BILITOT 0.4 03/12/2023 04:39 AM    ALKPHOS 89 03/12/2023 04:39 AM    AST 23 03/12/2023 04:39 AM    ALT 8 03/12/2023 04:39 AM     BMP:    Lab Results   Component Value Date/Time     03/14/2023  05:01 AM    K 4.9 03/14/2023 05:01 AM    K 5.3 03/08/2023 06:05 AM     03/14/2023 05:01 AM    CO2 22 03/14/2023 05:01 AM    BUN 35 03/14/2023 05:01 AM    LABALBU 2.8 03/12/2023 04:39 AM    CREATININE 1.21 03/14/2023 05:01 AM    CALCIUM 9.3 03/14/2023 05:01 AM    GFRAA 34.9 08/31/2022 05:48 AM    LABGLOM 46.3 03/14/2023 05:01 AM    GLUCOSE 90 03/14/2023 05:01 AM     Magnesium:    Lab Results   Component Value Date/Time    MG 2.0 03/12/2023 04:39 AM     Troponin:    Lab Results   Component Value Date/Time    TROPONINI 1.080 03/13/2023 05:28 AM       Radiology:  No results found. Echocardiogram 3/11/23:  Conclusions   Summary   Normal left ventricular systolic function, no regional wall motion   abnormalities, estimated ejection fraction of 55%. Normal left ventricular size and function. Mild concentric left ventricular hypertrophy. Impaired relaxation compatible with diastolic dysfunction. ( reversed E/A   ratio)   Normal right ventricular chamber size and function. The left atrium is Mildly dilated. Mild (1+) mitral regurgitation is present. mild to mod MS by gradient. however MV appears to be opening adequately. No evidence of aortic valve regurgitation . No evidence of aortic valve stenosis. There is mild tricuspid regurgitation with estimated RVSP of 64 mm Hg. Right ventricular systolic pressure of 64 mm Hg consistent with moderate   pulmonary hypertension.       Signature   ----------------------------------------------------------------   Electronically signed by Caro Caldera DO(Interpreting   physician) on 03/11/2023 12:37 PM    EKG 3/9/23: , RBBB, ST depression with T wave inversion in V1-V2, ST depression in V3-V6, QTc 526ms     Telemetry 3/10/23: SR 80s-90s  Telemetry 3/14/23: SR 80s-90s      Assessment:    Active Hospital Problems    Diagnosis Date Noted    Elevated troponin [R77.8] 03/10/2023     Priority: High    Acute pneumonia [J18.9] 03/10/2023     Priority: Medium Chest pain--now resolved. r/o cardiac ischemia  NSTEMI--? Type I vs type II. Troponin up-trending to 1.080  JP on CKD--improved  Acute on chronic anemia--stable post transfusion.  Hgb 9.0  Abnormal EKG/chronic RBBB/prolonged QTc  S/p left total knee replacement on 3/8/23  Hx CAD s/p PCI of Circ/RCA in 2017  Valvular heart disease (Mild MR/TR, Moderate mitral stenosis) per echo 3/11/23  Normal LVF EF 55-60% per echo 3/11/23  PHTN with RVSP 64mmHg per echo 3/11/23  HTN  Dyslipidemia      Plan:  Continue current medications-aspirin 81 mg p.o. twice daily, Cardizem  mg p.o. daily, Lopressor 50 mg p.o. twice daily, Lipitor 40mg PO daily, lasix 40mg IV BID per nephrology, Lovenox 30 mg subcu daily  Consider addition of ACE inhibitor/ARB in future if renal function remains stable and okay with nephrology  Cardiac diet recommended  Monitor on telemetry for any tachycardia or bradycardia arrhythmias  Maintain potassium greater than 4, magnesium greater than 2  Monitor H&H closely given acute on chronic postoperative anemia  GI/DVT prophylaxis  Cardiac catheterization prior to discharge to evaluate for underlying cardiac ischemia--will schedule for this afternoon with Dr. Wendy Brasher as Hgb and renal function have stabilized  Hospitalist recommendations  Further recommendations to follow          Electronically signed by STACIA Chiu on 3/14/2023 at 9:13 AM

## 2023-03-14 NOTE — PROGRESS NOTES
Internal Medicine   Hospitalist   Progress Note    3/14/2023   11:52 AM    Name:  Ange Benitez  MRN:    [id number]     [address] Day: 4     Admit Date: 3/9/2023  8:02 PM  PCP: Yuridia Carvajal MD    Code Status:  Full Code    Assessment and Plan: Active Problems/ diagnosis:     Acute respiratory insufficiency  Pneumonia  Elevated troponin-rule out NSTEMI-history of CAD status post previous PCI  JP on CKD 3  Recent knee replacement  Left lower extremity edema-DVT studies negative    Plan  LHC today  Hemoglobin is stable, continue to monitor. Discussed with cardiologist.     Respiratory status seems stable. We will continue antibiotics. Continue monitor.  echocardiogram noted. Appreciate nephrologist input regarding fluid status-renal function is improving. Home medication reviewed  DVT PPx     7 pm- 7 am, please contact on call Hospitalist for any needs     Dispo-C     Subjective:     No chest pain today.       Physical Examination:      Vitals:  BP (!) 159/58   Pulse 68   Temp 98.4 °F (36.9 °C) (Oral)   Resp 18   Ht 5' 1\" (1.549 m)   Wt 237 lb 11.2 oz (107.8 kg)   LMP 1996   SpO2 96%   BMI 44.91 kg/m²   Temp (24hrs), Av.6 °F (37 °C), Min:98.2 °F (36.8 °C), Max:99.3 °F (37.4 °C)      General appearance: alert, cooperative and no distress  Mental Status: oriented to person, place and time and normal affect  Lungs: clear to auscultation bilaterally, normal effort  Heart: regular rate and rhythm, no murmur  Abdomen: soft, nontender, nondistended, bowel sounds present, no masses  Extremities: no redness, tenderness in the calves  Skin: no gross lesions, rashes    Data:     Labs:  Recent Labs     23  0528 23  0501   WBC 6.4 6.1   HGB 9.0* 9.0*    215     Recent Labs     23  0528 23  0501    140   K 4.9 4.9   * 107   CO2 21 22   BUN 38* 35*   CREATININE 1.33* 1.21*   GLUCOSE 109* 90     Recent Labs     23  0439   AST 23   ALT 8   BILITOT 0.4 ALKPHOS 89       Current Facility-Administered Medications   Medication Dose Route Frequency Provider Last Rate Last Admin    furosemide (LASIX) injection 40 mg  40 mg IntraVENous BID Silvio Parra, DO        chlorothiazide (DIURIL) 250 mg in sodium chloride 0.9 % 50 mL IVPB  250 mg IntraVENous Q12H Salina Landon, DO        sodium chloride flush 0.9 % injection 5-40 mL  5-40 mL IntraVENous 2 times per day Roscoe Bowl, PA        sodium chloride flush 0.9 % injection 5-40 mL  5-40 mL IntraVENous PRN Roscoe Bowl, PA        0.9 % sodium chloride infusion   IntraVENous PRN Roscoe Bowl, PA        0.9 % sodium chloride infusion   IntraVENous PRN Clinton Olea, DO        enoxaparin Sodium (LOVENOX) injection 30 mg  30 mg SubCUTAneous Daily Jordan J Holiday, DO   30 mg at 03/14/23 0823    magnesium oxide (MAG-OX) tablet 400 mg  400 mg Oral BID Jordan J Holiday, DO   400 mg at 03/14/23 5479    metoprolol tartrate (LOPRESSOR) tablet 50 mg  50 mg Oral BID [facility] Holiday, DO   50 mg at 03/14/23 0821    0.9 % sodium chloride infusion   IntraVENous PRN Pitdai Cardozo, DO        ipratropium-albuterol (DUONEB) nebulizer solution 1 ampule  1 ampule Inhalation Q4H PRN Fermin Cardozo, DO   1 ampule at 03/13/23 0811    guaiFENesin (MUCINEX) extended release tablet 600 mg  600 mg Oral BID Nicoletto Bolzan-Roche, APRN - CNP   600 mg at 03/14/23 0821    sodium chloride flush 0.9 % injection 5-40 mL  5-40 mL IntraVENous 2 times per day Nicoletto Bolzan-Roche, APRN - CNP   10 mL at 03/14/23 0824    sodium chloride flush 0.9 % injection 5-40 mL  5-40 mL IntraVENous PRN Nicoletto Bolzan-Roche, APRN - CNP        0.9 % sodium chloride infusion   IntraVENous PRN Nicoletto Bolzan-Roche, APRN - CNP        polyethylene glycol (GLYCOLAX) packet 17 g  17 g Oral Daily PRN Nicoletto Bolzan-Roche, APRN - CNP        acetaminophen (TYLENOL) tablet 650 mg  650 mg Oral Q6H PRN Nicoletto Bolzan-Roche, BEN - JACK[place]        Or acetaminophen (TYLENOL) suppository 650 mg  650 mg Rectal Q6H PRN Niconathaniel Mengn-Roche, APRN - CNP        cefTRIAXone (ROCEPHIN) 1,000 mg in sodium chloride 0.9 % 50 mL IVPB (mini-bag)  1,000 mg IntraVENous Q24H Lyle Mengn-Roche, APRN - CNP   Stopped at 03/14/23 0135    aspirin EC tablet 81 mg  81 mg Oral BID Fermin Cardozo, DO   81 mg at 03/14/23 0821    dilTIAZem (CARDIZEM CD) extended release capsule 240 mg  240 mg Oral Daily Camdenney Cas, DO   240 mg at 03/14/23 0820    oxybutynin (DITROPAN-XL) extended release tablet 10 mg  10 mg Oral Daily Pitney Cas, DO   10 mg at 03/14/23 7750    atorvastatin (LIPITOR) tablet 40 mg  40 mg Oral Nightly Pat Ket, [place]   40 mg at 03/13/23 2033    oxyCODONE-acetaminophen (PERCOCET) 5-325 MG per tablet 1 tablet  1 tablet Oral Q4H PRN Fermin Cardozo DO   1 tablet at 03/13/23 2104    nitroGLYCERIN (NITROSTAT) SL tablet 0.4 mg  0.4 mg SubLINGual Q5 Min PRN Jama Morse MD   0.4 mg at 03/09/23 2212       Additional work up or/and treatment plan may be added today or then after based on clinical progression. I am managing a portion of pt care. Some medical issues are handled by other specialists. Additional work up and treatment should be done in out pt setting by pt PCP and other out pt providers. In addition to examining and evaluating pt, I spent additional time explaining care, normaland abnormal findings, and treatment plan. All of pt questions were answered. Counseling, diet and education were provided. Case will be discussed with nursing staff when appropriate. Family will be updated if and when appropriate.        Electronically signed by Fermin Cardozo DO on 3/14/2023 at 11:52 AM

## 2023-03-14 NOTE — FLOWSHEET NOTE
Patient returned from cath lab per cart. Rt wrist dressing dry and intact. No bleeding, swelling, or hematoma noted. Patient denies c/o at this time.  at bedside. Patient eating dinner and tolerating food and fluids well.

## 2023-03-14 NOTE — CARE COORDINATION
PT ON IV LASIX, FOR CATH TODAY. DC PLAN REMAINS HOME WITH [facility] [address] [facility], [address]

## 2023-03-14 NOTE — PROGRESS NOTES
Physical Therapy Med Surg Daily Treatment Note  Facility/Department: [address]  Room: [place] [id number]       NAME: Howard Medina  : 1945 (69 y.o.)  MRN: [id number]  CODE STATUS: Full Code    Date of Service: 3/14/2023    Patient Diagnosis(es): Hypoxia [R09.02]  Pneumonia due to infectious organism, unspecified laterality, unspecified part of lung [J18.9]  Acute pneumonia [J18.9]   Chief Complaint   Patient presents with    Chest Pain     For a couple hours     Patient Active Problem List    Diagnosis Date Noted    Elevated troponin 03/10/2023    Morbid obesity due to excess calories ([facility] [address] 2017    Acute pneumonia 03/10/2023    Status post total knee replacement, left 2023    Status post total knee replacement, right 2022    RBBB (right bundle branch block) 2022    Gastrointestinal hemorrhage     Anemia 2021    Adenomatous polyp of descending colon     Adenomatous polyp of ascending colon     Adenomatous polyp of colon     Chronic gastritis without bleeding     CKD (chronic kidney disease) stage 3, GFR 30-59 ml/min ([facility]) 2019    Asthma     Spinal stenosis of lumbar region with neurogenic claudication 2019    Degenerative spondylolisthesis 2019    Chronic bilateral low back pain with bilateral sciatica 10/30/2018    Primary osteoarthritis of both knees 10/30/2018    Coronary artery disease involving native coronary artery of native heart without angina pectoris 07/10/2018    History of ST elevation myocardial infarction (STEMI) 2017    Hypothyroid 12/10/2013    Essential hypertension 12/10/2013    Hyperlipidemia 12/10/2013        Past Medical History:   Diagnosis Date    Antral ulcer 2015    DR LANE    Asthma     \"cured by beestings\"    Coronary artery disease     Coronary artery disease involving native coronary artery of native heart without angina pectoris 7/10/2018    has x 4 cardiac stents / Dr. Geeta Stanford    Diverticulosis of colon (without mention of hemorrhage) 01/14/2015    DR Sariah Donovan    Essential hypertension 12/10/2013    meds > 20 yrs    History of blood transfusion 1990s    with hysterectomy    History of normal resting EKG 1996    normal    History of ST elevation myocardial infarction (STEMI) 7/11/2017    History of transfusion of whole blood 1996    Excessive bleeding before hysterectomy    Hyperlipidemia     meds > 2 yrs    Hypertension     Hypothyroidism     past trx years ago then stopped / recent restart    Kidney disease     Low back pain     Morbid obesity due to excess calories ([address] 5/13/2017    Morbid obesity due to excess calories ([address] 6/6/2017    Osteoarthritis     Pneumonia     RBBB (right bundle branch block) 3/17/2022    Shoulder dislocation     ST elevation myocardial infarction involving left circumflex coronary artery ([address] 5/13/2017    Unspecified gastritis and gastroduodenitis without mention of hemorrhage 05/06/2015    DR Sariah Donovan     Past Surgical History:   Procedure Laterality Date    CARDIAC SURGERY  2017    has x 4 cardiac stents    COLONOSCOPY  01/14/2015    DR Sariah Donovan - DIVERTICULOSIS    COLONOSCOPY N/A 1/2/2021    COLONOSCOPY DIAGNOSTIC performed by Leslie Ramos MD at [facility]  05/17/2017    x2 stents    CYST REMOVAL Left 2/7/2019    RESECTION OF LEFT PINNA LESION WITH GRAFT performed by Brayan Gilliland MD at [address] [facility], COLON, DIAGNOSTIC      FINGER SURGERY  12/9/14    middle finger right hand due to infection    HYSTERECTOMY ([address] [address])  1996    LASIK  [id number]    SHOULDER SURGERY  2008    shoulder dislocated - popped  back in Right shoulder    TOTAL KNEE ARTHROPLASTY Right 8/30/2022    RIGHT KNEE TOTAL KNEE ARTHROPLASTY [facility] [address] 3/7/2023    LEFT KNEE TOTAL KNEE ARTHROPLASTY performed by Radha Reynoso MD at [address]  01/14/2015     ARIANA - ULCER IN THE ANTRUM    UPPER GASTROINTESTINAL ENDOSCOPY  05/06/2015    DR LANE - GASTRITIS, PREVIOUS ULCER HEALED    UPPER GASTROINTESTINAL ENDOSCOPY N/A 1/2/2021    EGD DIAGNOSTIC ONLY performed by Michelle Hernandez MD at [facility]       Chart Reviewed: Yes  Additional Pertinent Hx: s/p L TKA 3/7/23  Family / Caregiver Present: No  Diagnosis: Acute pneumonia  General Comment  Comments: transport cart in [place] to take pt to cath lab. Restrictions:  Restrictions/Precautions: Fall Risk;Up as Tolerated;Contact Precautions (MRSA)    SUBJECTIVE:   Subjective: I can't wait to get out of here. Pt agreeable to walk to cart in [place] but required some encouragement. Pain  Pain: Denied pain pre and post session     OBJECTIVE:   Orientation  Overall Orientation Status: Within Functional Limits    Bed mobility  Rolling to Left: Minimal assistance  Rolling to Right: Stand by assistance  Supine to Sit: Minimal assistance  Bed Mobility Comments: HOB slightly elevated with use of hand rails; increased time and effort to complete. Pt c/o sticking to lakhwinder pad making it hard to move. Increased assistance required coming out of L side of bed. Transfers  Sit to Stand: Contact guard assistance;Stand by assistance  Stand to Sit: Contact guard assistance;Stand by assistance  Comment: Good hand placement  with WW. vc's for up right posture with improved follow through. Ambulation  Surface: Level tile  Device: Rolling Walker  Assistance: Stand by assistance;Contact guard assistance  Quality of Gait: FF posture, slow lilly, WBOS and shortened step length; vc's for posture and to improve reciprocal pattern. Gait Deviations: Slow Lilly; Increased RUBEN; Decreased step length;Decreased step height  Distance: 15'                   PT Exercises  Exercise Treatment: AP/LAQ/Marching/GS - x20          Activity Tolerance  Activity Tolerance: Patient tolerated treatment well  Activity Tolerance Comments: SpO2 dropped to 91% on RA; NC donned and recovered to 96%          ASSESSMENT   Assessment: Tx limited by pt going to cath lab. Pt ambulated to cart in [place] with [employer]. Pt required increased assistance coming out of L side of bed. Also needed encouragement to participate. Discharge Recommendations:  Continue to assess pending progress, Patient would benefit from continued therapy after discharge         Goals  Long Term Goals  Long Term Goal 1: Pt to complete bed mobility with indep  Long Term Goal 2: Pt to complete transfers with indep  Long Term Goal 3: Pt ambulate 50-150ft with LRD and indep  Long Term Goal 4: Pt to manage ramp indep with LRD to enter home  Long Term Goal 5: Pt to complete HEP with indep    PLAN    General Plan: 2 times a day 7 days a week  Safety Devices  Type of Devices: All fall risk precautions in place (with transport)     [facility] (6 CLICK) BASIC MOBILITY  AM-PAC Inpatient Mobility Raw Score : 17      Therapy Time   Individual   Time In 1404   Time Out 1420   Minutes 16      Bm/Trsf - 10 mins  Gait - 6 mins       Kayla Bradshaw PTA, 03/14/23 at 2:36 PM         Definitions for assistance levels  Independent = pt does not require any physical supervision or assistance from another person for activity completion. Device may be needed.   Stand by assistance = pt requires verbal cues or instructions from another person, close to but not touching, to perform the activity  Minimal assistance= pt performs 75% or more of the activity; assistance is required to complete the activity  Moderate assistance= pt performs 50% of the activity; assistance is required to complete the activity  Maximal assistance = pt performs 25% of the activity; assistance is required to complete the activity  Dependent = pt requires total physical assistance to accomplish the task

## 2023-03-14 NOTE — PROGRESS NOTES
1630: Returned from cath lab to pre/post cath, alert and oriented. R wrist remains stable, no bleeding or hematoma. VSS. Pt siting up, resting comfortably.

## 2023-03-14 NOTE — BRIEF OP NOTE
Section of Cardiology  Adult Brief Cardiac Cath Procedure Note        Procedure(s):  LHC, b/l coronary angio    Pre-operative Diagnosis:  angina    H&P Status: Completed and reviewed. Post-operative Diagnosis:      LV EF of 50  LM ectatic, mild disease  LAD mild disease  CX ectatic in prox with mild diffuse disease. Patent stents in mid. RCA- sub selective angio, due to radial artery spasm/tortuosity- patent stents. No sig stenosis.      Findings:  See full report    Complications:  none    Primary Proceduralist:   Dr.Wes Boland DO    Plan    Med rx  Rfm    Full procedure note to follow

## 2023-03-14 NOTE — PROGRESS NOTES
Nephrology Progress Note    Assessment:  JP improved CKD-3  Hypertension  OBESe  Hypothyroid  CAD        Plan: increase diuresis  increase activity  Cath?today possibly  gentle hydration    Patient Active Problem List:     Hypothyroid     Essential hypertension     Hyperlipidemia     Morbid obesity due to excess calories ([facility])     History of ST elevation myocardial infarction (STEMI)     Coronary artery disease involving native coronary artery of native heart without angina pectoris     Chronic bilateral low back pain with bilateral sciatica     Primary osteoarthritis of both knees     Spinal stenosis of lumbar region with neurogenic claudication     Degenerative spondylolisthesis     Asthma     CKD (chronic kidney disease) stage 3, GFR 30-59 ml/min ([facility])     Adenomatous polyp of descending colon     Adenomatous polyp of ascending colon     Adenomatous polyp of colon     Chronic gastritis without bleeding     Anemia     Gastrointestinal hemorrhage     RBBB (right bundle branch block)     Status post total knee replacement, right     Status post total knee replacement, left     Acute pneumonia     Elevated troponin      Subjective:  Admit Date: 3/9/2023    Interval History: doing well  legs swollen L>R had sx left    Medications:  Scheduled Meds:   furosemide  40 mg IntraVENous Daily    enoxaparin  30 mg SubCUTAneous Daily    magnesium oxide  400 mg Oral BID    metoprolol tartrate  50 mg Oral BID    guaiFENesin  600 mg Oral BID    sodium chloride flush  5-40 mL IntraVENous 2 times per day    cefTRIAXone (ROCEPHIN) IV  1,000 mg IntraVENous Q24H    aspirin  81 mg Oral BID    dilTIAZem  240 mg Oral Daily    oxybutynin  10 mg Oral Daily    atorvastatin  40 mg Oral Nightly     Continuous Infusions:   sodium chloride      sodium chloride      sodium chloride         CBC:   Recent Labs     03/13/23  0528 03/14/23  0501   WBC 6.4 6.1   HGB 9.0* 9.0*    215     CMP:    Recent Labs     03/12/23  0439 03/13/23  0528 03/14/23  0501    144 140   K 4.9 4.9 4.9   * 110* 107   CO2 19* 21 22   BUN 32* 38* 35*   CREATININE 1.37* 1.33* 1.21*   GLUCOSE 107* 109* 90   CALCIUM 8.9 9.3 9.3   LABGLOM 39.9* 41.3* 46.3*     Troponin:   Recent Labs     03/13/23  0528   TROPONINI 1.080*     BNP: No results for input(s): BNP in the last 72 hours.  INR: No results for input(s): INR in the last 72 hours.  Lipids: No results for input(s): CHOL, LDLDIRECT, TRIG, HDL, AMYLASE, LIPASE in the last 72 hours.  Liver:   Recent Labs     03/12/23  0439   AST 23   ALT 8   ALKPHOS 89   PROT 5.4*   LABALBU 2.8*   BILITOT 0.4     Iron:  No results for input(s): IRONS, FERRITIN in the last 72 hours.    Invalid input(s): LABIRONS  Urinalysis: No results for input(s): UA in the last 72 hours.    Objective:  Vitals: BP (!) 159/58   Pulse 68   Temp 98.4 °F (36.9 °C) (Oral)   Resp 18   Ht 5' 1\" (1.549 m)   Wt 237 lb 11.2 oz (107.8 kg)   LMP 09/17/1996   SpO2 96%   BMI 44.91 kg/m²    Wt Readings from Last 3 Encounters:   03/14/23 237 lb 11.2 oz (107.8 kg)   03/07/23 224 lb 12.8 oz (102 kg)   02/28/23 224 lb 12.8 oz (102 kg)      24HR INTAKE/OUTPUT:    Intake/Output Summary (Last 24 hours) at 3/14/2023 0831  Last data filed at 3/14/2023 0519  Gross per 24 hour   Intake --   Output 2150 ml   Net -2150 ml       General: alert, in no apparent distress  HEENT: normocephalic, atraumatic, anicteric  Neck: supple, no mass  Lungs: non-labored respirations, clear to auscultation bilaterally  Heart: regular rate and rhythm, no murmurs or rubs  Abdomen: soft, non-tender, non-distended  Ext: no cyanosis, 2+ peripheral edema  Neuro: alert and oriented, no gross abnormalities  Psych: normal mood and affect  Skin: no rash      Electronically signed by Paolo Landon DO, MD

## 2023-03-15 ENCOUNTER — TELEPHONE (OUTPATIENT)
Dept: CARDIOLOGY CLINIC | Age: 78
End: 2023-03-15

## 2023-03-15 VITALS
OXYGEN SATURATION: 95 % | HEIGHT: 61 IN | SYSTOLIC BLOOD PRESSURE: 156 MMHG | TEMPERATURE: 97.2 F | RESPIRATION RATE: 18 BRPM | HEART RATE: 70 BPM | DIASTOLIC BLOOD PRESSURE: 71 MMHG | WEIGHT: 233.1 LBS | BODY MASS INDEX: 44.01 KG/M2

## 2023-03-15 LAB
ANION GAP SERPL CALCULATED.3IONS-SCNC: 7 MEQ/L (ref 9–15)
BASOPHILS ABSOLUTE: 0 K/UL (ref 0–0.2)
BASOPHILS RELATIVE PERCENT: 0.7 %
BLOOD CULTURE, ROUTINE: NORMAL
BLOOD CULTURE, ROUTINE: NORMAL
BUN BLDV-MCNC: 39 MG/DL (ref 8–23)
CALCIUM SERPL-MCNC: 9.3 MG/DL (ref 8.5–9.9)
CHLORIDE BLD-SCNC: 103 MEQ/L (ref 95–107)
CO2: 30 MEQ/L (ref 20–31)
CREAT SERPL-MCNC: 1.29 MG/DL (ref 0.5–0.9)
EOSINOPHILS ABSOLUTE: 0.5 K/UL (ref 0–0.7)
EOSINOPHILS RELATIVE PERCENT: 7.9 %
GFR SERPL CREATININE-BSD FRML MDRD: 42.8 ML/MIN/{1.73_M2}
GLUCOSE BLD-MCNC: 90 MG/DL (ref 70–99)
HCT VFR BLD CALC: 27.3 % (ref 37–47)
HEMOGLOBIN: 9 G/DL (ref 12–16)
LYMPHOCYTES ABSOLUTE: 0.9 K/UL (ref 1–4.8)
LYMPHOCYTES RELATIVE PERCENT: 14 %
MCH RBC QN AUTO: 31 PG (ref 27–31.3)
MCHC RBC AUTO-ENTMCNC: 32.9 % (ref 33–37)
MCV RBC AUTO: 94.2 FL (ref 79.4–94.8)
MONOCYTES ABSOLUTE: 0.6 K/UL (ref 0.2–0.8)
MONOCYTES RELATIVE PERCENT: 9.4 %
NEUTROPHILS ABSOLUTE: 4.5 K/UL (ref 1.4–6.5)
NEUTROPHILS RELATIVE PERCENT: 68 %
PDW BLD-RTO: 14.4 % (ref 11.5–14.5)
PLATELET # BLD: 238 K/UL (ref 130–400)
POTASSIUM SERPL-SCNC: 4.7 MEQ/L (ref 3.4–4.9)
RBC # BLD: 2.9 M/UL (ref 4.2–5.4)
SODIUM BLD-SCNC: 140 MEQ/L (ref 135–144)
WBC # BLD: 6.6 K/UL (ref 4.8–10.8)

## 2023-03-15 PROCEDURE — 99221 1ST HOSP IP/OBS SF/LOW 40: CPT | Performed by: INTERNAL MEDICINE

## 2023-03-15 PROCEDURE — 36415 COLL VENOUS BLD VENIPUNCTURE: CPT

## 2023-03-15 PROCEDURE — 6360000002 HC RX W HCPCS: Performed by: INTERNAL MEDICINE

## 2023-03-15 PROCEDURE — 6370000000 HC RX 637 (ALT 250 FOR IP): Performed by: INTERNAL MEDICINE

## 2023-03-15 PROCEDURE — 6360000002 HC RX W HCPCS: Performed by: NURSE PRACTITIONER

## 2023-03-15 PROCEDURE — 2700000000 HC OXYGEN THERAPY PER DAY

## 2023-03-15 PROCEDURE — 80048 BASIC METABOLIC PNL TOTAL CA: CPT

## 2023-03-15 PROCEDURE — 97110 THERAPEUTIC EXERCISES: CPT

## 2023-03-15 PROCEDURE — 2580000003 HC RX 258: Performed by: NURSE PRACTITIONER

## 2023-03-15 PROCEDURE — 97116 GAIT TRAINING THERAPY: CPT

## 2023-03-15 PROCEDURE — C1769 GUIDE WIRE: HCPCS

## 2023-03-15 PROCEDURE — 6370000000 HC RX 637 (ALT 250 FOR IP): Performed by: NURSE PRACTITIONER

## 2023-03-15 PROCEDURE — 85025 COMPLETE CBC W/AUTO DIFF WBC: CPT

## 2023-03-15 RX ORDER — ATORVASTATIN CALCIUM 40 MG/1
40 TABLET, FILM COATED ORAL NIGHTLY
Qty: 30 TABLET | Refills: 3 | Status: SHIPPED | OUTPATIENT
Start: 2023-03-15

## 2023-03-15 RX ORDER — METOPROLOL TARTRATE 50 MG/1
50 TABLET, FILM COATED ORAL 2 TIMES DAILY
Qty: 60 TABLET | Refills: 3 | Status: SHIPPED | OUTPATIENT
Start: 2023-03-15

## 2023-03-15 RX ADMIN — CEFTRIAXONE SODIUM 1000 MG: 1 INJECTION, POWDER, FOR SOLUTION INTRAMUSCULAR; INTRAVENOUS at 00:09

## 2023-03-15 RX ADMIN — ENOXAPARIN SODIUM 30 MG: 100 INJECTION SUBCUTANEOUS at 09:56

## 2023-03-15 RX ADMIN — Medication 400 MG: at 09:55

## 2023-03-15 RX ADMIN — OXYBUTYNIN CHLORIDE 10 MG: 5 TABLET, EXTENDED RELEASE ORAL at 09:56

## 2023-03-15 RX ADMIN — FUROSEMIDE 40 MG: 10 INJECTION, SOLUTION INTRAMUSCULAR; INTRAVENOUS at 09:56

## 2023-03-15 RX ADMIN — METOPROLOL TARTRATE 50 MG: 50 TABLET, FILM COATED ORAL at 09:56

## 2023-03-15 RX ADMIN — DILTIAZEM HYDROCHLORIDE 240 MG: 240 CAPSULE, COATED, EXTENDED RELEASE ORAL at 09:55

## 2023-03-15 RX ADMIN — ASPIRIN 81 MG: 81 TABLET, COATED ORAL at 09:55

## 2023-03-15 RX ADMIN — GUAIFENESIN 600 MG: 600 TABLET, EXTENDED RELEASE ORAL at 09:56

## 2023-03-15 NOTE — PROGRESS NOTES
Physical Therapy Med Surg Daily Treatment Note  Facility/Department: [address]  [facility]       NAME: Gabby Greene  : 1945 (69 y.o.)  MRN: [id number]  CODE STATUS: Full Code    Date of Service: 3/15/2023    Patient Diagnosis(es): Hypoxia [R09.02]  Pneumonia due to infectious organism, unspecified laterality, unspecified part of lung [J18.9]  Acute pneumonia [J18.9]   Chief Complaint   Patient presents with    Chest Pain     For a couple hours     Patient Active Problem List    Diagnosis Date Noted    Elevated troponin 03/10/2023    Morbid obesity due to excess calories ([facility] [address] 2017    Acute pneumonia 03/10/2023    Status post total knee replacement, left 2023    Status post total knee replacement, right 2022    RBBB (right bundle branch block) 2022    Gastrointestinal hemorrhage     Anemia 2021    Adenomatous polyp of descending colon     Adenomatous polyp of ascending colon     Adenomatous polyp of colon     Chronic gastritis without bleeding     CKD (chronic kidney disease) stage 3, GFR 30-59 ml/min ([facility]) 2019    Asthma     Spinal stenosis of lumbar region with neurogenic claudication 2019    Degenerative spondylolisthesis 2019    Chronic bilateral low back pain with bilateral sciatica 10/30/2018    Primary osteoarthritis of both knees 10/30/2018    Coronary artery disease involving native coronary artery of native heart without angina pectoris 07/10/2018    History of ST elevation myocardial infarction (STEMI) 2017    Hypothyroid 12/10/2013    Essential hypertension 12/10/2013    Hyperlipidemia 12/10/2013        Past Medical History:   Diagnosis Date    Antral ulcer 2015    DR LANE    Asthma     \"cured by beestings\"    Coronary artery disease     Coronary artery disease involving native coronary artery of native heart without angina pectoris 7/10/2018    has x 4 cardiac stents / Dr. Manoj Lan    Diverticulosis of colon (without mention of hemorrhage) 01/14/2015    DR Kat Lara    Essential hypertension 12/10/2013    meds > 20 yrs    History of blood transfusion 1990s    with hysterectomy    History of normal resting EKG 1996    normal    History of ST elevation myocardial infarction (STEMI) 7/11/2017    History of transfusion of whole blood 1996    Excessive bleeding before hysterectomy    Hyperlipidemia     meds > 2 yrs    Hypertension     Hypothyroidism     past trx years ago then stopped / recent restart    Kidney disease     Low back pain     Morbid obesity due to excess calories ([address] 5/13/2017    Morbid obesity due to excess calories ([address] 6/6/2017    Osteoarthritis     Pneumonia     RBBB (right bundle branch block) 3/17/2022    Shoulder dislocation     ST elevation myocardial infarction involving left circumflex coronary artery ([address] 5/13/2017    Unspecified gastritis and gastroduodenitis without mention of hemorrhage 05/06/2015    DR Kat Lara     Past Surgical History:   Procedure Laterality Date    CARDIAC SURGERY  2017    has x 4 cardiac stents    COLONOSCOPY  01/14/2015    DR Kat Lara - DIVERTICULOSIS    COLONOSCOPY N/A 1/2/2021    COLONOSCOPY DIAGNOSTIC performed by Opal Carrillo MD at [address]  05/17/2017    x2 stents    CYST REMOVAL Left 2/7/2019    RESECTION OF LEFT PINNA LESION WITH GRAFT performed by Elissa Matamoros MD at [address] [place] [address], COLON, DIAGNOSTIC      FINGER SURGERY  12/9/14    middle finger right hand due to infection    HYSTERECTOMY ([address])  1996    LASIK  [id number]    SHOULDER SURGERY  2008    shoulder dislocated - popped  back in Right shoulder    TOTAL KNEE ARTHROPLASTY Right 8/30/2022    RIGHT KNEE TOTAL KNEE ARTHROPLASTY performed by Don Mcnamara MD at [facility] Left 3/7/2023    LEFT KNEE TOTAL KNEE ARTHROPLASTY performed by Don Mcnamara MD at [address] [place] [address] St  01/14/2015     ARIANA - ULCER IN THE ANTRUM    UPPER GASTROINTESTINAL ENDOSCOPY  05/06/2015    DR Carmin Goldmann - GASTRITIS, PREVIOUS ULCER HEALED    UPPER GASTROINTESTINAL ENDOSCOPY N/A 1/2/2021    EGD DIAGNOSTIC ONLY performed by Beckey Gaucher, MD at [facility] OR       Chart Reviewed: Yes  Family / Caregiver Present: No    Restrictions:  Restrictions/Precautions: Fall Risk;Up as Tolerated;Contact Precautions (MRSA)    SUBJECTIVE:        Pain  Pain: Denied pain pre and post session     OBJECTIVE:        Bed mobility  Supine to Sit: Minimal assistance  Sit to Supine:  (DNT pt into bedside chair to promote OOB activity.)  Bed Mobility Comments: HOB slightly elevated, pt uses bed rails to assist, Min lifting assistance needed to bring trunk upright on EOB. pt reports she sleeps in a recliner at baseline. Transfers  Sit to Stand: Stand by assistance  Stand to Sit: Stand by assistance  Comment: Good hand placement  with WW. increased time to extend hips and fully complete stand. Ambulation  Surface: Level tile  Device: Rolling Walker  Assistance: Stand by assistance  Quality of Gait: FF posture, slow elfego, WBOS and shortened step length; vc's for posture and to improve reciprocal pattern. Distance: [de-identified]'  Comments: pt able to tolerate increased gait distance this date, SpO2 92% post gait on RA. PT Exercises  A/AROM Exercises: supine AP/QS/GS/SLR/HS x 10, reviewed importance of completing HEP throughout the day, pt reports compliance and confidence with this. Activity Tolerance  Activity Tolerance: Patient tolerated treatment well  Activity Tolerance Comments: maintains SPO2 >92% throughout tx. ASSESSMENT   Assessment: pt able to increase gait distance without issue this date, pt expresses desire to return home at this time and feels confident with PT POC concerning TKA. RN notified.      Discharge Recommendations:  Continue to assess pending progress, Patient would benefit from continued therapy after discharge         Goals  Long Term Goals  Long Term Goal 1: Pt to complete bed mobility with indep  Long Term Goal 2: Pt to complete transfers with indep  Long Term Goal 3: Pt ambulate 50-150ft with LRD and indep  Long Term Goal 4: Pt to manage ramp indep with LRD to enter home  Long Term Goal 5: Pt to complete HEP with indep    PLAN    General Plan: 2 times a day 7 days a week  Safety Devices  Type of Devices: All fall risk precautions in place, Call light within reach, Chair alarm in place, Left in chair, Nurse notified     [facility] (6 CLICK) [address] Mobility Raw Score : 18      Therapy Time   Individual   Time In 0805   Time Out 0828   Minutes 23      Gait: 12  Therex: 8  BM/Trsf: [address] Ze[address] Aguilar, PTA, 03/15/23 at 8:43 AM         Definitions for assistance levels  Independent = pt does not require any physical supervision or assistance from another person for activity completion. Device may be needed.   Stand by assistance = pt requires verbal cues or instructions from another person, close to but not touching, to perform the activity  Minimal assistance= pt performs 75% or more of the activity; assistance is required to complete the activity  Moderate assistance= pt performs 50% of the activity; assistance is required to complete the activity  Maximal assistance = pt performs 25% of the activity; assistance is required to complete the activity  Dependent = pt requires total physical assistance to accomplish the task

## 2023-03-15 NOTE — PROGRESS NOTES
Nephrology Progress Note  Vitals good  Assessment:  CKD-3 a baseline  Chest pain asymptomatic now cath fine  Pul hypertension  Hx Hypertension      Plan: stable with present GFR await further decision by cardiology ok to discharge     Patient Active Problem List:     Hypothyroid     Essential hypertension     Hyperlipidemia     Morbid obesity due to excess calories ([address]     History of ST elevation myocardial infarction (STEMI)     Coronary artery disease involving native coronary artery of native heart without angina pectoris     Chronic bilateral low back pain with bilateral sciatica     Primary osteoarthritis of both knees     Spinal stenosis of lumbar region with neurogenic claudication     Degenerative spondylolisthesis     Asthma     CKD (chronic kidney disease) stage 3, GFR 30-59 ml/min ([facility])     Adenomatous polyp of descending colon     Adenomatous polyp of ascending colon     Adenomatous polyp of colon     Chronic gastritis without bleeding     Anemia     Gastrointestinal hemorrhage     RBBB (right bundle branch block)     Status post total knee replacement, right     Status post total knee replacement, left     Acute pneumonia     Elevated troponin      Subjective:  Admit Date: 3/9/2023    Interval History: asymptomatic    Medications:  Scheduled Meds:   furosemide  40 mg IntraVENous BID    chlorothiazide (DIURIL) IVPB  250 mg IntraVENous Q12H    sodium chloride flush  5-40 mL IntraVENous 2 times per day    sodium chloride flush  5-40 mL IntraVENous 2 times per day    enoxaparin  30 mg SubCUTAneous Daily    magnesium oxide  400 mg Oral BID    metoprolol tartrate  50 mg Oral BID    guaiFENesin  600 mg Oral BID    sodium chloride flush  5-40 mL IntraVENous 2 times per day    aspirin  81 mg Oral BID    dilTIAZem  240 mg Oral Daily    oxybutynin  10 mg Oral Daily    atorvastatin  40 mg Oral Nightly     Continuous Infusions:   sodium chloride      sodium chloride      sodium chloride      sodium chloride sodium chloride         CBC:   Recent Labs     03/14/23  0501 03/15/23  0510   WBC 6.1 6.6   HGB 9.0* 9.0*    238     CMP:    Recent Labs     03/13/23  0528 03/14/23  0501 03/15/23  0510    140 140   K 4.9 4.9 4.7   * 107 103   CO2 21 22 30   BUN 38* 35* 39*   CREATININE 1.33* 1.21* 1.29*   GLUCOSE 109* 90 90   CALCIUM 9.3 9.3 9.3   LABGLOM 41.3* 46.3* 42.8*     Troponin:   Recent Labs     03/13/23  0528   TROPONINI 1.080*     BNP: No results for input(s): BNP in the last 72 hours. INR: No results for input(s): INR in the last 72 hours. Lipids: No results for input(s): CHOL, LDLDIRECT, TRIG, HDL, AMYLASE, LIPASE in the last 72 hours. Liver: No results for input(s): AST, ALT, ALKPHOS, PROT, LABALBU, BILITOT in the last 72 hours. Invalid input(s): BILDIR  Iron:  No results for input(s): IRONS, FERRITIN in the last 72 hours. Invalid input(s): LABIRONS  Urinalysis: No results for input(s): UA in the last 72 hours.     Objective:  Vitals: BP (!) 133/51   Pulse 64   Temp 98.1 °F (36.7 °C) (Oral)   Resp 18   Ht 5' 1\" (1.549 m)   Wt 233 lb 1.6 oz (105.7 kg)   LMP 09/17/1996   SpO2 99%   BMI 44.04 kg/m²    Wt Readings from Last 3 Encounters:   03/15/23 233 lb 1.6 oz (105.7 kg)   03/07/23 224 lb 12.8 oz (102 kg)   02/28/23 224 lb 12.8 oz (102 kg)      24HR INTAKE/OUTPUT:    Intake/Output Summary (Last 24 hours) at 3/15/2023 0841  Last data filed at 3/15/2023 0021  Gross per 24 hour   Intake --   Output 3550 ml   Net -3550 ml       General: alert, in no apparent distress  HEENT: normocephalic, atraumatic, anicteric  Neck: supple, no mass  Lungs: non-labored respirations, clear to auscultation bilaterally  Heart: regular rate and rhythm, no murmurs or rubs  Abdomen: soft, non-tender, non-distended  Ext: no cyanosis, no peripheral edema  Neuro: alert and oriented, no gross abnormalities  Psych: normal mood and affect  Skin: no rash      Electronically signed by Henri White DO, MD

## 2023-03-15 NOTE — DISCHARGE SUMMARY
Hospital Medicine Discharge Summary    Dmitry Hollins  :  1945  MRN:  [id number]    Admit date:  3/9/2023  Discharge date:  3/15/2023    Admitting Physician:  Savannah Alex DO  Primary Care Physician:  Luis M Garcia MD      Discharge Diagnoses:      Acute respiratory insufficiency  Pneumonia  Elevated troponin- NSTEMI-history of CAD status post previous PCI  JP on CKD 3  Recent knee replacement  Left lower extremity edema-DVT studies negative    Chief Complaint   Patient presents with    Chest Pain     For a couple hours     Hospital Course:     Patient is 81-year-old female with a past medical history of CKD 3, recent knee replacement who presents to the hospital with chest pain. Pain started couple hours prior to arrival.  She was treated for NSTEMI. Patient was also treated for pneumonia and acute respiratory insufficiency. She also had JP on CKD 3. She was seen by cardiology and nephrologist.  Renal function improved. She underwent left heart cath that did not show any acute blockages and showed patent previous stents. She was medically managed. She was discharged home in a stable condition. she was instructed to follow-up with cardiology and nephrology as outpatient. Plan of care was discussed with patient in detail prior to discharge and she verbalized understanding. Exam on discharge:   BP (!) 133/51   Pulse 64   Temp 98.1 °F (36.7 °C) (Oral)   Resp 18   Ht 5' 1\" (1.549 m)   Wt 233 lb 1.6 oz (105.7 kg)   LMP 1996   SpO2 99%   BMI 44.04 kg/m²   General appearance: No apparent distress, appears stated age and cooperative. HEENT: Pupils equal, round, and reactive to light. Conjunctivae/corneas clear. Neck: Supple, with full range of motion. No jugular venous distention. Trachea midline. Respiratory:  Normal respiratory effort. Clear to auscultation, bilaterally without Rales/Wheezes/Rhonchi.   Cardiovascular: Regular rate and rhythm with normal S1/S2 without murmurs, rubs or gallops. Abdomen: Soft, non-tender, non-distended with normal bowel sounds. Musculoskeletal: No clubbing, cyanosis or edema bilaterally. Full range of motion without deformity. Skin: Skin color, texture, turgor normal.  No rashes or lesions. Neuro: Non Focal. Symetrical motor and tone. Nl Comprehension, Alert,awake and oriented. NL CN. Symetrical tone and reflexes. Psychiatric: Alert and oriented, thought content appropriate, normal insight  Capillary Refill: Brisk,< 3 seconds   Peripheral Pulses: +2 palpable, equal bilaterally     Patient was seen by the following consultants   Consults:  IP CONSULT TO CARDIOLOGY  IP CONSULT TO NEPHROLOGY  IP CONSULT TO SPIRITUAL SERVICES    Significant Diagnostic Studies:    Refer to chart     Please refer to chart if no studies are shown here    CTA CHEST W WO CONTRAST    Result Date: 3/10/2023  EXAMINATION: CTA OF THE CHEST WITH AND WITHOUT CONTRAST 3/9/2023 11:14 pm TECHNIQUE: CTA of the chest was performed before and after the administration of intravenous contrast.  Multiplanar reformatted images are provided for review. MIP images are provided for review. Automated exposure control, iterative reconstruction, and/or weight based adjustment of the mA/kV was utilized to reduce the radiation dose to as low as reasonably achievable. COMPARISON: None. HISTORY: ORDERING SYSTEM PROVIDED HISTORY: 2 days post knee replacement with chest pain TECHNOLOGIST PROVIDED HISTORY: Reason for exam:->2 days post knee replacement with chest pain Decision Support Exception - unselect if not a suspected or confirmed emergency medical condition->Emergency Medical Condition (MA) What reading provider will be dictating this exam?->CRC FINDINGS: Pulmonary Arteries: Streak artifact from patient's body habitus and arm placement by there signs degrades image quality. Pulmonary arteries are adequately opacified for evaluation.   No evidence of intraluminal filling defect to suggest pulmonary embolism. Main pulmonary artery is normal in caliber. Mediastinum: No evidence of mediastinal lymphadenopathy. Thyroid is homogeneous in appearance. Cardiac chambers are enlarged. No pericardial effusion. Coronary artery calcifications identified. Reactive lymph nodes seen in the hilar regions bilaterally. The heart and pericardium demonstrate no acute abnormality. There is no acute abnormality of the thoracic aorta. Lungs/pleura: Small bilateral pleural effusions with patchy ground-glass opacity identified inferiorly within the upper lung fields and lung bases. Airspace disease such as pneumonia or superimposed edema cannot be excluded. No pulmonary mass or nodule. Upper Abdomen: Limited images of the upper abdomen are unremarkable. Soft Tissues/Bones: No acute bone or soft tissue abnormality. Multilevel degenerative changes seen within the spine. No acute chest wall abnormality. No evidence of gross pulmonary embolism. Small bilateral pleural effusions with patchy ground-glass opacity within the lung fields to suggest either superimposed edema or multi lobar pneumonia. US DUP LOWER EXTREMITY LEFT LILIANA    Result Date: 3/10/2023  EXAMINATION: DUPLEX VENOUS ULTRASOUND OF THE LEFT LOWER EXTREMITY 3/10/2023 12:45 pm TECHNIQUE: Duplex ultrasound using B-mode/gray scaled imaging and Doppler spectral analysis and color flow was obtained of the deep venous structures of the left lower extremity. COMPARISON: None. HISTORY: ORDERING SYSTEM PROVIDED HISTORY: edema, recent surgery TECHNOLOGIST PROVIDED HISTORY: Reason for exam:->edema, recent surgery What reading provider will be dictating this exam?->CRC FINDINGS: The visualized veins of the left lower extremity are patent and free of echogenic thrombus. The veins demonstrate good compressibility with normal color flow study and spectral analysis. 4.1 x 3.8 x 1.4 cm popliteal fossa cyst is seen. No evidence of DVT in the left lower extremity. Discharge Medications:         Medication List        START taking these medications      atorvastatin 40 MG tablet  Commonly known as: LIPITOR  Take 1 tablet by mouth nightly            CHANGE how you take these medications      FeroSul 325 (65 Fe) MG tablet  Generic drug: ferrous sulfate  Take 1 tablet by mouth 2 times daily  What changed: See the new instructions. metoprolol tartrate 50 MG tablet  Commonly known as: LOPRESSOR  Take 1 tablet by mouth 2 times daily  What changed:   medication strength  See the new instructions. CONTINUE taking these medications      dilTIAZem 240 MG extended release capsule  Commonly known as: CARDIZEM CD  TAKE 1 CAPSULE BY MOUTH DAILY     nitroGLYCERIN 0.4 MG SL tablet  Commonly known as: Nitrostat  Place 1 tablet under the tongue every 5 minutes as needed for Chest pain     oxybutynin 10 MG extended release tablet  Commonly known as: DITROPAN-XL  Take 1 tablet by mouth daily     sennosides-docusate sodium 8.6-50 MG tablet  Commonly known as: SENOKOT-S  Take 1 tablet by mouth 2 times daily for 10 days            ASK your doctor about these medications      aspirin 81 MG EC tablet  Take 1 tablet by mouth 2 times daily               Where to Get Your Medications        These medications were sent to [address] [place] [address] [address] [facility] [phone]  [address] [place] [address], [facility]      Phone: [phone]   atorvastatin 40 MG tablet  metoprolol tartrate 50 MG tablet     Cardiac medication adjusted as per cardiology guidance    Disposition:   If discharged to Home, Any [facility] needs that were indicated and/or required as been addressed and set up by Social Work. Condition at discharge: good     Activity: activity as tolerated    Total time taken for discharging this patient: 40 minutes. Greater than 70% of time was spent focused exclusively on this patient.  Time was taken to review chart, discuss plans with consultants, reconciling medications, discussing plan answering questions with patient.      Luna Larsen DO  3/15/2023, 10:49 AM  ----------------------------------------------------------------------------------------------------------------------    Palmer Denver,

## 2023-03-15 NOTE — DISCHARGE INSTR - DIET
Good nutrition is important when healing from an illness, injury, or surgery. Follow any nutrition recommendations given to you during your hospital stay. If you were given an oral nutrition supplement while in the hospital, continue to take this supplement at home. You can take it with meals, in-between meals, and/or before bedtime. These supplements can be purchased at most local grocery stores, pharmacies, and chain [employer]-stores. If you have any questions about your diet or nutrition, call the hospital and ask for the dietitian. Heart healthy, low sodium diet.

## 2023-03-15 NOTE — TELEPHONE ENCOUNTER
----- Message from Robert Segundo DO sent at 3/15/2023 11:54 AM EDT -----  Regarding: Make appointment with Dr. Lashonda Salas    Please make post hospital follow up appointment with Dr. Garima Solis in 2-3 weeks.      Thanks  LM

## 2023-03-15 NOTE — PROGRESS NOTES
Cardiology Follow up Note    Subjective:   69 y.o. old female patient with history of CAD, prior PCI/stents to the RCA and circumflex (2017), CKD, hypertension, dyslipidemia, and recent left total knee replacement 3/8/2023 who presents with chest pain, pneumonia, acute on chronic CKD, and acute on chronic anemia. 3/11-3/12/23: Weekend cardiology notes reviewed. 3/13/23: No chest pain overnight. She complains of a cough. Hemoglobin has been stable creatinine improving. Troponin up to 1.08    3/14/23: Cardiac catheterization showed no significant CAD and patent stents. 3/15/23: Feels well. No chest pain or SOB. Wants to go home. Review of Systems:   General: Feels better   Cardiovascular: See HPI. No orthopnea or PND. Respiratory: Dyspnea is present with mild degrees of activity, improved. Gastrointestinal: No melena or hematochezia. Genitourinary: No hematuria. Hematological: No easy bruising or bleeding. Hemoglobin stable. Vascular: + Left lower extremity edema. No claudication. Neurological: No TIA or CVA symptoms. No paresthesias. Musculoskeletal: No chest wall pain. Psychiatric: No anxiety. *All other systems were reviewed and found to be negative unless otherwise noted in the HPI*    Objective:  BP (!) 156/71   Pulse 70   Temp 97.2 °F (36.2 °C) (Oral)   Resp 18   Ht 5' 1\" (1.549 m)   Wt 233 lb 1.6 oz (105.7 kg)   LMP 09/17/1996   SpO2 95%   BMI 44.04 kg/m²   Eyes: Conjunctiva clear; no scleral icterus. PERRLA   Respiratory: decreased breath sounds bilaterally with minimal expiratory wheeze   Musculoskeletal: No chest wall tenderness. No clubbing or cyanosis. Cardiovascular: regular rate and rhythm, S1, S2 normal, grade 2 out of 6 crescendo decrescendo systolic murmur. No click, rub or gallop. No carotid bruit. No JVD. Pulses 2+ and symmetric.  + Left greater than right leg edema. GI: soft, non-tender, non-distended. Bowel sounds normal. No masses,  no organomegaly. Obese  MSK: extremities normal, atraumatic, no clubbing. No chest wall pain. Neurologic: Grossly normal  Skin: No rashes or lesions. No cyanosis. Intake/Output Summary (Last 24 hours) at 3/15/2023 1150  Last data filed at 3/15/2023 0021  Gross per 24 hour   Intake --   Output 2450 ml   Net -2450 ml         Medications:  furosemide, 40 mg, BID  sodium chloride flush, 5-40 mL, 2 times per day  sodium chloride flush, 5-40 mL, 2 times per day  enoxaparin, 30 mg, Daily  magnesium oxide, 400 mg, BID  metoprolol tartrate, 50 mg, BID  guaiFENesin, 600 mg, BID  sodium chloride flush, 5-40 mL, 2 times per day  aspirin, 81 mg, BID  dilTIAZem, 240 mg, Daily  oxybutynin, 10 mg, Daily  atorvastatin, 40 mg, Nightly    sodium chloride  sodium chloride  sodium chloride  sodium chloride  sodium chloride    Recent Labs     03/12/23  1847 03/13/23  0528 03/14/23  0501 03/15/23  0510   HGB 9.2* 9.0* 9.0* 9.0*   WBC  --  6.4 6.1 6.6   PLT  --  182 215 238   NA  --  144 140 140   K  --  4.9 4.9 4.7   CO2  --  21 22 30   BUN  --  38* 35* 39*   TROPONINI  --  1.080*  --   --            EKG: My independent review of EKG reveals sinus rhythm with RBBB and nonspecific ST and T wave changes. Telemetry: My independent review reveals sinus rhythm    Echocardiogram:  Normal left ventricular systolic function, no regional wall motion   abnormalities, estimated ejection fraction of 55%. Normal left ventricular size and function. Mild concentric left ventricular hypertrophy. Impaired relaxation compatible with diastolic dysfunction. ( reversed E/A   ratio)   Normal right ventricular chamber size and function. The left atrium is Mildly dilated. Mild (1+) mitral regurgitation is present. mild to mod MS by gradient. however MV appears to be opening adequately. No evidence of aortic valve regurgitation . No evidence of aortic valve stenosis. There is mild tricuspid regurgitation with estimated RVSP of 64 mm Hg.    Right ventricular systolic pressure of 64 mm Hg consistent with moderate   pulmonary hypertension. Cardiac cath: 3/14/23  LV EF of 50  LM ectatic, mild disease  LAD mild disease  CX ectatic in prox with mild diffuse disease. Patent stents in mid. RCA- sub selective angio, due to radial artery spasm/tortuosity- patent stents. No sig stenosis. Assessment:  Chest pain, resolved. NSTEMI--type II  Stable CAD with patent previously deployed stents to the Cx and RCA, status post [facility]. CKD stable. Acute on chronic anemia--Hgb stable. Mild aortic stenosis/mitral regurgitation  Normal LV systolic function, EF 28%  Moderate pulm hypertension. HTN  Dyslipidemia  Recent left total knee replacement on 3/8/23  Pneumonia    Recommendations:  Continue aspirin, Cardizem, Lopressor, Lipitor. No ACE/ARB secondary to CKD. Monitor H&H, stable. [id number] [address] Fallon for discharge from cardiac standpoint. Follow up with Dr. Bill Duncan in 2-3 weeks. Thank you for allowing me to participate in your patient's cardiac care. Should you have questions, please do not hesitate to contact me.      Gerard Dior DO, [address] [facility]

## 2023-03-15 NOTE — PROGRESS NOTES
Iv and tele removed for discharge. Care plan completed for discharge. Appropriate education addressed in discharge instructions. Instructions completed and reviewed with patient. Verbalize understanding of instructions and follow up. Personal belongings gathered. No complaints. Transport wheelchair called. Family member present.       Electronically signed by Porsha Ramirez RN on 3/15/2023 at 1:53 PM

## 2023-03-15 NOTE — PLAN OF CARE
Problem: Safety - Adult  Goal: Free from fall injury  Outcome: Adequate for Discharge     Problem: Discharge Planning  Goal: Discharge to home or other facility with appropriate resources  Outcome: Adequate for Discharge     Problem: ABCDS Injury Assessment  Goal: Absence of physical injury  Outcome: Adequate for Discharge     Problem: Pain  Goal: Verbalizes/displays adequate comfort level or baseline comfort level  Outcome: Adequate for Discharge     Problem: Skin/Tissue Integrity  Goal: Absence of new skin breakdown  Description: 1. Monitor for areas of redness and/or skin breakdown  2. Assess vascular access sites hourly  3. Every 4-6 hours minimum:  Change oxygen saturation probe site  4. Every 4-6 hours:  If on nasal continuous positive airway pressure, respiratory therapy assess nares and determine need for appliance change or resting period.   Outcome: Adequate for Discharge

## 2023-03-15 NOTE — DISCHARGE INSTRUCTIONS
Follow up with primary care physician in the next 7 days or sooner if needed. If you do not have a Primary care physician, please schedule an appointment with one. Please ask prior to discharge about a list of local providers. Please return to ER or call 911 if you develop any significant signs or symptoms. I may not have addressed all of your medical illnesses or the abnormal blood work or imaging therefore please ask your PCP to obtain [facility] record to follow up on all of the abnormal labs, imaging and findings that I have and have not addressed during your hospitalization. Discharging you from the hospital does not mean that your medical care ends here and now. You may still need additional work up, investigation, monitoring, and treatment to be handled from this point on by outside providers including your PCP, Specialists and other healthcare providers. For medication questions, contact your retail pharmacy and your PCP. Your medical team at [facility] [address]  appreciates the opportunity to work with you to get well!     Sebastián Hendrix, DO  10:49 AM

## 2023-03-16 ENCOUNTER — CARE COORDINATION (OUTPATIENT)
Dept: CASE MANAGEMENT | Age: 78
End: 2023-03-16

## 2023-03-16 NOTE — CARE COORDINATION
[facility] Care Transitions Initial Follow Up Call    Call within 2 business days of discharge: Yes    Patient Current Location:  Home: [address] [place] [address] Avenue    Patient: Sarah Bowers Patient : 1945   MRN: [id number]  Reason for Admission: Acute Pneumonia  Discharge Date: 3/15/23 RARS: Readmission Risk Score: 14.5      Last Discharge  Street       Date Complaint Diagnosis Description Type Department Provider    3/9/23 Chest Pain Pneumonia due to infectious organism, unspecified laterality, unspecified part of lung . .. ED to Hosp-Admission (Discharged) (ADMITTED) [facility], DO; Leona Torres. .. Attempted to reach patient by phone for initial post hospital discharge follow up. HIPAA compliant message left on patient's voicemail; CTN contact information provided. CTN spoke with Helene Trejo from [facility] and [facility] 3/16/2023. -CTN noted incoming call from patient at time of this call. CTN returned call to the patient, Elodia Ann. CTN introduced self and explained role/reason for phone call. Patient politely declined the call from this CTN and any other future calls. CTN thanked the patient for her time.       Follow Up  Future Appointments   Date Time Provider [place] [place]   3/20/2023 10:00 AM Juanito Campbell MD [address] [place] [address]   2/15/2024 12:45 PM Jordan Godfrey DO [facility]

## 2023-03-16 NOTE — PROGRESS NOTES
Physical Therapy  Facility/Department: [facility] Q308/[id number]  Physical Therapy Discharge      NAME: Leta Mcwilliams    : 1945 (56 y.o.)  MRN: [id number]    Account: [de-identified]  Gender: female      Patient has been discharged from acute care hospital. DC patient from current PT program.      Electronically signed by Barbie Sanches PT on 3/16/23 at 4:24 PM EDT

## 2023-03-20 ENCOUNTER — OFFICE VISIT (OUTPATIENT)
Dept: FAMILY MEDICINE CLINIC | Age: 78
End: 2023-03-20

## 2023-03-20 VITALS
HEART RATE: 66 BPM | WEIGHT: 227 LBS | TEMPERATURE: 97.2 F | SYSTOLIC BLOOD PRESSURE: 124 MMHG | BODY MASS INDEX: 42.89 KG/M2 | DIASTOLIC BLOOD PRESSURE: 66 MMHG | OXYGEN SATURATION: 97 %

## 2023-03-20 DIAGNOSIS — Z09 HOSPITAL DISCHARGE FOLLOW-UP: Primary | ICD-10-CM

## 2023-03-20 DIAGNOSIS — I45.10 RBBB (RIGHT BUNDLE BRANCH BLOCK): ICD-10-CM

## 2023-03-20 DIAGNOSIS — D62 ACUTE BLOOD LOSS ANEMIA: ICD-10-CM

## 2023-03-20 DIAGNOSIS — J18.9 ACUTE PNEUMONIA: ICD-10-CM

## 2023-03-20 DIAGNOSIS — R77.8 ELEVATED TROPONIN: ICD-10-CM

## 2023-03-20 RX ORDER — OXYCODONE HYDROCHLORIDE AND ACETAMINOPHEN 5; 325 MG/1; MG/1
1 TABLET ORAL EVERY 4 HOURS PRN
COMMUNITY

## 2023-03-20 ASSESSMENT — ENCOUNTER SYMPTOMS
DIARRHEA: 0
SHORTNESS OF BREATH: 0
CONSTIPATION: 0
WHEEZING: 0
RHINORRHEA: 0
ABDOMINAL PAIN: 0
COUGH: 0
SORE THROAT: 0

## 2023-03-20 NOTE — PROGRESS NOTES
[address] [facility] PRIMARY CARE  [address] [id number]  Dept: [phone]  Dept Fax: : [phone]     Chief Complaint  Chief Complaint   Patient presents with    Follow-Up from [facility] 3/9-3/15/23 for kidney infection, low blood count , and pneumonia        HPI:  69 y. o.female who presents for the following:      Hosp f/u: still with some L knee soreness after the replacement; breathing well since the pneumonia; no fatigue or SOB; getting around with walker; no CP; JP improving back to her usual CKD level with renal f/u in the near future    Admit date:  3/9/2023                          Discharge date:  3/15/2023  Hospital Course:   Patient is 61-year-old female with a past medical history of CKD 3, recent knee replacement who presents to the hospital with chest pain. Pain started couple hours prior to arrival.  She was treated for NSTEMI. Patient was also treated for pneumonia and acute respiratory insufficiency. She also had JP on CKD 3. She was seen by cardiology and nephrologist.  Renal function improved. She underwent left heart cath that did not show any acute blockages and showed patent previous stents. She was medically managed. She was discharged home in a stable condition. she was instructed to follow-up with cardiology and nephrology as outpatient. Plan of care was discussed with patient in detail prior to discharge and she verbalized understanding. Review of Systems   Constitutional:  Negative for chills and fever. HENT:  Negative for congestion, rhinorrhea and sore throat. Respiratory:  Negative for cough, shortness of breath and wheezing. Gastrointestinal:  Negative for abdominal pain, constipation and diarrhea. Endocrine: Negative for polydipsia and polyuria. Genitourinary:  Negative for dysuria, frequency and urgency.    Neurological:  Negative for syncope,

## 2023-03-30 ENCOUNTER — OFFICE VISIT (OUTPATIENT)
Dept: CARDIOLOGY CLINIC | Age: 78
End: 2023-03-30
Payer: MEDICARE

## 2023-03-30 VITALS
HEART RATE: 71 BPM | HEIGHT: 61 IN | OXYGEN SATURATION: 96 % | SYSTOLIC BLOOD PRESSURE: 146 MMHG | WEIGHT: 226 LBS | DIASTOLIC BLOOD PRESSURE: 60 MMHG | BODY MASS INDEX: 42.67 KG/M2

## 2023-03-30 DIAGNOSIS — E78.2 MIXED HYPERLIPIDEMIA: ICD-10-CM

## 2023-03-30 DIAGNOSIS — E66.01 MORBID OBESITY DUE TO EXCESS CALORIES (HCC): ICD-10-CM

## 2023-03-30 DIAGNOSIS — I10 ESSENTIAL HYPERTENSION: ICD-10-CM

## 2023-03-30 DIAGNOSIS — I25.2 HISTORY OF ST ELEVATION MYOCARDIAL INFARCTION (STEMI): ICD-10-CM

## 2023-03-30 DIAGNOSIS — I25.10 CORONARY ARTERY DISEASE INVOLVING NATIVE CORONARY ARTERY OF NATIVE HEART WITHOUT ANGINA PECTORIS: ICD-10-CM

## 2023-03-30 DIAGNOSIS — I25.10 CORONARY ARTERY DISEASE INVOLVING NATIVE CORONARY ARTERY OF NATIVE HEART WITHOUT ANGINA PECTORIS: Primary | ICD-10-CM

## 2023-03-30 PROBLEM — R79.89 ELEVATED TROPONIN: Status: RESOLVED | Noted: 2023-03-10 | Resolved: 2023-03-30

## 2023-03-30 PROBLEM — R77.8 ELEVATED TROPONIN: Status: RESOLVED | Noted: 2023-03-10 | Resolved: 2023-03-30

## 2023-03-30 LAB
ERYTHROCYTE [DISTWIDTH] IN BLOOD BY AUTOMATED COUNT: 15.2 % (ref 11.5–14.5)
HCT VFR BLD AUTO: 27.4 % (ref 37–47)
HGB BLD-MCNC: 9 G/DL (ref 12–16)
MCH RBC QN AUTO: 31.2 PG (ref 27–31.3)
MCHC RBC AUTO-ENTMCNC: 32.8 % (ref 33–37)
MCV RBC AUTO: 95 FL (ref 79.4–94.8)
PLATELET # BLD AUTO: 259 K/UL (ref 130–400)
RBC # BLD AUTO: 2.88 M/UL (ref 4.2–5.4)
WBC # BLD AUTO: 7.2 K/UL (ref 4.8–10.8)

## 2023-03-30 PROCEDURE — 3078F DIAST BP <80 MM HG: CPT | Performed by: INTERNAL MEDICINE

## 2023-03-30 PROCEDURE — 99213 OFFICE O/P EST LOW 20 MIN: CPT | Performed by: INTERNAL MEDICINE

## 2023-03-30 PROCEDURE — 3077F SYST BP >= 140 MM HG: CPT | Performed by: INTERNAL MEDICINE

## 2023-03-30 PROCEDURE — 1123F ACP DISCUSS/DSCN MKR DOCD: CPT | Performed by: INTERNAL MEDICINE

## 2023-03-30 RX ORDER — ASPIRIN 81 MG/1
81 TABLET ORAL DAILY
COMMUNITY

## 2023-03-30 NOTE — PROGRESS NOTES
black or bloody stools  Genito-Urinary ROS: no dysuria, trouble voiding, or hematuria  Musculoskeletal ROS: negative  Neurological ROS: no TIA or stroke symptoms  Dermatological ROS: negative    VITALS:  Blood pressure (!) 146/60, pulse 71, height 5' 1\" (1.549 m), weight 226 lb (102.5 kg), last menstrual period 09/17/1996, SpO2 96 %, not currently breastfeeding. Body mass index is 42.7 kg/m². Physical Examination:  General appearance - alert, well appearing, and in no distress  Mental status - alert, oriented to person, place, and time  Neck - Neck is supple, no JVD. B/l carotid bruits. No thyromegaly or adenopathy. Chest - clear to auscultation, no wheezes, rales or rhonchi, symmetric air entry  Heart - normal rate, regular rhythm, normal S1, S2, no murmurs, rubs, clicks or gallops  Abdomen - soft, nontender, nondistended, no masses or organomegaly  Neurological - alert, oriented, normal speech, no focal findings or movement disorder noted  Extremities - peripheral pulses normal, no pedal edema, no clubbing or cyanosis  Skin - normal coloration and turgor, no rashes, no suspicious skin lesions noted      Orders Placed This Encounter   Procedures    CBC         ASSESSMENT:     Diagnosis Orders   1. Coronary artery disease involving native coronary artery of native heart without angina pectoris  CBC      2. Essential hypertension        3. Mixed hyperlipidemia        4. Morbid obesity due to excess calories ([address]        5. History of ST elevation myocardial infarction (STEMI)          6. CKD. PLAN:       As always, aggressive risk factor modification is strongly recommended. We should adhere to the [address] guidelines for HTN management and the NCEP ATP III guidelines for LDL-C management. Cardiac diet is always recommended with low fat, cholesterol, calories and sodium. Continue medications at current doses. NO ASA due to GIB/anemia.      Avoid nephrotoxic agents following with

## 2023-04-03 DIAGNOSIS — I21.21 ST ELEVATION MYOCARDIAL INFARCTION INVOLVING LEFT CIRCUMFLEX CORONARY ARTERY (HCC): ICD-10-CM

## 2023-04-04 RX ORDER — DILTIAZEM HYDROCHLORIDE 240 MG/1
240 CAPSULE, COATED, EXTENDED RELEASE ORAL DAILY
Qty: 90 CAPSULE | Refills: 3 | Status: SHIPPED | OUTPATIENT
Start: 2023-04-04

## 2023-04-04 NOTE — TELEPHONE ENCOUNTER
Comments:     Last Office Visit (last PCP visit):   3/20/2023    Next Visit Date:  Future Appointments   Date Time Provider [place]   2/15/2024 12:45 PM Jordan Boland, DO [address]       **If hasn't been seen in over a year OR hasn't followed up according to last diabetes/ADHD visit, make appointment for patient before sending refill to provider.     Rx requested:  Requested Prescriptions     Pending Prescriptions Disp Refills    dilTIAZem (CARDIZEM CD) 240 MG extended release capsule [Pharmacy Med Name: diltiazem  mg capsule,extended release 24 hr] 90 capsule 3     Sig: TAKE 1 CAPSULE BY MOUTH DAILY

## 2023-04-19 ENCOUNTER — HOSPITAL ENCOUNTER (OUTPATIENT)
Dept: LAB | Age: 78
Discharge: HOME | End: 2023-04-19

## 2023-04-19 LAB
ALBUMIN SERPL-MCNC: 4 G/DL (ref 3.5–4.6)
ANION GAP SERPL CALCULATED.3IONS-SCNC: 11 MEQ/L (ref 9–15)
BUN SERPL-MCNC: 25 MG/DL (ref 8–23)
CALCIUM SERPL-MCNC: 9.5 MG/DL (ref 8.5–9.9)
CHLORIDE SERPL-SCNC: 107 MEQ/L (ref 95–107)
CO2 SERPL-SCNC: 23 MEQ/L (ref 20–31)
CREAT SERPL-MCNC: 1.28 MG/DL (ref 0.5–0.9)
ERYTHROCYTE [DISTWIDTH] IN BLOOD BY AUTOMATED COUNT: 15.1 % (ref 11.5–14.5)
GLUCOSE SERPL-MCNC: 116 MG/DL (ref 70–99)
HCT VFR BLD AUTO: 29.6 % (ref 37–47)
HGB BLD-MCNC: 9.4 G/DL (ref 12–16)
MCH RBC QN AUTO: 30.2 PG (ref 27–31.3)
MCHC RBC AUTO-ENTMCNC: 31.7 % (ref 33–37)
MCV RBC AUTO: 95.4 FL (ref 79.4–94.8)
PHOSPHATE SERPL-MCNC: 3.8 MG/DL (ref 2.3–4.8)
PLATELET # BLD AUTO: 177 K/UL (ref 130–400)
POTASSIUM SERPL-SCNC: 4.3 MEQ/L (ref 3.4–4.9)
RBC # BLD AUTO: 3.11 M/UL (ref 4.2–5.4)
SODIUM SERPL-SCNC: 141 MEQ/L (ref 135–144)
WBC # BLD AUTO: 4.5 K/UL (ref 4.8–10.8)

## 2023-04-20 LAB — VITAMIN D 25-HYDROXY: 25.1 NG/ML

## 2023-04-22 LAB
COMMENT: NORMAL
MISCELLANEOUS LAB TEST RESULT: NORMAL

## 2023-04-24 ENCOUNTER — TELEPHONE (OUTPATIENT)
Dept: PHARMACY | Facility: CLINIC | Age: 78
End: 2023-04-24

## 2023-04-24 DIAGNOSIS — I10 ESSENTIAL HYPERTENSION: ICD-10-CM

## 2023-04-24 RX ORDER — LOSARTAN POTASSIUM AND HYDROCHLOROTHIAZIDE 25; 100 MG/1; MG/1
TABLET ORAL
Qty: 90 TABLET | Refills: 3 | OUTPATIENT
Start: 2023-04-24

## 2023-04-24 NOTE — TELEPHONE ENCOUNTER
[facility] CLINICAL PHARMACY: ADHERENCE REVIEW  Identified care gap per [employer]: fills at [address]: ACE/ARB and Statin adherence        ASSESSMENT    ACE/ARB ADHERENCE    Insurance Records claims through 4/8/23 (Prior Year [place] = not reported; YTD [place] [employer] = FIRST FILL; Potential Fail Date: 7/1/23):   LOSARTAN/HCT -25 last filled on 1/24/23 for 90 day supply. Next refill due: 4/24/23      BP Readings from Last 3 Encounters:   03/30/23 (!) 146/60   03/20/23 124/66   03/15/23 (!) 156/71     Estimated Creatinine Clearance: 41 mL/min (A) (based on SCr of 1.28 mg/dL (H)). Lab Results   Component Value Date    CREATININE 1.28 (H) 04/19/2023     Lab Results   Component Value Date    K 4.3 04/19/2023     STATIN ADHERENCE    Insurance Records claims through 4/8/23 (Prior Year [place] = not reported; YTD [place] = FIRST FILL; Potential Fail Date: 6/11/23):   ATORVASTATIN TAB 40MG last filled on 3/15/23 for 90 day supply. Next refill due: 4/14/23    Lab Results   Component Value Date    CHOL 261 (H) 05/14/2017    TRIG 231 (H) 05/14/2017    HDL 44 01/04/2022    LDLCALC 87 01/04/2022     ALT   Date Value Ref Range Status   03/12/2023 8 0 - 33 U/L Final     AST   Date Value Ref Range Status   03/12/2023 23 0 - 35 U/L Final     The ASCVD Risk score (Joshua DK, et al., 2019) failed to calculate for the following reasons: The patient has a prior MI or stroke diagnosis     PLAN  Per insurer report, LIS-0 - co-pays are based on tiers and patient is subject to coverage gap. Patient found in Outcomes [facility] and is currently eligible for 5 TIP      Per medication list. Losartan/hctz was dc'd on 3/8/23    Last Visit: 3/20/23  Next Visit: 2/15/24    For Pharmacy Admin Tracking Only    Program: [address] in place:  No  Gap Closed?: Yes   Time Spent (min): [address] [place].   [address] [facility] free: [phone]

## 2023-04-27 DIAGNOSIS — I10 ESSENTIAL HYPERTENSION: ICD-10-CM

## 2023-04-27 RX ORDER — LOSARTAN POTASSIUM AND HYDROCHLOROTHIAZIDE 25; 100 MG/1; MG/1
TABLET ORAL
Qty: 90 TABLET | Refills: 3 | Status: SHIPPED | OUTPATIENT
Start: 2023-04-27

## 2023-04-27 NOTE — TELEPHONE ENCOUNTER
Comments:     Last Office Visit (last PCP visit):   3/20/2023    Next Visit Date:  Future Appointments   Date Time Provider [place] Solo   2/15/2024 12:45 PM Jordan Boland, DO [address]       **If hasn't been seen in over a year OR hasn't followed up according to last diabetes/ADHD visit, make appointment for patient before sending refill to provider.     Rx requested:  Requested Prescriptions     Pending Prescriptions Disp Refills    losartan-hydroCHLOROthiazide (HYZAAR) 100-25 MG per tablet [Pharmacy Med Name: losartan 100 mg-hydrochlorothiazide 25 mg tablet] 90 tablet 3     Sig: TAKE 1 TABLET BY MOUTH EVERY DAY

## 2023-05-04 ENCOUNTER — TELEPHONE (OUTPATIENT)
Dept: PHARMACY | Facility: CLINIC | Age: 78
End: 2023-05-04

## 2023-05-04 NOTE — TELEPHONE ENCOUNTER
[facility] CLINICAL PHARMACY: ADHERENCE REVIEW  Identified care gap per [employer]: fills at [address]: Statin adherence    Patient also appears to be prescribed: ACE/ARB    ASSESSMENT    ACE/ARB ADHERENCE    Insurance Records claims through  23  (Prior Year [place] [employer] = 97% - PASSED;  [place] = 96%; Potential Fail Date: 23):   LOSARTAN/HCT -25 last filled on 23 for 90 day supply. Next refill due: 23    Prescribed si tablet/capsule daily    Per chart, was dc'd @3/8/23 to stop taking at discharge  - Then 3/30/23 cardio OV note: \"Resume losartan\"    BP Readings from Last 3 Encounters:   23 (!) 146/60   23 124/66   03/15/23 (!) 156/71     Estimated Creatinine Clearance: 41 mL/min (A) (based on SCr of 1.28 mg/dL (H)). Lab Results   Component Value Date    CREATININE 1.28 (H) 2023     Lab Results   Component Value Date    K 4.3 2023     STATIN ADHERENCE    Insurance Records claims through  23  (Prior Year [place] [employer] = not reported; YTD [place] [employer] = FIRST FILL; Potential Fail Date: 23):   ATORVASTATIN TAB 40MG last filled on 3/15/23 for 30 day supply. Next refill due: 23    Prescribed si tablet/capsule daily    Per Insurer Portal: previous atorvastatin 80mg HALF tab daily, 90ds fills 23 and 22  - Per chart, had stopped / \"not taking\", then reordered at March hospital discharge    Per chart, should have 3 refills of 30ds    Lab Results   Component Value Date    CHOL 261 (H) 2017    TRIG 231 (H) 2017    HDL 44 2022    LDLCALC 87 2022     ALT   Date Value Ref Range Status   2023 8 0 - 33 U/L Final     AST   Date Value Ref Range Status   2023 23 0 - 35 U/L Final     The ASCVD Risk score (Joshua GRAHAM, et al., 2019) failed to calculate for the following reasons:     The patient has a prior MI or stroke diagnosis     PLAN  Patient found in Outcomes [employer] and is currently eligible for atorvastatin and losartan-HCTZ adherence

## 2023-06-20 ENCOUNTER — TELEPHONE (OUTPATIENT)
Dept: INTERVENTIONAL RADIOLOGY/VASCULAR | Age: 78
End: 2023-06-20

## 2023-06-20 NOTE — TELEPHONE ENCOUNTER
Patient was given this information via phone conversation - voiced understanding  2.   Spoke to [employer] in [address]: 6/26/2023 @ 3:00 pm   >  You will need to arrive at 2:30 pm from home and check in at the Diagnostic Imaging Check In desk.   >  Patient to hold Aleve for 2 days prior to procedure  >  Patient to have PT/INR and CBC drawn 2-4 days prior to procedure

## 2023-06-21 ENCOUNTER — TELEPHONE (OUTPATIENT)
Dept: INTERVENTIONAL RADIOLOGY/VASCULAR | Age: 78
End: 2023-06-21

## 2023-06-21 NOTE — TELEPHONE ENCOUNTER
Patient was given this information via phone conversation - voiced understanding  2.   Spoke to [facility] in [address]: 6/26/23 @ 3:00PM  >  You will need to arrive at 2:30pm (from home) and check in at the Diagnostic Imaging Check In desk.   >  Patient to have PT/INR and CBC drawn 2-4 days prior to procedure

## 2023-06-22 ENCOUNTER — HOSPITAL ENCOUNTER (OUTPATIENT)
Dept: LAB | Age: 78
Discharge: HOME | End: 2023-06-22
Payer: MEDICARE

## 2023-06-22 DIAGNOSIS — M16.11 PRIMARY OSTEOARTHRITIS OF RIGHT HIP: ICD-10-CM

## 2023-06-22 DIAGNOSIS — D64.9 ANEMIA, UNSPECIFIED TYPE: ICD-10-CM

## 2023-06-22 LAB
ERYTHROCYTE [DISTWIDTH] IN BLOOD BY AUTOMATED COUNT: 14.4 % (ref 11.5–14.5)
HCT VFR BLD AUTO: 31 % (ref 37–47)
HGB BLD-MCNC: 9.6 G/DL (ref 12–16)
INR PPP: 1.1
MCH RBC QN AUTO: 29.5 PG (ref 27–31.3)
MCHC RBC AUTO-ENTMCNC: 30.9 % (ref 33–37)
MCV RBC AUTO: 95.4 FL (ref 79.4–94.8)
PLATELET # BLD AUTO: 130 K/UL (ref 130–400)
PROTHROMBIN TIME: 13.9 SEC (ref 12.3–14.9)
RBC # BLD AUTO: 3.25 M/UL (ref 4.2–5.4)
WBC # BLD AUTO: 3.8 K/UL (ref 4.8–10.8)

## 2023-06-22 PROCEDURE — 85027 COMPLETE CBC AUTOMATED: CPT

## 2023-06-22 PROCEDURE — 36415 COLL VENOUS BLD VENIPUNCTURE: CPT

## 2023-06-22 PROCEDURE — 85610 PROTHROMBIN TIME: CPT

## 2023-06-26 ENCOUNTER — HOSPITAL ENCOUNTER (OUTPATIENT)
Dept: GENERAL RADIOLOGY | Age: 78
Discharge: HOME | End: 2023-06-28
Payer: MEDICARE

## 2023-06-26 DIAGNOSIS — M16.11 PRIMARY OSTEOARTHRITIS OF RIGHT HIP: ICD-10-CM

## 2023-06-26 PROCEDURE — 6360000002 HC RX W HCPCS: Performed by: NURSE PRACTITIONER

## 2023-06-26 PROCEDURE — 2500000003 HC RX 250 WO HCPCS: Performed by: NURSE PRACTITIONER

## 2023-06-26 PROCEDURE — 20610 DRAIN/INJ JOINT/BURSA W/O US: CPT

## 2023-06-26 PROCEDURE — 6360000004 HC RX CONTRAST MEDICATION: Performed by: NURSE PRACTITIONER

## 2023-06-26 RX ORDER — LIDOCAINE HYDROCHLORIDE 20 MG/ML
10 INJECTION, SOLUTION INFILTRATION; PERINEURAL ONCE
Status: COMPLETED | OUTPATIENT
Start: 2023-06-26 | End: 2023-06-26

## 2023-06-26 RX ORDER — BUPIVACAINE HYDROCHLORIDE 5 MG/ML
30 INJECTION, SOLUTION EPIDURAL; INTRACAUDAL ONCE
Status: COMPLETED | OUTPATIENT
Start: 2023-06-26 | End: 2023-06-26

## 2023-06-26 RX ORDER — TRIAMCINOLONE ACETONIDE 40 MG/ML
40 INJECTION, SUSPENSION INTRA-ARTICULAR; INTRAMUSCULAR ONCE
Status: COMPLETED | OUTPATIENT
Start: 2023-06-26 | End: 2023-06-26

## 2023-06-26 RX ADMIN — IOPAMIDOL 5 ML: 612 INJECTION, SOLUTION INTRAVENOUS at 15:32

## 2023-06-26 RX ADMIN — LIDOCAINE HYDROCHLORIDE 3 ML: 20 INJECTION, SOLUTION INFILTRATION; PERINEURAL at 15:32

## 2023-06-26 RX ADMIN — TRIAMCINOLONE ACETONIDE 40 MG: 40 INJECTION, SUSPENSION INTRA-ARTICULAR; INTRAMUSCULAR at 15:31

## 2023-06-26 RX ADMIN — BUPIVACAINE HYDROCHLORIDE 3 ML: 5 INJECTION, SOLUTION EPIDURAL; INTRACAUDAL; PERINEURAL at 15:30

## 2023-06-26 ASSESSMENT — PAIN DESCRIPTION - LOCATION
LOCATION: HIP
LOCATION: HIP

## 2023-06-26 ASSESSMENT — PAIN SCALES - GENERAL
PAINLEVEL_OUTOF10: 0
PAINLEVEL_OUTOF10: 0

## 2023-06-26 ASSESSMENT — PAIN DESCRIPTION - ORIENTATION
ORIENTATION: RIGHT
ORIENTATION: RIGHT

## 2023-07-11 DIAGNOSIS — R39.15 URINARY URGENCY: ICD-10-CM

## 2023-07-11 RX ORDER — OXYBUTYNIN CHLORIDE 10 MG/1
10 TABLET, EXTENDED RELEASE ORAL DAILY
Qty: 30 TABLET | Refills: 5 | Status: SHIPPED | OUTPATIENT
Start: 2023-07-11

## 2023-07-11 NOTE — TELEPHONE ENCOUNTER
Comments:     Last Office Visit (last PCP visit):   3/20/2023    Next Visit Date:  Future Appointments   Date Time Provider [address]   2/15/2024 12:45 PM Jordan Boland DO [address]       **If hasn't been seen in over a year OR hasn't followed up according to last diabetes/ADHD visit, make appointment for patient before sending refill to provider.     Rx requested:  Requested Prescriptions     Pending Prescriptions Disp Refills    oxybutynin (DITROPAN-XL) 10 MG extended release tablet [Pharmacy Med Name: oxybutynin chloride ER 10 mg tablet,extended release 24 hr] 30 tablet 5     Sig: TAKE 1 TABLET BY MOUTH DAILY

## 2023-09-26 ENCOUNTER — OFFICE VISIT (OUTPATIENT)
Dept: FAMILY MEDICINE CLINIC | Age: 78
End: 2023-09-26
Payer: MEDICARE

## 2023-09-26 ENCOUNTER — TELEPHONE (OUTPATIENT)
Dept: FAMILY MEDICINE CLINIC | Age: 78
End: 2023-09-26

## 2023-09-26 VITALS
DIASTOLIC BLOOD PRESSURE: 80 MMHG | OXYGEN SATURATION: 98 % | HEART RATE: 62 BPM | HEIGHT: 61 IN | BODY MASS INDEX: 42.63 KG/M2 | WEIGHT: 225.8 LBS | SYSTOLIC BLOOD PRESSURE: 110 MMHG

## 2023-09-26 DIAGNOSIS — M48.062 SPINAL STENOSIS OF LUMBAR REGION WITH NEUROGENIC CLAUDICATION: ICD-10-CM

## 2023-09-26 DIAGNOSIS — R39.15 URINARY URGENCY: Primary | ICD-10-CM

## 2023-09-26 PROCEDURE — 3079F DIAST BP 80-89 MM HG: CPT | Performed by: FAMILY MEDICINE

## 2023-09-26 PROCEDURE — 99213 OFFICE O/P EST LOW 20 MIN: CPT | Performed by: FAMILY MEDICINE

## 2023-09-26 PROCEDURE — 1123F ACP DISCUSS/DSCN MKR DOCD: CPT | Performed by: FAMILY MEDICINE

## 2023-09-26 PROCEDURE — 3074F SYST BP LT 130 MM HG: CPT | Performed by: FAMILY MEDICINE

## 2023-09-26 RX ORDER — OXYBUTYNIN CHLORIDE 15 MG/1
30 TABLET, EXTENDED RELEASE ORAL DAILY
Qty: 30 TABLET | Refills: 3 | Status: SHIPPED | OUTPATIENT
Start: 2023-09-26

## 2023-09-26 ASSESSMENT — ENCOUNTER SYMPTOMS
ABDOMINAL PAIN: 0
RHINORRHEA: 0
DIARRHEA: 0
COUGH: 0
WHEEZING: 0
BACK PAIN: 1
SHORTNESS OF BREATH: 0
CONSTIPATION: 0
SORE THROAT: 0

## 2023-09-26 NOTE — PROGRESS NOTES
performed by Arleen Ayoub MD at [address] [facility] History    Marital status:      Spouse name: Not on file    Number of children: Not on file    Years of education: Not on file    Highest education level: Not on file   Occupational History    Not on file   Tobacco Use    Smoking status: Never    Smokeless tobacco: Never   Vaping Use    Vaping Use: Never used   Substance and Sexual Activity    Alcohol use: Yes     Comment: glass of wine once a year    Drug use: No    Sexual activity: Yes     Partners: Male   Other Topics Concern    Not on file   Social History Narrative    Not on file     Social Determinants of Health     Financial Resource Strain: Low Risk  (2/16/2023)    Overall Financial Resource Strain (CARDIA)     Difficulty of Paying Living Expenses: Not hard at all   Food Insecurity: No Food Insecurity (2/16/2023)    Hunger Vital Sign     Worried About Running Out of Food in the Last Year: Never true     [address] in the Last Year: Never true   Transportation Needs: Unknown (2/16/2023)    PRAPARE - Transportation     Lack of Transportation (Medical): Not on file     Lack of Transportation (Non-Medical):  No   Physical Activity: Not on file   Stress: Not on file   Social Connections: Not on file   Intimate Partner Violence: Not on file   Housing Stability: Unknown (2/16/2023)    Housing Stability Vital Sign     Unable to Pay for Housing in the Last Year: Not on file     Number of Places Lived in the Last Year: Not on file     Unstable Housing in the Last Year: No     Family History   Problem Relation Age of Onset    Heart Disease Mother 61    Cancer Father 79        kidney    No Known Problems Daughter       Allergies   Allergen Reactions    Lisinopril Nausea Only and Other (See Comments)     cough    Tramadol Nausea Only     Current Outpatient Medications   Medication Sig Dispense Refill    Handicap Placard MISC by Does not apply route Expires 9/26/2028 1 each 0

## 2023-09-26 NOTE — TELEPHONE ENCOUNTER
Pharmacy called to clarify the Oxybutynin prescription; . They wanted to clarify the jump from 10mg to 30mg (patient was on 10mg once a day and new prescription is for 15mg twice a day).

## 2023-09-28 NOTE — PROGRESS NOTES
minutes and the increasing chronic low back pain. Loss of range of motion right shoulder from an old dislocation. Both knees have been replaced. MRI x-rays lumbar spine.   Cj Klein MD

## 2023-10-05 ENCOUNTER — INITIAL CONSULT (OUTPATIENT)
Dept: NEUROSURGERY | Age: 78
End: 2023-10-05
Payer: MEDICARE

## 2023-10-05 VITALS
BODY MASS INDEX: 41.91 KG/M2 | TEMPERATURE: 97.2 F | HEIGHT: 61 IN | SYSTOLIC BLOOD PRESSURE: 126 MMHG | WEIGHT: 222 LBS | DIASTOLIC BLOOD PRESSURE: 74 MMHG

## 2023-10-05 DIAGNOSIS — M48.062 SPINAL STENOSIS OF LUMBAR REGION WITH NEUROGENIC CLAUDICATION: Primary | ICD-10-CM

## 2023-10-05 DIAGNOSIS — Z96.652 STATUS POST TOTAL KNEE REPLACEMENT, LEFT: ICD-10-CM

## 2023-10-05 DIAGNOSIS — E66.01 CLASS 3 SEVERE OBESITY DUE TO EXCESS CALORIES WITH SERIOUS COMORBIDITY AND BODY MASS INDEX (BMI) OF 40.0 TO 44.9 IN ADULT (HCC): ICD-10-CM

## 2023-10-05 DIAGNOSIS — Z96.651 STATUS POST TOTAL KNEE REPLACEMENT, RIGHT: ICD-10-CM

## 2023-10-05 PROBLEM — E66.813 CLASS 3 SEVERE OBESITY DUE TO EXCESS CALORIES WITH SERIOUS COMORBIDITY AND BODY MASS INDEX (BMI) OF 40.0 TO 44.9 IN ADULT: Status: ACTIVE | Noted: 2017-05-13

## 2023-10-05 PROCEDURE — 3074F SYST BP LT 130 MM HG: CPT | Performed by: NEUROLOGICAL SURGERY

## 2023-10-05 PROCEDURE — 99202 OFFICE O/P NEW SF 15 MIN: CPT | Performed by: NEUROLOGICAL SURGERY

## 2023-10-05 PROCEDURE — 3078F DIAST BP <80 MM HG: CPT | Performed by: NEUROLOGICAL SURGERY

## 2023-10-05 ASSESSMENT — ENCOUNTER SYMPTOMS
EYE PAIN: 0
DIARRHEA: 0
CONSTIPATION: 0
BACK PAIN: 1
SHORTNESS OF BREATH: 0
NAUSEA: 0

## 2023-10-14 ENCOUNTER — HOSPITAL ENCOUNTER (OUTPATIENT)
Dept: GENERAL RADIOLOGY | Age: 78
End: 2023-10-14
Attending: NEUROLOGICAL SURGERY
Payer: MEDICARE

## 2023-10-14 ENCOUNTER — HOSPITAL ENCOUNTER (OUTPATIENT)
Dept: MRI IMAGING | Age: 78
End: 2023-10-14
Attending: NEUROLOGICAL SURGERY
Payer: MEDICARE

## 2023-10-14 DIAGNOSIS — M48.062 SPINAL STENOSIS OF LUMBAR REGION WITH NEUROGENIC CLAUDICATION: ICD-10-CM

## 2023-10-14 PROCEDURE — 72100 X-RAY EXAM L-S SPINE 2/3 VWS: CPT

## 2023-10-14 PROCEDURE — 72148 MRI LUMBAR SPINE W/O DYE: CPT

## 2023-10-16 RX ORDER — ATORVASTATIN CALCIUM 40 MG/1
40 TABLET, FILM COATED ORAL NIGHTLY
Qty: 90 TABLET | Refills: 3 | Status: SHIPPED | OUTPATIENT
Start: 2023-10-16

## 2023-10-16 RX ORDER — METOPROLOL TARTRATE 50 MG/1
50 TABLET, FILM COATED ORAL 2 TIMES DAILY
Qty: 180 TABLET | Refills: 3 | Status: SHIPPED | OUTPATIENT
Start: 2023-10-16

## 2023-10-16 NOTE — TELEPHONE ENCOUNTER
Please approve or deny this refill request. The order is pended. Thank you.     LOV 3/30/2023    PT has yearly appt sea for 02/15/2024    Next Visit Date:  Future Appointments   Date Time Provider [address] [place] [address]   11/2/2023  2:00 PM Tanja Amor MD [facility] EMERGENCY [facility]   2/15/2024 12:45 PM Holiday, Rita Wei, DO [address] [facility]

## 2023-10-20 ENCOUNTER — HOSPITAL ENCOUNTER (OUTPATIENT)
Dept: ULTRASOUND IMAGING | Age: 78
End: 2023-10-20
Attending: INTERNAL MEDICINE
Payer: MEDICARE

## 2023-10-20 DIAGNOSIS — I65.23 BILATERAL CAROTID ARTERY STENOSIS: ICD-10-CM

## 2023-10-20 PROCEDURE — 93880 EXTRACRANIAL BILAT STUDY: CPT

## 2023-10-25 ENCOUNTER — HOSPITAL ENCOUNTER (OUTPATIENT)
Dept: LAB | Age: 78
Discharge: HOME | End: 2023-10-25

## 2023-10-25 LAB
ALBUMIN SERPL-MCNC: 4 G/DL (ref 3.5–4.6)
ANION GAP SERPL CALCULATED.3IONS-SCNC: 11 MEQ/L (ref 9–15)
BACTERIA URNS QL MICRO: ABNORMAL /HPF
BILIRUB UR QL STRIP: NEGATIVE
BUN SERPL-MCNC: 30 MG/DL (ref 8–23)
CALCIUM SERPL-MCNC: 9.7 MG/DL (ref 8.5–9.9)
CHLORIDE SERPL-SCNC: 108 MEQ/L (ref 95–107)
CLARITY UR: ABNORMAL
CO2 SERPL-SCNC: 23 MEQ/L (ref 20–31)
COLOR UR: YELLOW
CREAT SERPL-MCNC: 1.35 MG/DL (ref 0.5–0.9)
CREAT UR-MCNC: 101.1 MG/DL
EPI CELLS #/AREA URNS AUTO: ABNORMAL /HPF (ref 0–5)
ERYTHROCYTE [DISTWIDTH] IN BLOOD BY AUTOMATED COUNT: 14.3 % (ref 11.5–14.5)
GLUCOSE SERPL-MCNC: 125 MG/DL (ref 70–99)
GLUCOSE UR STRIP-MCNC: NEGATIVE MG/DL
HCT VFR BLD AUTO: 37.7 % (ref 37–47)
HGB BLD-MCNC: 11.4 G/DL (ref 12–16)
HGB UR QL STRIP: ABNORMAL
HYALINE CASTS #/AREA URNS AUTO: ABNORMAL /HPF (ref 0–5)
KETONES UR STRIP-MCNC: NEGATIVE MG/DL
LEUKOCYTE ESTERASE UR QL STRIP: ABNORMAL
MCH RBC QN AUTO: 29.2 PG (ref 27–31.3)
MCHC RBC AUTO-ENTMCNC: 30.2 % (ref 33–37)
MCV RBC AUTO: 96.4 FL (ref 79.4–94.8)
NITRITE UR QL STRIP: POSITIVE
PH UR STRIP: 5.5 [PH] (ref 5–9)
PHOSPHATE SERPL-MCNC: 4 MG/DL (ref 2.3–4.8)
PLATELET # BLD AUTO: 180 K/UL (ref 130–400)
POTASSIUM SERPL-SCNC: 5.4 MEQ/L (ref 3.4–4.9)
PROT UR STRIP-MCNC: 100 MG/DL
PROT UR-MCNC: 88 MG/DL
PROT/CREAT UR-RTO: 0.9 ML/ML
PROT/CREAT UR-RTO: 0.9 ML/ML (ref 0–0.2)
RBC # BLD AUTO: 3.91 M/UL (ref 4.2–5.4)
RBC #/AREA URNS AUTO: ABNORMAL /HPF (ref 0–5)
SODIUM SERPL-SCNC: 142 MEQ/L (ref 135–144)
SP GR UR STRIP: 1.02 (ref 1–1.03)
UROBILINOGEN UR STRIP-ACNC: 0.2 E.U./DL
WBC # BLD AUTO: 5.4 K/UL (ref 4.8–10.8)
WBC #/AREA URNS AUTO: >100 /HPF (ref 0–5)

## 2023-10-26 LAB — VITAMIN D 25-HYDROXY: 22.8 NG/ML

## 2023-10-27 LAB
COMMENT: ABNORMAL
MISCELLANEOUS LAB TEST RESULT: ABNORMAL

## 2023-10-27 NOTE — PROGRESS NOTES
Patient Name: Zulema Stevens : 1945        Date: 2023      Type of Appt:  Follow up    Reason for appt: Review MRI, Xray L-spine     Pt last seen by Dr Gamal Radford on 10/05/2023     Studies done: 10/14/2023 - MRI [place] @ [facility]  10/14/2023 - XR LUMBAR SPINE @ [facility]      Smoking: No       The patient is a 69 y.o. female who presents today for evaluation of the following problems:      Chief complaint back pain inability to walk        Referred by Yadira Pritchard MD        PAST MEDICAL, FAMILY AND SOCIAL HISTORY:  Past Medical History        Past Medical History:   Diagnosis Date    Antral ulcer 2015     DR Niurka Seals    Asthma       \"cured by [facility]\"    Coronary artery disease      Coronary artery disease involving native coronary artery of native heart without angina pectoris 7/10/2018     has x 4 cardiac stents / Dr. Ricky Hunter    Diverticulosis of colon (without mention of hemorrhage) 2015     DR Niurka Seals    Essential hypertension 12/10/2013     meds > 20 yrs    History of blood transfusion      with hysterectomy    History of normal resting EKG      normal    History of ST elevation myocardial infarction (STEMI) 2017    History of transfusion of whole blood      Excessive bleeding before hysterectomy    Hyperlipidemia       meds > 2 yrs    Hypertension      Hypothyroidism       past trx years ago then stopped / recent restart    Kidney disease      Low back pain      Morbid obesity due to excess calories ([address]) 2017    Morbid obesity due to excess calories ([address]) 2017    Osteoarthritis      Pneumonia      RBBB (right bundle branch block) 3/17/2022    Shoulder dislocation      ST elevation myocardial infarction involving left circumflex coronary artery ([address]) 2017    Unspecified gastritis and gastroduodenitis without mention of hemorrhage 2015     DR Niurka Seals         Past Surgical History         Past Surgical History:   Procedure Laterality Date

## 2023-11-02 ENCOUNTER — OFFICE VISIT (OUTPATIENT)
Dept: NEUROSURGERY | Age: 78
End: 2023-11-02
Payer: MEDICARE

## 2023-11-02 VITALS — TEMPERATURE: 97.8 F | WEIGHT: 222 LBS | BODY MASS INDEX: 40.85 KG/M2 | HEIGHT: 62 IN

## 2023-11-02 DIAGNOSIS — E66.01 CLASS 3 SEVERE OBESITY DUE TO EXCESS CALORIES WITH SERIOUS COMORBIDITY AND BODY MASS INDEX (BMI) OF 40.0 TO 44.9 IN ADULT (HCC): ICD-10-CM

## 2023-11-02 DIAGNOSIS — M48.061 MYELOPATHY CONCURRENT WITH AND DUE TO STENOSIS OF LUMBAR SPINE (HCC): ICD-10-CM

## 2023-11-02 DIAGNOSIS — M43.17 ACQUIRED SPONDYLOLISTHESIS OF LUMBOSACRAL REGION: Primary | ICD-10-CM

## 2023-11-02 DIAGNOSIS — G99.2 MYELOPATHY CONCURRENT WITH AND DUE TO STENOSIS OF LUMBAR SPINE (HCC): ICD-10-CM

## 2023-11-02 PROCEDURE — 1123F ACP DISCUSS/DSCN MKR DOCD: CPT | Performed by: NEUROLOGICAL SURGERY

## 2023-11-02 PROCEDURE — 99214 OFFICE O/P EST MOD 30 MIN: CPT | Performed by: NEUROLOGICAL SURGERY

## 2023-11-30 ENCOUNTER — HOSPITAL ENCOUNTER (OUTPATIENT)
Dept: LAB | Age: 78
Discharge: HOME | End: 2023-11-30
Payer: MEDICARE

## 2023-11-30 LAB
ALBUMIN SERPL-MCNC: 4.3 G/DL (ref 3.5–4.6)
ANION GAP SERPL CALCULATED.3IONS-SCNC: 14 MEQ/L (ref 9–15)
BUN SERPL-MCNC: 39 MG/DL (ref 8–23)
CALCIUM SERPL-MCNC: 9.5 MG/DL (ref 8.5–9.9)
CHLORIDE SERPL-SCNC: 105 MEQ/L (ref 95–107)
CO2 SERPL-SCNC: 24 MEQ/L (ref 20–31)
CREAT SERPL-MCNC: 1.53 MG/DL (ref 0.5–0.9)
GLUCOSE SERPL-MCNC: 128 MG/DL (ref 70–99)
PHOSPHATE SERPL-MCNC: 4.4 MG/DL (ref 2.3–4.8)
POTASSIUM SERPL-SCNC: 5 MEQ/L (ref 3.4–4.9)
SODIUM SERPL-SCNC: 143 MEQ/L (ref 135–144)

## 2023-11-30 PROCEDURE — 80069 RENAL FUNCTION PANEL: CPT

## 2023-11-30 PROCEDURE — 36415 COLL VENOUS BLD VENIPUNCTURE: CPT

## 2023-11-30 NOTE — PROGRESS NOTES
Patient Name: Chyrl Goltz : 1945        Date: 2023      Type of Appt:  Follow up    Reason for appt: 1 Month follow up 30 min     Pt last seen by Dr Gene Raymond on 2023    Smoking:  No    The patient is a 69 y.o. female who presents today for evaluation of the following problems:      Chief complaint back pain inability to walk        Referred by Laura Fatima MD        PAST MEDICAL, FAMILY AND SOCIAL HISTORY:  Past Medical History           Past Medical History:   Diagnosis Date    Antral ulcer 2015     DR Amanda Brown    Asthma       \"cured by [facility]\"    Coronary artery disease      Coronary artery disease involving native coronary artery of native heart without angina pectoris 7/10/2018     has x 4 cardiac stents / Dr. Ken Leigh    Diverticulosis of colon (without mention of hemorrhage) 2015     DR Amanda Brown    Essential hypertension 12/10/2013     meds > 20 yrs    History of blood transfusion      with hysterectomy    History of normal resting EKG      normal    History of ST elevation myocardial infarction (STEMI) 2017    History of transfusion of whole blood      Excessive bleeding before hysterectomy    Hyperlipidemia       meds > 2 yrs    Hypertension      Hypothyroidism       past trx years ago then stopped / recent restart    Kidney disease      Low back pain      Morbid obesity due to excess calories ([address]) 2017    Morbid obesity due to excess calories ([address]) 2017    Osteoarthritis      Pneumonia      RBBB (right bundle branch block) 3/17/2022    Shoulder dislocation      ST elevation myocardial infarction involving left circumflex coronary artery ([address]) 2017    Unspecified gastritis and gastroduodenitis without mention of hemorrhage 2015     DR Amanda Brown         Past Surgical History             Past Surgical History:   Procedure Laterality Date    CARDIAC SURGERY        has x 4 cardiac stents    COLONOSCOPY   2015     DR Amanda Brown -

## 2023-12-05 ENCOUNTER — PREP FOR PROCEDURE (OUTPATIENT)
Dept: NEUROSURGERY | Age: 78
End: 2023-12-05

## 2023-12-05 ENCOUNTER — OFFICE VISIT (OUTPATIENT)
Dept: NEUROSURGERY | Age: 78
End: 2023-12-05
Payer: MEDICARE

## 2023-12-05 VITALS — RESPIRATION RATE: 18 BRPM | HEIGHT: 62 IN | BODY MASS INDEX: 41.41 KG/M2 | WEIGHT: 225 LBS | TEMPERATURE: 97.6 F

## 2023-12-05 DIAGNOSIS — M48.061 MYELOPATHY CONCURRENT WITH AND DUE TO STENOSIS OF LUMBAR SPINE (HCC): ICD-10-CM

## 2023-12-05 DIAGNOSIS — Z96.651 STATUS POST TOTAL KNEE REPLACEMENT, RIGHT: ICD-10-CM

## 2023-12-05 DIAGNOSIS — M43.16 SPONDYLOLISTHESIS OF LUMBAR REGION: ICD-10-CM

## 2023-12-05 DIAGNOSIS — E66.01 CLASS 3 SEVERE OBESITY DUE TO EXCESS CALORIES WITH SERIOUS COMORBIDITY AND BODY MASS INDEX (BMI) OF 40.0 TO 44.9 IN ADULT (HCC): ICD-10-CM

## 2023-12-05 DIAGNOSIS — Z96.652 STATUS POST TOTAL KNEE REPLACEMENT, LEFT: ICD-10-CM

## 2023-12-05 DIAGNOSIS — M48.062 SPINAL STENOSIS OF LUMBAR REGION WITH NEUROGENIC CLAUDICATION: ICD-10-CM

## 2023-12-05 DIAGNOSIS — G99.2 MYELOPATHY CONCURRENT WITH AND DUE TO STENOSIS OF LUMBAR SPINE (HCC): ICD-10-CM

## 2023-12-05 DIAGNOSIS — M43.17 ACQUIRED SPONDYLOLISTHESIS OF LUMBOSACRAL REGION: Primary | ICD-10-CM

## 2023-12-05 PROCEDURE — 1123F ACP DISCUSS/DSCN MKR DOCD: CPT | Performed by: NEUROLOGICAL SURGERY

## 2023-12-05 PROCEDURE — 99213 OFFICE O/P EST LOW 20 MIN: CPT | Performed by: NEUROLOGICAL SURGERY

## 2023-12-05 RX ORDER — SODIUM CHLORIDE 0.9 % (FLUSH) 0.9 %
5-40 SYRINGE (ML) INJECTION PRN
Status: CANCELLED | OUTPATIENT
Start: 2023-12-05

## 2023-12-05 RX ORDER — SODIUM CHLORIDE 0.9 % (FLUSH) 0.9 %
5-40 SYRINGE (ML) INJECTION EVERY 12 HOURS SCHEDULED
Status: CANCELLED | OUTPATIENT
Start: 2023-12-05

## 2023-12-05 RX ORDER — SODIUM CHLORIDE 9 MG/ML
INJECTION, SOLUTION INTRAVENOUS PRN
Status: CANCELLED | OUTPATIENT
Start: 2023-12-05

## 2023-12-13 DIAGNOSIS — R39.15 URINARY URGENCY: ICD-10-CM

## 2023-12-13 RX ORDER — OXYBUTYNIN CHLORIDE 15 MG/1
30 TABLET, EXTENDED RELEASE ORAL DAILY
Qty: 60 TABLET | Refills: 3 | Status: SHIPPED | OUTPATIENT
Start: 2023-12-13

## 2023-12-13 NOTE — TELEPHONE ENCOUNTER
Comments:     Last Office Visit (last PCP visit):   9/26/2023    Next Visit Date:  Future Appointments   Date Time Provider [address]   2/15/2024 12:45 PM Holiday, Jordan [place], DO [address] [facility]       **If hasn't been seen in over a year OR hasn't followed up according to last diabetes/ADHD visit, make appointment for patient before sending refill to provider.     Rx requested:  Requested Prescriptions     Pending Prescriptions Disp Refills    oxybutynin (DITROPAN XL) 15 MG extended release tablet 60 tablet 3     Sig: Take 2 tablets by mouth daily

## 2023-12-13 NOTE — TELEPHONE ENCOUNTER
----- Message from Jorgito Sellers sent at 12/13/2023 10:39 AM EST -----  Subject: Refill Request    QUESTIONS  Name of Medication? oxybutynin (DITROPAN XL) 15 MG extended release tablet  Patient-reported dosage and instructions? 15 MG extended release tablets 2   x daily  How many days do you have left? 2  Preferred Pharmacy? [id number]  Pharmacy phone number (if available)? [phone]  Additional Information for Provider? Patient would like to have a 30 day   fill of 60 pills so she is not going to pharmacy every 15 days. Please   call to discuss and inform when sent   ---------------------------------------------------------------------------  --------------  CALL BACK INFO  What is the best way for the office to contact you? OK to leave message on   voicemail  Preferred Call Back Phone Number? [phone]  ---------------------------------------------------------------------------  --------------  SCRIPT ANSWERS  Relationship to Patient?  Self

## 2023-12-28 PROBLEM — M48.062 SPINAL STENOSIS OF LUMBAR REGION WITH NEUROGENIC CLAUDICATION: Status: ACTIVE | Noted: 2023-12-05

## 2023-12-28 PROBLEM — M43.16 SPONDYLOLISTHESIS OF LUMBAR REGION: Status: ACTIVE | Noted: 2023-12-05

## 2024-01-12 ENCOUNTER — HOSPITAL ENCOUNTER (OUTPATIENT)
Dept: PREADMISSION TESTING | Age: 79
Discharge: HOME | End: 2024-01-16
Payer: MEDICARE

## 2024-01-12 VITALS
TEMPERATURE: 98.2 F | SYSTOLIC BLOOD PRESSURE: 160 MMHG | HEART RATE: 59 BPM | OXYGEN SATURATION: 97 % | DIASTOLIC BLOOD PRESSURE: 69 MMHG | WEIGHT: 228.4 LBS | BODY MASS INDEX: 43.12 KG/M2 | HEIGHT: 61 IN | RESPIRATION RATE: 22 BRPM

## 2024-01-12 LAB
ABO + RH BLD: NORMAL
ANION GAP SERPL CALCULATED.3IONS-SCNC: 9 MEQ/L (ref 9–15)
APTT PPP: 36.9 SEC (ref 24.4–36.8)
BLD GP AB SCN SERPL QL: NORMAL
BUN SERPL-MCNC: 35 MG/DL (ref 8–23)
CALCIUM SERPL-MCNC: 9.4 MG/DL (ref 8.5–9.9)
CHLORIDE SERPL-SCNC: 107 MEQ/L (ref 95–107)
CO2 SERPL-SCNC: 23 MEQ/L (ref 20–31)
CREAT SERPL-MCNC: 1.32 MG/DL (ref 0.5–0.9)
EKG ATRIAL RATE: 51 BPM
EKG P AXIS: 63 DEGREES
EKG P-R INTERVAL: 196 MS
EKG Q-T INTERVAL: 520 MS
EKG QRS DURATION: 172 MS
EKG QTC CALCULATION (BAZETT): 479 MS
EKG R AXIS: -17 DEGREES
EKG T AXIS: -4 DEGREES
EKG VENTRICULAR RATE: 51 BPM
ERYTHROCYTE [DISTWIDTH] IN BLOOD BY AUTOMATED COUNT: 13.2 % (ref 11.5–14.5)
GLUCOSE SERPL-MCNC: 97 MG/DL (ref 70–99)
HCT VFR BLD AUTO: 37.6 % (ref 37–47)
HGB BLD-MCNC: 11.3 G/DL (ref 12–16)
INR PPP: 1
MCH RBC QN AUTO: 29.8 PG (ref 27–31.3)
MCHC RBC AUTO-ENTMCNC: 30.1 % (ref 33–37)
MCV RBC AUTO: 99.2 FL (ref 79.4–94.8)
PLATELET # BLD AUTO: 154 K/UL (ref 130–400)
POTASSIUM SERPL-SCNC: 5 MEQ/L (ref 3.4–4.9)
PROTHROMBIN TIME: 13.8 SEC (ref 12.3–14.9)
RBC # BLD AUTO: 3.79 M/UL (ref 4.2–5.4)
SODIUM SERPL-SCNC: 139 MEQ/L (ref 135–144)
WBC # BLD AUTO: 5.5 K/UL (ref 4.8–10.8)

## 2024-01-12 PROCEDURE — 86901 BLOOD TYPING SEROLOGIC RH(D): CPT

## 2024-01-12 PROCEDURE — 86900 BLOOD TYPING SEROLOGIC ABO: CPT

## 2024-01-12 PROCEDURE — 87641 MR-STAPH DNA AMP PROBE: CPT

## 2024-01-12 PROCEDURE — 93010 ELECTROCARDIOGRAM REPORT: CPT | Performed by: INTERNAL MEDICINE

## 2024-01-12 PROCEDURE — 86850 RBC ANTIBODY SCREEN: CPT

## 2024-01-12 PROCEDURE — 85610 PROTHROMBIN TIME: CPT

## 2024-01-12 PROCEDURE — 85730 THROMBOPLASTIN TIME PARTIAL: CPT

## 2024-01-12 PROCEDURE — 93005 ELECTROCARDIOGRAM TRACING: CPT | Performed by: FAMILY MEDICINE

## 2024-01-12 PROCEDURE — 80048 BASIC METABOLIC PNL TOTAL CA: CPT

## 2024-01-12 PROCEDURE — 85027 COMPLETE CBC AUTOMATED: CPT

## 2024-01-12 NOTE — H&P (VIEW-ONLY)
Blood Products: No  Personal or FH of DVT/PE: No    Medical/Cardiac Clearance Needed: Requested from Dr. Boland due to patient's cardiac history.     ALLERGIES: Lisinopril and Tramadol    PAST MEDICAL HISTORY:    Past Medical History:   Diagnosis Date    Antral ulcer 01/14/2015    DR LANE    CHF (congestive heart failure) (HCC)     Coronary artery disease     Coronary artery disease involving native coronary artery of native heart without angina pectoris 07/10/2018    has x 4 cardiac stents / Dr. Boland    Diverticulosis of colon (without mention of hemorrhage) 01/14/2015    DR LANE    Essential hypertension 12/10/2013    meds > 20 yrs    History of blood transfusion     with hysterectomy and s/p knee replacment    History of normal resting EKG 1996    normal    History of ST elevation myocardial infarction (STEMI) 07/11/2017    History of transfusion of whole blood 1996    Excessive bleeding before hysterectomy    Hyperlipidemia     meds > 2 yrs    Hypertension     Hypothyroidism     past trx years ago then stopped / recent restart    Kidney disease     Low back pain     Morbid obesity due to excess calories (HCC) 05/13/2017    Morbid obesity due to excess calories ([facility]) 06/06/2017    Osteoarthritis     Pneumonia     RBBB (right bundle branch block) 03/17/2022    Shoulder dislocation     ST elevation myocardial infarction involving left circumflex coronary artery (HCC) 05/13/2017    Unspecified gastritis and gastroduodenitis without mention of hemorrhage 05/06/2015    DR LANE     PAST SURGICAL HISTORY:    Past Surgical History:   Procedure Laterality Date    CARDIAC SURGERY  2017    has x 2 cardiac stents (2 separate surgeries    COLONOSCOPY  01/14/2015    DR LANE - DIVERTICULOSIS    COLONOSCOPY N/A 01/02/2021    COLONOSCOPY DIAGNOSTIC performed by Mohan Lane MD at [facility] OR    CORONARY ANGIOPLASTY WITH STENT PLACEMENT  05/17/2017    x2 stents    CYST REMOVAL Left 02/07/2019    RESECTION OF LEFT PINNA

## 2024-01-12 NOTE — H&P
Blood Products: No  Personal or FH of DVT/PE: No    Medical/Cardiac Clearance Needed: Requested from Dr. Boland due to patient's cardiac history.     ALLERGIES: Lisinopril and Tramadol    PAST MEDICAL HISTORY:    Past Medical History:   Diagnosis Date    Antral ulcer 01/14/2015    DR LANE    CHF (congestive heart failure) (HCC)     Coronary artery disease     Coronary artery disease involving native coronary artery of native heart without angina pectoris 07/10/2018    has x 4 cardiac stents / Dr. Boland    Diverticulosis of colon (without mention of hemorrhage) 01/14/2015    DR LANE    Essential hypertension 12/10/2013    meds > 20 yrs    History of blood transfusion     with hysterectomy and s/p knee replacment    History of normal resting EKG 1996    normal    History of ST elevation myocardial infarction (STEMI) 07/11/2017    History of transfusion of whole blood 1996    Excessive bleeding before hysterectomy    Hyperlipidemia     meds > 2 yrs    Hypertension     Hypothyroidism     past trx years ago then stopped / recent restart    Kidney disease     Low back pain     Morbid obesity due to excess calories (HCC) 05/13/2017    Morbid obesity due to excess calories ([facility]) 06/06/2017    Osteoarthritis     Pneumonia     RBBB (right bundle branch block) 03/17/2022    Shoulder dislocation     ST elevation myocardial infarction involving left circumflex coronary artery (HCC) 05/13/2017    Unspecified gastritis and gastroduodenitis without mention of hemorrhage 05/06/2015    DR LANE     PAST SURGICAL HISTORY:    Past Surgical History:   Procedure Laterality Date    CARDIAC SURGERY  2017    has x 2 cardiac stents (2 separate surgeries    COLONOSCOPY  01/14/2015    DR LANE - DIVERTICULOSIS    COLONOSCOPY N/A 01/02/2021    COLONOSCOPY DIAGNOSTIC performed by Mohan Lane MD at [facility] OR    CORONARY ANGIOPLASTY WITH STENT PLACEMENT  05/17/2017    x2 stents    CYST REMOVAL Left 02/07/2019    RESECTION OF LEFT PINNA

## 2024-01-13 LAB
MRSA, DNA, NASAL: ABNORMAL
SPECIMEN DESCRIPTION: ABNORMAL

## 2024-01-16 ENCOUNTER — ANESTHESIA EVENT (OUTPATIENT)
Dept: OPERATING ROOM | Age: 79
End: 2024-01-16
Payer: MEDICARE

## 2024-01-17 ENCOUNTER — HOSPITAL ENCOUNTER (INPATIENT)
Age: 79
LOS: 4 days | Discharge: HOME | DRG: 454 | End: 2024-01-21
Attending: NEUROLOGICAL SURGERY | Admitting: NEUROLOGICAL SURGERY
Payer: MEDICARE

## 2024-01-17 ENCOUNTER — APPOINTMENT (OUTPATIENT)
Dept: GENERAL RADIOLOGY | Age: 79
DRG: 454 | End: 2024-01-17
Attending: NEUROLOGICAL SURGERY
Payer: MEDICARE

## 2024-01-17 ENCOUNTER — ANESTHESIA (OUTPATIENT)
Dept: OPERATING ROOM | Age: 79
End: 2024-01-17
Payer: MEDICARE

## 2024-01-17 DIAGNOSIS — M43.16 SPONDYLOLISTHESIS AT L4-L5 LEVEL: Primary | ICD-10-CM

## 2024-01-17 LAB
GLUCOSE BLD-MCNC: 153 MG/DL (ref 70–99)
HCT VFR BLD AUTO: 33.8 % (ref 37–47)
HGB BLD-MCNC: 10.4 G/DL (ref 12–16)
PERFORMED ON: ABNORMAL

## 2024-01-17 PROCEDURE — 2709999900 HC NON-CHARGEABLE SUPPLY: Performed by: NEUROLOGICAL SURGERY

## 2024-01-17 PROCEDURE — C1763 CONN TISS, NON-HUMAN: HCPCS | Performed by: NEUROLOGICAL SURGERY

## 2024-01-17 PROCEDURE — 0SG1071 FUSION OF 2 OR MORE LUMBAR VERTEBRAL JOINTS WITH AUTOLOGOUS TISSUE SUBSTITUTE, POSTERIOR APPROACH, POSTERIOR COLUMN, OPEN APPROACH: ICD-10-PCS | Performed by: NEUROLOGICAL SURGERY

## 2024-01-17 PROCEDURE — 0SG10AJ FUSION OF 2 OR MORE LUMBAR VERTEBRAL JOINTS WITH INTERBODY FUSION DEVICE, POSTERIOR APPROACH, ANTERIOR COLUMN, OPEN APPROACH: ICD-10-PCS | Performed by: NEUROLOGICAL SURGERY

## 2024-01-17 PROCEDURE — 76942 ECHO GUIDE FOR BIOPSY: CPT | Performed by: STUDENT IN AN ORGANIZED HEALTH CARE EDUCATION/TRAINING PROGRAM

## 2024-01-17 PROCEDURE — 85018 HEMOGLOBIN: CPT

## 2024-01-17 PROCEDURE — 2580000003 HC RX 258: Performed by: NEUROLOGICAL SURGERY

## 2024-01-17 PROCEDURE — 85014 HEMATOCRIT: CPT

## 2024-01-17 PROCEDURE — 22853 INSJ BIOMECHANICAL DEVICE: CPT | Performed by: NEUROLOGICAL SURGERY

## 2024-01-17 PROCEDURE — 6360000002 HC RX W HCPCS: Performed by: NEUROLOGICAL SURGERY

## 2024-01-17 PROCEDURE — 22633 ARTHRD CMBN 1NTRSPC LUMBAR: CPT | Performed by: NEUROLOGICAL SURGERY

## 2024-01-17 PROCEDURE — 6370000000 HC RX 637 (ALT 250 FOR IP): Performed by: NEUROLOGICAL SURGERY

## 2024-01-17 PROCEDURE — 6360000002 HC RX W HCPCS: Performed by: STUDENT IN AN ORGANIZED HEALTH CARE EDUCATION/TRAINING PROGRAM

## 2024-01-17 PROCEDURE — 6360000002 HC RX W HCPCS: Performed by: NURSE ANESTHETIST, CERTIFIED REGISTERED

## 2024-01-17 PROCEDURE — C1713 ANCHOR/SCREW BN/BN,TIS/BN: HCPCS | Performed by: NEUROLOGICAL SURGERY

## 2024-01-17 PROCEDURE — 20931 SP BONE ALGRFT STRUCT ADD-ON: CPT | Performed by: NEUROLOGICAL SURGERY

## 2024-01-17 PROCEDURE — 00U20KZ SUPPLEMENT DURA MATER WITH NONAUTOLOGOUS TISSUE SUBSTITUTE, OPEN APPROACH: ICD-10-PCS | Performed by: NEUROLOGICAL SURGERY

## 2024-01-17 PROCEDURE — 2720000010 HC SURG SUPPLY STERILE: Performed by: NEUROLOGICAL SURGERY

## 2024-01-17 PROCEDURE — C1889 IMPLANT/INSERT DEVICE, NOC: HCPCS | Performed by: NEUROLOGICAL SURGERY

## 2024-01-17 PROCEDURE — 2500000003 HC RX 250 WO HCPCS: Performed by: NEUROLOGICAL SURGERY

## 2024-01-17 PROCEDURE — 3700000000 HC ANESTHESIA ATTENDED CARE: Performed by: NEUROLOGICAL SURGERY

## 2024-01-17 PROCEDURE — 22842 INSERT SPINE FIXATION DEVICE: CPT | Performed by: NEUROLOGICAL SURGERY

## 2024-01-17 PROCEDURE — 2780000010 HC IMPLANT OTHER: Performed by: NEUROLOGICAL SURGERY

## 2024-01-17 PROCEDURE — 63052 LAM FACETC/FRMT ARTHRD LUM 1: CPT | Performed by: NEUROLOGICAL SURGERY

## 2024-01-17 PROCEDURE — 61783 SCAN PROC SPINAL: CPT | Performed by: NEUROLOGICAL SURGERY

## 2024-01-17 PROCEDURE — 01NB0ZZ RELEASE LUMBAR NERVE, OPEN APPROACH: ICD-10-PCS | Performed by: NEUROLOGICAL SURGERY

## 2024-01-17 PROCEDURE — 2500000003 HC RX 250 WO HCPCS

## 2024-01-17 PROCEDURE — 3700000001 HC ADD 15 MINUTES (ANESTHESIA): Performed by: NEUROLOGICAL SURGERY

## 2024-01-17 PROCEDURE — 63048 LAM FACETEC &FORAMOT EA ADDL: CPT | Performed by: NEUROLOGICAL SURGERY

## 2024-01-17 PROCEDURE — 63047 LAM FACETEC & FORAMOT LUMBAR: CPT | Performed by: NEUROLOGICAL SURGERY

## 2024-01-17 PROCEDURE — 7100000000 HC PACU RECOVERY - FIRST 15 MIN: Performed by: NEUROLOGICAL SURGERY

## 2024-01-17 PROCEDURE — 7100000001 HC PACU RECOVERY - ADDTL 15 MIN: Performed by: NEUROLOGICAL SURGERY

## 2024-01-17 PROCEDURE — 3600000014 HC SURGERY LEVEL 4 ADDTL 15MIN: Performed by: NEUROLOGICAL SURGERY

## 2024-01-17 PROCEDURE — 3600000004 HC SURGERY LEVEL 4 BASE: Performed by: NEUROLOGICAL SURGERY

## 2024-01-17 PROCEDURE — 2580000003 HC RX 258: Performed by: STUDENT IN AN ORGANIZED HEALTH CARE EDUCATION/TRAINING PROGRAM

## 2024-01-17 PROCEDURE — 0SB20ZZ EXCISION OF LUMBAR VERTEBRAL DISC, OPEN APPROACH: ICD-10-PCS | Performed by: NEUROLOGICAL SURGERY

## 2024-01-17 PROCEDURE — 1210000000 HC MED SURG R&B

## 2024-01-17 PROCEDURE — 2500000003 HC RX 250 WO HCPCS: Performed by: NURSE ANESTHETIST, CERTIFIED REGISTERED

## 2024-01-17 PROCEDURE — 6360000002 HC RX W HCPCS

## 2024-01-17 DEVICE — DURASEAL® DURAL SEALANT SYSTEM 5ML 5 PACK
Type: IMPLANTABLE DEVICE | Site: SPINE LUMBAR | Status: FUNCTIONAL
Brand: DURASEAL®

## 2024-01-17 DEVICE — SCREW SPNL DIA5.5MM OPN TULIP LOK RELINE: Type: IMPLANTABLE DEVICE | Site: SPINE LUMBAR | Status: FUNCTIONAL

## 2024-01-17 DEVICE — BONE GRAFT KIT 7510100 INFUSE X SMALL
Type: IMPLANTABLE DEVICE | Site: SPINE LUMBAR | Status: FUNCTIONAL
Brand: INFUSE® BONE GRAFT

## 2024-01-17 DEVICE — DURAGEN® DURAL GRAFT MATRIX 5 PK, 1X1 DOM
Type: IMPLANTABLE DEVICE | Site: SPINE LUMBAR | Status: FUNCTIONAL
Brand: DURAGEN®

## 2024-01-17 DEVICE — GRAFT BNE SUB 5ML MTRX CELLULAR OSTEOCEL +: Type: IMPLANTABLE DEVICE | Site: SPINE LUMBAR | Status: FUNCTIONAL

## 2024-01-17 DEVICE — DURAGEN® DURAL GRAFT MATRIX 1PK, 2X2 DOM
Type: IMPLANTABLE DEVICE | Site: SPINE LUMBAR | Status: FUNCTIONAL
Brand: DURAGEN®

## 2024-01-17 DEVICE — SABLE SPACER, 10X26, 9-16MM, 8°
Type: IMPLANTABLE DEVICE | Site: SPINE LUMBAR | Status: FUNCTIONAL
Brand: SABLE

## 2024-01-17 DEVICE — ROD SPNL LORDTC 5.5X45 MM TI RELINE-O: Type: IMPLANTABLE DEVICE | Site: SPINE LUMBAR | Status: FUNCTIONAL

## 2024-01-17 DEVICE — SCREW SPNL POLYAX 4S 6.5X45 MM TRACTION POLYETH RELINE-O: Type: IMPLANTABLE DEVICE | Site: SPINE LUMBAR | Status: FUNCTIONAL

## 2024-01-17 RX ORDER — BISACODYL 5 MG/1
5 TABLET, DELAYED RELEASE ORAL DAILY
Status: DISCONTINUED | OUTPATIENT
Start: 2024-01-17 | End: 2024-01-21 | Stop reason: HOSPADM

## 2024-01-17 RX ORDER — MAGNESIUM HYDROXIDE/ALUMINUM HYDROXICE/SIMETHICONE 120; 1200; 1200 MG/30ML; MG/30ML; MG/30ML
15 SUSPENSION ORAL EVERY 6 HOURS PRN
Status: DISCONTINUED | OUTPATIENT
Start: 2024-01-17 | End: 2024-01-21 | Stop reason: HOSPADM

## 2024-01-17 RX ORDER — DIPHENHYDRAMINE HYDROCHLORIDE 50 MG/ML
12.5 INJECTION INTRAMUSCULAR; INTRAVENOUS
Status: DISCONTINUED | OUTPATIENT
Start: 2024-01-17 | End: 2024-01-17 | Stop reason: HOSPADM

## 2024-01-17 RX ORDER — ONDANSETRON 2 MG/ML
4 INJECTION INTRAMUSCULAR; INTRAVENOUS
Status: DISCONTINUED | OUTPATIENT
Start: 2024-01-17 | End: 2024-01-17 | Stop reason: HOSPADM

## 2024-01-17 RX ORDER — DILTIAZEM HYDROCHLORIDE 240 MG/1
240 CAPSULE, COATED, EXTENDED RELEASE ORAL DAILY
Status: DISCONTINUED | OUTPATIENT
Start: 2024-01-17 | End: 2024-01-21 | Stop reason: HOSPADM

## 2024-01-17 RX ORDER — SODIUM CHLORIDE 9 MG/ML
INJECTION, SOLUTION INTRAVENOUS PRN
Status: DISCONTINUED | OUTPATIENT
Start: 2024-01-17 | End: 2024-01-17 | Stop reason: HOSPADM

## 2024-01-17 RX ORDER — HYDROCODONE BITARTRATE AND ACETAMINOPHEN 5; 325 MG/1; MG/1
1 TABLET ORAL EVERY 6 HOURS PRN
Status: DISCONTINUED | OUTPATIENT
Start: 2024-01-17 | End: 2024-01-21 | Stop reason: HOSPADM

## 2024-01-17 RX ORDER — FENTANYL CITRATE 50 UG/ML
INJECTION, SOLUTION INTRAMUSCULAR; INTRAVENOUS PRN
Status: DISCONTINUED | OUTPATIENT
Start: 2024-01-17 | End: 2024-01-17 | Stop reason: SDUPTHER

## 2024-01-17 RX ORDER — DEXAMETHASONE SODIUM PHOSPHATE 10 MG/ML
INJECTION INTRAMUSCULAR; INTRAVENOUS PRN
Status: DISCONTINUED | OUTPATIENT
Start: 2024-01-17 | End: 2024-01-17 | Stop reason: SDUPTHER

## 2024-01-17 RX ORDER — LIDOCAINE HYDROCHLORIDE 10 MG/ML
1 INJECTION, SOLUTION EPIDURAL; INFILTRATION; INTRACAUDAL; PERINEURAL
Status: DISCONTINUED | OUTPATIENT
Start: 2024-01-17 | End: 2024-01-17 | Stop reason: HOSPADM

## 2024-01-17 RX ORDER — ROCURONIUM BROMIDE 10 MG/ML
INJECTION, SOLUTION INTRAVENOUS PRN
Status: DISCONTINUED | OUTPATIENT
Start: 2024-01-17 | End: 2024-01-17 | Stop reason: SDUPTHER

## 2024-01-17 RX ORDER — LIDOCAINE HYDROCHLORIDE AND EPINEPHRINE 10; 10 MG/ML; UG/ML
INJECTION, SOLUTION INFILTRATION; PERINEURAL PRN
Status: DISCONTINUED | OUTPATIENT
Start: 2024-01-17 | End: 2024-01-17 | Stop reason: ALTCHOICE

## 2024-01-17 RX ORDER — SODIUM CHLORIDE 9 MG/ML
INJECTION, SOLUTION INTRAVENOUS PRN
Status: DISCONTINUED | OUTPATIENT
Start: 2024-01-17 | End: 2024-01-21 | Stop reason: HOSPADM

## 2024-01-17 RX ORDER — LIDOCAINE HYDROCHLORIDE 20 MG/ML
INJECTION, SOLUTION INTRAVENOUS PRN
Status: DISCONTINUED | OUTPATIENT
Start: 2024-01-17 | End: 2024-01-17 | Stop reason: SDUPTHER

## 2024-01-17 RX ORDER — MAGNESIUM HYDROXIDE 1200 MG/15ML
LIQUID ORAL CONTINUOUS PRN
Status: COMPLETED | OUTPATIENT
Start: 2024-01-17 | End: 2024-01-17

## 2024-01-17 RX ORDER — OXYCODONE HYDROCHLORIDE 5 MG/1
5 TABLET ORAL EVERY 6 HOURS PRN
Status: DISCONTINUED | OUTPATIENT
Start: 2024-01-17 | End: 2024-01-21 | Stop reason: HOSPADM

## 2024-01-17 RX ORDER — MIDAZOLAM HYDROCHLORIDE 1 MG/ML
INJECTION INTRAMUSCULAR; INTRAVENOUS
Status: COMPLETED | OUTPATIENT
Start: 2024-01-17 | End: 2024-01-17

## 2024-01-17 RX ORDER — SODIUM CHLORIDE 9 MG/ML
INJECTION, SOLUTION INTRAVENOUS CONTINUOUS
Status: DISCONTINUED | OUTPATIENT
Start: 2024-01-17 | End: 2024-01-19

## 2024-01-17 RX ORDER — METOCLOPRAMIDE HYDROCHLORIDE 5 MG/ML
10 INJECTION INTRAMUSCULAR; INTRAVENOUS
Status: DISCONTINUED | OUTPATIENT
Start: 2024-01-17 | End: 2024-01-17 | Stop reason: HOSPADM

## 2024-01-17 RX ORDER — VANCOMYCIN HYDROCHLORIDE 1 G/20ML
1000 INJECTION, POWDER, LYOPHILIZED, FOR SOLUTION INTRAVENOUS ONCE
Status: DISCONTINUED | OUTPATIENT
Start: 2024-01-17 | End: 2024-01-17 | Stop reason: ALTCHOICE

## 2024-01-17 RX ORDER — SODIUM CHLORIDE 0.9 % (FLUSH) 0.9 %
5-40 SYRINGE (ML) INJECTION PRN
Status: DISCONTINUED | OUTPATIENT
Start: 2024-01-17 | End: 2024-01-21 | Stop reason: HOSPADM

## 2024-01-17 RX ORDER — SODIUM CHLORIDE 0.9 % (FLUSH) 0.9 %
5-40 SYRINGE (ML) INJECTION EVERY 12 HOURS SCHEDULED
Status: DISCONTINUED | OUTPATIENT
Start: 2024-01-17 | End: 2024-01-17 | Stop reason: HOSPADM

## 2024-01-17 RX ORDER — OXYCODONE HYDROCHLORIDE 5 MG/1
5 TABLET ORAL
Status: DISCONTINUED | OUTPATIENT
Start: 2024-01-17 | End: 2024-01-17 | Stop reason: HOSPADM

## 2024-01-17 RX ORDER — FENTANYL CITRATE 0.05 MG/ML
50 INJECTION, SOLUTION INTRAMUSCULAR; INTRAVENOUS EVERY 10 MIN PRN
Status: DISCONTINUED | OUTPATIENT
Start: 2024-01-17 | End: 2024-01-17 | Stop reason: HOSPADM

## 2024-01-17 RX ORDER — ACETAMINOPHEN 325 MG/1
650 TABLET ORAL EVERY 6 HOURS
Status: DISCONTINUED | OUTPATIENT
Start: 2024-01-17 | End: 2024-01-21 | Stop reason: HOSPADM

## 2024-01-17 RX ORDER — ATORVASTATIN CALCIUM 40 MG/1
40 TABLET, FILM COATED ORAL NIGHTLY
Status: DISCONTINUED | OUTPATIENT
Start: 2024-01-17 | End: 2024-01-21 | Stop reason: HOSPADM

## 2024-01-17 RX ORDER — KETOROLAC TROMETHAMINE 15 MG/ML
15 INJECTION, SOLUTION INTRAMUSCULAR; INTRAVENOUS EVERY 6 HOURS
Status: DISCONTINUED | OUTPATIENT
Start: 2024-01-17 | End: 2024-01-19

## 2024-01-17 RX ORDER — LOSARTAN POTASSIUM AND HYDROCHLOROTHIAZIDE 12.5; 5 MG/1; MG/1
2 TABLET ORAL DAILY
Status: DISCONTINUED | OUTPATIENT
Start: 2024-01-17 | End: 2024-01-21

## 2024-01-17 RX ORDER — OXYBUTYNIN CHLORIDE 5 MG/1
30 TABLET, EXTENDED RELEASE ORAL DAILY
Status: DISCONTINUED | OUTPATIENT
Start: 2024-01-17 | End: 2024-01-21 | Stop reason: HOSPADM

## 2024-01-17 RX ORDER — OXYCODONE HYDROCHLORIDE AND ACETAMINOPHEN 5; 325 MG/1; MG/1
1 TABLET ORAL EVERY 4 HOURS PRN
Status: DISCONTINUED | OUTPATIENT
Start: 2024-01-17 | End: 2024-01-21 | Stop reason: HOSPADM

## 2024-01-17 RX ORDER — ROPIVACAINE HYDROCHLORIDE 5 MG/ML
INJECTION, SOLUTION EPIDURAL; INFILTRATION; PERINEURAL
Status: COMPLETED | OUTPATIENT
Start: 2024-01-17 | End: 2024-01-17

## 2024-01-17 RX ORDER — POLYETHYLENE GLYCOL 3350 17 G/17G
17 POWDER, FOR SOLUTION ORAL DAILY PRN
Status: DISCONTINUED | OUTPATIENT
Start: 2024-01-17 | End: 2024-01-21 | Stop reason: HOSPADM

## 2024-01-17 RX ORDER — OXYCODONE HYDROCHLORIDE 5 MG/1
5 TABLET ORAL EVERY 4 HOURS PRN
Status: DISCONTINUED | OUTPATIENT
Start: 2024-01-17 | End: 2024-01-21 | Stop reason: HOSPADM

## 2024-01-17 RX ORDER — ONDANSETRON 2 MG/ML
4 INJECTION INTRAMUSCULAR; INTRAVENOUS EVERY 6 HOURS PRN
Status: DISCONTINUED | OUTPATIENT
Start: 2024-01-17 | End: 2024-01-21 | Stop reason: HOSPADM

## 2024-01-17 RX ORDER — SODIUM CHLORIDE 0.9 % (FLUSH) 0.9 %
5-40 SYRINGE (ML) INJECTION EVERY 12 HOURS SCHEDULED
Status: DISCONTINUED | OUTPATIENT
Start: 2024-01-17 | End: 2024-01-21 | Stop reason: HOSPADM

## 2024-01-17 RX ORDER — SODIUM CHLORIDE 0.9 % (FLUSH) 0.9 %
5-40 SYRINGE (ML) INJECTION PRN
Status: DISCONTINUED | OUTPATIENT
Start: 2024-01-17 | End: 2024-01-17 | Stop reason: HOSPADM

## 2024-01-17 RX ORDER — METOPROLOL TARTRATE 50 MG/1
50 TABLET, FILM COATED ORAL 2 TIMES DAILY
Status: DISCONTINUED | OUTPATIENT
Start: 2024-01-17 | End: 2024-01-21 | Stop reason: HOSPADM

## 2024-01-17 RX ORDER — VANCOMYCIN HYDROCHLORIDE 1 G/20ML
INJECTION, POWDER, LYOPHILIZED, FOR SOLUTION INTRAVENOUS PRN
Status: DISCONTINUED | OUTPATIENT
Start: 2024-01-17 | End: 2024-01-17 | Stop reason: ALTCHOICE

## 2024-01-17 RX ORDER — HYDROCODONE BITARTRATE AND ACETAMINOPHEN 5; 325 MG/1; MG/1
1 TABLET ORAL EVERY 6 HOURS PRN
Qty: 56 TABLET | Refills: 0 | Status: SHIPPED | OUTPATIENT
Start: 2024-01-17 | End: 2024-01-31

## 2024-01-17 RX ORDER — ONDANSETRON 2 MG/ML
INJECTION INTRAMUSCULAR; INTRAVENOUS PRN
Status: DISCONTINUED | OUTPATIENT
Start: 2024-01-17 | End: 2024-01-17 | Stop reason: SDUPTHER

## 2024-01-17 RX ORDER — PROPOFOL 10 MG/ML
INJECTION, EMULSION INTRAVENOUS PRN
Status: DISCONTINUED | OUTPATIENT
Start: 2024-01-17 | End: 2024-01-17 | Stop reason: SDUPTHER

## 2024-01-17 RX ORDER — NITROGLYCERIN 0.4 MG/1
0.4 TABLET SUBLINGUAL EVERY 5 MIN PRN
Status: DISCONTINUED | OUTPATIENT
Start: 2024-01-17 | End: 2024-01-21 | Stop reason: HOSPADM

## 2024-01-17 RX ADMIN — SODIUM CHLORIDE: 9 INJECTION, SOLUTION INTRAVENOUS at 22:29

## 2024-01-17 RX ADMIN — HYDROMORPHONE HYDROCHLORIDE 0.5 MG: 1 INJECTION, SOLUTION INTRAMUSCULAR; INTRAVENOUS; SUBCUTANEOUS at 17:14

## 2024-01-17 RX ADMIN — ONDANSETRON 4 MG: 2 INJECTION INTRAMUSCULAR; INTRAVENOUS at 15:26

## 2024-01-17 RX ADMIN — SODIUM CHLORIDE, PRESERVATIVE FREE 10 ML: 5 INJECTION INTRAVENOUS at 20:09

## 2024-01-17 RX ADMIN — METOPROLOL TARTRATE 50 MG: 50 TABLET, FILM COATED ORAL at 20:08

## 2024-01-17 RX ADMIN — SUGAMMADEX 200 MG: 100 INJECTION, SOLUTION INTRAVENOUS at 16:30

## 2024-01-17 RX ADMIN — ROCURONIUM BROMIDE 20 MG: 10 INJECTION, SOLUTION INTRAVENOUS at 08:26

## 2024-01-17 RX ADMIN — ACETAMINOPHEN 325MG 650 MG: 325 TABLET ORAL at 20:08

## 2024-01-17 RX ADMIN — LIDOCAINE HYDROCHLORIDE 40 MG: 20 INJECTION, SOLUTION INTRAVENOUS at 07:37

## 2024-01-17 RX ADMIN — SODIUM CHLORIDE: 9 INJECTION, SOLUTION INTRAVENOUS at 06:43

## 2024-01-17 RX ADMIN — SODIUM CHLORIDE: 9 INJECTION, SOLUTION INTRAVENOUS at 14:38

## 2024-01-17 RX ADMIN — SODIUM CHLORIDE: 9 INJECTION, SOLUTION INTRAVENOUS at 11:04

## 2024-01-17 RX ADMIN — ROCURONIUM BROMIDE 20 MG: 10 INJECTION, SOLUTION INTRAVENOUS at 09:42

## 2024-01-17 RX ADMIN — FENTANYL CITRATE 25 MCG: 50 INJECTION, SOLUTION INTRAMUSCULAR; INTRAVENOUS at 07:36

## 2024-01-17 RX ADMIN — ROCURONIUM BROMIDE 10 MG: 10 INJECTION, SOLUTION INTRAVENOUS at 10:58

## 2024-01-17 RX ADMIN — FENTANYL CITRATE 25 MCG: 50 INJECTION, SOLUTION INTRAMUSCULAR; INTRAVENOUS at 16:33

## 2024-01-17 RX ADMIN — ROCURONIUM BROMIDE 10 MG: 10 INJECTION, SOLUTION INTRAVENOUS at 13:14

## 2024-01-17 RX ADMIN — DEXAMETHASONE SODIUM PHOSPHATE 10 MG: 10 INJECTION INTRAMUSCULAR; INTRAVENOUS at 07:44

## 2024-01-17 RX ADMIN — ROPIVACAINE HYDROCHLORIDE 30 ML: 5 INJECTION, SOLUTION EPIDURAL; INFILTRATION; PERINEURAL at 07:12

## 2024-01-17 RX ADMIN — KETOROLAC TROMETHAMINE 15 MG: 15 INJECTION, SOLUTION INTRAMUSCULAR; INTRAVENOUS at 22:29

## 2024-01-17 RX ADMIN — CEFAZOLIN 2000 MG: 2 INJECTION, POWDER, FOR SOLUTION INTRAMUSCULAR; INTRAVENOUS at 07:43

## 2024-01-17 RX ADMIN — ROCURONIUM BROMIDE 10 MG: 10 INJECTION, SOLUTION INTRAVENOUS at 12:14

## 2024-01-17 RX ADMIN — CEFAZOLIN 2000 MG: 2 INJECTION, POWDER, FOR SOLUTION INTRAMUSCULAR; INTRAVENOUS at 20:07

## 2024-01-17 RX ADMIN — MIDAZOLAM HYDROCHLORIDE 2 MG: 1 INJECTION, SOLUTION INTRAMUSCULAR; INTRAVENOUS at 07:12

## 2024-01-17 RX ADMIN — ROCURONIUM BROMIDE 50 MG: 10 INJECTION, SOLUTION INTRAVENOUS at 07:38

## 2024-01-17 RX ADMIN — OXYCODONE 5 MG: 5 TABLET ORAL at 20:08

## 2024-01-17 RX ADMIN — CEFAZOLIN 2000 MG: 2 INJECTION, POWDER, FOR SOLUTION INTRAMUSCULAR; INTRAVENOUS at 12:00

## 2024-01-17 RX ADMIN — FENTANYL CITRATE 25 MCG: 50 INJECTION, SOLUTION INTRAMUSCULAR; INTRAVENOUS at 08:22

## 2024-01-17 RX ADMIN — ROCURONIUM BROMIDE 20 MG: 10 INJECTION, SOLUTION INTRAVENOUS at 15:23

## 2024-01-17 RX ADMIN — VANCOMYCIN HYDROCHLORIDE 1000 MG: 1 INJECTION, POWDER, LYOPHILIZED, FOR SOLUTION INTRAVENOUS at 08:34

## 2024-01-17 RX ADMIN — FENTANYL CITRATE 25 MCG: 50 INJECTION, SOLUTION INTRAMUSCULAR; INTRAVENOUS at 16:37

## 2024-01-17 RX ADMIN — ATORVASTATIN CALCIUM 40 MG: 40 TABLET, FILM COATED ORAL at 20:08

## 2024-01-17 RX ADMIN — SODIUM CHLORIDE: 9 INJECTION, SOLUTION INTRAVENOUS at 20:06

## 2024-01-17 RX ADMIN — PROPOFOL 140 MG: 10 INJECTION, EMULSION INTRAVENOUS at 07:37

## 2024-01-17 RX ADMIN — ROCURONIUM BROMIDE 10 MG: 10 INJECTION, SOLUTION INTRAVENOUS at 14:26

## 2024-01-17 ASSESSMENT — PAIN DESCRIPTION - LOCATION
LOCATION: BACK

## 2024-01-17 ASSESSMENT — PAIN SCALES - GENERAL
PAINLEVEL_OUTOF10: 1
PAINLEVEL_OUTOF10: 0
PAINLEVEL_OUTOF10: 6
PAINLEVEL_OUTOF10: 10
PAINLEVEL_OUTOF10: 10
PAINLEVEL_OUTOF10: 5

## 2024-01-17 ASSESSMENT — PAIN DESCRIPTION - DESCRIPTORS: DESCRIPTORS: ACHING;PRESSURE

## 2024-01-17 ASSESSMENT — PAIN - FUNCTIONAL ASSESSMENT
PAIN_FUNCTIONAL_ASSESSMENT: PREVENTS OR INTERFERES WITH ALL ACTIVE AND SOME PASSIVE ACTIVITIES
PAIN_FUNCTIONAL_ASSESSMENT: 0-10

## 2024-01-17 ASSESSMENT — PAIN DESCRIPTION - ORIENTATION: ORIENTATION: LOWER

## 2024-01-17 ASSESSMENT — PAIN DESCRIPTION - PAIN TYPE: TYPE: SURGICAL PAIN

## 2024-01-17 NOTE — DISCHARGE INSTR - COC
Continuity of Care Form    Patient Name: Zoya Martínez   :  1945  MRN:  [id number]    Admit date:  2024  Discharge date:  24    Code Status Order: Full Code   Advance Directives:   Advance Care Flowsheet Documentation       Date/Time Healthcare Directive Type of Healthcare Directive Copy in Chart Healthcare Agent Appointed Healthcare Agent's Name Healthcare Agent's Phone Number    24 0618 No, patient does not have an advance directive for healthcare treatment -- -- -- -- --            Admitting Physician:  Amy Villegas MD  PCP: Romario Bazan MD    Discharging Nurse: Norma  Discharging Hospital Unit/Room#: [facility] OR Pool/NONE  Discharging Unit Phone Number: [phone]    Emergency Contact:   Extended Emergency Contact Information  Primary Emergency Contact: Sophie Acuna   [facility]  Home Phone: [phone]  Mobile Phone: [phone]  Relation: Child  Secondary Emergency Contact: Adan Martínez   [facility]  Home Phone: [phone]  Mobile Phone: [phone]  Relation: Child    Past Surgical History:  Past Surgical History:   Procedure Laterality Date    CARDIAC SURGERY  2017    has x 2 cardiac stents (2 separate surgeries    COLONOSCOPY  2015    DR LANE - DIVERTICULOSIS    COLONOSCOPY N/A 2021    COLONOSCOPY DIAGNOSTIC performed by Mohan Lane MD at [facility] OR    CORONARY ANGIOPLASTY WITH STENT PLACEMENT  05/17/2017    x2 stents    CYST REMOVAL Left 2019    RESECTION OF LEFT PINNA LESION WITH GRAFT performed by Anton Renteria MD at [facility] OR    ENDOSCOPY, COLON, DIAGNOSTIC      FINGER SURGERY  2014    middle finger right hand due to infection    HYSTERECTOMY (CERVIX STATUS UNKNOWN)      LASIK  06/15/2005    SHOULDER SURGERY  2008    shoulder dislocated - popped  back in Right shoulder    TOTAL KNEE ARTHROPLASTY Right 2022    RIGHT KNEE TOTAL KNEE ARTHROPLASTY performed by Vincenzo Wadsworth MD at [facility] OR    TOTAL KNEE

## 2024-01-17 NOTE — FLOWSHEET NOTE
Dr Villegas informed of the H&H. 10.4/33.8. Explained that hse is able to move all extremities. Still trying to get pain under control. No further orders given. RH

## 2024-01-17 NOTE — INTERVAL H&P NOTE
Update History & Physical    The patient's History and Physical of  was reviewed with the patient and I examined the patient. There was no change. The surgical site was confirmed by the patient and me.     Plan: The risks, benefits, expected outcome, and alternative to the recommended procedure have been discussed with the patient. Patient understands and wants to proceed with the procedure.     Electronically signed by FELIBERTO BARR MD on 1/17/2024 at 7:24 AM

## 2024-01-17 NOTE — DISCHARGE INSTRUCTIONS
Every day after surgery change dressing frequently to keep wound clean and dry using 4X4 gauze pads and paper tape.    May shower in 3 days postoperative.    Do not soak or soap wound for one week post operative.    Discharge prescriptions e-prescribed to pharmacy.  Order done  as outpatient.  [place] [address] 1 4 times daily as needed pain [facility] [place]    Obtain Xrays at [facility] few days prior to recheck appointment.  Order done as outpatient.    Recheck one month.    Questions call office .

## 2024-01-17 NOTE — OP NOTE
[place] [address]                                OPERATIVE REPORT    PATIENT NAME: ERKHA NESBITT               :        1945  MED REC NO:   [id number]                            ROOM:  ACCOUNT NO:   [id number]                           ADMIT DATE: 2024  PROVIDER:     Amy Villegas MD    DATE OF PROCEDURE:  2024    PREOPERATIVE DIAGNOSES:  L4-5 acquired spondylolisthesis, L2, L3, L4, L5  canal stenosis with neurogenic claudication, and myelopathy.    POSTOPERATIVE DIAGNOSES:  L4-5 acquired spondylolisthesis, L2, L3, L4,  L5 canal stenosis with neurogenic claudication, and myelopathy.    PROCEDURES PERFORMED:  Bilateral decompressive laminectomies of L2, L3,  L4, L5; L4-5 diskectomies; interbody cage infused allograft  posterolateral pedicle screw fusion; computer stereotaxic    SURGEON:  Amy Villegas MD    INDICATIONS:  Inability to walk, weakness in both lower extremities,  back and bilateral lower extremity pain.    DESCRIPTION OF PROCEDURE:  The patient was given general endotracheal  anesthesia in supine position.  Ancef IV preoperatively.  Murguia catheter  was established.  Turned to prone position.  Back was prepped and  draped.  Time-out, patient identified.  Needle was placed.  Lateral  x-rays were obtained to confirm level.  Needle was withdrawn.  Skin was  infiltrated lidocaine with epinephrine.  Skin incision was made over the  tips of spinous processes of inferior L1, L2, L3 L4, L5, S1.  Dissection  was carried down these spinous process tips.  On the left side  initially, the spinous processes lamina of L2, L3 L4, L5 were exposed in  the spinous process tip of S1.  This was done on the left side exposing  out laterally to just beyond the facets and exposure of the transverse  processes, which were later decorticated and allograft autogenous bone  placed.  On the right side, the spinous processes

## 2024-01-17 NOTE — ANESTHESIA POSTPROCEDURE EVALUATION
Department of Anesthesiology  Postprocedure Note    Patient: Zoya Martínez  MRN: [id number]  YOB: 1945  Date of evaluation: 1/17/2024    Procedure Summary     Date: 01/17/24 Room / Location: [address]    Anesthesia Start: 0730 Anesthesia Stop: 1639    Procedure: L 2-3, 3-4, 4-5 bilateral laminectomies microdissection decompressions L4-5 discectomy interbody cage posterior lateral allograft autogenous infuse pedicle screw fusion computer stereotactic (Bilateral: Spine Lumbar) Diagnosis:       Spondylolisthesis of lumbar region      Spinal stenosis of lumbar region with neurogenic claudication      (Spondylolisthesis of lumbar region [M43.16])      (Spinal stenosis of lumbar region with neurogenic claudication [M48.062])    Surgeons: Amy Villegas MD Responsible Provider: Thiago Medley DO    Anesthesia Type: general, regional ASA Status: 3          Anesthesia Type: No value filed.    Darin Phase I: Darin Score: 10    Darin Phase II:      Anesthesia Post Evaluation    Patient location during evaluation: PACU  Patient participation: complete - patient participated  Level of consciousness: awake  Pain score: 0  Airway patency: patent  Nausea & Vomiting: no nausea and no vomiting  Cardiovascular status: hemodynamically stable  Respiratory status: acceptable  Hydration status: euvolemic  Pain management: adequate        No notable events documented.

## 2024-01-17 NOTE — ANESTHESIA PROCEDURE NOTES
Peripheral Block    Patient location during procedure: OR  Reason for block: post-op pain management and at surgeon's request  Start time: 1/17/2024 7:12 AM  End time: 1/17/2024 7:22 AM  Staffing  Performed: anesthesiologist   Anesthesiologist: Thiago Medley DO  Performed by: Thiago Medley DO  Authorized by: Thiago Medley DO    Preanesthetic Checklist  Completed: patient identified, IV checked, site marked, risks and benefits discussed, surgical/procedural consents, equipment checked, pre-op evaluation, timeout performed, anesthesia consent given, oxygen available and monitors applied/VS acknowledged  Peripheral Block   Patient position: supine  Prep: ChloraPrep  Provider prep: mask and sterile gloves  Patient monitoring: cardiac monitor, continuous pulse ox, frequent blood pressure checks and IV access  Block type: Erector spinae (L3)  Laterality: bilateral  Injection technique: single-shot  Guidance: ultrasound guided  Local infiltration: ropivacaine  Infiltration strength: 0.5 %  Local infiltration: ropivacaine  Dose: 30 mL    Needle   Needle type: combined needle/nerve stimulator   Needle gauge: 22 G  Needle localization: anatomical landmarks and ultrasound guidance  Needle length: 10 cm  Assessment   Injection assessment: negative aspiration for heme, no paresthesia on injection and local visualized surrounding nerve on ultrasound  Paresthesia pain: immediately resolved  Slow fractionated injection: yes  Hemodynamics: stable  Real-time US image taken/store: yes    Additional Notes  Ultrasound image printed and saved in patient chart.    Sterile probe cover used    Ropivacaine 0.5% 30 ml + saline 20 ml    25 ml bilateral    Medications Administered  midazolam (VERSED) injection 2 mg/2mL - IntraVENous   2 mg - 1/17/2024 7:12:00 AM  ropivacaine (NAROPIN) injection 0.5% - Perineural   30 mL - 1/17/2024 7:12:00 AM

## 2024-01-18 PROBLEM — M75.40 IMPINGEMENT SYNDROME OF SHOULDER REGION: Status: ACTIVE | Noted: 2024-01-18

## 2024-01-18 PROBLEM — M25.511 RIGHT SHOULDER PAIN: Status: ACTIVE | Noted: 2024-01-18

## 2024-01-18 PROBLEM — R58 BLEEDING: Status: ACTIVE | Noted: 2024-01-18

## 2024-01-18 PROBLEM — M17.11 RIGHT KNEE DJD: Status: ACTIVE | Noted: 2024-01-18

## 2024-01-18 PROBLEM — M25.561 RIGHT KNEE PAIN: Status: ACTIVE | Noted: 2022-08-24

## 2024-01-18 PROBLEM — Z78.9 IMPAIRED MOBILITY AND ACTIVITIES OF DAILY LIVING: Status: ACTIVE | Noted: 2024-01-18

## 2024-01-18 PROBLEM — M17.12 LEFT KNEE DJD: Status: ACTIVE | Noted: 2024-01-18

## 2024-01-18 PROBLEM — M25.562 LEFT KNEE PAIN: Status: ACTIVE | Noted: 2024-01-18

## 2024-01-18 PROBLEM — M54.2 NECK PAIN: Status: ACTIVE | Noted: 2024-01-18

## 2024-01-18 PROBLEM — M16.11 OSTEOARTHRITIS OF RIGHT HIP: Status: ACTIVE | Noted: 2024-01-18

## 2024-01-18 PROBLEM — Z74.09 IMPAIRED MOBILITY AND ACTIVITIES OF DAILY LIVING: Status: ACTIVE | Noted: 2024-01-18

## 2024-01-18 PROBLEM — M25.819 SHOULDER IMPINGEMENT: Status: ACTIVE | Noted: 2024-01-18

## 2024-01-18 LAB
ANION GAP SERPL CALCULATED.3IONS-SCNC: 10 MEQ/L (ref 9–15)
ANION GAP SERPL CALCULATED.3IONS-SCNC: 9 MEQ/L (ref 9–15)
BASOPHILS # BLD: 0 K/UL (ref 0–0.2)
BASOPHILS # BLD: 0 K/UL (ref 0–0.2)
BASOPHILS NFR BLD: 0.1 %
BASOPHILS NFR BLD: 0.1 %
BUN SERPL-MCNC: 39 MG/DL (ref 8–23)
BUN SERPL-MCNC: 41 MG/DL (ref 8–23)
CALCIUM SERPL-MCNC: 8.1 MG/DL (ref 8.5–9.9)
CALCIUM SERPL-MCNC: 8.3 MG/DL (ref 8.5–9.9)
CHLORIDE SERPL-SCNC: 111 MEQ/L (ref 95–107)
CHLORIDE SERPL-SCNC: 114 MEQ/L (ref 95–107)
CO2 SERPL-SCNC: 20 MEQ/L (ref 20–31)
CO2 SERPL-SCNC: 21 MEQ/L (ref 20–31)
CREAT SERPL-MCNC: 1.72 MG/DL (ref 0.5–0.9)
CREAT SERPL-MCNC: 1.91 MG/DL (ref 0.5–0.9)
EOSINOPHIL # BLD: 0 K/UL (ref 0–0.7)
EOSINOPHIL # BLD: 0 K/UL (ref 0–0.7)
EOSINOPHIL NFR BLD: 0 %
EOSINOPHIL NFR BLD: 0 %
ERYTHROCYTE [DISTWIDTH] IN BLOOD BY AUTOMATED COUNT: 13.4 % (ref 11.5–14.5)
ERYTHROCYTE [DISTWIDTH] IN BLOOD BY AUTOMATED COUNT: 13.5 % (ref 11.5–14.5)
GLUCOSE SERPL-MCNC: 135 MG/DL (ref 70–99)
GLUCOSE SERPL-MCNC: 178 MG/DL (ref 70–99)
HCT VFR BLD AUTO: 28.5 % (ref 37–47)
HCT VFR BLD AUTO: 29.8 % (ref 37–47)
HGB BLD-MCNC: 8.6 G/DL (ref 12–16)
HGB BLD-MCNC: 8.8 G/DL (ref 12–16)
LYMPHOCYTES # BLD: 0.6 K/UL (ref 1–4.8)
LYMPHOCYTES # BLD: 0.6 K/UL (ref 1–4.8)
LYMPHOCYTES NFR BLD: 6 %
LYMPHOCYTES NFR BLD: 6.3 %
MACROCYTES BLD QL SMEAR: ABNORMAL
MCH RBC QN AUTO: 30.1 PG (ref 27–31.3)
MCH RBC QN AUTO: 30.4 PG (ref 27–31.3)
MCHC RBC AUTO-ENTMCNC: 29.5 % (ref 33–37)
MCHC RBC AUTO-ENTMCNC: 30.2 % (ref 33–37)
MCV RBC AUTO: 100.7 FL (ref 79.4–94.8)
MCV RBC AUTO: 102.1 FL (ref 79.4–94.8)
MONOCYTES # BLD: 0.3 K/UL (ref 0.2–0.8)
MONOCYTES # BLD: 0.4 K/UL (ref 0.2–0.8)
MONOCYTES NFR BLD: 3.1 %
MONOCYTES NFR BLD: 4.4 %
NEUTROPHILS # BLD: 7.9 K/UL (ref 1.4–6.5)
NEUTROPHILS # BLD: 8.6 K/UL (ref 1.4–6.5)
NEUTS SEG NFR BLD: 89.2 %
NEUTS SEG NFR BLD: 90.2 %
PLATELET # BLD AUTO: 139 K/UL (ref 130–400)
PLATELET # BLD AUTO: 147 K/UL (ref 130–400)
PLATELET BLD QL SMEAR: ADEQUATE
POTASSIUM SERPL-SCNC: 5.4 MEQ/L (ref 3.4–4.9)
POTASSIUM SERPL-SCNC: 5.9 MEQ/L (ref 3.4–4.9)
RBC # BLD AUTO: 2.83 M/UL (ref 4.2–5.4)
RBC # BLD AUTO: 2.92 M/UL (ref 4.2–5.4)
SLIDE REVIEW: ABNORMAL
SODIUM SERPL-SCNC: 142 MEQ/L (ref 135–144)
SODIUM SERPL-SCNC: 143 MEQ/L (ref 135–144)
WBC # BLD AUTO: 8.7 K/UL (ref 4.8–10.8)
WBC # BLD AUTO: 9.7 K/UL (ref 4.8–10.8)

## 2024-01-18 PROCEDURE — 6370000000 HC RX 637 (ALT 250 FOR IP): Performed by: NEUROLOGICAL SURGERY

## 2024-01-18 PROCEDURE — 80048 BASIC METABOLIC PNL TOTAL CA: CPT

## 2024-01-18 PROCEDURE — 6370000000 HC RX 637 (ALT 250 FOR IP): Performed by: INTERNAL MEDICINE

## 2024-01-18 PROCEDURE — 2580000003 HC RX 258: Performed by: NEUROLOGICAL SURGERY

## 2024-01-18 PROCEDURE — 1210000000 HC MED SURG R&B

## 2024-01-18 PROCEDURE — 36415 COLL VENOUS BLD VENIPUNCTURE: CPT

## 2024-01-18 PROCEDURE — 97162 PT EVAL MOD COMPLEX 30 MIN: CPT

## 2024-01-18 PROCEDURE — 97166 OT EVAL MOD COMPLEX 45 MIN: CPT

## 2024-01-18 PROCEDURE — 2700000000 HC OXYGEN THERAPY PER DAY

## 2024-01-18 PROCEDURE — 94150 VITAL CAPACITY TEST: CPT

## 2024-01-18 PROCEDURE — 6360000002 HC RX W HCPCS: Performed by: NEUROLOGICAL SURGERY

## 2024-01-18 PROCEDURE — 85025 COMPLETE CBC W/AUTO DIFF WBC: CPT

## 2024-01-18 RX ADMIN — ACETAMINOPHEN 325MG 650 MG: 325 TABLET ORAL at 08:44

## 2024-01-18 RX ADMIN — KETOROLAC TROMETHAMINE 15 MG: 15 INJECTION, SOLUTION INTRAMUSCULAR; INTRAVENOUS at 10:22

## 2024-01-18 RX ADMIN — OXYBUTYNIN CHLORIDE 30 MG: 5 TABLET, EXTENDED RELEASE ORAL at 08:42

## 2024-01-18 RX ADMIN — METOPROLOL TARTRATE 50 MG: 50 TABLET, FILM COATED ORAL at 21:29

## 2024-01-18 RX ADMIN — LOSARTAN POTASSIUM AND HYDROCHLOROTHIAZIDE 2 TABLET: 50; 12.5 TABLET, FILM COATED ORAL at 08:42

## 2024-01-18 RX ADMIN — SODIUM ZIRCONIUM CYCLOSILICATE 10 G: 10 POWDER, FOR SUSPENSION ORAL at 12:34

## 2024-01-18 RX ADMIN — HYDROCODONE BITARTRATE AND ACETAMINOPHEN 1 TABLET: 5; 325 TABLET ORAL at 15:12

## 2024-01-18 RX ADMIN — KETOROLAC TROMETHAMINE 15 MG: 15 INJECTION, SOLUTION INTRAMUSCULAR; INTRAVENOUS at 04:21

## 2024-01-18 RX ADMIN — ATORVASTATIN CALCIUM 40 MG: 40 TABLET, FILM COATED ORAL at 21:30

## 2024-01-18 RX ADMIN — SODIUM ZIRCONIUM CYCLOSILICATE 10 G: 10 POWDER, FOR SUSPENSION ORAL at 21:29

## 2024-01-18 RX ADMIN — OXYCODONE 5 MG: 5 TABLET ORAL at 01:53

## 2024-01-18 RX ADMIN — SODIUM ZIRCONIUM CYCLOSILICATE 10 G: 10 POWDER, FOR SUSPENSION ORAL at 10:22

## 2024-01-18 RX ADMIN — DILTIAZEM HYDROCHLORIDE 240 MG: 240 CAPSULE, EXTENDED RELEASE ORAL at 08:43

## 2024-01-18 RX ADMIN — KETOROLAC TROMETHAMINE 15 MG: 15 INJECTION, SOLUTION INTRAMUSCULAR; INTRAVENOUS at 19:14

## 2024-01-18 RX ADMIN — SODIUM CHLORIDE, PRESERVATIVE FREE 10 ML: 5 INJECTION INTRAVENOUS at 21:30

## 2024-01-18 RX ADMIN — ACETAMINOPHEN 325MG 650 MG: 325 TABLET ORAL at 21:29

## 2024-01-18 RX ADMIN — ACETAMINOPHEN 325MG 650 MG: 325 TABLET ORAL at 01:53

## 2024-01-18 RX ADMIN — SODIUM CHLORIDE: 9 INJECTION, SOLUTION INTRAVENOUS at 15:14

## 2024-01-18 RX ADMIN — SODIUM CHLORIDE: 9 INJECTION, SOLUTION INTRAVENOUS at 21:59

## 2024-01-18 RX ADMIN — CEFAZOLIN 2000 MG: 2 INJECTION, POWDER, FOR SOLUTION INTRAMUSCULAR; INTRAVENOUS at 04:20

## 2024-01-18 RX ADMIN — BISACODYL 5 MG: 5 TABLET, COATED ORAL at 08:44

## 2024-01-18 RX ADMIN — ACETAMINOPHEN 325MG 650 MG: 325 TABLET ORAL at 12:34

## 2024-01-18 RX ADMIN — METOPROLOL TARTRATE 50 MG: 50 TABLET, FILM COATED ORAL at 08:43

## 2024-01-18 RX ADMIN — SODIUM CHLORIDE, PRESERVATIVE FREE 10 ML: 5 INJECTION INTRAVENOUS at 08:45

## 2024-01-18 ASSESSMENT — PAIN SCALES - GENERAL
PAINLEVEL_OUTOF10: 0
PAINLEVEL_OUTOF10: 8
PAINLEVEL_OUTOF10: 3
PAINLEVEL_OUTOF10: 7
PAINLEVEL_OUTOF10: 7
PAINLEVEL_OUTOF10: 3
PAINLEVEL_OUTOF10: 7

## 2024-01-18 ASSESSMENT — PAIN DESCRIPTION - LOCATION
LOCATION: FACE
LOCATION: BACK

## 2024-01-18 ASSESSMENT — PAIN DESCRIPTION - DESCRIPTORS
DESCRIPTORS: ACHING
DESCRIPTORS: ACHING;PRESSURE

## 2024-01-18 ASSESSMENT — PAIN DESCRIPTION - ORIENTATION
ORIENTATION: LOWER
ORIENTATION: MID
ORIENTATION: LOWER

## 2024-01-18 NOTE — PLAN OF CARE
Problem: Discharge Planning  Goal: Discharge to home or other facility with appropriate resources  1/18/2024 1016 by Lebron Aranda RN  Outcome: Progressing  1/18/2024 0129 by Tess Fine RN  Outcome: Progressing  Flowsheets (Taken 1/17/2024 2030)  Discharge to home or other facility with appropriate resources: Identify barriers to discharge with patient and caregiver     Problem: Pain  Goal: Verbalizes/displays adequate comfort level or baseline comfort level  1/18/2024 1016 by Lebron Aranda RN  Outcome: Progressing  1/18/2024 0129 by Tess Fine RN  Outcome: Progressing     Problem: Safety - Adult  Goal: Free from fall injury  1/18/2024 1016 by Lebron Aranda RN  Outcome: Progressing  1/18/2024 0129 by Tess Fine RN  Outcome: Progressing     Problem: Skin/Tissue Integrity  Goal: Absence of new skin breakdown  Description: 1.  Monitor for areas of redness and/or skin breakdown  2.  Assess vascular access sites hourly  3.  Every 4-6 hours minimum:  Change oxygen saturation probe site  4.  Every 4-6 hours:  If on nasal continuous positive airway pressure, respiratory therapy assess nares and determine need for appliance change or resting period.  1/18/2024 1016 by Lebron Aranda RN  Outcome: Progressing  1/18/2024 0129 by Tess Fine RN  Outcome: Progressing     Problem: ABCDS Injury Assessment  Goal: Absence of physical injury  1/18/2024 1016 by Lebron Aranda RN  Outcome: Progressing  1/18/2024 0129 by Tess Fine RN  Outcome: Progressing     Problem: Occupational Therapy - Adult  Goal: By Discharge: Performs self-care activities at highest level of function for planned discharge setting.  See evaluation for individualized goals.  1/18/2024 0944 by Narda Schneider, OTR/L  Outcome: Progressing     Problem: Physical Therapy - Adult  Goal: By Discharge: Performs mobility at highest level of function for planned discharge setting.  See evaluation for

## 2024-01-18 NOTE — ACP (ADVANCE CARE PLANNING)
Advance Care Planning   Healthcare Decision Maker:    Primary Decision Maker: Bro Torres - Spouse - [phone]    Secondary Decision Maker: Adan Martínez - Child - [phone]    Supplemental (Other) Decision Maker: Sophie Acuna - Child - [phone]    Click here to complete Healthcare Decision Makers including selection of the Healthcare Decision Maker Relationship (ie \"Primary\").

## 2024-01-18 NOTE — CONSULTS
Consult Note            Date:2024        Patient Name:Zoya Martínez     YOB: 1945     Age:78 y.o.    Reason for Consult: Medical management of chronic heart condition, CKD    Chief Complaint   Chronic back pain     History Obtained From   patient, electronic medical record    History of Present Illness   Zoya Martínez is a 78-year-old  female with significant past medical history of hypertension, hyperlipidemia, MI, CAD, CHF, hypothyroidism, CKD, osteoarthritis, who was admitted for complaints of chronic back pain secondary to spondylolisthesis of lumbar region, spinal stenosis of lumbar region with neurogenic claudication s/p bilateral decompressive laminectomies of L2, L3,  L4, L5; L4-5 diskectomies under the service of Dr. Villegas.  At the time of examination, patient denies chest pain, SOB/, dizziness, headache, N/V. IM hospitalist team was consulted for medical management of acute and chronic health conditions.    Past Medical History     Past Medical History:   Diagnosis Date    Antral ulcer 2015    DR LAEN    CHF (congestive heart failure) (HCC)     Coronary artery disease     Coronary artery disease involving native coronary artery of native heart without angina pectoris 07/10/2018    has x 4 cardiac stents / Dr. Boland    Diverticulosis of colon (without mention of hemorrhage) 2015    DR L[address]    Essential hypertension 12/10/2013    meds > 20 yrs    History of blood transfusion     with hysterectomy and s/p knee replacment    History of normal resting EKG     normal    History of ST elevation myocardial infarction (STEMI) 2017    History of transfusion of whole blood     Excessive bleeding before hysterectomy    Hyperlipidemia     meds > 2 yrs    Hypertension     Hypothyroidism     past trx years ago then stopped / recent restart    Kidney disease     Low back pain     Morbid obesity due to excess calories (HCC) 2017    Morbid 
her toes or her heels.  Back flexion 90 degrees.  Good range of motion knees and hips status post recent knee surgeries.  Station intact both lower extremities.  Deep tendon reflexes absent.  Skin is good color.  Cannot palpate dorsalis pedis pulses    Significant loss of range of motion right shoulder.  Distally has good strength sensation in the upper extremities.   Skin:     General: Skin is warm.      Findings: Wound present.             Comments: L 2-3, 3-4, 4-5 bilateral laminectomies microdissection decompressions L4-5 discectomy interbody cage posterior lateral allograft autogenous infuse pedicle screw fusion computer stereotactic (Bilateral: Spine Lumbar)   Neurological:      General: No focal deficit present.   Psychiatric:         Mood and Affect: Mood normal.       Ortho Exam  Neurologic Exam     Mental Status   Level of consciousness: alert            Diagnostics:    Recent Results (from the past 24 hour(s))   POCT Glucose    Collection Time: 01/17/24  4:45 PM   Result Value Ref Range    POC Glucose 153 (H) 70 - 99 mg/dl    Performed on ACCU-CHEK    Hemoglobin and Hematocrit    Collection Time: 01/17/24  5:00 PM   Result Value Ref Range    Hemoglobin 10.4 (L) 12.0 - 16.0 g/dL    Hematocrit 33.8 (L) 37.0 - 47.0 %   CBC with Auto Differential    Collection Time: 01/18/24  5:02 AM   Result Value Ref Range    WBC 8.7 4.8 - 10.8 K/uL    RBC 2.83 (L) 4.20 - 5.40 M/uL    Hemoglobin 8.6 (L) 12.0 - 16.0 g/dL    Hematocrit 28.5 (L) 37.0 - 47.0 %    .7 (H) 79.4 - 94.8 fL    MCH 30.4 27.0 - 31.3 pg    MCHC 30.2 (L) 33.0 - 37.0 %    RDW 13.4 11.5 - 14.5 %    Platelets 139 130 - 400 K/uL    Neutrophils % 90.2 %    Lymphocytes % 6.3 %    Monocytes % 3.1 %    Eosinophils % 0.0 %    Basophils % 0.1 %    Neutrophils Absolute 7.9 (H) 1.4 - 6.5 K/uL    Lymphocytes Absolute 0.6 (L) 1.0 - 4.8 K/uL    Monocytes Absolute 0.3 0.2 - 0.8 K/uL    Eosinophils Absolute 0.0 0.0 - 0.7 K/uL    Basophils Absolute 0.0 0.0 - 0.2

## 2024-01-18 NOTE — PLAN OF CARE
See OT evaluation for all goals and OT POC. Electronically signed by Narda Schneider OTR/L on 1/18/2024 at 9:44 AM

## 2024-01-19 LAB
ANION GAP SERPL CALCULATED.3IONS-SCNC: 11 MEQ/L (ref 9–15)
BASOPHILS # BLD: 0 K/UL (ref 0–0.2)
BASOPHILS NFR BLD: 0.1 %
BUN SERPL-MCNC: 53 MG/DL (ref 8–23)
CALCIUM SERPL-MCNC: 7.7 MG/DL (ref 8.5–9.9)
CHLORIDE SERPL-SCNC: 114 MEQ/L (ref 95–107)
CO2 SERPL-SCNC: 18 MEQ/L (ref 20–31)
CREAT SERPL-MCNC: 2.33 MG/DL (ref 0.5–0.9)
EOSINOPHIL # BLD: 0 K/UL (ref 0–0.7)
EOSINOPHIL NFR BLD: 0 %
ERYTHROCYTE [DISTWIDTH] IN BLOOD BY AUTOMATED COUNT: 13.6 % (ref 11.5–14.5)
GLUCOSE SERPL-MCNC: 102 MG/DL (ref 70–99)
HCT VFR BLD AUTO: 25.1 % (ref 37–47)
HGB BLD-MCNC: 7.6 G/DL (ref 12–16)
LYMPHOCYTES # BLD: 0.9 K/UL (ref 1–4.8)
LYMPHOCYTES NFR BLD: 12.2 %
MCH RBC QN AUTO: 31 PG (ref 27–31.3)
MCHC RBC AUTO-ENTMCNC: 30.3 % (ref 33–37)
MCV RBC AUTO: 102.4 FL (ref 79.4–94.8)
MONOCYTES # BLD: 0.5 K/UL (ref 0.2–0.8)
MONOCYTES NFR BLD: 5.9 %
NEUTROPHILS # BLD: 6.2 K/UL (ref 1.4–6.5)
NEUTS SEG NFR BLD: 81.1 %
PLATELET # BLD AUTO: 121 K/UL (ref 130–400)
POTASSIUM SERPL-SCNC: 5.1 MEQ/L (ref 3.4–4.9)
RBC # BLD AUTO: 2.45 M/UL (ref 4.2–5.4)
SODIUM SERPL-SCNC: 143 MEQ/L (ref 135–144)
WBC # BLD AUTO: 7.6 K/UL (ref 4.8–10.8)

## 2024-01-19 PROCEDURE — 2580000003 HC RX 258: Performed by: NEUROLOGICAL SURGERY

## 2024-01-19 PROCEDURE — 6370000000 HC RX 637 (ALT 250 FOR IP): Performed by: NEUROLOGICAL SURGERY

## 2024-01-19 PROCEDURE — 36415 COLL VENOUS BLD VENIPUNCTURE: CPT

## 2024-01-19 PROCEDURE — 2580000003 HC RX 258: Performed by: INTERNAL MEDICINE

## 2024-01-19 PROCEDURE — 97535 SELF CARE MNGMENT TRAINING: CPT

## 2024-01-19 PROCEDURE — 85025 COMPLETE CBC W/AUTO DIFF WBC: CPT

## 2024-01-19 PROCEDURE — 6360000002 HC RX W HCPCS: Performed by: NEUROLOGICAL SURGERY

## 2024-01-19 PROCEDURE — 1210000000 HC MED SURG R&B

## 2024-01-19 PROCEDURE — 97116 GAIT TRAINING THERAPY: CPT

## 2024-01-19 PROCEDURE — 6370000000 HC RX 637 (ALT 250 FOR IP): Performed by: INTERNAL MEDICINE

## 2024-01-19 PROCEDURE — 80048 BASIC METABOLIC PNL TOTAL CA: CPT

## 2024-01-19 RX ORDER — SODIUM CHLORIDE 9 MG/ML
INJECTION, SOLUTION INTRAVENOUS CONTINUOUS
Status: DISCONTINUED | OUTPATIENT
Start: 2024-01-19 | End: 2024-01-21 | Stop reason: HOSPADM

## 2024-01-19 RX ADMIN — HYDROCODONE BITARTRATE AND ACETAMINOPHEN 1 TABLET: 5; 325 TABLET ORAL at 23:59

## 2024-01-19 RX ADMIN — ATORVASTATIN CALCIUM 40 MG: 40 TABLET, FILM COATED ORAL at 22:27

## 2024-01-19 RX ADMIN — ACETAMINOPHEN 325MG 650 MG: 325 TABLET ORAL at 13:58

## 2024-01-19 RX ADMIN — METOPROLOL TARTRATE 50 MG: 50 TABLET, FILM COATED ORAL at 22:27

## 2024-01-19 RX ADMIN — METOPROLOL TARTRATE 50 MG: 50 TABLET, FILM COATED ORAL at 09:05

## 2024-01-19 RX ADMIN — SODIUM ZIRCONIUM CYCLOSILICATE 10 G: 10 POWDER, FOR SUSPENSION ORAL at 09:05

## 2024-01-19 RX ADMIN — OXYBUTYNIN CHLORIDE 30 MG: 5 TABLET, EXTENDED RELEASE ORAL at 09:04

## 2024-01-19 RX ADMIN — SODIUM ZIRCONIUM CYCLOSILICATE 10 G: 10 POWDER, FOR SUSPENSION ORAL at 13:58

## 2024-01-19 RX ADMIN — DILTIAZEM HYDROCHLORIDE 240 MG: 240 CAPSULE, EXTENDED RELEASE ORAL at 09:04

## 2024-01-19 RX ADMIN — SODIUM CHLORIDE: 9 INJECTION, SOLUTION INTRAVENOUS at 11:35

## 2024-01-19 RX ADMIN — KETOROLAC TROMETHAMINE 15 MG: 15 INJECTION, SOLUTION INTRAMUSCULAR; INTRAVENOUS at 05:42

## 2024-01-19 RX ADMIN — SODIUM ZIRCONIUM CYCLOSILICATE 10 G: 10 POWDER, FOR SUSPENSION ORAL at 22:27

## 2024-01-19 RX ADMIN — OXYCODONE 5 MG: 5 TABLET ORAL at 09:07

## 2024-01-19 RX ADMIN — SODIUM CHLORIDE, PRESERVATIVE FREE 10 ML: 5 INJECTION INTRAVENOUS at 09:05

## 2024-01-19 RX ADMIN — ACETAMINOPHEN 325MG 650 MG: 325 TABLET ORAL at 17:30

## 2024-01-19 RX ADMIN — ACETAMINOPHEN 325MG 650 MG: 325 TABLET ORAL at 05:42

## 2024-01-19 RX ADMIN — SODIUM CHLORIDE: 9 INJECTION, SOLUTION INTRAVENOUS at 19:47

## 2024-01-19 RX ADMIN — BISACODYL 5 MG: 5 TABLET, COATED ORAL at 09:04

## 2024-01-19 ASSESSMENT — PAIN DESCRIPTION - DESCRIPTORS
DESCRIPTORS: ACHING
DESCRIPTORS: ACHING
DESCRIPTORS: ACHING;THROBBING;SORE

## 2024-01-19 ASSESSMENT — PAIN SCALES - GENERAL
PAINLEVEL_OUTOF10: 6
PAINLEVEL_OUTOF10: 9
PAINLEVEL_OUTOF10: 5
PAINLEVEL_OUTOF10: 6

## 2024-01-19 ASSESSMENT — PAIN DESCRIPTION - LOCATION
LOCATION: INCISION
LOCATION: BACK

## 2024-01-19 ASSESSMENT — PAIN DESCRIPTION - ORIENTATION
ORIENTATION: MID
ORIENTATION: LOWER

## 2024-01-20 LAB
ANION GAP SERPL CALCULATED.3IONS-SCNC: 9 MEQ/L (ref 9–15)
BASOPHILS # BLD: 0 K/UL (ref 0–0.2)
BASOPHILS NFR BLD: 0.3 %
BUN SERPL-MCNC: 51 MG/DL (ref 8–23)
CALCIUM SERPL-MCNC: 8 MG/DL (ref 8.5–9.9)
CHLORIDE SERPL-SCNC: 115 MEQ/L (ref 95–107)
CO2 SERPL-SCNC: 20 MEQ/L (ref 20–31)
CREAT SERPL-MCNC: 2.02 MG/DL (ref 0.5–0.9)
EOSINOPHIL # BLD: 0.2 K/UL (ref 0–0.7)
EOSINOPHIL NFR BLD: 2.2 %
ERYTHROCYTE [DISTWIDTH] IN BLOOD BY AUTOMATED COUNT: 13.6 % (ref 11.5–14.5)
FERRITIN: 95 NG/ML (ref 13–150)
FOLATE: 7.2 NG/ML
GLUCOSE SERPL-MCNC: 96 MG/DL (ref 70–99)
HCT VFR BLD AUTO: 25.2 % (ref 37–47)
HGB BLD-MCNC: 7.4 G/DL (ref 12–16)
IRON % SATURATION: 18 % (ref 20–55)
IRON: 39 UG/DL (ref 37–145)
LYMPHOCYTES # BLD: 0.9 K/UL (ref 1–4.8)
LYMPHOCYTES NFR BLD: 13.8 %
MCH RBC QN AUTO: 30.5 PG (ref 27–31.3)
MCHC RBC AUTO-ENTMCNC: 29.4 % (ref 33–37)
MCV RBC AUTO: 103.7 FL (ref 79.4–94.8)
MONOCYTES # BLD: 0.4 K/UL (ref 0.2–0.8)
MONOCYTES NFR BLD: 6.5 %
NEUTROPHILS # BLD: 5.2 K/UL (ref 1.4–6.5)
NEUTS SEG NFR BLD: 76.8 %
PLATELET # BLD AUTO: 106 K/UL (ref 130–400)
POTASSIUM SERPL-SCNC: 4.5 MEQ/L (ref 3.4–4.9)
RBC # BLD AUTO: 2.43 M/UL (ref 4.2–5.4)
SODIUM SERPL-SCNC: 144 MEQ/L (ref 135–144)
TOTAL IRON BINDING CAPACITY: 219 UG/DL (ref 250–450)
UNSATURATED IRON BINDING CAPACITY: 180 UG/DL (ref 112–347)
VITAMIN B-12: 325 PG/ML (ref 232–1245)
WBC # BLD AUTO: 6.8 K/UL (ref 4.8–10.8)

## 2024-01-20 PROCEDURE — 6370000000 HC RX 637 (ALT 250 FOR IP): Performed by: INTERNAL MEDICINE

## 2024-01-20 PROCEDURE — 36415 COLL VENOUS BLD VENIPUNCTURE: CPT

## 2024-01-20 PROCEDURE — 1210000000 HC MED SURG R&B

## 2024-01-20 PROCEDURE — 99024 POSTOP FOLLOW-UP VISIT: CPT | Performed by: NEUROLOGICAL SURGERY

## 2024-01-20 PROCEDURE — 2580000003 HC RX 258: Performed by: NEUROLOGICAL SURGERY

## 2024-01-20 PROCEDURE — 82728 ASSAY OF FERRITIN: CPT

## 2024-01-20 PROCEDURE — 80048 BASIC METABOLIC PNL TOTAL CA: CPT

## 2024-01-20 PROCEDURE — 82607 VITAMIN B-12: CPT

## 2024-01-20 PROCEDURE — 83540 ASSAY OF IRON: CPT

## 2024-01-20 PROCEDURE — 82746 ASSAY OF FOLIC ACID SERUM: CPT

## 2024-01-20 PROCEDURE — 2580000003 HC RX 258: Performed by: INTERNAL MEDICINE

## 2024-01-20 PROCEDURE — 83550 IRON BINDING TEST: CPT

## 2024-01-20 PROCEDURE — 85025 COMPLETE CBC W/AUTO DIFF WBC: CPT

## 2024-01-20 PROCEDURE — 6370000000 HC RX 637 (ALT 250 FOR IP): Performed by: NEUROLOGICAL SURGERY

## 2024-01-20 RX ADMIN — DILTIAZEM HYDROCHLORIDE 240 MG: 240 CAPSULE, EXTENDED RELEASE ORAL at 10:43

## 2024-01-20 RX ADMIN — SODIUM CHLORIDE, PRESERVATIVE FREE 10 ML: 5 INJECTION INTRAVENOUS at 10:44

## 2024-01-20 RX ADMIN — SODIUM ZIRCONIUM CYCLOSILICATE 10 G: 10 POWDER, FOR SUSPENSION ORAL at 10:43

## 2024-01-20 RX ADMIN — HYDROCODONE BITARTRATE AND ACETAMINOPHEN 1 TABLET: 5; 325 TABLET ORAL at 16:45

## 2024-01-20 RX ADMIN — OXYCODONE 5 MG: 5 TABLET ORAL at 11:55

## 2024-01-20 RX ADMIN — METOPROLOL TARTRATE 50 MG: 50 TABLET, FILM COATED ORAL at 22:34

## 2024-01-20 RX ADMIN — ACETAMINOPHEN 325MG 650 MG: 325 TABLET ORAL at 17:56

## 2024-01-20 RX ADMIN — HYDROCODONE BITARTRATE AND ACETAMINOPHEN 1 TABLET: 5; 325 TABLET ORAL at 10:43

## 2024-01-20 RX ADMIN — METOPROLOL TARTRATE 50 MG: 50 TABLET, FILM COATED ORAL at 10:45

## 2024-01-20 RX ADMIN — BISACODYL 5 MG: 5 TABLET, COATED ORAL at 10:43

## 2024-01-20 RX ADMIN — ACETAMINOPHEN 325MG 650 MG: 325 TABLET ORAL at 03:52

## 2024-01-20 RX ADMIN — ACETAMINOPHEN 325MG 650 MG: 325 TABLET ORAL at 11:54

## 2024-01-20 RX ADMIN — SODIUM CHLORIDE: 9 INJECTION, SOLUTION INTRAVENOUS at 03:55

## 2024-01-20 RX ADMIN — ACETAMINOPHEN 325MG 650 MG: 325 TABLET ORAL at 10:41

## 2024-01-20 RX ADMIN — SODIUM CHLORIDE, PRESERVATIVE FREE 10 ML: 5 INJECTION INTRAVENOUS at 22:34

## 2024-01-20 RX ADMIN — ATORVASTATIN CALCIUM 40 MG: 40 TABLET, FILM COATED ORAL at 22:34

## 2024-01-20 RX ADMIN — SODIUM ZIRCONIUM CYCLOSILICATE 10 G: 10 POWDER, FOR SUSPENSION ORAL at 12:02

## 2024-01-20 RX ADMIN — OXYBUTYNIN CHLORIDE 30 MG: 5 TABLET, EXTENDED RELEASE ORAL at 10:42

## 2024-01-20 ASSESSMENT — PAIN SCALES - GENERAL
PAINLEVEL_OUTOF10: 6
PAINLEVEL_OUTOF10: 5
PAINLEVEL_OUTOF10: 7
PAINLEVEL_OUTOF10: 9
PAINLEVEL_OUTOF10: 7

## 2024-01-20 ASSESSMENT — PAIN DESCRIPTION - ORIENTATION
ORIENTATION: MID;UPPER;LOWER
ORIENTATION: MID;UPPER
ORIENTATION: UPPER

## 2024-01-20 ASSESSMENT — PAIN DESCRIPTION - DESCRIPTORS
DESCRIPTORS: ACHING
DESCRIPTORS: ACHING;BURNING;DISCOMFORT;PRESSURE
DESCRIPTORS: ACHING

## 2024-01-20 ASSESSMENT — PAIN - FUNCTIONAL ASSESSMENT
PAIN_FUNCTIONAL_ASSESSMENT: PREVENTS OR INTERFERES SOME ACTIVE ACTIVITIES AND ADLS
PAIN_FUNCTIONAL_ASSESSMENT: PREVENTS OR INTERFERES SOME ACTIVE ACTIVITIES AND ADLS

## 2024-01-20 ASSESSMENT — PAIN DESCRIPTION - LOCATION
LOCATION: BACK

## 2024-01-20 ASSESSMENT — PAIN DESCRIPTION - PAIN TYPE: TYPE: SURGICAL PAIN

## 2024-01-21 VITALS
TEMPERATURE: 98.4 F | DIASTOLIC BLOOD PRESSURE: 60 MMHG | RESPIRATION RATE: 18 BRPM | OXYGEN SATURATION: 96 % | WEIGHT: 225 LBS | HEIGHT: 61 IN | SYSTOLIC BLOOD PRESSURE: 171 MMHG | BODY MASS INDEX: 42.48 KG/M2 | HEART RATE: 70 BPM

## 2024-01-21 LAB
ANION GAP SERPL CALCULATED.3IONS-SCNC: 12 MEQ/L (ref 9–15)
BASOPHILS # BLD: 0 K/UL (ref 0–0.2)
BASOPHILS NFR BLD: 0.1 %
BUN SERPL-MCNC: 45 MG/DL (ref 8–23)
CALCIUM SERPL-MCNC: 8.5 MG/DL (ref 8.5–9.9)
CHLORIDE SERPL-SCNC: 115 MEQ/L (ref 95–107)
CO2 SERPL-SCNC: 20 MEQ/L (ref 20–31)
CREAT SERPL-MCNC: 1.67 MG/DL (ref 0.5–0.9)
EOSINOPHIL # BLD: 0.4 K/UL (ref 0–0.7)
EOSINOPHIL NFR BLD: 4.7 %
ERYTHROCYTE [DISTWIDTH] IN BLOOD BY AUTOMATED COUNT: 13.6 % (ref 11.5–14.5)
GLUCOSE SERPL-MCNC: 102 MG/DL (ref 70–99)
HCT VFR BLD AUTO: 26.3 % (ref 37–47)
HGB BLD-MCNC: 8 G/DL (ref 12–16)
LYMPHOCYTES # BLD: 0.6 K/UL (ref 1–4.8)
LYMPHOCYTES NFR BLD: 8.5 %
MCH RBC QN AUTO: 30.4 PG (ref 27–31.3)
MCHC RBC AUTO-ENTMCNC: 30.4 % (ref 33–37)
MCV RBC AUTO: 100 FL (ref 79.4–94.8)
MONOCYTES # BLD: 0.4 K/UL (ref 0.2–0.8)
MONOCYTES NFR BLD: 5.9 %
NEUTROPHILS # BLD: 6 K/UL (ref 1.4–6.5)
NEUTS SEG NFR BLD: 80.3 %
PLATELET # BLD AUTO: 123 K/UL (ref 130–400)
POTASSIUM SERPL-SCNC: 4.8 MEQ/L (ref 3.4–4.9)
RBC # BLD AUTO: 2.63 M/UL (ref 4.2–5.4)
SODIUM SERPL-SCNC: 147 MEQ/L (ref 135–144)
WBC # BLD AUTO: 7.4 K/UL (ref 4.8–10.8)

## 2024-01-21 PROCEDURE — 80048 BASIC METABOLIC PNL TOTAL CA: CPT

## 2024-01-21 PROCEDURE — 2580000003 HC RX 258: Performed by: NEUROLOGICAL SURGERY

## 2024-01-21 PROCEDURE — 6370000000 HC RX 637 (ALT 250 FOR IP): Performed by: NEUROLOGICAL SURGERY

## 2024-01-21 PROCEDURE — 36415 COLL VENOUS BLD VENIPUNCTURE: CPT

## 2024-01-21 PROCEDURE — 85025 COMPLETE CBC W/AUTO DIFF WBC: CPT

## 2024-01-21 RX ORDER — LOSARTAN POTASSIUM 25 MG/1
50 TABLET ORAL DAILY
Status: DISCONTINUED | OUTPATIENT
Start: 2024-01-21 | End: 2024-01-21 | Stop reason: HOSPADM

## 2024-01-21 RX ORDER — LOSARTAN POTASSIUM 50 MG/1
50 TABLET ORAL DAILY
Qty: 30 TABLET | Refills: 3 | Status: SHIPPED | OUTPATIENT
Start: 2024-01-21

## 2024-01-21 RX ADMIN — OXYCODONE 5 MG: 5 TABLET ORAL at 01:31

## 2024-01-21 RX ADMIN — OXYBUTYNIN CHLORIDE 30 MG: 5 TABLET, EXTENDED RELEASE ORAL at 10:11

## 2024-01-21 RX ADMIN — BISACODYL 5 MG: 5 TABLET, COATED ORAL at 10:11

## 2024-01-21 RX ADMIN — SODIUM CHLORIDE, PRESERVATIVE FREE 10 ML: 5 INJECTION INTRAVENOUS at 10:11

## 2024-01-21 RX ADMIN — METOPROLOL TARTRATE 50 MG: 50 TABLET, FILM COATED ORAL at 10:15

## 2024-01-21 RX ADMIN — DILTIAZEM HYDROCHLORIDE 240 MG: 240 CAPSULE, EXTENDED RELEASE ORAL at 10:11

## 2024-01-21 RX ADMIN — ACETAMINOPHEN 325MG 650 MG: 325 TABLET ORAL at 01:30

## 2024-01-21 RX ADMIN — ACETAMINOPHEN 325MG 650 MG: 325 TABLET ORAL at 10:12

## 2024-01-21 ASSESSMENT — PAIN SCALES - GENERAL: PAINLEVEL_OUTOF10: 9

## 2024-01-21 NOTE — CARE COORDINATION
Case Management Assessment  Initial Evaluation    Date/Time of Evaluation: 1/18/2024 10:04 AM  Assessment Completed by: Nella Ochoa RN    If patient is discharged prior to next notation, then this note serves as note for discharge by case management.    Patient Name: Zoya Martínez                   YOB: 1945  Diagnosis: Spondylolisthesis of lumbar region [M43.16]  Spinal stenosis of lumbar region with neurogenic claudication [M48.062]  Spondylolisthesis at L4-L5 level [M43.16]                   Date / Time: 1/17/2024  5:44 AM    Patient Admission Status: Inpatient   Readmission Risk (Low < 19, Mod (19-27), High > 27): Readmission Risk Score: 19    Current PCP: Romario Bazan MD  PCP verified by CM? Yes    Chart Reviewed: Yes      History Provided by: Patient  Patient Orientation: Alert and Oriented, Person, Place, Situation, Self    Patient Cognition: Alert    Hospitalization in the last 30 days (Readmission):  No    If yes, Readmission Assessment in  Navigator will be completed.    Advance Directives:      Code Status: Full Code   Patient's Primary Decision Maker is: Legal Next of Kin    Primary Decision Maker: Bro Torres - Spouse - [phone]    Secondary Decision Maker: Adan Martínez - Child - [phone]    Supplemental (Other) Decision Maker: Sophie Acuna - Child - [phone]    Discharge Planning:    Patient lives with: Spouse/Significant Other Type of Home: House  Primary Care Giver: Self  Patient Support Systems include: Spouse/Significant Other, Children   Current Financial resources:    Current community resources:    Current services prior to admission: None            Current DME:              Type of Home Care services:  OT, PT    ADLS  Prior functional level: Independent in ADLs/IADLs  Current functional level: Independent in ADLs/IADLs    PT AM-PAC: 18 /24  OT AM-PAC: 17 /24    Family can provide assistance at DC: Yes  Would you like Case Management to 
MET W/PT WITH DR. WEBSTER TO DISCUSS DISCHARGE PLAN. PT WOULD LIKE TO STAY UNTIL TOMORROW. UPDATED [facility], [facility]. SHE IS AWARE PT TO DISCHARGE TOMORROW. THERAPY WILL CONTACT PT TO COORDINATE A TIME. WILL FOLLOW.   
MET WITH PT AT BEDSIDE. DC PLAN REMAINS HOME W/[facility]. SPOKE WITH [facility]- PT IS ACCEPTED. HOME CARE ORDERS WERE OBTAINED. PT DENIES FURTHER NEEDS.   
SPOKE W/PT TO DISCUSS DISCHARGE PLAN WHICH REMAIN HOME W/SPOUSE. HAS WALKER. PT INDICATES SHE FEELS STRONG ENOUGH TO RETURN HOME W/[facility], HOWEVER, CONCERNED ABOUT FREQUENT URINATION. STATES HER BATHROOM IS NEARBY WHERE SHE SITS AT HOME. UPDATED [facility] FOR [facility] AND THEY WILL SEE PT TOMORROW. DENIES FURTHER NEEDS. ON RA.   
(Cane)  Transfer Assistance: Independent  Active : Yes  Occupation: Retired  Living Arrangements: Spouse/Significant Other  Support Systems: Spouse/Significant Other, Children  Type of Home Care Services: OT, PT  Dental Appliances: None  Vision - Corrective Lenses: Eyeglasses  Hearing Aid: Bilateral hearing aids  Personal Equipment:   Dental Appliances: None  Vision - Corrective Lenses: Eyeglasses  Hearing Aid: Bilateral hearing aids      CURRENT FUNCTIONAL LEVEL:  Physical Therapy  Bed mobility:  Bed mobility  Rolling to Left: Minimal assistance (01/18/24 0938)  Supine to Sit: Minimal assistance (Touching assist) (01/18/24 0938)  Bed Mobility Comments: Educated in log roll technique. Decreased ability to complete d/t fatigue and decreased endurance. (01/18/24 0938)  Transfers:  Transfers  Sit to Stand: Stand by assistance (Touching) (01/18/24 0938)  Stand to Sit: Stand by assistance (01/18/24 0938)  Bed to Chair: Stand by assistance (01/18/24 0938)  Comment: Verbal cues and tactile reinforcement for proper hand placement and sequencing. (01/18/24 0938)  Gait:   Ambulation  Surface: Level tile (01/18/24 0939)  Device: Rolling Walker (01/18/24 0939)  Assistance: Minimal assistance (Touching) (01/18/24 0939)  Quality of Gait: Cues for approximation within walker. Pt with slow pacing. Lateral and posterior instability noted requiring Mariel to correct for 1 LOB posteriorly. Pt with forward head and flexed posture - cues for correction. (01/18/24 0939)  Distance: 40ft X 2 (01/18/24 0939)  Stairs:     W/C mobility:         Occupational Therapy  Hand Dominance: Right  ADL  Feeding: Independent (01/18/24 0934)  Grooming: Setup (01/18/24 0934)  UE Bathing: Setup (01/18/24 0934)  LE Bathing: Moderate assistance (01/18/24 0934)  UE Dressing: Setup (01/18/24 0934)  LE Dressing: Moderate assistance (01/18/24 0934)  Toileting: Minimal assistance (01/18/24 0934)  Additional Comments: Simulated ADLs as above. Limited d/t

## 2024-01-22 NOTE — PROGRESS NOTES
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 Monitor renal function   Seems to be doing well postoperatively otherwise   Hold losartan and hydrochlorothiazide for now, monitor renal function and potassium  Continue rest of medications otherwise  Discussed with patient  DVT PPx     7 pm- 7 am, please contact on call Hospitalist for any needs     Subjective:      no new events.  Denies any new symptoms.  Feels well overall.    Physical Examination:      Vitals:  BP (!) 143/53   Pulse 54   Temp 97.7 °F (36.5 °C) (Oral)   Resp 18   Ht 1.549 m (5' 1\")   Wt 102.1 kg (225 lb)   LMP 1996   SpO2 99%   BMI 42.51 kg/m²   Temp (24hrs), Av.8 °F (36.6 °C), Min:97.7 °F (36.5 °C), Max:97.9 °F (36.6 °C)      General appearance: alert, cooperative and no distress  Mental Status: oriented to person, place and time and normal affect  Lungs: clear to auscultation bilaterally, normal effort  Heart: regular rate and rhythm, no murmur  Abdomen: soft, nontender, nondistended, bowel sounds present, no masses  Extremities: no edema, redness, tenderness in the calves  Skin: no gross lesions, rashes    Data:     Labs:  Recent Labs     24  0500 24  0524   WBC 7.6 6.8   HGB 7.6* 7.4*   * 106*     Recent Labs     24  0500 24  0524    144   K 5.1* 4.5   * 115*   CO2 18* 20   BUN 53* 51*   CREATININE 2.33* 2.02*   GLUCOSE 102* 96     No results for input(s): \"AST\", \"ALT\", \"ALB\", \"BILITOT\", \"ALKPHOS\" in the last 72 hours.    Current Facility-Administered Medications   Medication Dose Route Frequency Provider 
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 Monitor renal function   Seems to be doing well postoperatively otherwise   Hold losartan and hydrochlorothiazide for now, monitor renal function and potassium  Continue rest of medications otherwise  Discussed with patient  DVT PPx     7 pm- 7 am, please contact on call Hospitalist for any needs     Subjective:      no new events.  Denies any new symptoms.  Feels well overall.    Physical Examination:      Vitals:  BP (!) 132/49   Pulse 65   Temp 97.7 °F (36.5 °C) (Oral)   Resp 18   Ht 1.549 m (5' 1\")   Wt 102.1 kg (225 lb)   LMP 1996   SpO2 95%   BMI 42.51 kg/m²   Temp (24hrs), Av.7 °F (36.5 °C), Min:97.7 °F (36.5 °C), Max:97.7 °F (36.5 °C)      General appearance: alert, cooperative and no distress  Mental Status: oriented to person, place and time and normal affect  Lungs: clear to auscultation bilaterally, normal effort  Heart: regular rate and rhythm, no murmur  Abdomen: soft, nontender, nondistended, bowel sounds present, no masses  Extremities: no edema, redness, tenderness in the calves  Skin: no gross lesions, rashes    Data:     Labs:  Recent Labs     24  0929 24  0500   WBC 9.7 7.6   HGB 8.8* 7.6*    121*     Recent Labs     24  0929 24  0500    143   K 5.4* 5.1*   * 114*   CO2 21 18*   BUN 41* 53*   CREATININE 1.91* 2.33*   GLUCOSE 178* 102*     No results for input(s): \"AST\", \"ALT\", \"ALB\", \"BILITOT\", \"ALKPHOS\" in the last 72 hours.    Current Facility-Administered Medications   Medication Dose Route Frequency Provider Last Rate Last Admin    0.9 % sodium chloride infusion   
Internal Medicine   Hospitalist   Progress Note    2024   2:01 PM    Name:  Zoya Martínez  MRN:    [id number]     IP Day: 1     Admit Date: 2024  5:44 AM  PCP: Romario Bazan MD    Code Status:  Full Code    Assessment and Plan:        Active Problems/ diagnosis:     Status postlaminectomy  Mild JP  Hyperkalemia  Hypertension  CKD 3    Plan  Seems to be doing well postoperatively  Hold losartan and hydrochlorothiazide for now, monitor renal function and potassium  Continue rest of medications otherwise  Discussed with patient  DVT PPx     7 pm- 7 am, please contact on call Hospitalist for any needs     Subjective:      no new events.  Denies any new symptoms.  Feels well overall.    Physical Examination:      Vitals:  BP (!) 114/52   Pulse 65   Temp 97.6 °F (36.4 °C) (Oral)   Resp 16   Ht 1.549 m (5' 1\")   Wt 102.1 kg (225 lb)   LMP 1996   SpO2 98%   BMI 42.51 kg/m²   Temp (24hrs), Av.7 °F (36.5 °C), Min:97.3 °F (36.3 °C), Max:98 °F (36.7 °C)      General appearance: alert, cooperative and no distress  Mental Status: oriented to person, place and time and normal affect  Lungs: clear to auscultation bilaterally, normal effort  Heart: regular rate and rhythm, no murmur  Abdomen: soft, nontender, nondistended, bowel sounds present, no masses  Extremities: no edema, redness, tenderness in the calves  Skin: no gross lesions, rashes    Data:     Labs:  Recent Labs     24  0502 24  0929   WBC 8.7 9.7   HGB 8.6* 8.8*    147     Recent Labs     24  0502 24  0929    142   K 5.9* 5.4*   * 111*   CO2 20 21   BUN 39* 41*   CREATININE 1.72* 1.91*   GLUCOSE 135* 178*     No results for input(s): \"AST\", \"ALT\", \"ALB\", \"BILITOT\", \"ALKPHOS\" in the last 72 hours.    Current Facility-Administered Medications   Medication Dose Route Frequency Provider Last Rate Last Admin    sodium zirconium cyclosilicate (LOKELMA) oral suspension 10 g  10 g Oral TID Lefty, 
Patient reports improvement in back pain.  She did have headache yesterday but this has completely resolved with no headache at all today even when sitting up.  She has been on bedrest.  She has had Murguia in and does report baseline urinary frequency.    Incision clean dry and intact  Lower extremities 5 out of 5, sensation intact to light touch  Drain 125 last shift mostly bloody, removed without difficulty.  Assessment and plan postop day 2 L2-4 decompression and L4-5 decompression and fusion.  She has had improvement in back pain and denies any headache.  Drain was removed without difficulty and will mobilize out of bed PT and OT and D/C Murguia and work on discharge planning.  Jean-Claude Freeman MD    
Physical Therapy  Facility/Department: [facility] MED SURG W280/W280-01  Physical Therapy Discharge      NAME: Zoya Martínez    : 1945 (78 y.o.)  MRN: [id number]    Account: [id number]  Gender: female      Patient has been discharged from acute care hospital. DC patient from current PT program.      Electronically signed by Ellyn Mora PT on 24 at 11:50 AM EST      
Physical Therapy Med Surg Initial Assessment  Facility/Department: [address] ORTHO TELE  Room: 80/80[facility]       NAME: Zoya Martínez  : 1945 (78 y.o.)  MRN: [id number]  CODE STATUS: Full Code    Date of Service: 2024    Patient Diagnosis(es): Spondylolisthesis of lumbar region [M43.16]  Spinal stenosis of lumbar region with neurogenic claudication [M48.062]  Spondylolisthesis at L4-L5 level [M43.16]   No chief complaint on file.    Patient Active Problem List    Diagnosis Date Noted    Class 3 severe obesity due to excess calories with serious comorbidity and body mass index (BMI) of 40.0 to 44.9 in adult ([facility]) 2017    Acute pneumonia 03/10/2023    Status post total knee replacement, left 2023    Status post total knee replacement, right 2022    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 2024    Right knee DJD 2024    Left knee DJD 2024    Osteoarthritis of right hip 2024    Neck pain 2024    Left knee pain 2024    Bleeding 2024    Shoulder impingement 2024    Right shoulder pain 2024    Impingement syndrome of shoulder region 2024    Spondylolisthesis at L4-L5 level 2024    Spinal stenosis of lumbar region with neurogenic claudication 2023    Right knee pain 2022    RBBB (right bundle branch block) 2022    Gastrointestinal hemorrhage     Anemia 2021    Adenomatous polyp of descending colon     Adenomatous polyp of ascending colon     Adenomatous polyp of colon     Chronic gastritis without bleeding     CKD (chronic kidney disease) stage 3, GFR 30-59 ml/min ([facility]) 2019    Myelopathy concurrent with and due to stenosis of lumbar spine ([facility]) 2019    Acquired spondylolisthesis of lumbosacral region 2019    Chronic bilateral low back pain with bilateral sciatica 10/30/2018    Primary osteoarthritis of both knees 10/30/2018    Coronary artery disease 
Physical Therapy Missed Treatment   Facility/Department: [facility] MED SURG W280/W280-01    NAME: Zoya Martínez    : 1945 (78 y.o.)  MRN: [id number]    Account: [id number]  Gender: female    Chart reviewed, attempted PT at 1100. Patient unavailable 2° to:    [x] Hold per nsg request secondary to bedrest orders.  Nursing waiting to hear if doctor is releasing the orders.  Will check back after lunch.  Otherwise, will see Saturday.        Will attempt PT treatment again at earliest convenience.      Electronically signed by Laura Baldwin PTA on 24 at 11:04 AM EST    
Postoperative day #1 L2-3-4 decompression L4-5 decompression and instrumented fusion.  Patient is feeling good this morning with minimal pain.  Moving lower extremities well with good sensation.  Patient is obese inhibiting some mobility BMI 43.   Murguia catheter in place plan removal later today.  Durotomy repair successful.  Wound drain has increased output mostly bloody and should remain until output decreases.  Hemoglobin 8.6.    Low urinary output.  Increase IV fluids.    Start mobilizing patient slowly as her obesity and pain allows.  Consulting OT PT   rehabilitation for discharge dispositions.  Patient prefers discharge to home with home health when stable.  She will consider all options.    Home health order done for skilled nursing physical Occupational Therapy has home health aide.  
She tells me her pain is slowly improving, she has had some low back pain and bilateral lower extremity pain which is better than before surgery.  She has had some pain in both shoulders and chest which she has at her baseline and has been since surgery and slowly improving, no symptoms into her arms.  She denies any shortness of breath.  This is not a new problem and was there before surgery.  She has had no headache at all and has been walking with walker in halls.  She has been using the pure wick, she tells me she normally has urinary frequency and uses a pad at home.  She is tolerating p.o. without problems.  SBP 120s to 140s, pulse 50s to 60s  Lower extremities 5 out of 5  Station intact to light touch in lower extremities  Incision clean dry and intact, no swelling  Tenderness with palpation and movement of her bilateral shoulders and upper chest with palpation    Assessment and plan: Postop day 3 status post lumbar decompression and fusion.  She is doing very well and has not had any headaches and has been mobilizing and tolerating p.o..  The soreness in the shoulders and chest is not new and appears to be musculoskeletal, does not appear to be cardiac in nature.  Hematocrit is stable at 25 and she is hemodynamically stable, baseline creatinine is elevated and has been trending down. She is being followed by hospitalists.  Her wound is well-healed with no swelling or evidence of CSF leak, she has no headaches.  She wishes to be discharged to home, I saw her with care management team and she is planned for home with home health and wishes to go home tomorrow morning.  Her drain and Murguia have been removed.  Will work on discharge planning and plan for home with home health tomorrow, if medically stable.  She will follow-up with her PCP for her chronic medical conditions, as well.  Jean-Claude Freeman MD      
Subjective:  The patient complains of severe acute on chronic progressive fatigue and postop low back pain, lower extremity weakness partially relieved by rest, PT, OT and meds including opiates for pain management and exacerbated by exertion and recent decompressive surgery.  She s recovering from:  Date: 01/17/24 Room / Location: [place] [address] / [facility]       Anesthesia Start: 0730 Anesthesia Stop: 1639     Procedure: L 2-3, 3-4, 4-5 bilateral laminectomies microdissection decompressions L4-5 discectomy interbody cage posterior lateral allograft autogenous infuse pedicle screw fusion computer stereotactic (Bilateral: Spine Lumbar) Diagnosis:       Spondylolisthesis of lumbar region      Spinal stenosis of lumbar region with neurogenic claudication      (Spondylolisthesis of lumbar region [M43.16])      (Spinal stenosis of lumbar region with neurogenic claudication [M48.062])     Surgeons: Amy Villegas MD Responsible Provider: Thiago Medley DO     Anesthesia Type: general, regional ASA Status: 3       I am concerned about patient’s medical complexities including:  Principal Problem:    Spondylolisthesis at L4-L5 level  Active Problems:    Class 3 severe obesity due to excess calories with serious comorbidity and body mass index (BMI) of 40.0 to 44.9 in adult ([facility])    Coronary artery disease involving native coronary artery of native heart without angina pectoris    Myelopathy concurrent with and due to stenosis of lumbar spine ([facility])    CKD (chronic kidney disease) stage 3, GFR 30-59 ml/min ([facility])    Spinal stenosis of lumbar region with neurogenic claudication    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  Resolved Problems:    Spondylolisthesis of lumbar region      .    Reviewed recent nursing note and discussed current status and planned care with acute care providers,  \"reports improvement in back pain.  She did have headache yesterday but this has completely 
LEFT PINNA LESION WITH GRAFT performed by Anton Renteria MD at [facility] [place]    ENDOSCOPY, COLON, DIAGNOSTIC      FINGER SURGERY  12/09/2014    middle finger right hand due to infection    HYSTERECTOMY (CERVIX STATUS UNKNOWN)  1996    LASIK  06/15/2005    LUMBAR FUSION Bilateral 1/17/2024    L 2-3, 3-4, 4-5 bilateral laminectomies microdissection decompressions L4-5 discectomy interbody cage posterior lateral allograft autogenous infuse pedicle screw fusion computer stereotactic performed by Amy Villegas MD at [facility] OR    SHOULDER SURGERY  2008    shoulder dislocated - popped  back in Right shoulder    TOTAL KNEE ARTHROPLASTY Right 08/30/2022    RIGHT KNEE TOTAL KNEE ARTHROPLASTY performed by Vincenzo Wadsworth MD at [facility] OR    TOTAL KNEE ARTHROPLASTY Left 03/07/2023    LEFT KNEE TOTAL KNEE ARTHROPLASTY performed by Vincenzo Wadsworth MD at [facility] OR    UPPER GASTROINTESTINAL ENDOSCOPY  01/14/2015    DR LANE - ULCER IN THE ANTRUM    UPPER GASTROINTESTINAL ENDOSCOPY  05/06/2015    DR LANE - GASTRITIS, PREVIOUS ULCER HEALED    UPPER GASTROINTESTINAL ENDOSCOPY N/A 01/02/2021    EGD DIAGNOSTIC ONLY performed by Mohan Lane MD at [facility] OR       Chart Reviewed: Yes  Family / Caregiver Present: No    Restrictions:  Restrictions/Precautions: Fall Risk;Up as Tolerated;Contact Precautions (Per clinical judgement)    SUBJECTIVE:   Subjective: \"That nurse is really good.\"    Pain  Pain: denies pain pre and post.     OBJECTIVE:        Bed mobility  Rolling to Left: Minimal assistance  Supine to Sit: Minimal assistance (Touching assist)  Bed Mobility Comments: Continued education on efficient transfers with fair carryover.    Transfers  Sit to Stand: Stand by assistance (Touching)  Stand to Sit: Stand by assistance  Bed to Chair: Stand by assistance  Comment: Verbal cues for safety and sequencing, with good carryover.    Ambulation  Surface: Level tile  Device: Rolling Walker  Assistance: Minimal assistance (Touching)  Quality of 
with Auto Differential    Collection Time: 01/19/24  5:00 AM   Result Value Ref Range    WBC 7.6 4.8 - 10.8 K/uL    RBC 2.45 (L) 4.20 - 5.40 M/uL    Hemoglobin 7.6 (L) 12.0 - 16.0 g/dL    Hematocrit 25.1 (L) 37.0 - 47.0 %    .4 (H) 79.4 - 94.8 fL    MCH 31.0 27.0 - 31.3 pg    MCHC 30.3 (L) 33.0 - 37.0 %    RDW 13.6 11.5 - 14.5 %    Platelets 121 (L) 130 - 400 K/uL    Neutrophils % 81.1 %    Lymphocytes % 12.2 %    Monocytes % 5.9 %    Eosinophils % 0.0 %    Basophils % 0.1 %    Neutrophils Absolute 6.2 1.4 - 6.5 K/uL    Lymphocytes Absolute 0.9 (L) 1.0 - 4.8 K/uL    Monocytes Absolute 0.5 0.2 - 0.8 K/uL    Eosinophils Absolute 0.0 0.0 - 0.7 K/uL    Basophils Absolute 0.0 0.0 - 0.2 K/uL     Previous extensive, complex labs, notes and diagnostics reviewed and analyzed.     ALLERGIES:    Allergies as of 12/28/2023 - Fully Reviewed 12/05/2023   Allergen Reaction Noted    Lisinopril Nausea Only and Other (See Comments) 09/17/2012    Tramadol Nausea Only 08/20/2014      (please also verify by checking MAR)     Complex Physical Medicine & Rehab Issues Assess & Plan:   Severe abnormality of gait and mobility and impaired self-care and ADL's secondary to lumbar spinal stenosis with myelopathy and recent L 2-3, 3-4, 4-5 bilateral laminectomies microdissection decompressions L4-5 discectomy interbody cage  .  Updated functional and medical status reassessed regarding patient’s ability to participate in therapies and patient found to be able to participate in:          acute intensive comprehensive inpatient rehabilitation program including PT/OT to improve balance, ambulation, ADL’s, and to improve the P/AROM.   It is my opinion that they will be able to tolerate 3 hours of therapy a day and benefit from it at an acute level. I again discussed acute rehab with the patient and verify that the patient is able and willing to participate in 3 hours of therapy a day.  Rehab and Acute Care Case Management has also reinforced 
IADLs  Prognosis: Good  Discharge Recommendations: Continue to assess pending progress  Decision Making: Medium Complexity  History: Pt's medical history is moderately complex  Exam: Pt has 6 performance deficits  Assistance / Modification: Pt requires mod A    AMPAC (Six Click) Self care Score   How much help is needed for putting on and taking off regular lower body clothing?: A Lot  How much help is needed for bathing (which includes washing, rinsing, drying)?: A Lot  How much help is needed for toileting (which includes using toilet, bedpan, or urinal)?: A Little  How much help is needed for putting on and taking off regular upper body clothing?: A Little  How much help is needed for taking care of personal grooming?: A Little  How much help for eating meals?: None  AM-[facility] Inpatient Daily Activity Raw Score: 17  AM-[facility] Inpatient ADL T-Scale Score : 37.26  ADL Inpatient CMS 0-100% Score: 50.11    Therapy key for assistance levels -   Independent/Mod I = Pt. is able to perform task with no assistance but may require a device   Stand by assistance = Pt. does not perform task at an independent level but does not need physical assistance, requires verbal cues  Minimal, Moderate, Maximal Assistance = Pt. requires physical assistance (25%, 50%, 75% assist from helper) for task but is able to actively participate in task   Dependent = Pt. requires total assistance with task and is not able to actively participate with task completion     Plan:  Occupational Therapy Plan  Times Per Week: 1-4x  Therapy Duration:  (Length of acute stay)  Current Treatment Recommendations: Strengthening, Balance training, Functional mobility training, Endurance training, Pain management, Safety education & training, Neuromuscular re-education, Patient/Caregiver education & training, Equipment evaluation, education, & procurement, Self-Care / ADL, Home management training    Goals:   Patient will:    - Improve functional endurance to

## 2024-01-22 NOTE — DISCHARGE SUMMARY
Discharge Summary    Zoya Martínez  :  1945  MRN:  [id number]    ADMIT DATE:  2024  DISCHARGE DATE: 2024    PRIMARY CARE PHYSICIAN:  Romario Bazan MD    VISIT STATUS: Admission    CODE STATUS:  Prior    DISCHARGE DIAGNOSES:  Principal Problem:    Spondylolisthesis at L4-L5 level  Active Problems:    Class 3 severe obesity due to excess calories with serious comorbidity and body mass index (BMI) of 40.0 to 44.9 in adult ([facility])    Coronary artery disease involving native coronary artery of native heart without angina pectoris    Myelopathy concurrent with and due to stenosis of lumbar spine ([facility])    CKD (chronic kidney disease) stage 3, GFR 30-59 ml/min ([facility])    Spinal stenosis of lumbar region with neurogenic claudication    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  Resolved Problems:    Spondylolisthesis of lumbar region      SURGICAL PROCEDURES:    Bilateral decompressive laminectomies of L2, L3,  L4, L5; L4-5 diskectomies; interbody cage infused allograft  posterolateral pedicle screw fusion; computer stereotaxic on 2024    HOSPITAL COURSE:  The patient was admitted to the hospital on the day of surgery and underwent the above noted procedure. Post-operatively, the patient was transferred to the PACU in stable condition and then to [facility]. They received 24 hours of prophylactic intravenous antibiotics. DVT prophylaxis included SCDs and early ambulation.  On postop day 1 patient had postural headaches and was placed on bedrest.  Headaches completely resolved and on postop day 2 patient was mobilized.  Her surgical drain was removed and she was seen by PT and OT and case management.  Diet was advanced, patient was ambulated and able to urinate, and pain was reasonably controlled. The patient progressed well throughout the hospitalization and was deemed stable for discharge to home with home health care on the above listed date.

## 2024-01-24 ENCOUNTER — TELEPHONE (OUTPATIENT)
Dept: PAIN MANAGEMENT | Age: 79
End: 2024-01-24

## 2024-01-24 DIAGNOSIS — M62.830 BACK MUSCLE SPASM: Primary | ICD-10-CM

## 2024-01-24 RX ORDER — CYCLOBENZAPRINE HCL 10 MG
10 TABLET ORAL 3 TIMES DAILY PRN
Qty: 21 TABLET | Refills: 0 | Status: SHIPPED | OUTPATIENT
Start: 2024-01-24 | End: 2024-02-03

## 2024-01-24 NOTE — TELEPHONE ENCOUNTER
This should not have been sent to neurology should have went to neurosurgery please make sure it is getting to the right office so that there is little delay in the care of pt.

## 2024-01-24 NOTE — TELEPHONE ENCOUNTER
Pt called stating her medication from her surgery is not working for her pain at all and can barely walk because of it

## 2024-01-29 ENCOUNTER — TELEPHONE (OUTPATIENT)
Dept: NEUROSURGERY | Age: 79
End: 2024-01-29

## 2024-01-29 NOTE — TELEPHONE ENCOUNTER
Received call from Ayaan at [facility]. He states that the patient told him she was told to take 1/2 of her norco but she was still having hallucinations. She has been completely off the medication for 2.5 days and is still hallucinating and having more pain. Slightly increased blood pressure. Movement quality diminished. They wanted to make Dr Villegas aware.

## 2024-01-29 NOTE — TELEPHONE ENCOUNTER
Patient son called concerned about the patient's mental status change. He says this morning around 3AM she began hallucinating. Convinced someone was in her house, seeing animals and other people. She ended up calling the  who sent an ambulance to check her vitals, but the patient refused to go to the ER. He is not sure if this is related to her medication, she did not have this issue prior to her surgery. She does have a post op appointment with Tiffanie tomorrow. Son wanted to make sure Dr Villegas and Tiffanie were aware.     I did ask if he had spoken to the patient's PCP but he said they had not.

## 2024-01-30 ENCOUNTER — OFFICE VISIT (OUTPATIENT)
Dept: PAIN MANAGEMENT | Age: 79
End: 2024-01-30

## 2024-01-30 ENCOUNTER — APPOINTMENT (OUTPATIENT)
Dept: CT IMAGING | Age: 79
End: 2024-01-30
Payer: MEDICARE

## 2024-01-30 ENCOUNTER — OFFICE VISIT (OUTPATIENT)
Dept: NEUROSURGERY | Age: 79
End: 2024-01-30
Payer: MEDICARE

## 2024-01-30 ENCOUNTER — HOSPITAL ENCOUNTER (INPATIENT)
Age: 79
LOS: 4 days | Discharge: HOME HEALTH | End: 2024-02-03
Attending: EMERGENCY MEDICINE | Admitting: INTERNAL MEDICINE
Payer: MEDICARE

## 2024-01-30 VITALS
HEIGHT: 61 IN | TEMPERATURE: 97.8 F | WEIGHT: 225 LBS | BODY MASS INDEX: 42.48 KG/M2 | DIASTOLIC BLOOD PRESSURE: 100 MMHG | SYSTOLIC BLOOD PRESSURE: 160 MMHG

## 2024-01-30 DIAGNOSIS — N30.00 ACUTE CYSTITIS WITHOUT HEMATURIA: ICD-10-CM

## 2024-01-30 DIAGNOSIS — G82.20 PARAPARESIS (HCC): ICD-10-CM

## 2024-01-30 DIAGNOSIS — Z48.89 ENCOUNTER FOR POSTOPERATIVE WOUND CHECK: Primary | ICD-10-CM

## 2024-01-30 DIAGNOSIS — R44.3 HALLUCINATIONS: Primary | ICD-10-CM

## 2024-01-30 DIAGNOSIS — E66.01 CLASS 3 SEVERE OBESITY DUE TO EXCESS CALORIES WITH SERIOUS COMORBIDITY AND BODY MASS INDEX (BMI) OF 40.0 TO 44.9 IN ADULT (HCC): ICD-10-CM

## 2024-01-30 DIAGNOSIS — Z98.1 HISTORY OF LUMBAR FUSION: ICD-10-CM

## 2024-01-30 PROBLEM — G93.41 METABOLIC ENCEPHALOPATHY: Status: ACTIVE | Noted: 2024-01-30

## 2024-01-30 LAB
ALBUMIN SERPL-MCNC: 3.7 G/DL (ref 3.5–4.6)
ALP SERPL-CCNC: 118 U/L (ref 40–130)
ALT SERPL-CCNC: 12 U/L (ref 0–33)
AMMONIA PLAS-SCNC: 18 UMOL/L (ref 11–51)
ANION GAP SERPL CALCULATED.3IONS-SCNC: 14 MEQ/L (ref 9–15)
APAP SERPL-MCNC: 17 UG/ML (ref 10–30)
AST SERPL-CCNC: 18 U/L (ref 0–35)
BACTERIA URNS QL MICRO: ABNORMAL /HPF
BASOPHILS # BLD: 0.1 K/UL (ref 0–0.2)
BASOPHILS NFR BLD: 0.5 %
BILIRUB SERPL-MCNC: 0.9 MG/DL (ref 0.2–0.7)
BILIRUB UR QL STRIP: NEGATIVE
BUN SERPL-MCNC: 18 MG/DL (ref 8–23)
CALCIUM SERPL-MCNC: 8.9 MG/DL (ref 8.5–9.9)
CHLORIDE SERPL-SCNC: 108 MEQ/L (ref 95–107)
CK SERPL-CCNC: 131 U/L (ref 0–170)
CLARITY UR: ABNORMAL
CO2 SERPL-SCNC: 24 MEQ/L (ref 20–31)
COLOR UR: YELLOW
CREAT SERPL-MCNC: 1.12 MG/DL (ref 0.5–0.9)
EOSINOPHIL # BLD: 0.3 K/UL (ref 0–0.7)
EOSINOPHIL NFR BLD: 2.2 %
EPI CELLS #/AREA URNS AUTO: ABNORMAL /HPF (ref 0–5)
ERYTHROCYTE [DISTWIDTH] IN BLOOD BY AUTOMATED COUNT: 13.9 % (ref 11.5–14.5)
ETHANOL PERCENT: NORMAL G/DL
ETHANOLAMINE SERPL-MCNC: <10 MG/DL (ref 0–0.08)
GLOBULIN SER CALC-MCNC: 2.7 G/DL (ref 2.3–3.5)
GLUCOSE SERPL-MCNC: 119 MG/DL (ref 70–99)
GLUCOSE UR STRIP-MCNC: NEGATIVE MG/DL
HCT VFR BLD AUTO: 28.7 % (ref 37–47)
HGB BLD-MCNC: 8.9 G/DL (ref 12–16)
HGB UR QL STRIP: ABNORMAL
HYALINE CASTS #/AREA URNS AUTO: ABNORMAL /HPF (ref 0–5)
KETONES UR STRIP-MCNC: NEGATIVE MG/DL
LACTATE BLDV-SCNC: 1.5 MMOL/L (ref 0.5–2.2)
LEUKOCYTE ESTERASE UR QL STRIP: ABNORMAL
LYMPHOCYTES # BLD: 0.8 K/UL (ref 1–4.8)
LYMPHOCYTES NFR BLD: 5.9 %
MAGNESIUM SERPL-MCNC: 1.4 MG/DL (ref 1.7–2.4)
MCH RBC QN AUTO: 30.6 PG (ref 27–31.3)
MCHC RBC AUTO-ENTMCNC: 31 % (ref 33–37)
MCV RBC AUTO: 98.6 FL (ref 79.4–94.8)
MONOCYTES # BLD: 0.6 K/UL (ref 0.2–0.8)
MONOCYTES NFR BLD: 4.9 %
NEUTROPHILS # BLD: 11 K/UL (ref 1.4–6.5)
NEUTS SEG NFR BLD: 86.1 %
NITRITE UR QL STRIP: NEGATIVE
PH UR STRIP: 5.5 [PH] (ref 5–9)
PLATELET # BLD AUTO: 273 K/UL (ref 130–400)
POTASSIUM SERPL-SCNC: 4 MEQ/L (ref 3.4–4.9)
PROCALCITONIN SERPL IA-MCNC: 0.12 NG/ML (ref 0–0.15)
PROT SERPL-MCNC: 6.4 G/DL (ref 6.3–8)
PROT UR STRIP-MCNC: >=300 MG/DL
RBC # BLD AUTO: 2.91 M/UL (ref 4.2–5.4)
RBC #/AREA URNS AUTO: ABNORMAL /HPF (ref 0–5)
SALICYLATES SERPL-MCNC: <0.3 MG/DL (ref 15–30)
SODIUM SERPL-SCNC: 146 MEQ/L (ref 135–144)
SP GR UR STRIP: 1.02 (ref 1–1.03)
TSH SERPL-MCNC: 2.68 UIU/ML (ref 0.44–3.86)
URINE REFLEX TO CULTURE: YES
UROBILINOGEN UR STRIP-ACNC: 1 E.U./DL
WBC # BLD AUTO: 12.7 K/UL (ref 4.8–10.8)
WBC #/AREA URNS AUTO: ABNORMAL /HPF (ref 0–5)

## 2024-01-30 PROCEDURE — 99213 OFFICE O/P EST LOW 20 MIN: CPT | Performed by: NEUROLOGICAL SURGERY

## 2024-01-30 PROCEDURE — 6370000000 HC RX 637 (ALT 250 FOR IP): Performed by: INTERNAL MEDICINE

## 2024-01-30 PROCEDURE — 2580000003 HC RX 258: Performed by: INTERNAL MEDICINE

## 2024-01-30 PROCEDURE — 83605 ASSAY OF LACTIC ACID: CPT

## 2024-01-30 PROCEDURE — 6360000002 HC RX W HCPCS: Performed by: EMERGENCY MEDICINE

## 2024-01-30 PROCEDURE — 36415 COLL VENOUS BLD VENIPUNCTURE: CPT

## 2024-01-30 PROCEDURE — 99024 POSTOP FOLLOW-UP VISIT: CPT | Performed by: NURSE PRACTITIONER

## 2024-01-30 PROCEDURE — 1123F ACP DISCUSS/DSCN MKR DOCD: CPT | Performed by: NEUROLOGICAL SURGERY

## 2024-01-30 PROCEDURE — 80053 COMPREHEN METABOLIC PANEL: CPT

## 2024-01-30 PROCEDURE — 81001 URINALYSIS AUTO W/SCOPE: CPT

## 2024-01-30 PROCEDURE — 99285 EMERGENCY DEPT VISIT HI MDM: CPT

## 2024-01-30 PROCEDURE — 87088 URINE BACTERIA CULTURE: CPT

## 2024-01-30 PROCEDURE — 84145 PROCALCITONIN (PCT): CPT

## 2024-01-30 PROCEDURE — 80179 DRUG ASSAY SALICYLATE: CPT

## 2024-01-30 PROCEDURE — 87186 SC STD MICRODIL/AGAR DIL: CPT

## 2024-01-30 PROCEDURE — 87086 URINE CULTURE/COLONY COUNT: CPT

## 2024-01-30 PROCEDURE — 94762 N-INVAS EAR/PLS OXIMTRY CONT: CPT

## 2024-01-30 PROCEDURE — 80143 DRUG ASSAY ACETAMINOPHEN: CPT

## 2024-01-30 PROCEDURE — 1210000000 HC MED SURG R&B

## 2024-01-30 PROCEDURE — 82550 ASSAY OF CK (CPK): CPT

## 2024-01-30 PROCEDURE — 85025 COMPLETE CBC W/AUTO DIFF WBC: CPT

## 2024-01-30 PROCEDURE — 2580000003 HC RX 258: Performed by: EMERGENCY MEDICINE

## 2024-01-30 PROCEDURE — 82140 ASSAY OF AMMONIA: CPT

## 2024-01-30 PROCEDURE — 82077 ASSAY SPEC XCP UR&BREATH IA: CPT

## 2024-01-30 PROCEDURE — 96374 THER/PROPH/DIAG INJ IV PUSH: CPT

## 2024-01-30 PROCEDURE — 83735 ASSAY OF MAGNESIUM: CPT

## 2024-01-30 PROCEDURE — 70450 CT HEAD/BRAIN W/O DYE: CPT

## 2024-01-30 PROCEDURE — 6370000000 HC RX 637 (ALT 250 FOR IP): Performed by: EMERGENCY MEDICINE

## 2024-01-30 PROCEDURE — 84443 ASSAY THYROID STIM HORMONE: CPT

## 2024-01-30 RX ORDER — SODIUM CHLORIDE 9 MG/ML
INJECTION, SOLUTION INTRAVENOUS PRN
Status: DISCONTINUED | OUTPATIENT
Start: 2024-01-30 | End: 2024-02-03 | Stop reason: HOSPADM

## 2024-01-30 RX ORDER — POLYETHYLENE GLYCOL 3350 17 G/17G
17 POWDER, FOR SOLUTION ORAL DAILY PRN
Status: DISCONTINUED | OUTPATIENT
Start: 2024-01-30 | End: 2024-02-03 | Stop reason: HOSPADM

## 2024-01-30 RX ORDER — MAGNESIUM SULFATE IN WATER 40 MG/ML
2000 INJECTION, SOLUTION INTRAVENOUS PRN
Status: DISCONTINUED | OUTPATIENT
Start: 2024-01-30 | End: 2024-02-03 | Stop reason: HOSPADM

## 2024-01-30 RX ORDER — ATORVASTATIN CALCIUM 40 MG/1
40 TABLET, FILM COATED ORAL NIGHTLY
Status: DISCONTINUED | OUTPATIENT
Start: 2024-01-30 | End: 2024-02-03 | Stop reason: HOSPADM

## 2024-01-30 RX ORDER — ONDANSETRON 2 MG/ML
4 INJECTION INTRAMUSCULAR; INTRAVENOUS EVERY 6 HOURS PRN
Status: DISCONTINUED | OUTPATIENT
Start: 2024-01-30 | End: 2024-02-03 | Stop reason: HOSPADM

## 2024-01-30 RX ORDER — 0.9 % SODIUM CHLORIDE 0.9 %
1000 INTRAVENOUS SOLUTION INTRAVENOUS ONCE
Status: COMPLETED | OUTPATIENT
Start: 2024-01-30 | End: 2024-01-30

## 2024-01-30 RX ORDER — ACETAMINOPHEN 325 MG/1
650 TABLET ORAL EVERY 6 HOURS PRN
Status: DISCONTINUED | OUTPATIENT
Start: 2024-01-30 | End: 2024-02-03 | Stop reason: HOSPADM

## 2024-01-30 RX ORDER — MAGNESIUM SULFATE IN WATER 40 MG/ML
2000 INJECTION, SOLUTION INTRAVENOUS ONCE
Status: COMPLETED | OUTPATIENT
Start: 2024-01-30 | End: 2024-01-30

## 2024-01-30 RX ORDER — SODIUM CHLORIDE 0.9 % (FLUSH) 0.9 %
5-40 SYRINGE (ML) INJECTION EVERY 12 HOURS SCHEDULED
Status: DISCONTINUED | OUTPATIENT
Start: 2024-01-30 | End: 2024-02-03 | Stop reason: HOSPADM

## 2024-01-30 RX ORDER — ONDANSETRON 4 MG/1
4 TABLET, ORALLY DISINTEGRATING ORAL EVERY 8 HOURS PRN
Status: DISCONTINUED | OUTPATIENT
Start: 2024-01-30 | End: 2024-02-03 | Stop reason: HOSPADM

## 2024-01-30 RX ORDER — METOPROLOL TARTRATE 50 MG/1
50 TABLET, FILM COATED ORAL 2 TIMES DAILY
Status: DISCONTINUED | OUTPATIENT
Start: 2024-01-30 | End: 2024-01-31

## 2024-01-30 RX ORDER — METOPROLOL TARTRATE 50 MG/1
50 TABLET, FILM COATED ORAL ONCE
Status: COMPLETED | OUTPATIENT
Start: 2024-01-30 | End: 2024-01-30

## 2024-01-30 RX ORDER — POTASSIUM CHLORIDE 20 MEQ/1
40 TABLET, EXTENDED RELEASE ORAL PRN
Status: DISCONTINUED | OUTPATIENT
Start: 2024-01-30 | End: 2024-02-03 | Stop reason: HOSPADM

## 2024-01-30 RX ORDER — LOSARTAN POTASSIUM 50 MG/1
50 TABLET ORAL DAILY
Status: DISCONTINUED | OUTPATIENT
Start: 2024-01-30 | End: 2024-01-30 | Stop reason: SDUPTHER

## 2024-01-30 RX ORDER — DILTIAZEM HYDROCHLORIDE 240 MG/1
240 CAPSULE, COATED, EXTENDED RELEASE ORAL DAILY
Status: DISCONTINUED | OUTPATIENT
Start: 2024-01-31 | End: 2024-02-03 | Stop reason: HOSPADM

## 2024-01-30 RX ORDER — ACETAMINOPHEN 650 MG/1
650 SUPPOSITORY RECTAL EVERY 6 HOURS PRN
Status: DISCONTINUED | OUTPATIENT
Start: 2024-01-30 | End: 2024-02-03 | Stop reason: HOSPADM

## 2024-01-30 RX ORDER — POTASSIUM CHLORIDE 7.45 MG/ML
10 INJECTION INTRAVENOUS PRN
Status: DISCONTINUED | OUTPATIENT
Start: 2024-01-30 | End: 2024-02-03 | Stop reason: HOSPADM

## 2024-01-30 RX ORDER — HYDRALAZINE HYDROCHLORIDE 20 MG/ML
10 INJECTION INTRAMUSCULAR; INTRAVENOUS EVERY 6 HOURS PRN
Status: DISCONTINUED | OUTPATIENT
Start: 2024-01-30 | End: 2024-02-03 | Stop reason: HOSPADM

## 2024-01-30 RX ORDER — ASPIRIN 81 MG/1
81 TABLET, CHEWABLE ORAL DAILY
Status: DISCONTINUED | OUTPATIENT
Start: 2024-01-31 | End: 2024-02-03 | Stop reason: HOSPADM

## 2024-01-30 RX ORDER — LOSARTAN POTASSIUM 50 MG/1
50 TABLET ORAL DAILY
Status: DISCONTINUED | OUTPATIENT
Start: 2024-01-31 | End: 2024-02-01

## 2024-01-30 RX ORDER — MULTIVIT-MIN/IRON/FOLIC ACID/K 18-600-40
CAPSULE ORAL
COMMUNITY

## 2024-01-30 RX ORDER — ENOXAPARIN SODIUM 100 MG/ML
40 INJECTION SUBCUTANEOUS DAILY
Status: DISCONTINUED | OUTPATIENT
Start: 2024-01-31 | End: 2024-02-03 | Stop reason: HOSPADM

## 2024-01-30 RX ORDER — SODIUM CHLORIDE 0.9 % (FLUSH) 0.9 %
5-40 SYRINGE (ML) INJECTION PRN
Status: DISCONTINUED | OUTPATIENT
Start: 2024-01-30 | End: 2024-02-03 | Stop reason: HOSPADM

## 2024-01-30 RX ADMIN — METOPROLOL TARTRATE 50 MG: 50 TABLET ORAL at 19:15

## 2024-01-30 RX ADMIN — METOPROLOL TARTRATE 50 MG: 50 TABLET ORAL at 22:24

## 2024-01-30 RX ADMIN — SODIUM CHLORIDE, PRESERVATIVE FREE 10 ML: 5 INJECTION INTRAVENOUS at 22:24

## 2024-01-30 RX ADMIN — CEFTRIAXONE SODIUM 1000 MG: 1 INJECTION, POWDER, FOR SOLUTION INTRAMUSCULAR; INTRAVENOUS at 16:56

## 2024-01-30 RX ADMIN — SODIUM CHLORIDE 1000 ML: 9 INJECTION, SOLUTION INTRAVENOUS at 13:43

## 2024-01-30 RX ADMIN — ATORVASTATIN CALCIUM 40 MG: 40 TABLET, FILM COATED ORAL at 22:24

## 2024-01-30 RX ADMIN — LOSARTAN POTASSIUM 50 MG: 50 TABLET, FILM COATED ORAL at 19:15

## 2024-01-30 RX ADMIN — MAGNESIUM SULFATE HEPTAHYDRATE 2000 MG: 40 INJECTION, SOLUTION INTRAVENOUS at 15:08

## 2024-01-30 ASSESSMENT — PAIN SCALES - GENERAL
PAINLEVEL_OUTOF10: 0
PAINLEVEL_OUTOF10: 7
PAINLEVEL_OUTOF10: 0

## 2024-01-30 ASSESSMENT — ENCOUNTER SYMPTOMS
NAUSEA: 0
EYE PAIN: 0
VOMITING: 0
SHORTNESS OF BREATH: 0
CHEST TIGHTNESS: 0
ABDOMINAL PAIN: 0
SORE THROAT: 0

## 2024-01-30 ASSESSMENT — PAIN - FUNCTIONAL ASSESSMENT
PAIN_FUNCTIONAL_ASSESSMENT: NONE - DENIES PAIN
PAIN_FUNCTIONAL_ASSESSMENT: 0-10
PAIN_FUNCTIONAL_ASSESSMENT: NONE - DENIES PAIN
PAIN_FUNCTIONAL_ASSESSMENT: NONE - DENIES PAIN

## 2024-01-30 NOTE — ED TRIAGE NOTES
Pt  brought in by family. Pt had back surgery recently and was on pain pills. Pt has not had pain pills in 3 days but is experiencing hallucinations for 1 1/2 weeks.  Per family, pt has  claimed that people have broken in to her house, that people  are on top of her and that she has even called 911. Pt cooperative at this time

## 2024-01-30 NOTE — ED PROVIDER NOTES
Notable for the following components:    Clarity, UA CLOUDY (*)     Blood, Urine MODERATE (*)     Protein, UA >=300 (*)     Leukocyte Esterase, Urine TRACE (*)     All other components within normal limits   SALICYLATE LEVEL - Abnormal; Notable for the following components:    Salicylate, Serum <0.3 (*)     All other components within normal limits   MICROSCOPIC URINALYSIS - Abnormal; Notable for the following components:    Bacteria, UA MANY (*)     WBC, UA 20-50 (*)     RBC, UA 3-5 (*)     All other components within normal limits   CBC WITH AUTO DIFFERENTIAL - Abnormal; Notable for the following components:    WBC 11.5 (*)     RBC 2.86 (*)     Hemoglobin 8.4 (*)     Hematocrit 28.0 (*)     MCV 97.9 (*)     MCHC 30.0 (*)     Neutrophils Absolute 9.7 (*)     Lymphocytes Absolute 0.8 (*)     All other components within normal limits   BASIC METABOLIC PANEL - Abnormal; Notable for the following components:    Sodium 150 (*)     Chloride 112 (*)     Anion Gap 18 (*)     Glucose 119 (*)     Creatinine 1.08 (*)     Est, Glom Filt Rate 52.7 (*)     All other components within normal limits   PROTIME-INR - Abnormal; Notable for the following components:    Protime 16.0 (*)     All other components within normal limits   HEPATIC FUNCTION PANEL - Abnormal; Notable for the following components:    Total Protein 6.0 (*)     All other components within normal limits   CULTURE, URINE   ETHANOL   AMMONIA   LACTIC ACID   TSH   CK   ACETAMINOPHEN LEVEL   PROCALCITONIN    Narrative:     add on   MAGNESIUM       All other labs were within normal range or not returned as of this dictation.    EMERGENCY DEPARTMENT COURSE and DIFFERENTIAL DIAGNOSIS/MDM:   Vitals:    Vitals:    01/30/24 2330 01/31/24 0245 01/31/24 0615 01/31/24 0742   BP: (!) 176/60 (!) 166/63 (!) 177/81 (!) 188/85   Pulse:    83   Resp:    18   Temp:    97.9 °F (36.6 °C)   TempSrc:    Oral   SpO2:    91%   Weight:       Height:           Patient has been hallucinating  since having back surgery.  Patient is not septic but does have a UTI.  She has been off her opioids and muscle relaxants for 3 days yet continues to hallucinate.  No history of psychiatric disorder.  I believe this is likely related to urine infection.  Patient has been calling 911 and  cannot manage her in this current state and will require admission    Medical Decision Making  Amount and/or Complexity of Data Reviewed  Labs: ordered.  Radiology: ordered.    Risk  Prescription drug management.  Decision regarding hospitalization.          REASSESSMENT          CRITICAL CARE TIME   Total Critical Care time was 30 minutes, excluding separately reportable procedures.  There was a high probability of clinically significant/life threatening deterioration in the patient's condition which required my urgent intervention.      CONSULTS:  None    PROCEDURES:  Unless otherwise noted below, none     Procedures        FINAL IMPRESSION      1. Hallucinations    2. Acute cystitis without hematuria          DISPOSITION/PLAN   DISPOSITION Admitted 01/30/2024 04:32:02 PM      PATIENT REFERRED TO:  No follow-up provider specified.    DISCHARGE MEDICATIONS:  Current Discharge Medication List        Controlled Substances Monitoring:     RX Monitoring Attestation   5/22/2019  10:01 AM The Prescription Monitoring Report for this patient was reviewed today.       (Please note that portions of this note were completed with a voice recognition program.  Efforts were made to edit the dictations but occasionally words are mis-transcribed.)    Johanna Pack DO (electronically signed)  Attending Emergency Physician            Johanna Pack DO  01/31/24 [id number]

## 2024-01-30 NOTE — PROGRESS NOTES
Pt here today for post op f/u.  It appears patient's son called concerned about mental status change and patient hallucinating.  See telephone note.  Patient was advised by Dr. Freeman to go to the ER to have be evaluated however does not appear this was done.  Patient also evidently tried to change how she was taking Norco and then stopped taking Norco and was still hallucinating and having pain.  Pt is S/p Bilateral decompressive laminectomies of L2, L3,  L4, L5; L4-5 diskectomies; interbody cage infused allograft  posterolateral pedicle screw fusion; computer stereotaxic on 1/17/2024 with Dr. Villegas.  She was given Norco postoperatively.  Patient was advised no lifting greater than 30 pounds for 3 weeks and to follow-up with Dr. Villegas in 4 weeks with x-rays done prior.  Flexeril was also ordered for her due to spasms.  Family is here with her today.  She has called the  and says seeing people dressed up like shawn giron.  She hasn't used flexeril or norco for 3 days.        Walks with walker and gets in and out of chair with assistance.    Incision: well approximated without warm, drainage or tenderness    Pain level: \"fine sitting\" but says increases to 5/10 with standing and walking      PLAN: Advised pt and family she needs to be evaluated in ER d/t her reported hallucinations.  Family agreed. Pt is being seen by Dr. Villegas as well today.

## 2024-01-30 NOTE — PROGRESS NOTES
Patient Name: Zoya Martínez : 1945        Date: 2024      Type of Appt: Post Op    Reason for appt: Post op Dr. Villegas 24. L2-3 3-4 4-5 rosie post lat fusion.     Pt last seen by Dr Villegas on 23        Surgeries: Post op Dr. Villegas 24. L2-3 3-4 4-5 rosie post lat fusion.          [facility]  [address] , [address]  [place], [place]  P: [phone]  F: [phone]      Patient: Zoya Martínez  YOB: 1945  Date: 2024    The patient is a 78 y.o. female who presents today for follow up.      Postoperatively patient is doing well with healing of the wound increasing strength in lower extremities less pain with mobility.  Her wound is healing well by examination.  She has increased antigravity strength in both lower extremities.  With minimal assistance she is able to stand up out of the wheelchair unable to walk.  Improved from her preoperative status.    Family is concerned and there is evidence of significant hallucinations.  Psychiatric issue.  She is called the  seeing animals in the kitchen as examples.  She is a danger to herself and the family cannot care for her at home.  All narcotics were stopped 3 days ago.    Advised to the emergency room for evaluation and probable admission to the hospital under the medical service for neurology psychiatric evaluation.  Continue physical therapy for her low back postoperative recovery.      FELIBERTO VILLEGAS MD       WDL

## 2024-01-30 NOTE — H&P
07/11/2017    History of transfusion of whole blood 1996    Excessive bleeding before hysterectomy    Hyperlipidemia     meds > 2 yrs    Hypertension     Hypothyroidism     past trx years ago then stopped / recent restart    Kidney disease     Low back pain     Morbid obesity due to excess calories (HCC) 05/13/2017    Morbid obesity due to excess calories (HCC) 06/06/2017    Osteoarthritis     Pneumonia     RBBB (right bundle branch block) 03/17/2022    Shoulder dislocation     ST elevation myocardial infarction involving left circumflex coronary artery (HCC) 05/13/2017    Unspecified gastritis and gastroduodenitis without mention of hemorrhage 05/06/2015    DR LANE       Past Surgical History:      Procedure Laterality Date    CARDIAC SURGERY  2017    has x 2 cardiac stents (2 separate surgeries    COLONOSCOPY  01/14/2015    DR LANE - DIVERTICULOSIS    COLONOSCOPY N/A 01/02/2021    COLONOSCOPY DIAGNOSTIC performed by Mohan Lane MD at [facility] OR    CORONARY ANGIOPLASTY WITH STENT PLACEMENT  05/17/2017    x2 stents    CYST REMOVAL Left 02/07/2019    RESECTION OF LEFT PINNA LESION WITH GRAFT performed by Anton Renteria [place] at [facility]    ENDOSCOPY, COLON, DIAGNOSTIC      FINGER SURGERY  12/09/2014    middle finger right hand due to infection    HYSTERECTOMY (CERVIX STATUS UNKNOWN)  1996    LASIK  06/15/2005    LUMBAR FUSION Bilateral 1/17/2024    L 2-3, 3-4, 4-5 bilateral laminectomies microdissection decompressions L4-5 discectomy interbody cage posterior lateral allograft autogenous infuse pedicle screw fusion computer stereotactic performed by Amy Villegas MD at [facility]    SHOULDER SURGERY  2008    shoulder dislocated - popped  back in Right shoulder    TOTAL KNEE ARTHROPLASTY Right 08/30/2022    RIGHT KNEE TOTAL KNEE ARTHROPLASTY performed by Vincenzo Wadsworth MD at [facility]    TOTAL KNEE ARTHROPLASTY Left 03/07/2023    LEFT KNEE TOTAL KNEE ARTHROPLASTY performed by Vincenzo Wadsworth MD at [facility]    UPPER  gray-white differentiation is maintained without evidence of an acute infarct.  There is no evidence of hydrocephalus. ORBITS: The visualized portion of the orbits demonstrate no acute abnormality. SINUSES: The visualized paranasal sinuses and mastoid air cells demonstrate no acute abnormality. SOFT TISSUES/SKULL:  No acute abnormality of the visualized skull or soft tissues.     No acute intracranial abnormality.           Assessment and Plan     1.  Metabolic encephalopathy suspect secondary to UTI  -Ceftriaxone.  Follow culture  -Stop anticholinergic medication oxybutynin  -Supportive care    Class III obesity  Hypertension  Carotid stenosis  CAD  Hypomagnesemia    Lovenox prophylaxis  Full code    55 minutes in total care time.     Dylon Brizuela DO  Admitting Hospitalist

## 2024-01-31 LAB
ALBUMIN SERPL-MCNC: 3.6 G/DL (ref 3.5–4.6)
ALP SERPL-CCNC: 115 U/L (ref 40–130)
ALT SERPL-CCNC: 11 U/L (ref 0–33)
ANION GAP SERPL CALCULATED.3IONS-SCNC: 18 MEQ/L (ref 9–15)
AST SERPL-CCNC: 16 U/L (ref 0–35)
BASOPHILS # BLD: 0.1 K/UL (ref 0–0.2)
BASOPHILS NFR BLD: 0.4 %
BILIRUB DIRECT SERPL-MCNC: <0.2 MG/DL (ref 0–0.4)
BILIRUB INDIRECT SERPL-MCNC: ABNORMAL MG/DL (ref 0–0.6)
BILIRUB SERPL-MCNC: 0.7 MG/DL (ref 0.2–0.7)
BUN SERPL-MCNC: 15 MG/DL (ref 8–23)
CALCIUM SERPL-MCNC: 8.7 MG/DL (ref 8.5–9.9)
CHLORIDE SERPL-SCNC: 112 MEQ/L (ref 95–107)
CO2 SERPL-SCNC: 20 MEQ/L (ref 20–31)
CREAT SERPL-MCNC: 1.08 MG/DL (ref 0.5–0.9)
EOSINOPHIL # BLD: 0.4 K/UL (ref 0–0.7)
EOSINOPHIL NFR BLD: 3 %
ERYTHROCYTE [DISTWIDTH] IN BLOOD BY AUTOMATED COUNT: 13.9 % (ref 11.5–14.5)
GLUCOSE SERPL-MCNC: 119 MG/DL (ref 70–99)
HCT VFR BLD AUTO: 28 % (ref 37–47)
HGB BLD-MCNC: 8.4 G/DL (ref 12–16)
INR PPP: 1.2
LYMPHOCYTES # BLD: 0.8 K/UL (ref 1–4.8)
LYMPHOCYTES NFR BLD: 6.5 %
MAGNESIUM SERPL-MCNC: 1.8 MG/DL (ref 1.7–2.4)
MCH RBC QN AUTO: 29.4 PG (ref 27–31.3)
MCHC RBC AUTO-ENTMCNC: 30 % (ref 33–37)
MCV RBC AUTO: 97.9 FL (ref 79.4–94.8)
MONOCYTES # BLD: 0.6 K/UL (ref 0.2–0.8)
MONOCYTES NFR BLD: 4.8 %
NEUTROPHILS # BLD: 9.7 K/UL (ref 1.4–6.5)
NEUTS SEG NFR BLD: 84.9 %
PLATELET # BLD AUTO: 242 K/UL (ref 130–400)
POTASSIUM SERPL-SCNC: 3.7 MEQ/L (ref 3.4–4.9)
PROT SERPL-MCNC: 6 G/DL (ref 6.3–8)
PROTHROMBIN TIME: 16 SEC (ref 12.3–14.9)
RBC # BLD AUTO: 2.86 M/UL (ref 4.2–5.4)
SODIUM SERPL-SCNC: 150 MEQ/L (ref 135–144)
WBC # BLD AUTO: 11.5 K/UL (ref 4.8–10.8)

## 2024-01-31 PROCEDURE — 83735 ASSAY OF MAGNESIUM: CPT

## 2024-01-31 PROCEDURE — 2580000003 HC RX 258: Performed by: INTERNAL MEDICINE

## 2024-01-31 PROCEDURE — 6370000000 HC RX 637 (ALT 250 FOR IP): Performed by: INTERNAL MEDICINE

## 2024-01-31 PROCEDURE — 6360000002 HC RX W HCPCS: Performed by: INTERNAL MEDICINE

## 2024-01-31 PROCEDURE — 1210000000 HC MED SURG R&B

## 2024-01-31 PROCEDURE — 80048 BASIC METABOLIC PNL TOTAL CA: CPT

## 2024-01-31 PROCEDURE — 85025 COMPLETE CBC W/AUTO DIFF WBC: CPT

## 2024-01-31 PROCEDURE — 36415 COLL VENOUS BLD VENIPUNCTURE: CPT

## 2024-01-31 PROCEDURE — 80076 HEPATIC FUNCTION PANEL: CPT

## 2024-01-31 PROCEDURE — 85610 PROTHROMBIN TIME: CPT

## 2024-01-31 RX ORDER — DEXTROSE MONOHYDRATE 50 MG/ML
INJECTION, SOLUTION INTRAVENOUS CONTINUOUS
Status: DISPENSED | OUTPATIENT
Start: 2024-01-31 | End: 2024-01-31

## 2024-01-31 RX ORDER — HYDROCHLOROTHIAZIDE 25 MG/1
25 TABLET ORAL DAILY
Status: DISCONTINUED | OUTPATIENT
Start: 2024-01-31 | End: 2024-02-03 | Stop reason: HOSPADM

## 2024-01-31 RX ORDER — CARVEDILOL 12.5 MG/1
12.5 TABLET ORAL 2 TIMES DAILY
Status: DISCONTINUED | OUTPATIENT
Start: 2024-01-31 | End: 2024-02-03 | Stop reason: HOSPADM

## 2024-01-31 RX ADMIN — METOPROLOL TARTRATE 50 MG: 50 TABLET ORAL at 08:18

## 2024-01-31 RX ADMIN — CEFTRIAXONE SODIUM 1000 MG: 1 INJECTION, POWDER, FOR SOLUTION INTRAMUSCULAR; INTRAVENOUS at 15:18

## 2024-01-31 RX ADMIN — ASPIRIN 81 MG 81 MG: 81 TABLET ORAL at 08:18

## 2024-01-31 RX ADMIN — HYDRALAZINE HYDROCHLORIDE 10 MG: 20 INJECTION, SOLUTION INTRAMUSCULAR; INTRAVENOUS at 16:48

## 2024-01-31 RX ADMIN — ATORVASTATIN CALCIUM 40 MG: 40 TABLET, FILM COATED ORAL at 21:17

## 2024-01-31 RX ADMIN — LOSARTAN POTASSIUM 50 MG: 50 TABLET, FILM COATED ORAL at 08:18

## 2024-01-31 RX ADMIN — HYDROCHLOROTHIAZIDE 25 MG: 25 TABLET ORAL at 10:39

## 2024-01-31 RX ADMIN — DEXTROSE MONOHYDRATE: 50 INJECTION, SOLUTION INTRAVENOUS at 10:38

## 2024-01-31 RX ADMIN — DILTIAZEM HYDROCHLORIDE 240 MG: 240 CAPSULE, EXTENDED RELEASE ORAL at 08:18

## 2024-01-31 RX ADMIN — HYDRALAZINE HYDROCHLORIDE 10 MG: 20 INJECTION, SOLUTION INTRAMUSCULAR; INTRAVENOUS at 06:26

## 2024-01-31 RX ADMIN — SODIUM CHLORIDE, PRESERVATIVE FREE 10 ML: 5 INJECTION INTRAVENOUS at 08:18

## 2024-01-31 RX ADMIN — SODIUM CHLORIDE, PRESERVATIVE FREE 10 ML: 5 INJECTION INTRAVENOUS at 21:17

## 2024-01-31 RX ADMIN — CARVEDILOL 12.5 MG: 12.5 TABLET, FILM COATED ORAL at 21:17

## 2024-01-31 RX ADMIN — ENOXAPARIN SODIUM 40 MG: 100 INJECTION SUBCUTANEOUS at 08:18

## 2024-01-31 RX ADMIN — HYDRALAZINE HYDROCHLORIDE 10 MG: 20 INJECTION, SOLUTION INTRAMUSCULAR; INTRAVENOUS at 00:09

## 2024-01-31 ASSESSMENT — PAIN SCALES - GENERAL: PAINLEVEL_OUTOF10: 0

## 2024-01-31 NOTE — PROGRESS NOTES
1855--ER called to give report, made ER nurse aware of concerns regarding patient blood pressure reading 181/109, wanted to make sure ER physician was aware of BP and BP addressed before transporting patient to the floor for patient safety.

## 2024-01-31 NOTE — CARE COORDINATION
RAFYW MET WITH THE PT TO DISCUSS HER DC PLAN.  PT IS HOME WITH SPOUSE AND HER SON IS SUPPORTIVE AS WELL.  SHE HAD [facility] SERVICES PRIOR TO ADMISSION AND WOULD LIKE [facility] TO REUME WHEN SHE GETS HOME.  PT FEELS THAT SHE WILL BE FINE FOR A DC TO HOME.  SHE REPORTS THAT SHE NO LONGER IS HAVING HALLUCINATIONS OR CONFUSION.

## 2024-01-31 NOTE — ED NOTES
Kaylene BALDERAS called to give report, she is refusing to take report until blood pressure is treated. Attending made aware

## 2024-01-31 NOTE — ED NOTES
Report called to RN using SBAR, all questions answered to the best of this nurses abilities. Transport placed at this time

## 2024-01-31 NOTE — ACP (ADVANCE CARE PLANNING)
Advance Care Planning     Advance Care Planning Activator (Inpatient)  Conversation Note      Date of ACP Conversation: 1/30/2024     Conversation Conducted with: Patient with Decision Making Capacity    ACP Activator: Gisselle Lange, RN        Health Care Decision Maker:     Current Designated Health Care Decision Maker:     Primary Decision Maker: Bro Torres - Spouse - [phone]    Secondary Decision Maker: Adan Martínez - Child - [phone]    Supplemental (Other) Decision Maker: Sophie Acuna - Child - [phone]

## 2024-01-31 NOTE — PROGRESS NOTES
Physician Progress Note    1/31/2024   4:05 PM    Name:  Zoya Martínez  MRN:    [id number]     IP Day: 1     Admit Date: 1/30/2024  1:13 PM  PCP: Romario Bazan MD    Code Status:  Full Code    Subjective:     Doing fine.  No longer having hallucinations    Current Facility-Administered Medications   Medication Dose Route Frequency Provider Last Rate Last Admin    carvedilol (COREG) tablet 12.5 mg  12.5 mg Oral BID Chata, Dylon R, DO        cefTRIAXone (ROCEPHIN) 1,000 mg in sodium chloride 0.9 % 50 mL IVPB (mini-bag)  1,000 mg IntraVENous Q24H Chata, Dylon R,  mL/hr at 01/31/24 1518 1,000 mg at 01/31/24 1518    hydroCHLOROthiazide (HYDRODIURIL) tablet 25 mg  25 mg Oral Daily Chata, Dylon R, DO   25 mg at 01/31/24 1039    losartan (COZAAR) tablet 50 mg  50 mg Oral Daily Chata, Dylon R, DO   50 mg at 01/31/24 0818    atorvastatin (LIPITOR) tablet 40 mg  40 mg Oral Nightly Chata, Dylon R, DO   40 mg at 01/30/24 2224    dilTIAZem (CARDIZEM CD) extended release capsule 240 mg  240 mg Oral Daily Chata, Dylon R, DO   240 mg at 01/31/24 0818    sodium chloride flush 0.9 % injection 5-40 mL  5-40 mL IntraVENous 2 times per day Chata, Dylon R, DO   10 mL at 01/31/24 0818    sodium chloride flush 0.9 % injection 5-40 mL  5-40 mL IntraVENous PRN Chata, Dylon R, DO        0.9 % sodium chloride infusion   IntraVENous PRN Chata, Dylon R, DO        potassium chloride (KLOR-CON M) extended release tablet 40 mEq  40 mEq Oral PRN Chata, Dylon R, DO        Or    potassium bicarb-citric acid (EFFER-K) effervescent tablet 40 mEq  40 mEq Oral PRN Chata, Dylon R, DO        Or    potassium chloride 10 mEq/100 mL IVPB (Peripheral Line)  10 mEq IntraVENous PRN Chata, Dylon R, DO        magnesium sulfate 2000 mg in 50 mL IVPB premix  2,000 mg IntraVENous PRN Dylon Brizuela DO        enoxaparin (LOVENOX) injection 40 mg  40 mg SubCUTAneous Daily Dylon Brizuela DO   40 mg at 01/31/24 0818    ondansetron  care    2.  Mild hypernatremia: D5W    3.  Hypertension:  -Continue home losartan.  Change metoprolol to carvedilol.  Add thiazide diuretic     Class III obesity  Hypertension  Carotid stenosis  CAD  Hypomagnesemia     Lovenox prophylaxis  Full code    37 minutes in total care time    Electronically signed by Dylon Brizuela DO on 1/31/2024 at 4:05 PM

## 2024-01-31 NOTE — CARE COORDINATION
Readmission Assessment  Number of Days since last admission?: 8-30 days  Previous Disposition: Other (comment) ( and [facility].)  Who is being Interviewed: Patient  What was the patient's/caregiver's perception as to why they think they needed to return back to the hospital?: Other (Comment) (acute problem: AMS.)  Did you visit your Primary Care Physician after you left the hospital, before you returned this time?: Yes  Did you see a specialist, such as Cardiac, Pulmonary, Orthopedic Physician, etc. after you left the hospital?: Yes  Who advised the patient to return to the hospital?: Self-referral  Does the patient report anything that got in the way of taking their medications?: No  Case Management Assessment  Initial Evaluation    Date/Time of Evaluation: 1/30/2024 7:52 PM  Assessment Completed by: Gisselle Lange RN    If patient is discharged prior to next notation, then this note serves as note for discharge by case management.    Patient Name: Zoya Martínez                   YOB: 1945  Diagnosis: Metabolic encephalopathy [G93.41]                   Date / Time: 1/30/2024  1:13 PM    Patient Admission Status: Inpatient   Readmission Risk (Low < 19, Mod (19-27), High > 27): Readmission Risk Score: 18.7    Current PCP: Romario Bazan MD  PCP verified by ? Yes    Chart Reviewed: Yes      History Provided by: Patient  Patient Orientation: Alert and Oriented, Person, Place, Situation, Self    Patient Cognition: Alert    Hospitalization in the last 30 days (Readmission):  Yes    If yes, Readmission Assessment in  Navigator will be completed.    Advance Directives:      Code Status: Prior   Patient's Primary Decision Maker is: Legal Next of Kin ( Bro, son Adan, daughter Sophie.)    Primary Decision Maker: Bro Torres - Spouse - [phone]    Secondary Decision Maker: MauricioAdan - Child - [phone]    Supplemental (Other) Decision Maker: Sophie Acuna - Child -  [phone]    Discharge Planning:    Patient lives with: Spouse/Significant Other Type of Home: House  Primary Care Giver: Self  Patient Support Systems include: Spouse/Significant Other, Children   Current Financial resources: Medicare  Current community resources: ECF/Home Care ([facility] therapies pt/ot)  Current services prior to admission: Home Care            Current DME:              Type of Home Care services:   (she stated \"therapies\" could be pt and/or ot.)    ADLS  Prior functional level: Independent in ADLs/IADLs  Current functional level: Other (see comment) (currently she is a/o x 3. she had been independent but has been confused.)    PT AM-PAC:   /24  OT AM-PAC:   /24    Family can provide assistance at DC: Yes  Would you like Case Management to discuss the discharge plan with any other family members/significant others, and if so, who? Yes (, son and daughter.)  Plans to Return to Present Housing: Yes  Other Identified Issues/Barriers to RETURNING to current housing: none  Potential Assistance needed at discharge: Other (Comment) (continue with [facility] services.)            Potential DME:    Patient expects to discharge to: House  Plan for transportation at discharge:      Financial    Payor: [employer] MEDICARE / Plan: [employer] MEDICARE ADVANTAGE HMO / Product Type: Medicare /     Does insurance require precert for SNF: Yes    Potential assistance Purchasing Medications: No  Meds-to-Beds request:        [id number] - [place], [place] - [address] - P [phone] - F [phone]  [address] [place] [address]  Phone: [phone] Fax: [phone]      Notes:    Factors facilitating achievement of predicted outcomes: Family support, Motivated, Cooperative, Pleasant, Sense of humor, and Good insight into deficits    Barriers to discharge: medical clearance    Additional Case Management Notes: pt lives with her . She was d/c with [facility] and states that she is getting therapy at home

## 2024-02-01 ENCOUNTER — APPOINTMENT (OUTPATIENT)
Dept: GENERAL RADIOLOGY | Age: 79
End: 2024-02-01
Payer: MEDICARE

## 2024-02-01 LAB
ANION GAP SERPL CALCULATED.3IONS-SCNC: 11 MEQ/L (ref 9–15)
BASOPHILS # BLD: 0 K/UL (ref 0–0.2)
BASOPHILS NFR BLD: 0.5 %
BUN SERPL-MCNC: 15 MG/DL (ref 8–23)
CALCIUM SERPL-MCNC: 7.9 MG/DL (ref 8.5–9.9)
CHLORIDE SERPL-SCNC: 111 MEQ/L (ref 95–107)
CO2 SERPL-SCNC: 23 MEQ/L (ref 20–31)
CREAT SERPL-MCNC: 1.15 MG/DL (ref 0.5–0.9)
EOSINOPHIL # BLD: 0.4 K/UL (ref 0–0.7)
EOSINOPHIL NFR BLD: 4.8 %
ERYTHROCYTE [DISTWIDTH] IN BLOOD BY AUTOMATED COUNT: 14.1 % (ref 11.5–14.5)
GLUCOSE SERPL-MCNC: 124 MG/DL (ref 70–99)
HCT VFR BLD AUTO: 26.6 % (ref 37–47)
HGB BLD-MCNC: 7.8 G/DL (ref 12–16)
LYMPHOCYTES # BLD: 0.7 K/UL (ref 1–4.8)
LYMPHOCYTES NFR BLD: 7.5 %
MCH RBC QN AUTO: 29.1 PG (ref 27–31.3)
MCHC RBC AUTO-ENTMCNC: 29.3 % (ref 33–37)
MCV RBC AUTO: 99.3 FL (ref 79.4–94.8)
MONOCYTES # BLD: 0.5 K/UL (ref 0.2–0.8)
MONOCYTES NFR BLD: 5.7 %
NEUTROPHILS # BLD: 7.1 K/UL (ref 1.4–6.5)
NEUTS SEG NFR BLD: 81 %
PLATELET # BLD AUTO: 214 K/UL (ref 130–400)
POTASSIUM SERPL-SCNC: 3.8 MEQ/L (ref 3.4–4.9)
RBC # BLD AUTO: 2.68 M/UL (ref 4.2–5.4)
SODIUM SERPL-SCNC: 145 MEQ/L (ref 135–144)
WBC # BLD AUTO: 8.8 K/UL (ref 4.8–10.8)

## 2024-02-01 PROCEDURE — 1210000000 HC MED SURG R&B

## 2024-02-01 PROCEDURE — 36415 COLL VENOUS BLD VENIPUNCTURE: CPT

## 2024-02-01 PROCEDURE — 80048 BASIC METABOLIC PNL TOTAL CA: CPT

## 2024-02-01 PROCEDURE — 97162 PT EVAL MOD COMPLEX 30 MIN: CPT

## 2024-02-01 PROCEDURE — 2580000003 HC RX 258: Performed by: INTERNAL MEDICINE

## 2024-02-01 PROCEDURE — 6370000000 HC RX 637 (ALT 250 FOR IP): Performed by: INTERNAL MEDICINE

## 2024-02-01 PROCEDURE — 6360000002 HC RX W HCPCS: Performed by: INTERNAL MEDICINE

## 2024-02-01 PROCEDURE — 97166 OT EVAL MOD COMPLEX 45 MIN: CPT

## 2024-02-01 PROCEDURE — 71045 X-RAY EXAM CHEST 1 VIEW: CPT

## 2024-02-01 PROCEDURE — 94150 VITAL CAPACITY TEST: CPT

## 2024-02-01 PROCEDURE — 85025 COMPLETE CBC W/AUTO DIFF WBC: CPT

## 2024-02-01 RX ORDER — SULFAMETHOXAZOLE AND TRIMETHOPRIM 800; 160 MG/1; MG/1
1 TABLET ORAL 2 TIMES DAILY
Status: DISCONTINUED | OUTPATIENT
Start: 2024-02-01 | End: 2024-02-03 | Stop reason: HOSPADM

## 2024-02-01 RX ORDER — LOSARTAN POTASSIUM 25 MG/1
50 TABLET ORAL ONCE
Status: COMPLETED | OUTPATIENT
Start: 2024-02-01 | End: 2024-02-01

## 2024-02-01 RX ORDER — LOSARTAN POTASSIUM 25 MG/1
100 TABLET ORAL DAILY
Status: DISCONTINUED | OUTPATIENT
Start: 2024-02-02 | End: 2024-02-03 | Stop reason: HOSPADM

## 2024-02-01 RX ADMIN — ATORVASTATIN CALCIUM 40 MG: 40 TABLET, FILM COATED ORAL at 22:04

## 2024-02-01 RX ADMIN — SODIUM CHLORIDE, PRESERVATIVE FREE 10 ML: 5 INJECTION INTRAVENOUS at 08:06

## 2024-02-01 RX ADMIN — ASPIRIN 81 MG 81 MG: 81 TABLET ORAL at 08:05

## 2024-02-01 RX ADMIN — DILTIAZEM HYDROCHLORIDE 240 MG: 240 CAPSULE, EXTENDED RELEASE ORAL at 08:05

## 2024-02-01 RX ADMIN — HYDROCHLOROTHIAZIDE 25 MG: 25 TABLET ORAL at 08:05

## 2024-02-01 RX ADMIN — ACETAMINOPHEN 325MG 650 MG: 325 TABLET ORAL at 08:06

## 2024-02-01 RX ADMIN — LOSARTAN POTASSIUM 50 MG: 25 TABLET, FILM COATED ORAL at 09:38

## 2024-02-01 RX ADMIN — LOSARTAN POTASSIUM 50 MG: 50 TABLET, FILM COATED ORAL at 08:05

## 2024-02-01 RX ADMIN — SULFAMETHOXAZOLE AND TRIMETHOPRIM 1 TABLET: 800; 160 TABLET ORAL at 14:41

## 2024-02-01 RX ADMIN — CARVEDILOL 12.5 MG: 12.5 TABLET, FILM COATED ORAL at 08:05

## 2024-02-01 RX ADMIN — ENOXAPARIN SODIUM 40 MG: 100 INJECTION SUBCUTANEOUS at 08:05

## 2024-02-01 RX ADMIN — SULFAMETHOXAZOLE AND TRIMETHOPRIM 1 TABLET: 800; 160 TABLET ORAL at 22:04

## 2024-02-01 RX ADMIN — CARVEDILOL 12.5 MG: 12.5 TABLET, FILM COATED ORAL at 22:05

## 2024-02-01 NOTE — CARE COORDINATION
Since the pt is still having some confusion and hallucinations LSW called her daughter Sophie to discuss home situation and DC plans.  Sophie confirmed that the pt normally gets around well at home and her spouse is with her at all times and is able to help as needed.  Daughter lives about a mile away and is at the home daily to check on pt.  Daughter feels that home with [facility] will be a good DC plan for the pt.  Pt still verbalizes that she wants to go home with C.

## 2024-02-01 NOTE — PROGRESS NOTES
[facility] OCCUPATIONAL THERAPY EVALUATION - ACUTE     NAME: Zoya Martínez  : 1945 (78 y.o.)  MRN: [id number]  CODE STATUS: Full Code  Room: W279/W279-01    Date of Service: 2024    Patient Diagnosis(es): Hallucinations [R44.3]  Metabolic encephalopathy [G93.41]  Acute cystitis without hematuria [N30.00]   Patient Active Problem List    Diagnosis Date Noted    Class 3 severe obesity due to excess calories with serious comorbidity and body mass index (BMI) of 40.0 to 44.9 in adult ([facility]) 2017    Acute pneumonia 03/10/2023    Status post total knee replacement, left 2023    Status post total knee replacement, right 2022    Hallucinations 2024    History of lumbar fusion 2024    Paraparesis ([facility]) 2024    Metabolic encephalopathy 2024    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 2024    Right knee DJD 2024    Left knee DJD 2024    Osteoarthritis of right hip 2024    Neck pain 2024    Left knee pain 2024    Bleeding 2024    Shoulder impingement 2024    Right shoulder pain 2024    Impingement syndrome of shoulder region 2024    Spondylolisthesis at L4-L5 level 2024    Spinal stenosis of lumbar region with neurogenic claudication 2023    Right knee pain 2022    RBBB (right bundle branch block) 2022    Gastrointestinal hemorrhage     Anemia 2021    Adenomatous polyp of descending colon     Adenomatous polyp of ascending colon     Adenomatous polyp of colon     Chronic gastritis without bleeding     CKD (chronic kidney disease) stage 3, GFR 30-59 ml/min ([facility]) 2019    Myelopathy concurrent with and due to stenosis of lumbar spine ([facility]) 2019    Acquired spondylolisthesis of lumbosacral region 2019    Chronic bilateral low back pain with bilateral sciatica 10/30/2018    Primary osteoarthritis of both knees 10/30/2018    Coronary artery  0-100% Score: 46.65    Therapy key for assistance levels -   Independent/Mod I = Pt. is able to perform task with no assistance but may require a device   Stand by assistance = Pt. does not perform task at an independent level but does not need physical assistance, requires verbal cues  Minimal, Moderate, Maximal Assistance = Pt. requires physical assistance (25%, 50%, 75% assist from helper) for task but is able to actively participate in task   Dependent = Pt. requires total assistance with task and is not able to actively participate with task completion     Plan:  Occupational Therapy Plan  Times Per Week: 1-4x  Therapy Duration:  (Length of acute stay)  Current Treatment Recommendations: Strengthening, Balance training, Functional mobility training, Endurance training, Pain management, Safety education & training, Patient/Caregiver education & training, Equipment evaluation, education, & procurement, Self-Care / ADL, Home management training, Cognitive/Perceptual training    Goals:   Patient will:    - Improve functional endurance to tolerate/complete 30 mins of ADL's  - Be Mod I in UB ADLs   - Be Mod I in LB ADLs  - Be Mod I in ADL transfers without LOB  - Be Mod I in toileting tasks  - Improve B UE strength and endurance to 4/5 in order to participate in self-care activities as projected.  - Access appropriate D/C site with as few architectural barriers as possible.  - Sequence self-care tasks with no verbal cues for safety    Patient Goal: Patient goals : \"I want to go home\"     Discussed and agreed upon: Yes Comments:       Therapy Time:   Individual   Time In 1000   Time Out 1017   Minutes 17     Eval: 17 minutes     Electronically signed by:    MIRTA Chung/L,   2/1/2024, 12:25 PM

## 2024-02-01 NOTE — PROGRESS NOTES
Pt is still having hallucinations at times stating there are other people in the room when they are not and talking to them redirects easily. [place] easily.

## 2024-02-01 NOTE — PLAN OF CARE
See OT evaluation for all goals and OT POC. Electronically signed by Narda Schneider OTR/L on 2/1/2024 at 12:27 PM

## 2024-02-01 NOTE — PROGRESS NOTES
Physician Progress Note    2/1/2024   3:09 PM    Name:  Zoya Martínez  MRN:    [id number]     IP Day: 2     Admit Date: 1/30/2024  1:13 PM  PCP: Romario Bazan MD    Code Status:  Full Code    Subjective:     Patient denied any hallucinations but nursing says she is still having them.    Current Facility-Administered Medications   Medication Dose Route Frequency Provider Last Rate Last Admin    [START ON 2/2/2024] losartan (COZAAR) tablet 100 mg  100 mg Oral Daily Chata, Dylon R, DO        sulfamethoxazole-trimethoprim (BACTRIM DS;SEPTRA DS) 800-160 MG per tablet 1 tablet  1 tablet Oral BID Chata, Dylon R, DO   1 tablet at 02/01/24 1441    carvedilol (COREG) tablet 12.5 mg  12.5 mg Oral BID Chata, Dylon R, DO   12.5 mg at 02/01/24 0805    hydroCHLOROthiazide (HYDRODIURIL) tablet 25 mg  25 mg Oral Daily Chata, Dylon R, DO   25 mg at 02/01/24 0805    atorvastatin (LIPITOR) tablet 40 mg  40 mg Oral Nightly Chata, Dylon R, DO   40 mg at 01/31/24 2117    dilTIAZem (CARDIZEM CD) extended release capsule 240 mg  240 mg Oral Daily Chata, Dylon R, DO   240 mg at 02/01/24 0805    sodium chloride flush 0.9 % injection 5-40 mL  5-40 mL IntraVENous 2 times per day Chata, Dylon R, DO   10 mL at 02/01/24 0806    sodium chloride flush 0.9 % injection 5-40 mL  5-40 mL IntraVENous PRN Chata, Dylon R, DO        0.9 % sodium chloride infusion   IntraVENous PRN Chata, Dylon R, DO        potassium chloride (KLOR-CON M) extended release tablet 40 mEq  40 mEq Oral PRN Chata, Dylon R, DO        Or    potassium bicarb-citric acid (EFFER-K) effervescent tablet 40 mEq  40 mEq Oral PRN Chata, Dylon R, DO        Or    potassium chloride 10 mEq/100 mL IVPB (Peripheral Line)  10 mEq IntraVENous PRN Chata, Dylon R, DO        magnesium sulfate 2000 mg in 50 mL IVPB premix  2,000 mg IntraVENous PRN Dylon Brizuela DO        enoxaparin (LOVENOX) injection 40 mg  40 mg SubCUTAneous Daily Dylon Brizuela DO   40 mg at  24 0805    ondansetron (ZOFRAN-ODT) disintegrating tablet 4 mg  4 mg Oral Q8H PRN Dylon Brizuela DO        Or    ondansetron (ZOFRAN) injection 4 mg  4 mg IntraVENous Q6H PRN Dylon Brizuela DO        polyethylene glycol (GLYCOLAX) packet 17 g  17 g Oral Daily PRN Dylon Brizuela DO        acetaminophen (TYLENOL) tablet 650 mg  650 mg Oral Q6H PRN Dylon Brizuela DO   650 mg at 24 0806    Or    acetaminophen (TYLENOL) suppository 650 mg  650 mg Rectal Q6H PRN Dylon Brizuela DO        aspirin chewable tablet 81 mg  81 mg Oral Daily Dylon Brizuela DO   81 mg at 24 0805    hydrALAZINE (APRESOLINE) injection 10 mg  10 mg IntraVENous Q6H PRN Dang Noguera DO   10 mg at 24 1648       Physical Examination:      Vitals:  BP (!) 167/57   Pulse 84   Temp 98.4 °F (36.9 °C) (Oral)   Resp 18   Ht 1.549 m (5' 1\")   Wt 99.3 kg (219 lb)   LMP 1996   SpO2 96%   BMI 41.38 kg/m²   Temp (24hrs), Av.3 °F (36.8 °C), Min:98.2 °F (36.8 °C), Max:98.4 °F (36.9 °C)      General appearance: alert, cooperative and no distress.  Obese  Mental Status: oriented to person, place and time and normal affect  Lungs: clear to auscultation bilaterally, normal effort  Heart: regular rate and rhythm, no murmur  Abdomen: soft, nontender, nondistended, bowel sounds present, no masses  Extremities: no edema, redness, tenderness in the calves. Cap refill <2s  Skin: no gross lesions, rashes    Data:     Labs:  Recent Labs     245 24   WBC 11.5* 8.8   HGB 8.4* 7.8*    214     Recent Labs     24  0455 24   * 145*   K 3.7 3.8   * 111*   CO2 20 23   BUN 15 15   CREATININE 1.08* 1.15*   GLUCOSE 119* 124*     Recent Labs     24  1330 24  0455   AST 18 16   ALT 12 11   BILITOT 0.9* 0.7   ALKPHOS 118 115       Assessment and Plan:        1.  Metabolic encephalopathy secondary to ESBL E. coli UTI:   - changed to Bactrim.  Add infectious

## 2024-02-01 NOTE — PROGRESS NOTES
Physical Therapy Med Surg Initial Assessment  Facility/Department: [address] ORTHO TELE  Room: [facility]/[place] [address]       NAME: Zoya Martínez  : 1945 (78 y.o.)  MRN: [id number]  CODE STATUS: Full Code    Date of Service: 2024    Patient Diagnosis(es): Hallucinations [R44.3]  Metabolic encephalopathy [G93.41]  Acute cystitis without hematuria [N30.00]   Chief Complaint   Patient presents with    Hallucinations      Pt has been off pain medication after surgery for 3 days. Pt has been hallucinating  for 1 1/2 weeks     Patient Active Problem List    Diagnosis Date Noted    Class 3 severe obesity due to excess calories with serious comorbidity and body mass index (BMI) of 40.0 to 44.9 in adult ([facility]) 2017    Acute pneumonia 03/10/2023    Status post total knee replacement, left 2023    Status post total knee replacement, right 2022    Hallucinations 2024    History of lumbar fusion 2024    Paraparesis ([facility]) 2024    Metabolic encephalopathy 2024    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 2024    Right knee DJD 2024    Left knee DJD 2024    Osteoarthritis of right hip 2024    Neck pain 2024    Left knee pain 2024    Bleeding 2024    Shoulder impingement 2024    Right shoulder pain 2024    Impingement syndrome of shoulder region 2024    Spondylolisthesis at L4-L5 level 2024    Spinal stenosis of lumbar region with neurogenic claudication 2023    Right knee pain 2022    RBBB (right bundle branch block) 2022    Gastrointestinal hemorrhage     Anemia 2021    Adenomatous polyp of descending colon     Adenomatous polyp of ascending colon     Adenomatous polyp of colon     Chronic gastritis without bleeding     CKD (chronic kidney disease) stage 3, GFR 30-59 ml/min ([facility]) 2019    Myelopathy concurrent with and due to stenosis of lumbar spine ([facility]) 2019

## 2024-02-02 PROBLEM — B96.29 UTI DUE TO EXTENDED-SPECTRUM BETA LACTAMASE (ESBL) PRODUCING ESCHERICHIA COLI: Status: ACTIVE | Noted: 2024-02-02

## 2024-02-02 PROBLEM — Z16.12 UTI DUE TO EXTENDED-SPECTRUM BETA LACTAMASE (ESBL) PRODUCING ESCHERICHIA COLI: Status: ACTIVE | Noted: 2024-02-02

## 2024-02-02 PROBLEM — N30.00 ACUTE CYSTITIS WITHOUT HEMATURIA: Status: ACTIVE | Noted: 2024-02-02

## 2024-02-02 PROBLEM — N39.0 UTI DUE TO EXTENDED-SPECTRUM BETA LACTAMASE (ESBL) PRODUCING ESCHERICHIA COLI: Status: ACTIVE | Noted: 2024-02-02

## 2024-02-02 LAB
BACTERIA UR CULT: ABNORMAL
BACTERIA UR CULT: ABNORMAL
ORGANISM: ABNORMAL

## 2024-02-02 PROCEDURE — 6360000002 HC RX W HCPCS: Performed by: INTERNAL MEDICINE

## 2024-02-02 PROCEDURE — 1210000000 HC MED SURG R&B

## 2024-02-02 PROCEDURE — 6370000000 HC RX 637 (ALT 250 FOR IP): Performed by: INTERNAL MEDICINE

## 2024-02-02 PROCEDURE — 99222 1ST HOSP IP/OBS MODERATE 55: CPT | Performed by: INTERNAL MEDICINE

## 2024-02-02 PROCEDURE — 2580000003 HC RX 258: Performed by: INTERNAL MEDICINE

## 2024-02-02 PROCEDURE — 97116 GAIT TRAINING THERAPY: CPT

## 2024-02-02 PROCEDURE — 97535 SELF CARE MNGMENT TRAINING: CPT

## 2024-02-02 RX ORDER — SULFAMETHOXAZOLE AND TRIMETHOPRIM 800; 160 MG/1; MG/1
1 TABLET ORAL 2 TIMES DAILY
Qty: 14 TABLET | Refills: 0 | Status: SHIPPED | OUTPATIENT
Start: 2024-02-02 | End: 2024-02-09

## 2024-02-02 RX ADMIN — ENOXAPARIN SODIUM 40 MG: 100 INJECTION SUBCUTANEOUS at 11:25

## 2024-02-02 RX ADMIN — ASPIRIN 81 MG 81 MG: 81 TABLET ORAL at 11:25

## 2024-02-02 RX ADMIN — CARVEDILOL 12.5 MG: 12.5 TABLET, FILM COATED ORAL at 21:38

## 2024-02-02 RX ADMIN — HYDRALAZINE HYDROCHLORIDE 10 MG: 20 INJECTION, SOLUTION INTRAMUSCULAR; INTRAVENOUS at 16:51

## 2024-02-02 RX ADMIN — CARVEDILOL 12.5 MG: 12.5 TABLET, FILM COATED ORAL at 11:25

## 2024-02-02 RX ADMIN — ATORVASTATIN CALCIUM 40 MG: 40 TABLET, FILM COATED ORAL at 21:38

## 2024-02-02 RX ADMIN — SULFAMETHOXAZOLE AND TRIMETHOPRIM 1 TABLET: 800; 160 TABLET ORAL at 11:25

## 2024-02-02 RX ADMIN — SULFAMETHOXAZOLE AND TRIMETHOPRIM 1 TABLET: 800; 160 TABLET ORAL at 21:38

## 2024-02-02 RX ADMIN — LOSARTAN POTASSIUM 100 MG: 25 TABLET, FILM COATED ORAL at 11:25

## 2024-02-02 RX ADMIN — SODIUM CHLORIDE, PRESERVATIVE FREE 10 ML: 5 INJECTION INTRAVENOUS at 21:38

## 2024-02-02 RX ADMIN — HYDROCHLOROTHIAZIDE 25 MG: 25 TABLET ORAL at 11:25

## 2024-02-02 RX ADMIN — DILTIAZEM HYDROCHLORIDE 240 MG: 240 CAPSULE, EXTENDED RELEASE ORAL at 11:25

## 2024-02-02 ASSESSMENT — PAIN DESCRIPTION - FREQUENCY: FREQUENCY: CONTINUOUS

## 2024-02-02 ASSESSMENT — PAIN DESCRIPTION - PAIN TYPE: TYPE: CHRONIC PAIN

## 2024-02-02 ASSESSMENT — PAIN DESCRIPTION - DESCRIPTORS: DESCRIPTORS: ACHING;DISCOMFORT

## 2024-02-02 ASSESSMENT — PAIN DESCRIPTION - LOCATION: LOCATION: BACK

## 2024-02-02 ASSESSMENT — PAIN DESCRIPTION - ORIENTATION: ORIENTATION: MID;LOWER

## 2024-02-02 ASSESSMENT — PAIN DESCRIPTION - ONSET: ONSET: ON-GOING

## 2024-02-02 ASSESSMENT — PAIN - FUNCTIONAL ASSESSMENT: PAIN_FUNCTIONAL_ASSESSMENT: PREVENTS OR INTERFERES SOME ACTIVE ACTIVITIES AND ADLS

## 2024-02-02 NOTE — PROGRESS NOTES
Pt denied having any hallucinations from [id number] [facility]. Pt is A&Ox4. This RN did not witness pt having hallucinations in this time period either. Report given to Magui Tee RN. Pt denies needs at this time.

## 2024-02-02 NOTE — CONSULTS
Infectious Diseases Inpatient Consult Note      Reason for Consult:   ESBL E. coli UTI  Requesting Physician:   Dr. Brizuela  Primary Care Physician:  Romario Bazan MD  History Obtained From:   Pt, EPIC    Admit Date: 1/30/2024  Hospital Day: 4      HISTORY OF PRESENT ILLNESS:  This is a 78 y.o. female with past medical history significant for hyperlipidemia hypertension hypothyroidism morbid obesity degenerative disc disease osteoarthritis right bundle branch block shoulder dislocation coronary artery disease CHF diverticulosis of colon, recent back surgery on January 17, was admitted to [facility]  from home through ER with confusion and visual hallucination.  Upon further evaluation patient was found to have pyuria with positive urine culture for ESBL E. Coli.  Patient was started on p.o. Bactrim yesterday.  Patient has a pure wick.  She denies any dysuria abdominal pain or back pain.  Patient's confusion and visual hallucinations resolved after her pain medications..  Patient denies any increased weakness or worsening back pain.   No fevers or chills.  She reports good appetite.   She used a walker for ambulation  Denies any leg numbness    Past medical surgical and social history were reviewed and are as detailed in my note  Past Medical History:   Diagnosis Date    Antral ulcer 01/14/2015    DR LANE    CHF (congestive heart failure) (HCC)     Coronary artery disease     Coronary artery disease involving native coronary artery of native heart without angina pectoris 07/10/2018    has x 4 cardiac stents / Dr. Boland    Diverticulosis of colon (without mention of hemorrhage) 01/14/2015    DR LANE    Essential hypertension 12/10/2013    meds > 20 yrs    History of blood transfusion     with hysterectomy and s/p knee replacment    History of normal resting EKG 1996    normal    History of ST elevation myocardial infarction (STEMI) 07/11/2017    History of transfusion of whole blood 1996    Excessive bleeding      Financial Resource Strain: Low Risk  (2/16/2023)    Overall Financial Resource Strain (CARDIA)     Difficulty of Paying Living Expenses: Not hard at all   Food Insecurity: No Food Insecurity (1/30/2024)    Hunger Vital Sign     Worried About Running Out of Food in the Last Year: Never true     Ran Out of Food in the Last Year: Never true   Transportation Needs: No Transportation Needs (1/30/2024)    PRAPARE - Transportation     Lack of Transportation (Medical): No     Lack of Transportation (Non-Medical): No   Physical Activity: Not on file   Stress: Not on file   Social Connections: Not on file   Intimate Partner Violence: Not on file   Housing Stability: Low Risk  (1/30/2024)    Housing Stability Vital Sign     Unable to Pay for Housing in the Last Year: No     Number of Places Lived in the Last Year: 1     Unstable Housing in the Last Year: No         Family History:   Family History   Problem Relation Age of Onset    Heart Disease Mother 63    Cancer Father 67        kidney    No Known Problems Daughter        Review of Systems  14 system review is negative other than HPI    Physical Exam  Vitals:    02/01/24 0723 02/01/24 0904 02/01/24 1917 02/02/24 0722   BP: (!) 167/57  (!) 140/51 (!) 174/53   Pulse: 72 84 72 66   Resp: 18  18 18   Temp: 98.4 °F (36.9 °C)  99 °F (37.2 °C) 97.7 °F (36.5 °C)   TempSrc: Oral  Oral Oral   SpO2: 96%  92% 96%   Weight:       Height:         General Appearance: alertand oriented to person, place and time, well-developed and well-nourished, in no acute distress  Skin: warm anddry, no rash.   Head: normocephalic and atraumatic  Eyes: extraocular eye movements intact, conjunctivae normal, anicteric sclerae  ENT: oropharynx clear and moist with normal mucous membranes. No thrush  Lungs: normal respiratory effort, Clear Lungs, no rhonchi, no crackles, no wheezes  Heart:RRR, nl S1/S2, positive systolic ejection murmur over bilateral upper sternal border  Abdomen: soft, no tenderness,

## 2024-02-02 NOTE — CARE COORDINATION
MET TIFFANY PT AT BEDSIDE. PT UP TO CHAIR WITH THERAPY. PT DENIES HALLUCINATIONS. A&OX4. PT WOULD LIKE TO RETURN HOME W/SPOUSE AND CONTINUE [facility]. SPOKE WITH OLE AT [facility] TO UPDATE. CHEPE ORDERS PLACED. WILL SPEAK TO FAMILY TO CONFIRM DC PLANNING.   SPOKE WITH PT ED TALYOR. AGREEABLE WITH PLAN FOR PT TO RETURN HOME W/[facility].

## 2024-02-02 NOTE — PROGRESS NOTES
Physical Therapy Med Surg Daily Treatment Note  Facility/Department: [address] ORTHO TELE  Room: [facility]       NAME: Zoya Martínez  : 1945 (78 y.o.)  MRN: [id number]  CODE STATUS: Full Code    Date of Service: 2024    Patient Diagnosis(es): Hallucinations [R44.3]  Metabolic encephalopathy [G93.41]  Acute cystitis without hematuria [N30.00]   Chief Complaint   Patient presents with    Hallucinations      Pt has been off pain medication after surgery for 3 days. Pt has been hallucinating  for 1 1/2 weeks     Patient Active Problem List    Diagnosis Date Noted    Class 3 severe obesity due to excess calories with serious comorbidity and body mass index (BMI) of 40.0 to 44.9 in adult (HCC) 2017    Acute pneumonia 03/10/2023    Status post total knee replacement, left 2023    Status post total knee replacement, right 2022    Acute cystitis without hematuria 2024    UTI due to extended-spectrum beta lactamase (ESBL) producing Escherichia coli 2024    Hallucinations 2024    History of lumbar fusion 2024    Paraparesis (HCC) 2024    Metabolic encephalopathy 2024    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 2024    Right knee DJD 2024    Left knee DJD 2024    Osteoarthritis of right hip 2024    Neck pain 2024    Left knee pain 2024    Bleeding 2024    Shoulder impingement 2024    Right shoulder pain 2024    Impingement syndrome of shoulder region 2024    Spondylolisthesis at L4-L5 level 2024    Spinal stenosis of lumbar region with neurogenic claudication 2023    Right knee pain 2022    RBBB (right bundle branch block) 2022    Gastrointestinal hemorrhage     Anemia 2021    Adenomatous polyp of descending colon     Adenomatous polyp of ascending colon     Adenomatous polyp of colon     Chronic gastritis without bleeding     CKD (chronic

## 2024-02-03 VITALS
OXYGEN SATURATION: 97 % | RESPIRATION RATE: 16 BRPM | HEIGHT: 61 IN | SYSTOLIC BLOOD PRESSURE: 146 MMHG | DIASTOLIC BLOOD PRESSURE: 78 MMHG | WEIGHT: 219 LBS | BODY MASS INDEX: 41.35 KG/M2 | HEART RATE: 76 BPM | TEMPERATURE: 98.3 F

## 2024-02-03 PROCEDURE — 6360000002 HC RX W HCPCS: Performed by: INTERNAL MEDICINE

## 2024-02-03 PROCEDURE — 6370000000 HC RX 637 (ALT 250 FOR IP): Performed by: INTERNAL MEDICINE

## 2024-02-03 PROCEDURE — 2580000003 HC RX 258: Performed by: INTERNAL MEDICINE

## 2024-02-03 RX ORDER — LOSARTAN POTASSIUM 100 MG/1
100 TABLET ORAL DAILY
Qty: 30 TABLET | Refills: 3 | Status: SHIPPED | OUTPATIENT
Start: 2024-02-04

## 2024-02-03 RX ORDER — CARVEDILOL 12.5 MG/1
12.5 TABLET ORAL 2 TIMES DAILY
Qty: 60 TABLET | Refills: 3 | Status: SHIPPED | OUTPATIENT
Start: 2024-02-03

## 2024-02-03 RX ORDER — HYDROCHLOROTHIAZIDE 25 MG/1
25 TABLET ORAL DAILY
Qty: 30 TABLET | Refills: 3 | Status: SHIPPED | OUTPATIENT
Start: 2024-02-04

## 2024-02-03 RX ADMIN — HYDROCHLOROTHIAZIDE 25 MG: 25 TABLET ORAL at 08:57

## 2024-02-03 RX ADMIN — ENOXAPARIN SODIUM 40 MG: 100 INJECTION SUBCUTANEOUS at 08:57

## 2024-02-03 RX ADMIN — SODIUM CHLORIDE, PRESERVATIVE FREE 10 ML: 5 INJECTION INTRAVENOUS at 08:58

## 2024-02-03 RX ADMIN — SULFAMETHOXAZOLE AND TRIMETHOPRIM 1 TABLET: 800; 160 TABLET ORAL at 08:57

## 2024-02-03 RX ADMIN — CARVEDILOL 12.5 MG: 12.5 TABLET, FILM COATED ORAL at 08:57

## 2024-02-03 RX ADMIN — ASPIRIN 81 MG 81 MG: 81 TABLET ORAL at 08:56

## 2024-02-03 RX ADMIN — LOSARTAN POTASSIUM 100 MG: 25 TABLET, FILM COATED ORAL at 08:57

## 2024-02-03 RX ADMIN — DILTIAZEM HYDROCHLORIDE 240 MG: 240 CAPSULE, EXTENDED RELEASE ORAL at 08:57

## 2024-02-03 RX ADMIN — ACETAMINOPHEN 325MG 650 MG: 325 TABLET ORAL at 00:23

## 2024-02-03 ASSESSMENT — PAIN SCALES - GENERAL
PAINLEVEL_OUTOF10: 5
PAINLEVEL_OUTOF10: 0

## 2024-02-03 ASSESSMENT — PAIN - FUNCTIONAL ASSESSMENT: PAIN_FUNCTIONAL_ASSESSMENT: ACTIVITIES ARE NOT PREVENTED

## 2024-02-03 ASSESSMENT — PAIN DESCRIPTION - DESCRIPTORS: DESCRIPTORS: ACHING;DISCOMFORT;TIGHTNESS

## 2024-02-03 ASSESSMENT — PAIN DESCRIPTION - LOCATION: LOCATION: ABDOMEN;BACK

## 2024-02-03 ASSESSMENT — PAIN DESCRIPTION - ORIENTATION: ORIENTATION: MID

## 2024-02-03 NOTE — DISCHARGE INSTR - COC
Continuity of Care Form    Patient Name: Zoya Martínez   :  1945  MRN:  [id number]    Admit date:  2024  Discharge date:  2/3/24    Code Status Order: Full Code   Advance Directives:     Admitting Physician:  Dylon Brizuela DO  PCP: Romario Bazan MD    Discharging Nurse: SHERRIE Lucas  Discharging Hospital Unit/Room#: W279/W279-01  Discharging Unit Phone Number: [phone]    Emergency Contact:   Extended Emergency Contact Information  Primary Emergency Contact: Sophie Acuna   [facility]  Home Phone: [phone]  Mobile Phone: [phone]  Relation: Child  Secondary Emergency Contact: MauricioAdan   [facility]  Home Phone: [phone]  Mobile Phone: [phone]  Relation: Child    Past Surgical History:  Past Surgical History:   Procedure Laterality Date    CARDIAC SURGERY  2017    has x 2 cardiac stents (2 separate surgeries    COLONOSCOPY  2015    DR [address] - DIVERTICULOSIS    COLONOSCOPY N/A 2021    COLONOSCOPY DIAGNOSTIC performed by Mohan Lane MD at [facility] OR    CORONARY ANGIOPLASTY WITH STENT PLACEMENT  05/17/2017    x2 stents    CYST REMOVAL Left 2019    RESECTION OF LEFT PINNA LESION WITH GRAFT performed by Anton Renteria [place] at [facility] [place]    ENDOSCOPY, COLON, DIAGNOSTIC      FINGER SURGERY  2014    middle finger right hand due to infection    HYSTERECTOMY (CERVIX STATUS UNKNOWN)      LASIK  06/15/2005    LUMBAR FUSION Bilateral 2024    L 2-3, 3-4, 4-5 bilateral laminectomies microdissection decompressions L4-5 discectomy interbody cage posterior lateral allograft autogenous infuse pedicle screw fusion computer stereotactic performed by Amy Villegas MD at [facility] [place]    SHOULDER SURGERY      shoulder dislocated - popped  back in Right shoulder    TOTAL KNEE ARTHROPLASTY Right 2022    RIGHT KNEE TOTAL KNEE ARTHROPLASTY performed by Vincenzo Wadsworth MD at [facility] [place]    TOTAL KNEE ARTHROPLASTY Left 2023    LEFT  (Active)   Dressing Status Clean;Dry;Intact 02/03/24 0850   Incision Cleansed Not Cleansed 02/03/24 0850   Dressing/Treatment Surgical glue;Open to air 02/03/24 0850   Closure Open to air;Surgical glue 02/03/24 0850   Incision Assessment Dry 02/03/24 0850   Drainage Amount None (dry) 02/03/24 0850   Odor None 02/03/24 0850   Pati-incision Assessment Intact 02/03/24 0850   Number of days: 17        Elimination:  Continence:   Bowel: No  Bladder: No  Urinary Catheter: None   Colostomy/Ileostomy/Ileal Conduit: No       Date of Last BM:       Intake/Output Summary (Last 24 hours) at 2/3/2024 1408  Last data filed at 2/3/2024 0858  Gross per 24 hour   Intake 128 ml   Output 800 ml   Net -672 ml     I/O last 3 completed shifts:  In: 480 [P.O.:480]  Out: 800 [Urine:800]    Safety Concerns:     At Risk for Falls    Impairments/Disabilities:      ***    Nutrition Therapy:  Current Nutrition Therapy:   - Oral Diet:  General    Routes of Feeding: Oral  Liquids: Thin Liquids  Daily Fluid Restriction: no  Last Modified Barium Swallow with Video (Video Swallowing Test): not done    Treatments at the Time of Hospital Discharge:   Respiratory Treatments:     Oxygen Therapy:  is on oxygen at 2 as needed L/min per nasal cannula.  Ventilator:    - No ventilator support    Rehab Therapies: Physical Therapy and Occupational Therapy  Weight Bearing Status/Restrictions: No weight bearing restrictions  Other Medical Equipment (for information only, NOT a DME order):  walker  Other Treatments:     Patient's personal belongings (please select all that are sent with patient):  Paco    RN SIGNATURE:  Electronically signed by Shayy Lamar RN on 2/3/24 at 2:11 PM EST    CASE MANAGEMENT/SOCIAL WORK SECTION    Inpatient Status Date: ***    Readmission Risk Assessment Score:  Readmission Risk              Risk of Unplanned Readmission:  17           Discharging to Facility/ Agency   Name:   Address:  Phone:  Fax:    Dialysis Facility (if

## 2024-02-03 NOTE — CARE COORDINATION
Rounds done with Shayy and pt to dc home. I called Macie from [facility] to let her know. CHEPE order in and Macie aware of dc today.

## 2024-02-03 NOTE — PLAN OF CARE
Problem: Skin/Tissue Integrity  Goal: Absence of new skin breakdown  Description: 1.  Monitor for areas of redness and/or skin breakdown  2.  Assess vascular access sites hourly  3.  Every 4-6 hours minimum:  Change oxygen saturation probe site  4.  Every 4-6 hours:  If on nasal continuous positive airway pressure, respiratory therapy assess nares and determine need for appliance change or resting period.  Outcome: Progressing     Problem: Safety - Adult  Goal: Free from fall injury  Outcome: Progressing     Problem: Chronic Conditions and Co-morbidities  Goal: Patient's chronic conditions and co-morbidity symptoms are monitored and maintained or improved  Outcome: Progressing     Problem: Pain  Goal: Verbalizes/displays adequate comfort level or baseline comfort level  Outcome: Progressing

## 2024-02-03 NOTE — DISCHARGE SUMMARY
Physician Discharge Summary     Patient ID:  Zoya Martínez  [id number]  78 y.o.  1945    Admit date: 1/30/2024    Discharge date : 02/03/24     Admitting Physician: Dylon Brizuela DO     Discharge Physician: ESTHER MARTINEZ MD     Admission Diagnoses: Hallucinations [R44.3]  Metabolic encephalopathy [G93.41]  Acute cystitis without hematuria [N30.00]    Discharge Diagnoses: ***    Admission Condition: {condition:88070}    Discharged Condition: {condition:37951}    Hospital Course: ***    Consults: {consultation:54194}    Significant Diagnostic Studies: {diagnostics:85743}    Discharge Exam:  {exam; complete normal and system select:52581}    Labs:   Recent Labs     02/01/24 0448   WBC 8.8   HGB 7.8*   HCT 26.6*        Recent Labs     02/01/24 0448   *   K 3.8   *   CO2 23   BUN 15   CREATININE 1.15*   CALCIUM 7.9*     No results for input(s): \"AST\", \"ALT\", \"BILIDIR\", \"BILITOT\", \"ALKPHOS\" in the last 72 hours.  No results for input(s): \"INR\" in the last 72 hours.  No results for input(s): \"CKTOTAL\", \"TROPONINI\" in the last 72 hours.    Urinalysis:   Lab Results   Component Value Date/Time    NITRU Negative 01/30/2024 01:30 PM    WBCUA 20-50 01/30/2024 01:30 PM    BACTERIA MANY 01/30/2024 01:30 PM    RBCUA 3-5 01/30/2024 01:30 PM    BLOODU MODERATE 01/30/2024 01:30 PM    SPECGRAV 1.020 01/30/2024 01:30 PM    GLUCOSEU Negative 01/30/2024 01:30 PM       Radiology:   Most recent    Chest CT      WITH CONTRAST:No results found for this or any previous visit.       WITHOUT CONTRAST: No results found for this or any previous visit.      CXR      2-view: Results for orders placed during the hospital encounter of 01/14/22    XR CHEST (2 VW)    Narrative  EXAMINATION: XR CHEST (2 VW)    CLINICAL HISTORY: COUGHING    COMPARISONS: None available.    FINDINGS: Osseous structures intact. Cardiopericardial silhouette normal. Aorta calcified. Pulmonary vasculature is normal. A geographic area of  02/01/24  0448   *   K 3.8   *   CO2 23   BUN 15   CREATININE 1.15*   CALCIUM 7.9*     No results for input(s): \"AST\", \"ALT\", \"BILIDIR\", \"BILITOT\", \"ALKPHOS\" in the last 72 hours.  No results for input(s): \"INR\" in the last 72 hours.  No results for input(s): \"CKTOTAL\", \"TROPONINI\" in the last 72 hours.    Urinalysis:   Lab Results   Component Value Date/Time    NITRU Negative 01/30/2024 01:30 PM    WBCUA 20-50 01/30/2024 01:30 PM    BACTERIA MANY 01/30/2024 01:30 PM    RBCUA 3-5 01/30/2024 01:30 PM    BLOODU MODERATE 01/30/2024 01:30 PM    SPECGRAV 1.020 01/30/2024 01:30 PM    GLUCOSEU Negative 01/30/2024 01:30 PM       Radiology:   Most recent    Chest CT      WITH CONTRAST:No results found for this or any previous visit.       WITHOUT CONTRAST: No results found for this or any previous visit.      CXR      2-view: Results for orders placed during the hospital encounter of 01/14/22    XR CHEST (2 VW)    Narrative  EXAMINATION: XR CHEST (2 VW)    CLINICAL HISTORY: COUGHING    COMPARISONS: None available.    FINDINGS: Osseous structures intact. Cardiopericardial silhouette normal. Aorta calcified. Pulmonary vasculature is normal. A geographic area of lucency is identified along the right lateral chest wall. Diaphragms are flattened. Retrosternal clear space  increased.    Impression  EMPHYSEMA.    INDETERMINATE LUCENCY RIGHT LATERAL CHEST. WHILE FINDING MAY REPRESENT NORMAL VARIANT SKIN FOLD, CANNOT EXCLUDE PNEUMOTHORAX. CLINICAL CORRELATION WIRE. IF CLINICAL CONCERN WARRANTS, CT CHEST MAY BE OBTAINED FOR FURTHER EVALUATION.       Portable: Results for orders placed during the hospital encounter of 01/30/24    XR CHEST PORTABLE    Narrative  EXAMINATION:  ONE XRAY VIEW OF THE CHEST    2/1/2024 3:00 pm    COMPARISON:  CT chest, March 9, 2023.  Chest radiograph, January 14, 2022.    HISTORY:  ORDERING SYSTEM PROVIDED HISTORY: hypoxia  TECHNOLOGIST PROVIDED HISTORY:  Reason for exam:->hypoxia  What reading

## 2024-02-03 NOTE — PROGRESS NOTES
Physician Progress Note    2/2/2024   11:36 PM    Name:  Zoya Martínez  MRN:    [id number]      Day: 3     Admit Date: 1/30/2024  1:13 PM  PCP: Romario Bazan MD    Code Status:  Full Code    Subjective:   Patient resting in bed, no cp, sob.  Denies hallucinations.    Current Facility-Administered Medications   Medication Dose Route Frequency Provider Last Rate Last Admin    losartan (COZAAR) tablet 100 mg  100 mg Oral Daily Chata Dylon R, DO   100 mg at 02/02/24 1125    sulfamethoxazole-trimethoprim (BACTRIM DS;SEPTRA DS) 800-160 MG per tablet 1 tablet  1 tablet Oral BID Chata, Dylon R, DO   1 tablet at 02/02/24 2138    carvedilol (COREG) tablet 12.5 mg  12.5 mg Oral BID Chata, Dylon R, DO   12.5 mg at 02/02/24 2138    hydroCHLOROthiazide (HYDRODIURIL) tablet 25 mg  25 mg Oral Daily Chata, Dylon R, DO   25 mg at 02/02/24 1125    atorvastatin (LIPITOR) tablet 40 mg  40 mg Oral Nightly Chata, Dylon R, DO   40 mg at 02/02/24 2138    dilTIAZem (CARDIZEM CD) extended release capsule 240 mg  240 mg Oral Daily Chata, Dylon R, DO   240 mg at 02/02/24 1125    sodium chloride flush 0.9 % injection 5-40 mL  5-40 mL IntraVENous 2 times per day Chata, Dylon R, DO   10 mL at 02/02/24 2138    sodium chloride flush 0.9 % injection 5-40 mL  5-40 mL IntraVENous PRN Chata, Dylon R, DO        0.9 % sodium chloride infusion   IntraVENous PRN Chata, Dylon R, DO        potassium chloride (KLOR-CON M) extended release tablet 40 mEq  40 mEq Oral PRN Chata, Dylon R, DO        Or    potassium bicarb-citric acid (EFFER-K) effervescent tablet 40 mEq  40 mEq Oral PRN Chata, Dylon R, DO        Or    potassium chloride 10 mEq/100 mL IVPB (Peripheral Line)  10 mEq IntraVENous PRN Chata, Dylon R, DO        magnesium sulfate 2000 mg in 50 mL IVPB premix  2,000 mg IntraVENous PRN Dylon Brizuela DO        enoxaparin (LOVENOX) injection 40 mg  40 mg SubCUTAneous Daily Dylon Brizuela DO   40 mg at 02/02/24 1125     oxybutynin  -Supportive care  2/2 - improved,ID agrees with bactrim on dc, plan for dc tomorrow am.    2.  Hypertension:  -Increase losartan.  Changed metoprolol to carvedilol.  Added thiazide diuretic    3.  Hypoxia: No documentation of hypoxia however in the room she is on 3 L and nursing tells me she desaturates anytime they take it off  -Check chest x-ray  -Add incentive spirometer.  May need home O2 evaluation at discharge    4.  Mild hypernatremia: Resolved    5.  Recent lumbar laminectomy:  -PT/OT     Class III obesity  Carotid stenosis  CAD  Hypomagnesemia     Lovenox prophylaxis  Full code    Family updated at bedside 2/2      Electronically signed by ESTHER MARTINEZ MD on 2/2/2024 at 11:36 PM

## 2024-02-03 NOTE — FLOWSHEET NOTE
Registered Nurse Shift Summary  2/3/24   Patient Name: Zoya Martínez  Attending Provider: Juan Miller MD      Admit Date: 2024  MRN: [id number]                           : 1945  Full Code    Code Status: Full Code      7:48 AM Assumed Care of Patient. Documentation initiated as per policy / SOP. Report Received from Lucretia Lopez RN.    1420: Reviewed discharge instructions with patient who verbalized understanding. All questions answered. Belongings packed up for discharge. Patient was assisted with dressing by Zoie SENA. Awaiting family for discharge. Electronically signed by Shayy Lamar RN on 2/3/2024 at 2:24 PM     Assessment Complete, please see flow sheets.   Labs, orders, plan of care and meds reviewed.         Labs:   Recent Labs     24   WBC 8.8   HGB 7.8*   HCT 26.6*        Recent Labs     24   *   K 3.8   *   CO2 23   BUN 15   CREATININE 1.15*   CALCIUM 7.9*           Last set of vitals:  Enc Vitals  BP: (!) 154/64 (24 0415)  Pulse: 70 (24 0415)  Respirations: 16 (24)  Temp: 97.7 °F (36.5 °C) (24 0415)  Temp Source: Oral (24)  SpO2: 96 % (245)  Weight - Scale: 99.3 kg (219 lb) (24 1309)  Height: 154.9 cm (5' 1\") (24 1309)     I/O    Intake/Output Summary (Last 24 hours) at 2/3/2024 0748  Last data filed at 2/3/2024 0423  Gross per 24 hour   Intake 480 ml   Output 800 ml   Net -320 ml

## 2024-02-05 ENCOUNTER — CARE COORDINATION (OUTPATIENT)
Dept: CARE COORDINATION | Age: 79
End: 2024-02-05

## 2024-02-05 DIAGNOSIS — G93.41 METABOLIC ENCEPHALOPATHY: Primary | ICD-10-CM

## 2024-02-05 PROCEDURE — 1111F DSCHRG MED/CURRENT MED MERGE: CPT | Performed by: FAMILY MEDICINE

## 2024-02-05 NOTE — CARE COORDINATION
Care Transitions Initial Follow Up Call    Call within 2 business days of discharge: Yes    Patient Current Location:  Home: [address] [place] [address] [address]  [place] [id number]    Care Transition Nurse contacted the patient by telephone to perform post hospital discharge assessment. Verified name and  with patient as identifiers. Provided introduction to self, and explanation of the Care Transition Nurse role.     Patient: Zoya Martínez Patient : 1945   MRN: [id number]  Reason for Admission: Metabolic encephalopathy/UTI  Discharge Date: 2/3/24 RARS: Readmission Risk Score: 19.9      Last Discharge Facility       Date Complaint Diagnosis Description Type Department Provider    24 Hallucinations Hallucinations ... ED to Hosp-Admission (Discharged) (ADMITTED) Juan Garrison MD; JESSICA Pack.          Was this an external facility discharge? No Discharge Facility: [facility]    Challenges to be reviewed by the provider   Additional needs identified to be addressed with provider: No  none               Method of communication with provider: none.    Spoke with patient today 24 for initial CHIP/hospital discharge follow up.     Patient states she is feeling better since discharge and offers no complaints. Noted patient was admitted for hallucinations. Noted CT of head obtained on admission showed no acute findings. Confirmed patient recently had s/p lumbar laminectomy per Dr. MELODY Villegas (neurosurgery) on 24.     Patient answers questions appropriately with thought process appearing normal.     Noted urine culture was positive for e.coli on admission. Patient denies any difficulty with urination, pain/burning with urination or noticing blood in urine. Denies any shortness of breath, chest pain, abdominal pain, kidney/flank pain, nausea, vomiting, chills or fever. Encouraged to wipe front-to-back after using bathroom.     Patient denies any back pain r/t to recent back surgery. States she uses either

## 2024-02-05 NOTE — PROGRESS NOTES
Physical Therapy  Facility/Department: [facility] MED SURG W279/W279-01  Physical Therapy Discharge      NAME: Zoya Martínez    : 1945 (78 y.o.)  MRN: [id number]    Account: [id number]  Gender: female      Patient has been discharged from [facility] hospital. DC patient from current PT program.      Electronically signed by Ellyn Mora PT on 24 at 1:42 PM EST

## 2024-02-08 ENCOUNTER — OFFICE VISIT (OUTPATIENT)
Dept: FAMILY MEDICINE CLINIC | Age: 79
End: 2024-02-08

## 2024-02-08 VITALS
DIASTOLIC BLOOD PRESSURE: 82 MMHG | TEMPERATURE: 97.8 F | WEIGHT: 224 LBS | OXYGEN SATURATION: 99 % | HEIGHT: 61 IN | SYSTOLIC BLOOD PRESSURE: 134 MMHG | HEART RATE: 78 BPM | BODY MASS INDEX: 42.29 KG/M2

## 2024-02-08 DIAGNOSIS — Z74.09 IMPAIRED MOBILITY AND ACTIVITIES OF DAILY LIVING: ICD-10-CM

## 2024-02-08 DIAGNOSIS — B96.29 UTI DUE TO EXTENDED-SPECTRUM BETA LACTAMASE (ESBL) PRODUCING ESCHERICHIA COLI: ICD-10-CM

## 2024-02-08 DIAGNOSIS — R44.3 HALLUCINATIONS: ICD-10-CM

## 2024-02-08 DIAGNOSIS — Z16.12 UTI DUE TO EXTENDED-SPECTRUM BETA LACTAMASE (ESBL) PRODUCING ESCHERICHIA COLI: ICD-10-CM

## 2024-02-08 DIAGNOSIS — N39.0 UTI DUE TO EXTENDED-SPECTRUM BETA LACTAMASE (ESBL) PRODUCING ESCHERICHIA COLI: ICD-10-CM

## 2024-02-08 DIAGNOSIS — Z09 HOSPITAL DISCHARGE FOLLOW-UP: Primary | ICD-10-CM

## 2024-02-08 DIAGNOSIS — N30.00 ACUTE CYSTITIS WITHOUT HEMATURIA: ICD-10-CM

## 2024-02-08 DIAGNOSIS — Z78.9 IMPAIRED MOBILITY AND ACTIVITIES OF DAILY LIVING: ICD-10-CM

## 2024-02-08 DIAGNOSIS — G93.41 METABOLIC ENCEPHALOPATHY: ICD-10-CM

## 2024-02-08 PROBLEM — M75.40 IMPINGEMENT SYNDROME OF SHOULDER REGION: Status: RESOLVED | Noted: 2024-01-18 | Resolved: 2024-02-08

## 2024-02-08 PROBLEM — M25.562 LEFT KNEE PAIN: Status: RESOLVED | Noted: 2024-01-18 | Resolved: 2024-02-08

## 2024-02-08 PROBLEM — M43.17 ACQUIRED SPONDYLOLISTHESIS OF LUMBOSACRAL REGION: Status: RESOLVED | Noted: 2019-01-22 | Resolved: 2024-02-08

## 2024-02-08 PROBLEM — M25.819 SHOULDER IMPINGEMENT: Status: RESOLVED | Noted: 2024-01-18 | Resolved: 2024-02-08

## 2024-02-08 PROBLEM — I25.10 CORONARY ARTERY DISEASE INVOLVING NATIVE CORONARY ARTERY OF NATIVE HEART WITHOUT ANGINA PECTORIS: Status: RESOLVED | Noted: 2018-07-10 | Resolved: 2024-02-08

## 2024-02-08 PROBLEM — M16.11 OSTEOARTHRITIS OF RIGHT HIP: Status: RESOLVED | Noted: 2024-01-18 | Resolved: 2024-02-08

## 2024-02-08 PROBLEM — M25.511 RIGHT SHOULDER PAIN: Status: RESOLVED | Noted: 2024-01-18 | Resolved: 2024-02-08

## 2024-02-08 PROBLEM — M25.561 RIGHT KNEE PAIN: Status: RESOLVED | Noted: 2022-08-24 | Resolved: 2024-02-08

## 2024-02-08 PROBLEM — M54.42 CHRONIC BILATERAL LOW BACK PAIN WITH BILATERAL SCIATICA: Status: RESOLVED | Noted: 2018-10-30 | Resolved: 2024-02-08

## 2024-02-08 PROBLEM — E66.813 CLASS 3 SEVERE OBESITY DUE TO EXCESS CALORIES WITH SERIOUS COMORBIDITY AND BODY MASS INDEX (BMI) OF 40.0 TO 44.9 IN ADULT: Status: RESOLVED | Noted: 2017-05-13 | Resolved: 2024-02-08

## 2024-02-08 PROBLEM — M54.2 NECK PAIN: Status: RESOLVED | Noted: 2024-01-18 | Resolved: 2024-02-08

## 2024-02-08 PROBLEM — J18.9 ACUTE PNEUMONIA: Status: RESOLVED | Noted: 2023-03-10 | Resolved: 2024-02-08

## 2024-02-08 PROBLEM — M17.12 LEFT KNEE DJD: Status: RESOLVED | Noted: 2024-01-18 | Resolved: 2024-02-08

## 2024-02-08 PROBLEM — M54.41 CHRONIC BILATERAL LOW BACK PAIN WITH BILATERAL SCIATICA: Status: RESOLVED | Noted: 2018-10-30 | Resolved: 2024-02-08

## 2024-02-08 PROBLEM — M17.11 RIGHT KNEE DJD: Status: RESOLVED | Noted: 2024-01-18 | Resolved: 2024-02-08

## 2024-02-08 PROBLEM — G89.29 CHRONIC BILATERAL LOW BACK PAIN WITH BILATERAL SCIATICA: Status: RESOLVED | Noted: 2018-10-30 | Resolved: 2024-02-08

## 2024-02-08 PROBLEM — E66.01 CLASS 3 SEVERE OBESITY DUE TO EXCESS CALORIES WITH SERIOUS COMORBIDITY AND BODY MASS INDEX (BMI) OF 40.0 TO 44.9 IN ADULT (HCC): Status: RESOLVED | Noted: 2017-05-13 | Resolved: 2024-02-08

## 2024-02-08 PROBLEM — D64.9 ANEMIA: Status: RESOLVED | Noted: 2021-02-04 | Resolved: 2024-02-08

## 2024-02-08 PROBLEM — R58 BLEEDING: Status: RESOLVED | Noted: 2024-01-18 | Resolved: 2024-02-08

## 2024-02-08 ASSESSMENT — ENCOUNTER SYMPTOMS
SHORTNESS OF BREATH: 0
RHINORRHEA: 0
WHEEZING: 0
DIARRHEA: 0
CONSTIPATION: 0
ABDOMINAL PAIN: 0
SORE THROAT: 0
COUGH: 0

## 2024-02-08 ASSESSMENT — PATIENT HEALTH QUESTIONNAIRE - PHQ9
SUM OF ALL RESPONSES TO PHQ QUESTIONS 1-9: 0
SUM OF ALL RESPONSES TO PHQ QUESTIONS 1-9: 0
2. FEELING DOWN, DEPRESSED OR HOPELESS: 0
SUM OF ALL RESPONSES TO PHQ QUESTIONS 1-9: 0
SUM OF ALL RESPONSES TO PHQ9 QUESTIONS 1 & 2: 0
SUM OF ALL RESPONSES TO PHQ QUESTIONS 1-9: 0
1. LITTLE INTEREST OR PLEASURE IN DOING THINGS: 0

## 2024-02-08 NOTE — PROGRESS NOTES
[facility]  [facility]  [address]  [facility] [address]  Dept: [phone]  Dept Fax: [phone]  Loc: [phone]     Chief Complaint  Chief Complaint   Patient presents with    Follow-Up from Hospital     TCM: 1/30/2024 - 2/3/2024 (4 days), [facility], Metabolic encephalopathy       HPI:  78 y.o.female who presents for the following:      Hosp f/u: admitted for altered MS in the setting of UTI and hypoxia and low sodium; improved with abx and med adjustments; feeling much better today; tolerating PO; no more hallucinations; has home therapy visiting    Admit date: 1/30/2024  Discharge date : 02/03/24  Hospital Course:   1.  Metabolic encephalopathy secondary to ESBL E. coli UTI:   -2/1 changed to Bactrim.  Add infectious disease  -Stop anticholinergic medication oxybutynin  -Supportive care  2/2 - improved,ID agrees with bactrim on dc, plan for dc tomorrow am.     2.  Hypertension:  -Increase losartan.  Changed metoprolol to carvedilol.  Added thiazide diuretic     3.  Hypoxia: No documentation of hypoxia however in the room she is on 3 L and nursing tells me she desaturates anytime they take it off  -Check chest x-ray  -Add incentive spirometer.  May need home O2 evaluation at discharge     4.  Mild hypernatremia: Resolved     5.  Recent lumbar laminectomy:  -PT/OT    Review of Systems   Constitutional:  Negative for chills and fever.   HENT:  Negative for congestion, rhinorrhea and sore throat.    Respiratory:  Negative for cough, shortness of breath and wheezing.    Gastrointestinal:  Negative for abdominal pain, constipation and diarrhea.   Endocrine: Negative for polydipsia and polyuria.   Genitourinary:  Negative for dysuria, frequency and urgency.   Neurological:  Negative for syncope, light-headedness, numbness and headaches.   Psychiatric/Behavioral:  Negative for sleep disturbance. The patient is not

## 2024-02-08 NOTE — PROGRESS NOTES
Patient Name: Zoya Martínez : 1945        Date: 2024      Type of Appt: Post Op    Reason for appt: Post op 24. L2-3 3-4 4-5 rosie post lat fusion.     Pt last seen by Dr Villegas on 2024 in office    Studies done: 2024 - CT HEAD WO CONTRAST @ [facility]    Surgeries: 2024. L2-3 3-4 4-5 rosie post lat fusion.          [facility]  [facility]  [address] , [address]  [place], [place]  P: [phone]  F: [phone]      Patient: Zoya Martínez  YOB: 1945  Date: 2024    The patient is a 78 y.o. female who presents today for follow up.    Patient had hallucinations secondary metabolic issues with a urinary tract infection.  Has completely recovered with the use of Bactrim and medication changes.  Awake and alert and cooperative today she is awake and alert bright.  Has some pain in the left posterior hamstring when she sits down or stands up.  No back pain. there is no tenderness on examination good range of motion of the hip.  She is mobilizing well with the walker and improving daily with her strength.    Obtain postoperative x-rays previously ordered AP lateral lumbar spine.  Continue her therapy.  Any questions or problems contact the office    FELIBERTO VILLEGAS MD

## 2024-02-13 ENCOUNTER — OFFICE VISIT (OUTPATIENT)
Dept: NEUROSURGERY | Age: 79
End: 2024-02-13

## 2024-02-13 VITALS
WEIGHT: 224 LBS | TEMPERATURE: 97.6 F | DIASTOLIC BLOOD PRESSURE: 84 MMHG | SYSTOLIC BLOOD PRESSURE: 136 MMHG | BODY MASS INDEX: 42.29 KG/M2 | HEIGHT: 61 IN

## 2024-02-13 DIAGNOSIS — G82.20 PARAPARESIS (HCC): Primary | ICD-10-CM

## 2024-02-13 PROCEDURE — 99024 POSTOP FOLLOW-UP VISIT: CPT | Performed by: NEUROLOGICAL SURGERY

## 2024-02-15 ENCOUNTER — CARE COORDINATION (OUTPATIENT)
Dept: CARE COORDINATION | Age: 79
End: 2024-02-15

## 2024-02-15 NOTE — CARE COORDINATION
Care Transitions Follow Up Call    Patient Current Location:  Home: [address] [place] [address]    Care Transition Nurse contacted the patient by telephone to follow up after admission on 24.  Verified name and  with patient as identifiers.    Patient: Zoya Martínez  Patient : 1945   MRN: [id number]  Reason for Admission: Metabolic Encephalopathy  Discharge Date: 2/3/24 RARS: Readmission Risk Score: 19.9      Needs to be reviewed by the provider   Additional needs identified to be addressed with provider: No               Method of communication with provider: none.    Spoke with patient today 2/15/24 for CHIP/hospital discharge follow up sub call.    Patient states she continues doing well and offers no complaints. Confirmed with patient she completed post op appt with Dr. Villegas (Neuro surg) on 24 as she is recent s/p lumbar laminectomy. Patient denies any new meds or changes in meds.    Denies any pain to lumbar spine. Denies any wearing a supportive back brace or splint. Denies any loss of bowel/bladder function.     Patient states she uses both a walker and cane. States she continues working with [facility] for therapy.    Patient denies any shortness of breath, chest pain, abdominal pain, kidney/ flank pain, nausea, chills or fever.     Denies any difficulty urinating, pain/burning with urination or noticing blood in urine. Confirmed with patient Bactrim Ds that was ordered on hosp discharge is finished.     Confirmed with patient she completed HFU appt with Dr. MELODY Bazan (PCP) on 24.     Denies any needs or concerns at this time. CTN will continue to follow for Care Transition.     Addressed changes since last contact:   Completed HFU appt with PCP on 24 and post op appt with Dr. Villegas (neuro surg) on .   Discussed follow-up appointments. If no appointment was previously scheduled, appointment scheduling offered: Yes.   Is follow up appointment scheduled within 7 days

## 2024-02-22 ENCOUNTER — CARE COORDINATION (OUTPATIENT)
Dept: CARE COORDINATION | Age: 79
End: 2024-02-22

## 2024-02-22 NOTE — CARE COORDINATION
Care Transitions Follow Up Call    Patient Current Location:  Home: [address] [place] [address]  [place] [id number]    Care Transition Nurse contacted the patient by telephone to follow up after admission on 24.  Verified name and  with patient as identifiers.    Patient: Zoya Martínez  Patient : 1945   MRN: [id number]  Reason for Admission: metabolic encephalopathy  Discharge Date: 2/3/24 RARS: Readmission Risk Score: 19.9      Needs to be reviewed by the provider   Additional needs identified to be addressed with provider: No  none             Method of communication with provider: none.    Spoke with patient today 24 for final CHIP/hospital discharge follow up.     Patient complains of bilateral hip pain caused from exercising with [facility]. Patient states hip pain is aggravated when doing the stepping side-to-side exercise. Rates hip pain today a 4/10 in severity which is tolerable. Patient states she takes ES Tylenol for pain which provides relief. Confirmed with patient she continues using either walker or cane when up ambulating.     Confirmed with patient she completed her post op f/u with Rodger Villegas (Neuro surg) as she is a recent s/p lumbar laminectomy. Denies any back pain at this time. Denies any numbness, tingling or loss of sensation to LE. Denies any loss of bowel/bladder function.     Denies any shortness of breath, chest pain, abdominal pain, kidney/flank pain,nausea, vomiting, chills or fever.     Confirmed with patient she completed Bactrim DS therapy that was ordered on hospital discharge. Denies any difficulty urinating, pain/burning with urination, frequent urination or noticing blood in urine.     Reiterated importance to wipe front-to-back after using bathroom as urine culture was positive for e.coli which patient v/u.     Patient denies any other complaints or further needs and in agreement to final call. Patient had declines RPM in past. CTN signing off.     Addressed changes since  Pt aware of Dr Bowen's recommendations.    Medina, please call pt and reschedule appt to 6 mos from now with psa prior

## 2024-02-29 ENCOUNTER — OFFICE VISIT (OUTPATIENT)
Dept: CARDIOLOGY CLINIC | Age: 79
End: 2024-02-29
Payer: MEDICARE

## 2024-02-29 VITALS
HEART RATE: 70 BPM | BODY MASS INDEX: 42.41 KG/M2 | DIASTOLIC BLOOD PRESSURE: 62 MMHG | HEIGHT: 61 IN | OXYGEN SATURATION: 96 % | WEIGHT: 224.6 LBS | SYSTOLIC BLOOD PRESSURE: 142 MMHG

## 2024-02-29 DIAGNOSIS — N18.30 STAGE 3 CHRONIC KIDNEY DISEASE, UNSPECIFIED WHETHER STAGE 3A OR 3B CKD (HCC): ICD-10-CM

## 2024-02-29 DIAGNOSIS — E78.2 MIXED HYPERLIPIDEMIA: ICD-10-CM

## 2024-02-29 DIAGNOSIS — I25.10 CORONARY ARTERY DISEASE INVOLVING NATIVE CORONARY ARTERY OF NATIVE HEART WITHOUT ANGINA PECTORIS: Primary | ICD-10-CM

## 2024-02-29 DIAGNOSIS — E66.01 MORBID OBESITY DUE TO EXCESS CALORIES (HCC): ICD-10-CM

## 2024-02-29 DIAGNOSIS — I25.2 HISTORY OF ST ELEVATION MYOCARDIAL INFARCTION (STEMI): ICD-10-CM

## 2024-02-29 DIAGNOSIS — I10 ESSENTIAL HYPERTENSION: ICD-10-CM

## 2024-02-29 DIAGNOSIS — I65.23 BILATERAL CAROTID ARTERY STENOSIS: ICD-10-CM

## 2024-02-29 PROCEDURE — 3078F DIAST BP <80 MM HG: CPT | Performed by: INTERNAL MEDICINE

## 2024-02-29 PROCEDURE — 99214 OFFICE O/P EST MOD 30 MIN: CPT | Performed by: INTERNAL MEDICINE

## 2024-02-29 PROCEDURE — 93000 ELECTROCARDIOGRAM COMPLETE: CPT | Performed by: INTERNAL MEDICINE

## 2024-02-29 PROCEDURE — 3077F SYST BP >= 140 MM HG: CPT | Performed by: INTERNAL MEDICINE

## 2024-02-29 PROCEDURE — 1123F ACP DISCUSS/DSCN MKR DOCD: CPT | Performed by: INTERNAL MEDICINE

## 2024-02-29 RX ORDER — METOPROLOL TARTRATE 50 MG/1
50 TABLET, FILM COATED ORAL 2 TIMES DAILY
Qty: 180 TABLET | Refills: 3 | Status: SHIPPED | OUTPATIENT
Start: 2024-02-29

## 2024-02-29 RX ORDER — LOSARTAN POTASSIUM AND HYDROCHLOROTHIAZIDE 25; 100 MG/1; MG/1
1 TABLET ORAL DAILY
Qty: 90 TABLET | Refills: 3 | Status: SHIPPED | OUTPATIENT
Start: 2024-02-29

## 2024-02-29 NOTE — PROGRESS NOTES
Difficulty of Paying Living Expenses: Not hard at all   Food Insecurity: No Food Insecurity (1/30/2024)    Hunger Vital Sign     Worried About Running Out of Food in the Last Year: Never true     Ran Out of Food in the Last Year: Never true   Transportation Needs: No Transportation Needs (1/30/2024)    PRAPARE - Transportation     Lack of Transportation (Medical): No     Lack of Transportation (Non-Medical): No   Housing Stability: Low Risk  (1/30/2024)    Housing Stability Vital Sign     Unable to Pay for Housing in the Last Year: No     Number of Places Lived in the Last Year: 1     Unstable Housing in the Last Year: No       Family History   Problem Relation Age of Onset    Heart Disease Mother 63    Cancer Father 67        kidney    No Known Problems Daughter        Current Outpatient Medications   Medication Sig Dispense Refill    metoprolol tartrate (LOPRESSOR) 50 MG tablet Take 1 tablet by mouth 2 times daily 180 tablet 3    losartan-hydroCHLOROthiazide (HYZAAR) 100-25 MG per tablet Take 1 tablet by mouth daily 90 tablet 3    atorvastatin (LIPITOR) 40 MG tablet Take 1 tablet by mouth nightly 90 tablet 3    Handicap Placard MISC by Does not apply route Expires 9/26/2028 1 each 0    dilTIAZem (CARDIZEM CD) 240 MG extended release capsule TAKE 1 CAPSULE BY MOUTH DAILY 90 capsule 3    Cholecalciferol (VITAMIN D) 50 MCG (2000 UT) CAPS capsule Take by mouth daily       No current facility-administered medications for this visit.       Lisinopril and Tramadol    Review of Systems:  General ROS: negative  Psychological ROS: negative  Hematological and Lymphatic ROS: No history of blood clots or bleeding disorder.   Respiratory ROS: no cough, shortness of breath, or wheezing  Cardiovascular ROS: no chest pain or dyspnea on exertion  Gastrointestinal ROS: no abdominal pain, change in bowel habits, or black or bloody stools  Genito-Urinary ROS: no dysuria, trouble voiding, or hematuria  Musculoskeletal ROS:

## 2024-03-25 ENCOUNTER — HOSPITAL ENCOUNTER (OUTPATIENT)
Age: 79
Setting detail: SPECIMEN
Discharge: HOME | End: 2024-03-25
Payer: MEDICARE

## 2024-03-25 ENCOUNTER — OFFICE VISIT (OUTPATIENT)
Dept: FAMILY MEDICINE CLINIC | Age: 79
End: 2024-03-25
Payer: MEDICARE

## 2024-03-25 VITALS
DIASTOLIC BLOOD PRESSURE: 88 MMHG | OXYGEN SATURATION: 99 % | WEIGHT: 225 LBS | HEIGHT: 62 IN | HEART RATE: 78 BPM | SYSTOLIC BLOOD PRESSURE: 138 MMHG | BODY MASS INDEX: 41.41 KG/M2 | TEMPERATURE: 97.6 F

## 2024-03-25 DIAGNOSIS — R39.9 LOWER URINARY TRACT SYMPTOMS (LUTS): ICD-10-CM

## 2024-03-25 DIAGNOSIS — Z00.00 MEDICARE ANNUAL WELLNESS VISIT, SUBSEQUENT: ICD-10-CM

## 2024-03-25 DIAGNOSIS — E03.9 HYPOTHYROIDISM, UNSPECIFIED TYPE: ICD-10-CM

## 2024-03-25 DIAGNOSIS — R39.15 URINARY URGENCY: ICD-10-CM

## 2024-03-25 DIAGNOSIS — Z00.00 ENCOUNTER FOR ANNUAL WELLNESS EXAM IN MEDICARE PATIENT: Primary | ICD-10-CM

## 2024-03-25 PROBLEM — M17.0 PRIMARY OSTEOARTHRITIS OF BOTH KNEES: Status: RESOLVED | Noted: 2018-10-30 | Resolved: 2024-03-25

## 2024-03-25 PROBLEM — M43.16 SPONDYLOLISTHESIS AT L4-L5 LEVEL: Status: RESOLVED | Noted: 2024-01-17 | Resolved: 2024-03-25

## 2024-03-25 LAB
BILIRUBIN, POC: ABNORMAL
BLOOD URINE, POC: ABNORMAL
CLARITY, POC: ABNORMAL
COLOR, POC: YELLOW
GLUCOSE URINE, POC: ABNORMAL
KETONES, POC: ABNORMAL
LEUKOCYTE EST, POC: ABNORMAL
NITRITE, POC: ABNORMAL
PH, POC: 6
PROTEIN, POC: ABNORMAL
SPECIFIC GRAVITY, POC: 1.02
UROBILINOGEN, POC: ABNORMAL

## 2024-03-25 PROCEDURE — 87086 URINE CULTURE/COLONY COUNT: CPT

## 2024-03-25 PROCEDURE — G0439 PPPS, SUBSEQ VISIT: HCPCS | Performed by: FAMILY MEDICINE

## 2024-03-25 PROCEDURE — 81003 URINALYSIS AUTO W/O SCOPE: CPT | Performed by: FAMILY MEDICINE

## 2024-03-25 PROCEDURE — 87088 URINE BACTERIA CULTURE: CPT

## 2024-03-25 PROCEDURE — 3075F SYST BP GE 130 - 139MM HG: CPT | Performed by: FAMILY MEDICINE

## 2024-03-25 PROCEDURE — 87186 SC STD MICRODIL/AGAR DIL: CPT

## 2024-03-25 PROCEDURE — 1123F ACP DISCUSS/DSCN MKR DOCD: CPT | Performed by: FAMILY MEDICINE

## 2024-03-25 PROCEDURE — 3079F DIAST BP 80-89 MM HG: CPT | Performed by: FAMILY MEDICINE

## 2024-03-25 PROCEDURE — 99213 OFFICE O/P EST LOW 20 MIN: CPT | Performed by: FAMILY MEDICINE

## 2024-03-25 RX ORDER — SOLIFENACIN SUCCINATE 5 MG/1
5 TABLET, FILM COATED ORAL DAILY
Qty: 30 TABLET | Refills: 3 | Status: SHIPPED | OUTPATIENT
Start: 2024-03-25 | End: 2025-03-25

## 2024-03-25 RX ORDER — SULFAMETHOXAZOLE AND TRIMETHOPRIM 800; 160 MG/1; MG/1
1 TABLET ORAL 2 TIMES DAILY
Qty: 10 TABLET | Refills: 0 | Status: SHIPPED | OUTPATIENT
Start: 2024-03-25 | End: 2024-03-30

## 2024-03-25 SDOH — ECONOMIC STABILITY: FOOD INSECURITY: WITHIN THE PAST 12 MONTHS, YOU WORRIED THAT YOUR FOOD WOULD RUN OUT BEFORE YOU GOT MONEY TO BUY MORE.: NEVER TRUE

## 2024-03-25 SDOH — ECONOMIC STABILITY: FOOD INSECURITY: WITHIN THE PAST 12 MONTHS, THE FOOD YOU BOUGHT JUST DIDN'T LAST AND YOU DIDN'T HAVE MONEY TO GET MORE.: NEVER TRUE

## 2024-03-25 SDOH — ECONOMIC STABILITY: INCOME INSECURITY: HOW HARD IS IT FOR YOU TO PAY FOR THE VERY BASICS LIKE FOOD, HOUSING, MEDICAL CARE, AND HEATING?: NOT HARD AT ALL

## 2024-03-25 ASSESSMENT — PATIENT HEALTH QUESTIONNAIRE - PHQ9
SUM OF ALL RESPONSES TO PHQ QUESTIONS 1-9: 2
SUM OF ALL RESPONSES TO PHQ QUESTIONS 1-9: 2
SUM OF ALL RESPONSES TO PHQ9 QUESTIONS 1 & 2: 2
SUM OF ALL RESPONSES TO PHQ QUESTIONS 1-9: 2
1. LITTLE INTEREST OR PLEASURE IN DOING THINGS: MORE THAN HALF THE DAYS
SUM OF ALL RESPONSES TO PHQ QUESTIONS 1-9: 2
2. FEELING DOWN, DEPRESSED OR HOPELESS: NOT AT ALL

## 2024-03-25 ASSESSMENT — ENCOUNTER SYMPTOMS
COUGH: 0
SHORTNESS OF BREATH: 0
CONSTIPATION: 0
DIARRHEA: 0
SORE THROAT: 0
WHEEZING: 0
ABDOMINAL PAIN: 0
RHINORRHEA: 0

## 2024-03-25 ASSESSMENT — LIFESTYLE VARIABLES
HOW MANY STANDARD DRINKS CONTAINING ALCOHOL DO YOU HAVE ON A TYPICAL DAY: PATIENT DOES NOT DRINK
HOW OFTEN DO YOU HAVE A DRINK CONTAINING ALCOHOL: NEVER

## 2024-03-25 NOTE — PROGRESS NOTES
[facility] PRIMARY CARE  [address]  [facility] [address]  Dept: [phone]  Dept Fax: [phone]  Loc: [phone]     Chief Complaint  Chief Complaint   Patient presents with    Medicare AWV    Urinary Problem     Urine has an odor, frequency, recurrent problem, just finished abx in the beginning of March for UTI symptoms       HPI:  78 y.o.female who presents for the following:      LUTS: urine smell stronger and different; no dysuria; no blood; also has bladder spasms long term      Review of Systems   Constitutional:  Negative for chills and fever.   HENT:  Negative for congestion, rhinorrhea and sore throat.    Respiratory:  Negative for cough, shortness of breath and wheezing.    Gastrointestinal:  Negative for abdominal pain, constipation and diarrhea.   Endocrine: Negative for polydipsia and polyuria.   Genitourinary:  Negative for dysuria, frequency and urgency.   Neurological:  Negative for syncope, light-headedness, numbness and headaches.   Psychiatric/Behavioral:  Negative for sleep disturbance. The patient is not nervous/anxious.        Past Medical History:   Diagnosis Date    Antral ulcer 01/14/2015    DR LANE    CHF (congestive heart failure) (HCC)     Coronary artery disease     Coronary artery disease involving native coronary artery of native heart without angina pectoris 07/10/2018    has x 4 cardiac stents / Dr. Boland    Diverticulosis of colon (without mention of hemorrhage) 01/14/2015    DR LANE    Essential hypertension 12/10/2013    meds > 20 yrs    History of blood transfusion     with hysterectomy and s/p knee replacment    History of normal resting EKG 1996    normal    History of ST elevation myocardial infarction (STEMI) 07/11/2017    History of transfusion of whole blood 1996    Excessive bleeding before hysterectomy    Hyperlipidemia     meds > 2 yrs    Hypertension     Hypothyroidism

## 2024-03-25 NOTE — PATIENT INSTRUCTIONS
heart healthy and prevent heart disease. This lifestyle includes eating healthy, being active, staying at a weight that's healthy for you, and not smoking or using tobacco. It also includes taking medicines as directed, managing other health conditions, and trying to get a healthy amount of sleep.  Follow-up care is a key part of your treatment and safety. Be sure to make and go to all appointments, and call your doctor if you are having problems. It's also a good idea to know your test results and keep a list of the medicines you take.  How can you care for yourself at home?  Diet    Use less salt when you cook and eat. This helps lower your blood pressure. Taste food before salting. Add only a little salt when you think you need it. With time, your taste buds will adjust to less salt.     Eat fewer snack items, fast foods, canned soups, and other high-salt, high-fat, processed foods.     Read food labels and try to avoid saturated and trans fats. They increase your risk of heart disease by raising cholesterol levels.     Limit the amount of solid fat--butter, margarine, and shortening--you eat. Use olive, peanut, or canola oil when you cook. Bake, broil, and steam foods instead of frying them.     Eat a variety of fruit and vegetables every day. Dark green, deep orange, red, or yellow fruits and vegetables are especially good for you. Examples include spinach, carrots, peaches, and berries.     Foods high in fiber can reduce your cholesterol and provide important vitamins and minerals. High-fiber foods include whole-grain cereals and breads, oatmeal, beans, brown rice, citrus fruits, and apples.     Eat lean proteins. Heart-healthy proteins include seafood, lean meats and poultry, eggs, beans, peas, nuts, seeds, and soy products.     Limit drinks and foods with added sugar. These include candy, desserts, and soda pop.   Heart-healthy lifestyle    If your doctor recommends it, get more exercise. For many people,

## 2024-03-27 ENCOUNTER — HOSPITAL ENCOUNTER (OUTPATIENT)
Dept: LAB | Age: 79
Discharge: HOME | End: 2024-03-27
Payer: MEDICARE

## 2024-03-27 DIAGNOSIS — N18.32 STAGE 3B CHRONIC KIDNEY DISEASE (HCC): Primary | ICD-10-CM

## 2024-03-27 DIAGNOSIS — Z00.00 ENCOUNTER FOR ANNUAL WELLNESS EXAM IN MEDICARE PATIENT: ICD-10-CM

## 2024-03-27 DIAGNOSIS — E03.9 HYPOTHYROIDISM, UNSPECIFIED TYPE: ICD-10-CM

## 2024-03-27 LAB
ALBUMIN SERPL-MCNC: 4.1 G/DL (ref 3.5–4.6)
ALP SERPL-CCNC: 127 U/L (ref 40–130)
ALT SERPL-CCNC: 17 U/L (ref 0–33)
ANION GAP SERPL CALCULATED.3IONS-SCNC: 12 MEQ/L (ref 9–15)
AST SERPL-CCNC: 18 U/L (ref 0–35)
BACTERIA UR CULT: ABNORMAL
BACTERIA UR CULT: ABNORMAL
BILIRUB SERPL-MCNC: 0.6 MG/DL (ref 0.2–0.7)
BUN SERPL-MCNC: 27 MG/DL (ref 8–23)
CALCIUM SERPL-MCNC: 10 MG/DL (ref 8.5–9.9)
CHLORIDE SERPL-SCNC: 107 MEQ/L (ref 95–107)
CHOLEST SERPL-MCNC: 162 MG/DL (ref 0–199)
CO2 SERPL-SCNC: 24 MEQ/L (ref 20–31)
CREAT SERPL-MCNC: 1.51 MG/DL (ref 0.5–0.9)
GLOBULIN SER CALC-MCNC: 2.8 G/DL (ref 2.3–3.5)
GLUCOSE FASTING: 115 MG/DL (ref 70–99)
HDLC SERPL-MCNC: 45 MG/DL (ref 40–59)
LDL CHOLESTEROL CALCULATED: 90 MG/DL (ref 0–129)
ORGANISM: ABNORMAL
POTASSIUM SERPL-SCNC: 5.2 MEQ/L (ref 3.4–4.9)
PROT SERPL-MCNC: 6.9 G/DL (ref 6.3–8)
SODIUM SERPL-SCNC: 143 MEQ/L (ref 135–144)
TRIGLYCERIDE, FASTING: 136 MG/DL (ref 0–150)
TSH SERPL-MCNC: 3.53 UIU/ML (ref 0.44–3.86)

## 2024-03-27 PROCEDURE — 80061 LIPID PANEL: CPT

## 2024-03-27 PROCEDURE — 84443 ASSAY THYROID STIM HORMONE: CPT

## 2024-03-27 PROCEDURE — 36415 COLL VENOUS BLD VENIPUNCTURE: CPT

## 2024-03-27 PROCEDURE — 80053 COMPREHEN METABOLIC PANEL: CPT

## 2024-04-03 DIAGNOSIS — I21.21 ST ELEVATION MYOCARDIAL INFARCTION INVOLVING LEFT CIRCUMFLEX CORONARY ARTERY (HCC): ICD-10-CM

## 2024-04-03 RX ORDER — DILTIAZEM HYDROCHLORIDE 240 MG/1
240 CAPSULE, COATED, EXTENDED RELEASE ORAL DAILY
Qty: 90 CAPSULE | Refills: 3 | Status: SHIPPED | OUTPATIENT
Start: 2024-04-03

## 2024-04-03 NOTE — TELEPHONE ENCOUNTER
Comments:     Last Office Visit (last PCP visit):   3/25/2024    Next Visit Date:  Future Appointments   Date Time Provider Department Center   3/13/2025 12:30 PM Holiday, DO RIANA Gomez C[facility]       **If hasn't been seen in over a year OR hasn't followed up according to last diabetes/ADHD visit, make appointment for patient before sending refill to provider.    Rx requested:  Requested Prescriptions     Pending Prescriptions Disp Refills    dilTIAZem (CARDIZEM CD) 240 MG extended release capsule [Pharmacy Med Name: diltiazem  mg capsule,extended release 24 hr] 90 capsule 3     Sig: TAKE 1 CAPSULE BY MOUTH DAILY

## 2024-06-11 ENCOUNTER — TELEPHONE (OUTPATIENT)
Dept: PHARMACY | Facility: CLINIC | Age: 79
End: 2024-06-11

## 2024-06-11 NOTE — TELEPHONE ENCOUNTER
[facility] CLINICAL PHARMACY: ADHERENCE REVIEW  Identified care gap per [employer]: fills at [facility] [place]: ACE/ARB and Statin adherence      ASSESSMENT    ACE/ARB ADHERENCE    Insurance Records claims through 24 (Prior Year PDC = 94% - PASSED ; YTD PDC = 100%; Potential Fail Date: 24):   LOSARTAN/HCT -25  last filled on 24 for 90 day supply. Next refill due: 24    Prescribed si tablet/capsule daily    Per Insurer Portal: last filled on 24 for 90 day supply.     Per [facility]: will get 90 day supply ready to  since past due.This med shows refill was due 24 , Pt received 90 ds 24 it should be due 24     BP Readings from Last 3 Encounters:   24 138/88   24 (!) 142/62   24 136/84     Estimated Creatinine Clearance: 35 mL/min (A) (based on SCr of 1.51 mg/dL (H)).  Lab Results   Component Value Date    CREATININE 1.51 (H) 2024     Lab Results   Component Value Date    K 5.2 (H) 2024     STATIN ADHERENCE    Insurance Records claims through 24 (Prior Year PDC = 71% - FAILED; YTD PDC = 71%; Potential Fail Date: 24):   ATORVASTATIN TAB 40MG  last filled on 01.15.24 for 90 day supply. Next refill due: 24    Prescribed si tablet/capsule daily    Per Insurer Portal: last filled on same    Per [facility]: will get 90 day supply ready to  since past due.    Lab Results   Component Value Date    CHOL 261 (H) 2017    TRIG 231 (H) 2017    HDL 45 2024     Lab Results   Component Value Date    LDL 90 2024      ALT   Date Value Ref Range Status   2024 17 0 - 33 U/L Final     AST   Date Value Ref Range Status   2024 18 0 - 35 U/L Final     The ASCVD Risk score (Joshua GRAHAM, et al., 2019) failed to calculate for the following reasons:    The patient has a prior MI or stroke diagnosis       The following are interventions that have been identified:

## 2024-06-17 NOTE — BRIEF OP NOTE
Brief Postoperative Note      Patient: Zoya Martínez  YOB: 1945  MRN: [id number]    Date of Procedure: 1/17/2024    Pre-Op Diagnosis Codes:     * Spondylolisthesis of lumbar region [M43.16]     * Spinal stenosis of lumbar region with neurogenic claudication [M48.062]    Post-Op Diagnosis: Same       Procedure(s):  L 2-3, 3-4, 4-5 bilateral laminectomies microdissection decompressions L4-5 discectomy interbody cage posterior lateral allograft autogenous infuse pedicle screw fusion computer stereotactic    Surgeon(s):  Amy Villegas MD    Assistant:   Assistant: Laura Clark  Physician Assistant: Araceli Leigh PA-C    Anesthesia: General    Estimated Blood Loss (mL): 400    Complications: Durotomy repaired        Implants:  Implant Name Type Inv. Item Serial No.  Lot No. LRB No. Used Action   GRAFT DURA [id number] REABSORBABLE MTRX SUB FOR SFT TISS REP [id number]] [employer]] - [id number]  GRAFT DURA [id number] REABSORBABLE MTRX SUB FOR SFT TISS REP [id number]] [employer]]  [employer]- [id number] N/A 1 Implanted   GRAFT DURA [id number] REABSORBABLE MTRX SUB FOR SFT TISS REP [id number]] [employer]] - [id number]  GRAFT DURA [id number] REABSORBABLE MTRX SUB FOR SFT TISS REP [id number]] [employer]]  [employer]- [id number] N/A 1 Implanted   GRAFT BNE SUB 5ML MTRX CELLULAR OSTEOCEL + - [id number]  GRAFT BNE SUB 5ML MTRX CELLULAR OSTEOCEL + [id number] NUVASIVE [facility]-  N/A 1 Implanted   GRAFT DURA [id number] REABSORBABLE MTRX SUB FOR SFT TISS REP - [id number]  GRAFT DURA [id number] REABSORBABLE MTRX SUB FOR SFT TISS REP  [employer]- [id number] N/A 1 Implanted   KIT BNE GRFT XSM 1.4CC RHBMP-2 ABSRB CLLGN SPNG INFUSE - [id number]  KIT BNE GRFT XSM 1.4CC RHBMP-2 ABSRB CLLGN SPNG INFUSE  [employer] SPINALGRA[employer] TECH-WD [id number] N/A 1 Implanted   SPACER SPNL 8 DEG [id number] MM SABLE - [id number]  SPACER SPNL 8 DEG  [Initial] : an initial consultation for

## 2024-08-07 NOTE — ED NOTES
10/12/2023 New patient here with no PMH hx here as a hospital follow up for diverticulitis. Patient states this has never happened before. She has never had a colonoscopy before.  Went to [place] ER 10/12/2023 for LLQ severe abdominal pain. Intermittent. Exacerbated while laying on left side and on back. Radiated to her back. Had nausea and diarrhea. Had 1-2 BMs that were soft. Denies vomiting Had LLQ tenderness at PCP office who recommended she go to the ER on 10/11/2023. Labs showed WBC 14.3. CT scan showed acute uncomplicated sigmoid diverticulitis.  Was discharged with Amoxicillin 875 mg PO BID. Patient states she could not take medication because it was too big. Patient still having pain today. Same severity. Still nauseous.  No prior history of constipation.  10/23/2023 Patient is here as a follow-up visit for diverticulitis. She was prescribed Cipro 500 BID and Flagyl 500 mg TID by me on 10/12/2023. States she has been taking it as prescribed for the past 11 days and only missed one dose. She has 3 days left of both medications. She states her symptoms are resolving, and she appears to be in better condition than she was at the last visit. She reports no active LLQ abdominal pain today. She is having 3 formed BMs a day. They were loose stools at last visit. It typically happens after meals. Denies fever, chills, n/v, vomiting, and rectal bleeding.  01/08/2024 patient here as a follow up. She reports having formed stools daily 2-3 times. It is post-prandial and she states she has some urgency. Denies fecal incontinence, rectal bleeding and abdominal pain. Admits to "stomach fluttering". She reports incorporating healthier food options in her diet.  Denies straining. Admits to incomplete evacuation. She also had a dry cough spell that she went to see a pulmonologist about last month. She was placed on Omeprazole 40 mg and has a follow up next month. She states she is almost out of medication.  05/23/2024 Pt presents for f/u after colonoscopy. At last visit, she was continued on omeprazole 40 mg daily and was recommended to trial Benefiber/MiraLax for suspected constipation with overflow. Pt states she is doing well without bowel regimen. Typically has BM after she eats without straining.  She has continued on omeprazole 40 mg daily with some improvement in cough. She notes that some days she still has the cough, but less frequent. Denies heartburn, regurgitation, N/V.  07/24/2024 Pt presents to f/u on diverticulitis. She called in 7/23/24 with LLQ pain similar to previous episode of diverticulitis. With CT-confirmed history of diverticulitis in 10/2023, pt was empirically started on Augmentin; however pt noted that she reacted to amoxicillin in the past and so was sent Cipro + Flagyl instead. She was recommended bowel rest and OV f/u. Today, pt states symptoms started 2 days ago. She suddenly had pain in LLQ and across lower abdomen. Thought initially might be gas pains or constipation. She realized symptoms were similar to previous episode of diverticulitis. Pain waxed and waned, but eventually she decided to go to ED. She did not end up going into the ED. She took Tylenol throughout the night. She started taking antibiotics last night.  She admits to some lower appetite with symptoms. Denies associated fever, chills, N/V, rectal bleeding, diarrhea. Last BM was this morning.  She thinks she was having regular BMs before symptoms started; she did not feel constipated. She stopped taking the Benefiber since her colonoscopy because she thought she was told to stop it. She does not take any NSAIDs. She has not been taking anything for this episode of pain.  08/07/2024 Pt presents for f/u. At last visit, she was continued on cipro + Flagyl and recommended bowel rest. CMP, CBC, lipase ordered and normal. Restarting Benefiber and high fiber diet was recommended to prevent future episodes. Today, pt states her stomach is still "unsettles." She had some diarrhea on the antibiotics. No longer having pain. When she eats, she still feels the urge to have a BM. This happened last time she took antibiotics as well.  She started back on fiber supplement this week. She is drinking water throughout the day.  She started having the cough again.  She is concerned about her thyroid because of her abdomen feeling "tight." Has noticed some hair loss as well. Would like to check this today. Spoke with transfer center. Pt assigned bed 495-1. Number for report is [phone].       Bhaveshe Levo  02/04/21 [id number]

## 2024-08-14 DIAGNOSIS — R39.15 URINARY URGENCY: ICD-10-CM

## 2024-08-14 RX ORDER — SOLIFENACIN SUCCINATE 5 MG/1
5 TABLET, FILM COATED ORAL DAILY
Qty: 30 TABLET | Refills: 3 | Status: SHIPPED | OUTPATIENT
Start: 2024-08-14 | End: 2025-08-14

## 2024-08-14 NOTE — TELEPHONE ENCOUNTER
Comments:     Last Office Visit (last PCP visit):   3/25/2024    Next Visit Date:  Future Appointments   Date Time Provider Department Center   3/13/2025 12:30 PM Holiday, DO RIANA Gomez [facility]       **If hasn't been seen in over a year OR hasn't followed up according to last diabetes/ADHD visit, make appointment for patient before sending refill to provider.    Rx requested:  Requested Prescriptions     Pending Prescriptions Disp Refills    solifenacin (VESICARE) 5 MG tablet [Pharmacy Med Name: solifenacin 5 mg tablet] 30 tablet 3     Sig: Take 1 tablet by mouth daily

## 2024-08-23 DIAGNOSIS — M16.11 PRIMARY OSTEOARTHRITIS OF RIGHT HIP: ICD-10-CM

## 2024-09-04 ENCOUNTER — TELEPHONE (OUTPATIENT)
Dept: ORTHOPEDIC SURGERY | Facility: CLINIC | Age: 79
End: 2024-09-04

## 2024-09-19 ENCOUNTER — TELEPHONE (OUTPATIENT)
Dept: PHARMACY | Facility: CLINIC | Age: 79
End: 2024-09-19

## 2024-10-02 ENCOUNTER — TELEPHONE (OUTPATIENT)
Dept: INTERVENTIONAL RADIOLOGY/VASCULAR | Age: 79
End: 2024-10-02

## 2024-10-02 DIAGNOSIS — M16.11 PRIMARY OSTEOARTHRITIS OF RIGHT HIP: Primary | ICD-10-CM

## 2024-10-02 DIAGNOSIS — Z86.39 HX OF HYPERLIPIDEMIA: ICD-10-CM

## 2024-10-02 NOTE — TELEPHONE ENCOUNTER
Patient was given this information via phone conversation - voiced understanding  2.  Spoke to Nela in Specials to schedule procedure    >  Right hip fluoro guided steroid injection is scheduled on 10/9/2024 @ 11:00 am   >  You will need to arrive at 10:30 am from home and check in at the Diagnostic Imaging Check In desk.   >  Patient to have PT/INR and CBC drawn anytime between now and 1 day prior to procedure         96

## 2024-10-04 ENCOUNTER — HOSPITAL ENCOUNTER (OUTPATIENT)
Dept: LAB | Age: 79
Discharge: HOME | End: 2024-10-04
Payer: MEDICARE

## 2024-10-04 DIAGNOSIS — Z86.39 HX OF HYPERLIPIDEMIA: ICD-10-CM

## 2024-10-04 DIAGNOSIS — M16.11 PRIMARY OSTEOARTHRITIS OF RIGHT HIP: ICD-10-CM

## 2024-10-04 LAB
ERYTHROCYTE [DISTWIDTH] IN BLOOD BY AUTOMATED COUNT: 13.6 % (ref 11.5–14.5)
HCT VFR BLD AUTO: 39.4 % (ref 37–47)
HGB BLD-MCNC: 11.8 G/DL (ref 12–16)
INR PPP: 1
MCH RBC QN AUTO: 29.2 PG (ref 27–31.3)
MCHC RBC AUTO-ENTMCNC: 29.9 % (ref 33–37)
MCV RBC AUTO: 97.5 FL (ref 79.4–94.8)
PLATELET # BLD AUTO: 172 K/UL (ref 130–400)
PROTHROMBIN TIME: 13.5 SEC (ref 12.3–14.9)
RBC # BLD AUTO: 4.04 M/UL (ref 4.2–5.4)
WBC # BLD AUTO: 4.6 K/UL (ref 4.8–10.8)

## 2024-10-04 PROCEDURE — 85610 PROTHROMBIN TIME: CPT

## 2024-10-04 PROCEDURE — 85027 COMPLETE CBC AUTOMATED: CPT

## 2024-10-04 PROCEDURE — 36415 COLL VENOUS BLD VENIPUNCTURE: CPT

## 2024-10-09 ENCOUNTER — HOSPITAL ENCOUNTER (OUTPATIENT)
Dept: INTERVENTIONAL RADIOLOGY/VASCULAR | Age: 79
Discharge: HOME | End: 2024-10-11
Payer: MEDICARE

## 2024-10-09 VITALS
HEART RATE: 67 BPM | SYSTOLIC BLOOD PRESSURE: 170 MMHG | RESPIRATION RATE: 16 BRPM | DIASTOLIC BLOOD PRESSURE: 79 MMHG | OXYGEN SATURATION: 93 %

## 2024-10-09 DIAGNOSIS — M16.11 PRIMARY OSTEOARTHRITIS OF RIGHT HIP: ICD-10-CM

## 2024-10-09 PROCEDURE — 77002 NEEDLE LOCALIZATION BY XRAY: CPT

## 2024-10-09 PROCEDURE — 6360000004 HC RX CONTRAST MEDICATION: Performed by: RADIOLOGY

## 2024-10-09 PROCEDURE — 6360000002 HC RX W HCPCS: Performed by: RADIOLOGY

## 2024-10-09 PROCEDURE — 20610 DRAIN/INJ JOINT/BURSA W/O US: CPT

## 2024-10-09 PROCEDURE — 2500000003 HC RX 250 WO HCPCS: Performed by: RADIOLOGY

## 2024-10-09 RX ORDER — BUPIVACAINE HYDROCHLORIDE 5 MG/ML
INJECTION, SOLUTION PERINEURAL PRN
Status: COMPLETED | OUTPATIENT
Start: 2024-10-09 | End: 2024-10-09

## 2024-10-09 RX ORDER — TRIAMCINOLONE ACETONIDE 40 MG/ML
INJECTION, SUSPENSION INTRA-ARTICULAR; INTRAMUSCULAR PRN
Status: COMPLETED | OUTPATIENT
Start: 2024-10-09 | End: 2024-10-09

## 2024-10-09 RX ORDER — LIDOCAINE HYDROCHLORIDE 20 MG/ML
INJECTION, SOLUTION INFILTRATION; PERINEURAL PRN
Status: COMPLETED | OUTPATIENT
Start: 2024-10-09 | End: 2024-10-09

## 2024-10-09 RX ORDER — IOPAMIDOL 612 MG/ML
INJECTION, SOLUTION INTRATHECAL PRN
Status: COMPLETED | OUTPATIENT
Start: 2024-10-09 | End: 2024-10-09

## 2024-10-09 RX ADMIN — TRIAMCINOLONE ACETONIDE 40 MG: 40 INJECTION, SUSPENSION INTRA-ARTICULAR; INTRAMUSCULAR at 10:53

## 2024-10-09 RX ADMIN — IOPAMIDOL 1 ML: 612 INJECTION, SOLUTION INTRATHECAL at 10:52

## 2024-10-09 RX ADMIN — BUPIVACAINE HYDROCHLORIDE 3 ML: 5 INJECTION, SOLUTION PERINEURAL at 10:53

## 2024-10-09 RX ADMIN — LIDOCAINE HYDROCHLORIDE 8 ML: 20 INJECTION, SOLUTION INFILTRATION; PERINEURAL at 10:51

## 2024-10-09 NOTE — OR NURSING
NO SEDATION      Pt brought to specials via ambulation with cane, gait steady. Pt A&Ox4, respirations even and unlabored, visible skin WNL. Procedure explained and consent signed. Minimally assisted pt onto table in supine position. States pain 5/10.  History, allergies and medications verified. Vital signs obtained. Right hip area exposed and area is found to be reddened and some excoriation.     Dr. Robles marked right hip site using fluoro imaging.    Right hip site cleansed with chloraprep by KB-RN. After 3 minute dry time, prepped and draped in a sterile fashion by Dr. Robles.    Time out performed.     2% lidocaine used to numb procedure site, see eMar.     Spinal needle 20G x 6\" inserted and contrast given to confirm placement using fluoro guidance, see eMar.     Medication cocktail instilled through spinal needle under fluoro guidance, see eMar.     Pt tolerating procedure well and verbal support given throughout.     Needle removed, band aid applied, no bleeding noted.     Pt assisted off table. States no pain at this time. Discharge instructions provided and pt verbalized understanding.    JADE accompanied pt to changing room via w/c with her cane for d/c home. Pt spouse in [facility].Electronically signed by Britney Crow RN on 10/9/2024 at 10:55 AM

## 2024-10-10 NOTE — CARE COORDINATION
Definition and description of a heart attack explained. Brief overview of the heart anatomy reviewed with pt. CAD progression discussed. During a MI, lack of oxygen and damage to heart muscle explained. Symptoms of a MI reviewed. Pt. reports experiencing: shortness of breath and mid-sternal pressure. Post MI complications reviewed including arrhythmias, decreased cardiac output, and inflammation (pericarditis). Hospital course reviewed, including testing: Lab work, B/P, ECG, ECHO, Stress testing, and Heart Cath. Treatments also reviewed from medication, angioplasty and stenting, to CABG. Patient having a heart cath when cardiology determines that it is safe. Plan post discharge includes follow up with cardiology. Physical activity discussed including cardiac rehab. Pt advised to follow their physician's discharge instructions for activity restrictions, if any. Risk factors reviewed including: smoking, high cholesterol, HTN, DM, obesity, sedentary lifestyle, and stress. Pt. encouraged to make lifestyle changes to improve their health and lower these risk factors. Stress and depression were also discussed. S/S depression reviewed as well as encouragement to talk with someone if symptoms occur. Importance of follow up with the cardiologist reinforced. MI Zone booklet also reviewed. Goal is to keep patient in the \"green\" zone. She verbalizes understanding to call the doctor ASAP when experiencing symptoms in the \"yellow\" zone. Instructed to call 911 when S/S of \"red\" zone begin. Reminder to never drive after taking nitroglycerine. Copy of MI booklet and zone pamphlet provided to the patient for review. Offer for patient to verbalize any questions. All questions answered and patient denies further questions at this time.     Electronically signed by Ami Simmons RN on 3/13/2023 at 11:59 AM Normal for race

## 2024-10-30 ENCOUNTER — HOSPITAL ENCOUNTER (OUTPATIENT)
Dept: LAB | Age: 79
Discharge: HOME | End: 2024-10-30
Payer: MEDICARE

## 2024-10-30 LAB
ALBUMIN SERPL-MCNC: 3.9 G/DL (ref 3.5–4.6)
ANION GAP SERPL CALCULATED.3IONS-SCNC: 7 MEQ/L (ref 9–15)
BUN SERPL-MCNC: 27 MG/DL (ref 8–23)
CALCIUM SERPL-MCNC: 9.5 MG/DL (ref 8.5–9.9)
CHLORIDE SERPL-SCNC: 107 MEQ/L (ref 95–107)
CO2 SERPL-SCNC: 27 MEQ/L (ref 20–31)
CREAT SERPL-MCNC: 1.33 MG/DL (ref 0.5–0.9)
ERYTHROCYTE [DISTWIDTH] IN BLOOD BY AUTOMATED COUNT: 13.2 % (ref 11.5–14.5)
GLUCOSE SERPL-MCNC: 118 MG/DL (ref 70–99)
HCT VFR BLD AUTO: 38.6 % (ref 37–47)
HGB BLD-MCNC: 11.8 G/DL (ref 12–16)
MCH RBC QN AUTO: 29.6 PG (ref 27–31.3)
MCHC RBC AUTO-ENTMCNC: 30.6 % (ref 33–37)
MCV RBC AUTO: 97 FL (ref 79.4–94.8)
PHOSPHATE SERPL-MCNC: 4 MG/DL (ref 2.3–4.8)
PLATELET # BLD AUTO: 152 K/UL (ref 130–400)
POTASSIUM SERPL-SCNC: 5.3 MEQ/L (ref 3.4–4.9)
PTH-INTACT SERPL-MCNC: 92.4 PG/ML (ref 15–65)
RBC # BLD AUTO: 3.98 M/UL (ref 4.2–5.4)
SODIUM SERPL-SCNC: 141 MEQ/L (ref 135–144)
WBC # BLD AUTO: 5 K/UL (ref 4.8–10.8)

## 2024-10-30 PROCEDURE — 85027 COMPLETE CBC AUTOMATED: CPT

## 2024-10-30 PROCEDURE — 80069 RENAL FUNCTION PANEL: CPT

## 2024-10-30 PROCEDURE — 82306 VITAMIN D 25 HYDROXY: CPT

## 2024-10-30 PROCEDURE — 83970 ASSAY OF PARATHORMONE: CPT

## 2024-10-30 PROCEDURE — 36415 COLL VENOUS BLD VENIPUNCTURE: CPT

## 2024-10-31 LAB — VITAMIN D 25-HYDROXY: 22.4 NG/ML (ref 30–100)

## 2024-11-22 ENCOUNTER — OFFICE VISIT (OUTPATIENT)
Dept: FAMILY MEDICINE CLINIC | Age: 79
End: 2024-11-22

## 2024-11-22 VITALS
HEIGHT: 61 IN | BODY MASS INDEX: 42.29 KG/M2 | HEART RATE: 54 BPM | DIASTOLIC BLOOD PRESSURE: 62 MMHG | OXYGEN SATURATION: 96 % | WEIGHT: 224 LBS | SYSTOLIC BLOOD PRESSURE: 142 MMHG

## 2024-11-22 DIAGNOSIS — R39.15 URINARY URGENCY: ICD-10-CM

## 2024-11-22 DIAGNOSIS — D49.2 NEOPLASM OF SKIN OF FACE: Primary | ICD-10-CM

## 2024-11-22 RX ORDER — SOLIFENACIN SUCCINATE 10 MG/1
10 TABLET, FILM COATED ORAL DAILY
Qty: 90 TABLET | Refills: 3 | Status: SHIPPED | OUTPATIENT
Start: 2024-11-22

## 2024-11-22 RX ORDER — SOLIFENACIN SUCCINATE 5 MG/1
5 TABLET, FILM COATED ORAL DAILY
Qty: 30 TABLET | Refills: 3 | Status: CANCELLED | OUTPATIENT
Start: 2024-11-22 | End: 2025-11-22

## 2024-11-22 NOTE — PROGRESS NOTES
[facility] PRIMARY CARE  [address] [place] [address] WAY  [facility] [address]  Dept: [phone]  Dept Fax: [phone]     Chief Complaint:  Chief Complaint   Patient presents with    Lesion(s)     Spot on left cheek x 2 years, she states it scabs, then scab falls off, and then repeats the process over again.    Urinary Frequency     Vesicare does not seem to be helping much.       Vitals:    11/22/24 1255   BP: (!) 146/78   Pulse: 54   SpO2: 96%   Weight: 101.6 kg (224 lb)   Height: 1.549 m (5' 1\")       HPI:  79 y.o.female who presents for the following:      Face lesion: x2 years; cycles from hard sore to a scap and then repeats     -----------------------------------------------------------------------------    Assessment/Plan:  79 y.o. female here mainly for the following:  L face lesion  Highly suspicious for skin cancer  Sending to dermatology        ICD-10-CM    1. Neoplasm of skin of face  D49.2 LANCE - Reynaldo Mackenzie MD, Dermatology, [facility]      2. Urinary urgency  R39.15 solifenacin (VESICARE) 10 MG tablet          No follow-ups on file.    Romario Bazan MD      -----------------------------------------------------------------------------      Objective     Physical Exam:  Physical Exam  Vitals reviewed.   Constitutional:       General: She is not in acute distress.     Appearance: She is well-developed.   HENT:      Head: Normocephalic and atraumatic.     Cardiovascular:      Rate and Rhythm: Normal rate.   Pulmonary:      Effort: Pulmonary effort is normal. No respiratory distress.   Musculoskeletal:      Cervical back: Normal range of motion.   Skin:     General: Skin is warm and dry.   Neurological:      Mental Status: She is alert and oriented to person, place, and time.   Psychiatric:         Attention and Perception: Attention normal.         Behavior: Behavior normal.           Review of systems as noted in HPI    Past Medical History:   Diagnosis Date    Antral ulcer 01/14/2015

## 2025-03-13 ENCOUNTER — OFFICE VISIT (OUTPATIENT)
Dept: CARDIOLOGY CLINIC | Age: 80
End: 2025-03-13

## 2025-03-13 VITALS
WEIGHT: 224.4 LBS | OXYGEN SATURATION: 97 % | HEART RATE: 54 BPM | SYSTOLIC BLOOD PRESSURE: 132 MMHG | DIASTOLIC BLOOD PRESSURE: 64 MMHG | RESPIRATION RATE: 16 BRPM | BODY MASS INDEX: 42.4 KG/M2

## 2025-03-13 DIAGNOSIS — E78.2 MIXED HYPERLIPIDEMIA: ICD-10-CM

## 2025-03-13 DIAGNOSIS — I65.23 BILATERAL CAROTID ARTERY STENOSIS: ICD-10-CM

## 2025-03-13 DIAGNOSIS — E66.01 MORBID OBESITY DUE TO EXCESS CALORIES: ICD-10-CM

## 2025-03-13 DIAGNOSIS — I25.2 HISTORY OF ST ELEVATION MYOCARDIAL INFARCTION (STEMI): Primary | ICD-10-CM

## 2025-03-13 DIAGNOSIS — I10 ESSENTIAL HYPERTENSION: ICD-10-CM

## 2025-03-13 DIAGNOSIS — R01.1 HEART MURMUR: ICD-10-CM

## 2025-03-13 RX ORDER — ATORVASTATIN CALCIUM 40 MG/1
40 TABLET, FILM COATED ORAL NIGHTLY
Qty: 90 TABLET | Refills: 3 | Status: SHIPPED | OUTPATIENT
Start: 2025-03-13

## 2025-03-13 RX ORDER — LOSARTAN POTASSIUM AND HYDROCHLOROTHIAZIDE 25; 100 MG/1; MG/1
1 TABLET ORAL DAILY
Qty: 90 TABLET | Refills: 3 | Status: SHIPPED | OUTPATIENT
Start: 2025-03-13

## 2025-03-13 RX ORDER — METOPROLOL TARTRATE 50 MG
50 TABLET ORAL 2 TIMES DAILY
Qty: 180 TABLET | Refills: 3 | Status: SHIPPED | OUTPATIENT
Start: 2025-03-13

## 2025-03-13 NOTE — PROGRESS NOTES
Chief Complaint   Patient presents with    1 Year Follow Up     EKG 2/29/25    Coronary Artery Disease     CC: SOB, CAD    5-13-17: Hospital Course:   Zoya Martínez is a 71 y.o. female admitted to [facility] on 5/13/2017 for chest pain. After an EKG was done at [facility] was found to be having an acute MI patient was flown here by helicopter taken directly to cath lab to drug eluted stents were put in place ALP INE 3.5 mm x 23 mm and a 2.5 mm x 8 mm . Tolerated procedure well denied chest pain denied right wrist pain no distress     6-6-17: Patient presents for initial medical evaluation. Patient is followed on a regular basis by Romario Zazueta MD.   Pt denies chest pain, dyspnea, dyspnea on exertion, change in exercise capacity, fatigue,  nausea, vomiting, diarrhea, constipation, motor weakness, insomnia, weight loss, syncope, dizziness, lightheadedness, palpitations, PND, orthopnea, or claudication.  No nitro use. BP and hr are good. CAD is stable. No LE discoloration or ulcers. No LE edema. No CHF type symptoms. Lipid profile is abnormal. Was placed on statin during hosp. No bleeding issues.    S/p LHC with mild LAD diffuse disease, 99% mid CX, 80-90%90% proximal to mid RCA. S/p PCI to CX with 2 KEYSHA. Planned staging of RCA due to STEMI procedure.     s/p ECHO.    Conclusions   Summary   Normal mitral valve structure and function.   Normal aortic valve structure and function.   Normal tricuspid valve structure and function.   There is mild ( 1 +) tricuspid regurgitation with estimated RVSP of 38 mm   Hg.   Normal pulmonic valve structure and function.   Mildly dilated left atrium.   Left ventricular ejection fraction is visually estimated at 65%.   Impaired relaxation compatible with diastolic dysfunction. ( reversed E/A   ratio)   Mild concentric left ventricular hypertrophy.   Normal right atrium.   Normal right ventricle structure and function.   No evidence of pericardial

## 2025-03-21 DIAGNOSIS — I21.21 ST ELEVATION MYOCARDIAL INFARCTION INVOLVING LEFT CIRCUMFLEX CORONARY ARTERY (HCC): ICD-10-CM

## 2025-03-21 RX ORDER — DILTIAZEM HYDROCHLORIDE 240 MG/1
240 CAPSULE, COATED, EXTENDED RELEASE ORAL DAILY
Qty: 90 CAPSULE | Refills: 3 | Status: SHIPPED | OUTPATIENT
Start: 2025-03-21

## 2025-04-08 ENCOUNTER — HOSPITAL ENCOUNTER (OUTPATIENT)
Dept: ULTRASOUND IMAGING | Age: 80
Discharge: HOME | End: 2025-04-10
Attending: INTERNAL MEDICINE
Payer: MEDICARE

## 2025-04-08 ENCOUNTER — HOSPITAL ENCOUNTER (OUTPATIENT)
Age: 80
Discharge: HOME | End: 2025-04-10
Attending: INTERNAL MEDICINE
Payer: MEDICARE

## 2025-04-08 VITALS
HEIGHT: 62 IN | SYSTOLIC BLOOD PRESSURE: 180 MMHG | BODY MASS INDEX: 40.48 KG/M2 | DIASTOLIC BLOOD PRESSURE: 72 MMHG | WEIGHT: 220 LBS

## 2025-04-08 DIAGNOSIS — R01.1 HEART MURMUR: ICD-10-CM

## 2025-04-08 DIAGNOSIS — I65.23 BILATERAL CAROTID ARTERY STENOSIS: ICD-10-CM

## 2025-04-08 LAB
ECHO AV AREA PLAN/BSA: 0.35 CM2/M2
ECHO AV AREA PLAN: 0.7 CM2
ECHO AV CUSP MM: 0.9 CM
ECHO AV MEAN GRADIENT: 17 MMHG
ECHO AV MEAN VELOCITY: 2 M/S
ECHO AV PEAK GRADIENT: 31 MMHG
ECHO AV PEAK GRADIENT: 34 MMHG
ECHO AV PEAK VELOCITY: 2.8 M/S
ECHO AV PEAK VELOCITY: 2.9 M/S
ECHO AV VTI: 77.6 CM
ECHO BSA: 2.08 M2
ECHO BSA: 2.08 M2
ECHO EST RA PRESSURE: 10 MMHG
ECHO LA VOL A-L A2C: 87 ML (ref 22–52)
ECHO LA VOL A-L A4C: 96 ML (ref 22–52)
ECHO LA VOL MOD A2C: 79 ML (ref 22–52)
ECHO LA VOL MOD A4C: 90 ML (ref 22–52)
ECHO LA VOLUME AREA LENGTH: 95 ML
ECHO LA VOLUME INDEX A-L A2C: 44 ML/M2 (ref 16–34)
ECHO LA VOLUME INDEX A-L A4C: 48 ML/M2 (ref 16–34)
ECHO LA VOLUME INDEX AREA LENGTH: 48 ML/M2 (ref 16–34)
ECHO LA VOLUME INDEX MOD A2C: 40 ML/M2 (ref 16–34)
ECHO LA VOLUME INDEX MOD A4C: 45 ML/M2 (ref 16–34)
ECHO LV E' LATERAL VELOCITY: 6.87 CM/S
ECHO LV E' SEPTAL VELOCITY: 6.01 CM/S
ECHO LV EDV A2C: 124 ML
ECHO LV EDV A4C: 159 ML
ECHO LV EDV BP: 143 ML (ref 56–104)
ECHO LV EDV INDEX A4C: 80 ML/M2
ECHO LV EDV INDEX BP: 72 ML/M2
ECHO LV EDV NDEX A2C: 63 ML/M2
ECHO LV EF PHYSICIAN: 45 %
ECHO LV EJECTION FRACTION A2C: 46 %
ECHO LV EJECTION FRACTION A4C: 43 %
ECHO LV EJECTION FRACTION BIPLANE: 46 % (ref 55–100)
ECHO LV ESV A2C: 67 ML
ECHO LV ESV A4C: 91 ML
ECHO LV ESV BP: 78 ML (ref 19–49)
ECHO LV ESV INDEX A2C: 34 ML/M2
ECHO LV ESV INDEX A4C: 46 ML/M2
ECHO LV ESV INDEX BP: 39 ML/M2
ECHO LV FRACTIONAL SHORTENING: 22 % (ref 28–44)
ECHO LV INTERNAL DIMENSION DIASTOLE INDEX: 3.38 CM/M2
ECHO LV INTERNAL DIMENSION DIASTOLIC: 6.7 CM (ref 3.9–5.3)
ECHO LV INTERNAL DIMENSION SYSTOLIC INDEX: 2.63 CM/M2
ECHO LV INTERNAL DIMENSION SYSTOLIC: 5.2 CM
ECHO LV IVSD: 1.1 CM (ref 0.6–0.9)
ECHO LV IVSS: 1.2 CM
ECHO LV MASS 2D: 298.2 G (ref 67–162)
ECHO LV MASS INDEX 2D: 150.6 G/M2 (ref 43–95)
ECHO LV POSTERIOR WALL DIASTOLIC: 0.9 CM (ref 0.6–0.9)
ECHO LV POSTERIOR WALL SYSTOLIC: 1 CM
ECHO LV RELATIVE WALL THICKNESS RATIO: 0.27
ECHO LVOT AREA: 3.5 CM2
ECHO LVOT AV VTI INDEX: 0.24
ECHO LVOT DIAM: 2.1 CM
ECHO LVOT MEAN GRADIENT: 1 MMHG
ECHO LVOT PEAK GRADIENT: 2 MMHG
ECHO LVOT PEAK GRADIENT: 2 MMHG
ECHO LVOT PEAK VELOCITY: 0.7 M/S
ECHO LVOT PEAK VELOCITY: 0.7 M/S
ECHO LVOT STROKE VOLUME INDEX: 32.9 ML/M2
ECHO LVOT SV: 65.1 ML
ECHO LVOT VTI: 18.8 CM
ECHO MV A VELOCITY: 0.97 M/S
ECHO MV AREA PHT: 2.4 CM2
ECHO MV E DECELERATION TIME (DT): 246.5 MS
ECHO MV E VELOCITY: 1.14 M/S
ECHO MV E/A RATIO: 1.18
ECHO MV E/E' LATERAL: 16.59
ECHO MV E/E' RATIO (AVERAGED): 17.78
ECHO MV E/E' SEPTAL: 18.97
ECHO MV PRESSURE HALF TIME (PHT): 90.2 MS
ECHO MV REGURGITANT PEAK GRADIENT: 149 MMHG
ECHO MV REGURGITANT PEAK VELOCITY: 6.1 M/S
ECHO PV MAX VELOCITY: 1.3 M/S
ECHO PV PEAK GRADIENT: 7 MMHG
ECHO RIGHT VENTRICULAR SYSTOLIC PRESSURE (RVSP): 67 MMHG
ECHO RV INTERNAL DIMENSION: 1.2 CM
ECHO RV TAPSE: 2.1 CM (ref 1.7–?)
ECHO RVOT PEAK GRADIENT: 3 MMHG
ECHO RVOT PEAK VELOCITY: 0.9 M/S
ECHO TV REGURGITANT MAX VELOCITY: 3.79 M/S
ECHO TV REGURGITANT PEAK GRADIENT: 57 MMHG
VAS LEFT BULB EDV: 12.9 CM/S
VAS LEFT BULB PSV: 72 CM/S
VAS LEFT CCA DIST EDV: 13.5 CM/S
VAS LEFT CCA DIST PSV: 66.2 CM/S
VAS LEFT CCA MID EDV: 16.9 CM/S
VAS LEFT CCA MID PSV: 66.5 CM/S
VAS LEFT CCA PROX EDV: 12.8 CM/S
VAS LEFT CCA PROX PSV: 66 CM/S
VAS LEFT ECA EDV: 0 CM/S
VAS LEFT ECA PSV: 67.2 CM/S
VAS LEFT ICA DIST EDV: 14.6 CM/S
VAS LEFT ICA DIST PSV: 55.3 CM/S
VAS LEFT ICA MID EDV: 26.6 CM/S
VAS LEFT ICA MID PSV: 101.4 CM/S
VAS LEFT ICA PROX EDV: 23.1 CM/S
VAS LEFT ICA PROX PSV: 91.1 CM/S
VAS LEFT ICA/CCA PSV: 1.5 NO UNITS
VAS LEFT VERTEBRAL EDV: 7.7 CM/S
VAS LEFT VERTEBRAL PSV: 39 CM/S
VAS RIGHT BULB EDV: 13.4 CM/S
VAS RIGHT BULB PSV: 61.3 CM/S
VAS RIGHT CCA DIST EDV: 12 CM/S
VAS RIGHT CCA DIST PSV: 55.7 CM/S
VAS RIGHT CCA MID EDV: 13.3 CM/S
VAS RIGHT CCA MID PSV: 52.4 CM/S
VAS RIGHT CCA PROX EDV: 6.6 CM/S
VAS RIGHT CCA PROX PSV: 57.8 CM/S
VAS RIGHT ECA EDV: 0 CM/S
VAS RIGHT ECA PSV: 78.3 CM/S
VAS RIGHT ICA DIST EDV: 19.9 CM/S
VAS RIGHT ICA DIST PSV: 94.7 CM/S
VAS RIGHT ICA MID EDV: 25.8 CM/S
VAS RIGHT ICA MID PSV: 101.7 CM/S
VAS RIGHT ICA PROX EDV: 41.1 CM/S
VAS RIGHT ICA PROX PSV: 156.2 CM/S
VAS RIGHT ICA/CCA PSV: 3 NO UNITS
VAS RIGHT VERTEBRAL EDV: 23 CM/S
VAS RIGHT VERTEBRAL PSV: 66.9 CM/S

## 2025-04-08 PROCEDURE — 93306 TTE W/DOPPLER COMPLETE: CPT

## 2025-04-08 PROCEDURE — 93880 EXTRACRANIAL BILAT STUDY: CPT | Performed by: INTERNAL MEDICINE

## 2025-04-08 PROCEDURE — 93880 EXTRACRANIAL BILAT STUDY: CPT

## 2025-04-09 ENCOUNTER — TELEPHONE (OUTPATIENT)
Age: 80
End: 2025-04-09

## 2025-04-09 NOTE — TELEPHONE ENCOUNTER
----- Message from Dr. Jordan Boland, DO sent at 4/8/2025  4:34 PM EDT -----  Abn ECHO. Follow up with me only please.

## 2025-04-10 ENCOUNTER — RESULTS FOLLOW-UP (OUTPATIENT)
Age: 80
End: 2025-04-10

## 2025-04-24 ENCOUNTER — OFFICE VISIT (OUTPATIENT)
Dept: CARDIOLOGY CLINIC | Age: 80
End: 2025-04-24
Payer: MEDICARE

## 2025-04-24 VITALS
SYSTOLIC BLOOD PRESSURE: 120 MMHG | OXYGEN SATURATION: 94 % | DIASTOLIC BLOOD PRESSURE: 70 MMHG | WEIGHT: 226 LBS | HEART RATE: 64 BPM | BODY MASS INDEX: 42.01 KG/M2

## 2025-04-24 DIAGNOSIS — I25.10 CORONARY ARTERY DISEASE INVOLVING NATIVE CORONARY ARTERY OF NATIVE HEART WITHOUT ANGINA PECTORIS: ICD-10-CM

## 2025-04-24 DIAGNOSIS — I65.23 BILATERAL CAROTID ARTERY STENOSIS: ICD-10-CM

## 2025-04-24 DIAGNOSIS — I10 ESSENTIAL HYPERTENSION: ICD-10-CM

## 2025-04-24 DIAGNOSIS — R01.1 HEART MURMUR: Primary | ICD-10-CM

## 2025-04-24 DIAGNOSIS — I25.2 HISTORY OF ST ELEVATION MYOCARDIAL INFARCTION (STEMI): ICD-10-CM

## 2025-04-24 DIAGNOSIS — E66.01 MORBID OBESITY DUE TO EXCESS CALORIES (HCC): ICD-10-CM

## 2025-04-24 DIAGNOSIS — E78.2 MIXED HYPERLIPIDEMIA: ICD-10-CM

## 2025-04-24 PROCEDURE — 1159F MED LIST DOCD IN RCRD: CPT | Performed by: INTERNAL MEDICINE

## 2025-04-24 PROCEDURE — 3074F SYST BP LT 130 MM HG: CPT | Performed by: INTERNAL MEDICINE

## 2025-04-24 PROCEDURE — 1126F AMNT PAIN NOTED NONE PRSNT: CPT | Performed by: INTERNAL MEDICINE

## 2025-04-24 PROCEDURE — 99214 OFFICE O/P EST MOD 30 MIN: CPT | Performed by: INTERNAL MEDICINE

## 2025-04-24 PROCEDURE — 1123F ACP DISCUSS/DSCN MKR DOCD: CPT | Performed by: INTERNAL MEDICINE

## 2025-04-24 PROCEDURE — 3078F DIAST BP <80 MM HG: CPT | Performed by: INTERNAL MEDICINE

## 2025-04-24 NOTE — PROGRESS NOTES
Chief Complaint   Patient presents with    Abnormal Test Results     ECHOCARDIOGRAM     CC: SOB, CAD    5-13-17: Hospital Course:   Zoya Martínez is a 71 y.o. female admitted to [facility] on 5/13/2017 for chest pain. After an EKG was done at [facility] was found to be having an acute MI patient was flown here by helicopter taken directly to cath lab to drug eluted stents were put in place ALP INE 3.5 mm x 23 mm and a 2.5 mm x 8 mm . Tolerated procedure well denied chest pain denied right wrist pain no distress     6-6-17: Patient presents for initial medical evaluation. Patient is followed on a regular basis by Romario Zazueta MD.   Pt denies chest pain, dyspnea, dyspnea on exertion, change in exercise capacity, fatigue,  nausea, vomiting, diarrhea, constipation, motor weakness, insomnia, weight loss, syncope, dizziness, lightheadedness, palpitations, PND, orthopnea, or claudication.  No nitro use. BP and hr are good. CAD is stable. No LE discoloration or ulcers. No LE edema. No CHF type symptoms. Lipid profile is abnormal. Was placed on statin during hosp. No bleeding issues.    S/p LHC with mild LAD diffuse disease, 99% mid CX, 80-90%90% proximal to mid RCA. S/p PCI to CX with 2 KEYSHA. Planned staging of RCA due to STEMI procedure.     s/p ECHO.    Conclusions   Summary   Normal mitral valve structure and function.   Normal aortic valve structure and function.   Normal tricuspid valve structure and function.   There is mild ( 1 +) tricuspid regurgitation with estimated RVSP of 38 mm   Hg.   Normal pulmonic valve structure and function.   Mildly dilated left atrium.   Left ventricular ejection fraction is visually estimated at 65%.   Impaired relaxation compatible with diastolic dysfunction. ( reversed E/A   ratio)   Mild concentric left ventricular hypertrophy.   Normal right atrium.   Normal right ventricle structure and function.   No evidence of pericardial effusion.   No evidence

## 2025-07-25 ENCOUNTER — TELEPHONE (OUTPATIENT)
Dept: PHARMACY | Facility: CLINIC | Age: 80
End: 2025-07-25

## 2025-07-25 NOTE — TELEPHONE ENCOUNTER
[facility] CLINICAL PHARMACY: ADHERENCE REVIEW  Identified care gap per [employer]: fills at [facility] [place]: Statin adherence    Patient also appears to be prescribed: ACE/ARB        ASSESSMENT    STATIN ADHERENCE:     Insurance Records claims through  7/12/25 (Prior Year PDC = N/A;  YTD PDC = 73.77%; Potential Fail Date: 8/8/2025):     Atorvastatin 40mg last filled on 3/13/2025 for 90 day supply. Next refill due: 6/11/2025  Fill History: 1/15/2024, 6/11/2024, 9/19/2024, 3/13/2025 (#90/90ds each) - per Med Rec Dispense Report and payer data  Prescribed sig:  Take 1 (one) (whole) tablet by mouth once nightly  Last ordered on: 3/13/2025 #90, 3RF  Last Prescriber on Rx: Jordan Holiday  RX #: [id number]    Relevant PMH:   Hyperlipidemia, History of ST elevation myocardial infarction (STEMI) - 2017, Bilateral carotid artery stenosis,   Hx of STEMI/CAD s/p PCI. Has stage III CKD    LDL Target goal: <70 mg/dL- Clinical ASCVD    Last LDL: 90mg/dL 3/27/2024      Per Chart Notes:  5/4/2023: Per Insurer Portal: previous atorvastatin 80mg HALF tab daily, 90ds fills 6/19/23 and 9/29/22  - Per chart, had stopped / \"not taking\", then reordered at March hospital discharge          ACE/ARB ADHERENCE: (not currently a targeted med, but active in EMR)    Losartan-HCTZ 100-25mg last filled on 6/27/2025 for 90 day supply. Anticipated next refill due: 9/27/2025  Fill History: 2/29/24, 6/11/24, 919/24, 12/26/24, 3/13/25, 6/27/25 (90ds each) - per Med Rec Dispense Report and payer data  Prescribed sig:  Take 1 (one) (whole) tablet by mouth once daily  Last ordered on: 3/13/2025 #90, 3RF  Last Prescriber on Rx: Jordan Holiday          LABS    The ASCVD Risk score (Joshua GRAHAM, et al., 2019) failed to calculate for the following reasons:    Risk score cannot be calculated because patient has a medical history suggesting prior/existing ASCVD  Lab Results   Component Value Date    LDL 90 03/27/2024     Lab Results   Component Value Date    ALT 17

## 2025-07-30 NOTE — TELEPHONE ENCOUNTER
Second attempt:  Attempting to reach patient to review.  Left message asking for return call.     Letter sent.    Need to confirm if patient takes Atorvastatin 40mg as 1 (whole) tab daily versus 1/2 tab daily or every-other-day before initiating refills.      Patient OVERDUE refilling ATORVASTATIN and active on home medication list.   Need to confirm if patient is taking ATORVASTATIN differently than prescribed and need to obtain an updated order if so        Bo PowerD., [facility] Arizona Specialty HospitalCP  Population Health Pharmacist  [facility] Clinical Pharmacy  Department, toll free: [phone], option 1

## 2025-08-29 DIAGNOSIS — M16.11 PRIMARY OSTEOARTHRITIS OF RIGHT HIP: ICD-10-CM

## 2025-09-02 ENCOUNTER — TELEPHONE (OUTPATIENT)
Age: 80
End: 2025-09-02

## 2025-09-02 DIAGNOSIS — Z86.79 HISTORY OF HYPERTENSION: ICD-10-CM

## 2025-09-02 DIAGNOSIS — M16.11 PRIMARY OSTEOARTHRITIS OF RIGHT HIP: Primary | ICD-10-CM

## 2025-09-02 DIAGNOSIS — Z01.812 PRE-OPERATIVE LABORATORY EXAMINATION: ICD-10-CM

## 2025-09-02 DIAGNOSIS — Z86.39 HISTORY OF HYPERLIPIDEMIA, MIXED: ICD-10-CM

## 2025-09-03 ENCOUNTER — HOSPITAL ENCOUNTER (OUTPATIENT)
Dept: INTERVENTIONAL RADIOLOGY/VASCULAR | Age: 80
Discharge: HOME | End: 2025-09-05

## 2025-09-03 VITALS
SYSTOLIC BLOOD PRESSURE: 174 MMHG | RESPIRATION RATE: 16 BRPM | HEART RATE: 60 BPM | OXYGEN SATURATION: 92 % | DIASTOLIC BLOOD PRESSURE: 70 MMHG

## 2025-09-03 DIAGNOSIS — M16.11 PRIMARY OSTEOARTHRITIS OF RIGHT HIP: ICD-10-CM

## (undated) DEVICE — LABEL MED MINI W/ MARKER

## (undated) DEVICE — COVER,TABLE,44X90,STERILE: Brand: MEDLINE

## (undated) DEVICE — SUTURE PERMA-HAND 4-0 L18IN NONABSORBABLE BLK L13MM TF 1/2 M104T

## (undated) DEVICE — SUTURE VCRL SZ 2-0 L36IN ABSRB UD L36MM CT-1 1/2 CIR J945H

## (undated) DEVICE — ENDO CARRY-ON PROCEDURE KIT INCLUDES LUBRICANT, DEFENDO OLYMPUS AIR, WATER, SUCTION, BIOPSY VALVE KIT, ENZYMATIC SPONGE, AND BASIN.: Brand: ENDO CARRY-ON PROCEDURE KIT

## (undated) DEVICE — CONTAINER,SPECIMEN,OR STERILE,4OZ: Brand: MEDLINE

## (undated) DEVICE — FORCEPS BX L240CM JAW DIA2.4MM ORNG L CAP W/ NDL DISP RAD

## (undated) DEVICE — CODMAN® SURGICAL PATTIES 1/2" X 3" (1.27CM X 7.62CM): Brand: CODMAN®

## (undated) DEVICE — Device: Brand: STABLECUT®

## (undated) DEVICE — TOWEL,OR,DSP,ST,BLUE,STD,4/PK,20PK/CS: Brand: MEDLINE

## (undated) DEVICE — APPLICATOR MEDICATED 26 CC SOLUTION HI LT ORNG CHLORAPREP

## (undated) DEVICE — TOOL MR8-14BA40 MR8 14CM BALL 4MM: Brand: MIDAS REX MR8

## (undated) DEVICE — GLOVE ORANGE PI 7 1/2   MSG9075

## (undated) DEVICE — BANDAGE COMPR M W6INXL10YD WHT BGE VELC E MTRX HK AND LOOP

## (undated) DEVICE — BLADE SAW W125XL70MM THK064MM CUT THK094MM REPL RECIP DBL

## (undated) DEVICE — STERILE-Z SURGICAL PATIENT COVERS CLEAR POLYETHYLENE STERILE UNIVERSAL FIT 10 PER CASE: Brand: STERILE-Z

## (undated) DEVICE — DBD-PACK,EENT,SIRUS,PK II: Brand: MEDLINE

## (undated) DEVICE — FLOSEAL WITH RECOTHROM - 10ML.: Brand: FLOSEAL HEMOSTATIC MATRIX

## (undated) DEVICE — PADDING CAST W6INXL4YD RAYON UNDERCAST SOF-ROL

## (undated) DEVICE — INTENDED FOR TISSUE SEPARATION, AND OTHER PROCEDURES THAT REQUIRE A SHARP SURGICAL BLADE TO PUNCTURE OR CUT.: Brand: BARD-PARKER ® CARBON RIB-BACK BLADES

## (undated) DEVICE — SUTURE VCRL SZ 2-0 L27IN ABSRB UD L36MM CP-1 1/2 CIR REV J266H

## (undated) DEVICE — ADHESIVE SKIN CLSR 0.7ML TOP DERMBND ADV

## (undated) DEVICE — BLADE RMR L26MM PAT W/ PILOT H

## (undated) DEVICE — MARKER SURG SKIN GENTIAN VLT REG TIP W/ 6IN RUL

## (undated) DEVICE — ABSORBENT ROLL ECODRI-SAFE

## (undated) DEVICE — SUTURE VCRL SZ 3-0 L18IN ABSRB UD PS-2 L19MM 3/8 CRV PRIM J497H

## (undated) DEVICE — TOOL MR8-14MH30 MR8 14CM MATCH 3MM: Brand: MIDAS REX MR8

## (undated) DEVICE — SUTURE VCRL SZ 3-0 L36IN ABSRB UD L36MM CT-1 1/2 CIR J944H

## (undated) DEVICE — HYPODERMIC SAFETY NEEDLE: Brand: MAGELLAN

## (undated) DEVICE — GLOVE ORANGE PI 8   MSG9080

## (undated) DEVICE — GAUZE,SPONGE,4"X4",16PLY,XRAY,STRL,LF: Brand: MEDLINE

## (undated) DEVICE — CATHETER SCLERO L240CM NDL 25GA L4MM SHTH DIA2.3MM CNTRST

## (undated) DEVICE — NEEDLE SPINAL 22GA L3.5IN SPINOCAN

## (undated) DEVICE — MARKER SURG PASS SPHR NDI

## (undated) DEVICE — FAN SPRAY KIT: Brand: PULSAVAC®

## (undated) DEVICE — COUNTER NDL 40 COUNT HLD 70 FOAM BLK ADH W/ MAG

## (undated) DEVICE — MEDI-VAC NON-CONDUCTIVE SUCTION TUBING: Brand: CARDINAL HEALTH

## (undated) DEVICE — GLOVE ORANGE PI 7   MSG9070

## (undated) DEVICE — STANDARD HYPODERMIC NEEDLE,POLYPROPYLENE HUB: Brand: MONOJECT

## (undated) DEVICE — SCREWDRIVER SURG OD2MM HEX FEM CANN KNEE SET S STL NXGN

## (undated) DEVICE — SYRINGE MED 10ML TRNSLUC BRL PLUNG BLK MRK POLYPR CTRL

## (undated) DEVICE — 4-PORT MANIFOLD: Brand: NEPTUNE 2

## (undated) DEVICE — PACK PROCEDURE SURG TOTAL KNEE PACK

## (undated) DEVICE — KIT JACK TBL PT CARE

## (undated) DEVICE — ZIMMER® STERILE DISPOSABLE TOURNIQUET CUFF, DUAL PORT, SINGLE BLADDER, 34 IN. (86 CM)

## (undated) DEVICE — HEADLESS TROCHAR PIN 75MM: Brand: ZUK

## (undated) DEVICE — GLOVE ORTHO 7 1/2   MSG9475

## (undated) DEVICE — 1010 S-DRAPE TOWEL DRAPE 10/BX: Brand: STERI-DRAPE™

## (undated) DEVICE — SCREW BNE L25MM DIA2.5MM KNEE FULL THRD HEX FEM PERSONA
Type: IMPLANTABLE DEVICE | Site: KNEE | Status: NON-FUNCTIONAL
Removed: 2022-08-30

## (undated) DEVICE — NEURO: Brand: MEDLINE INDUSTRIES, INC.

## (undated) DEVICE — ELECTRODE PT RET AD L9FT HI MOIST COND ADH HYDRGEL CORDED

## (undated) DEVICE — DRAIN SURG 10FR 100% SIL RND END PERF W/ TRCR

## (undated) DEVICE — 3 BONE CEMENT MIXER WITH SPATULA: Brand: MIXEVAC, ACM

## (undated) DEVICE — SUTURE ETHBND EXCEL SZ 2 L30IN NONABSORBABLE GRN L40MM V-37 MX69G

## (undated) DEVICE — BW-412T DISP COMBO CLEANING BRUSH: Brand: SINGLE USE COMBINATION CLEANING BRUSH

## (undated) DEVICE — C-ARM: Brand: UNBRANDED

## (undated) DEVICE — SNARE ENDOSCP POLYP 2.4 MM 240 CM 10 MM 2.8 MM CAPTIVATOR

## (undated) DEVICE — KIT EVAC 400CC PVC RADPQ Y CONN

## (undated) DEVICE — SPLINT KNEE UNIV FOR LESS THAN 36IN L20IN FOAM LAM E CNTCT

## (undated) DEVICE — COVER MICSCP W46XL120IN 4 BINOC GLS LENS LEICA

## (undated) DEVICE — BLADE,CARBON-STEEL,11,STRL,DISPOSABLE,TB: Brand: MEDLINE

## (undated) DEVICE — ADAPTER FLSH PMP FLD MGMT GI IRRIG OFP 2 DISPOSABLE

## (undated) DEVICE — SYRINGE MED 30ML STD CLR PLAS LUERLOCK TIP N CTRL DISP

## (undated) DEVICE — TUBING, SUCTION, 9/32" X 12', STRAIGHT: Brand: MEDLINE INDUSTRIES, INC.

## (undated) DEVICE — SUTURE VCRL + SZ 1 L27IN ABSRB UD CT-1 L36MM 1/2 CIR TAPR VCPP40D

## (undated) DEVICE — DRESSING HYDROFIBER AQUACEL AG ADVANTAGE 3.5X12 IN

## (undated) DEVICE — 60-7070-007 TRNQT,DPSB,LL PURPLE: Brand: MEDLINE RENEWAL

## (undated) DEVICE — SUTURE ABSORBABLE ANTIBACT 1-0 CT-1 24 IN STRATAFIX PDS + SXPP1A443

## (undated) DEVICE — STERILE PATIENT PROTECTIVE PAD FOR IMP® KNEE POSITIONERS & COHESIVE WRAP (10 / CASE): Brand: DE MAYO KNEE POSITIONER®

## (undated) DEVICE — Device: Brand: ENDO SMARTCAP

## (undated) DEVICE — SPONGE,NEURO,0.5"X0.5",XR,STRL,10/PK: Brand: MEDLINE

## (undated) DEVICE — CONNECTOR TUBE BLU CHAN

## (undated) DEVICE — COTTON BALL ST

## (undated) DEVICE — 3M™ STERI-DRAPE™ U-DRAPE 1015: Brand: STERI-DRAPE™

## (undated) DEVICE — SUTURE MCRYL SZ 4-0 L27IN ABSRB UD L19MM PS-2 1/2 CIR PRIM Y426H

## (undated) DEVICE — KIT COLLCTN L2.5IN OD1.8IN BONE DUST S STL DISP SHEEHY MESH

## (undated) DEVICE — TRAYS TRANSPORT SCOPE OASIS W/LID

## (undated) DEVICE — TTL1LYR 16FR10ML 100%SIL TMPST TR: Brand: MEDLINE

## (undated) DEVICE — MAT FLR ABSRB ECODRI-SAFE

## (undated) DEVICE — TUBE SET 96 MM 64 MM H2O PERISTALTIC STD AUX CHANNEL

## (undated) DEVICE — ELECTRODE ES L275IN 275IN BLDE TIP COAT PTFE TEF W EVAC

## (undated) DEVICE — KAIRISON TUBING SET PNEUMATIC, (3000 MM), STERILE, DISPOSABLE, TO BE USED WITH: FK898R, PACKAGE OF 10 PIECES: Brand: KAIRISON

## (undated) DEVICE — SIPS DUAL 2 MINUTE TIP

## (undated) DEVICE — T-DRAPE,EXTREMITY,STERILE: Brand: MEDLINE

## (undated) DEVICE — SUTURE VCRL SZ 4-0 L18IN ABSRB UD L19MM PS-2 3/8 CIR PRIM J496H

## (undated) DEVICE — TUBE ENDOSCP COLON CHANNEL

## (undated) DEVICE — TRAY PREP DRY W/ PREM GLV 2 APPL 6 SPNG 2 UNDPD 1 OVERWRAP

## (undated) DEVICE — 450 ML BOTTLE OF 0.05% CHLORHEXIDINE GLUCONATE IN 99.95% STERILE WATER FOR IRRIGATION, USP AND APPLICATOR.: Brand: IRRISEPT ANTIMICROBIAL WOUND LAVAGE

## (undated) DEVICE — TRAP POLYP BALEEN

## (undated) DEVICE — SHEET,DRAPE,53X77,STERILE: Brand: MEDLINE

## (undated) DEVICE — SUTURE CHROMIC GUT SZ 4-0 L18IN ABSRB BRN L13MM P-3 3/8 CIR 1654G

## (undated) DEVICE — BLOCK BITE PEDIATRIC 14 MM MOUTHPIECE POLYETH ENDO-GUARD LTX 100428] BARD INC]

## (undated) DEVICE — BIPOLAR SEALER 23-113-1 AQM 2.3 OM NEURO: Brand: AQUAMANTYS ®

## (undated) DEVICE — SUTURE CHROMIC GUT 5/0 18 HE-3 D/A G1792K

## (undated) DEVICE — LIQUIBAND RAPID ADHESIVE 36/CS 0.8ML: Brand: MEDLINE

## (undated) DEVICE — SUTURE VCRL SZ 0 L27IN ABSRB UD L26MM CP-2 1/2 CIR SGL J870H